# Patient Record
Sex: FEMALE | Race: WHITE | NOT HISPANIC OR LATINO | Employment: OTHER | URBAN - METROPOLITAN AREA
[De-identification: names, ages, dates, MRNs, and addresses within clinical notes are randomized per-mention and may not be internally consistent; named-entity substitution may affect disease eponyms.]

---

## 2023-08-14 ENCOUNTER — HOSPITAL ENCOUNTER (OUTPATIENT)
Dept: DATA CONVERSION | Facility: HOSPITAL | Age: 73
Discharge: HOME | End: 2023-08-14

## 2023-08-14 DIAGNOSIS — I10 ESSENTIAL (PRIMARY) HYPERTENSION: ICD-10-CM

## 2023-08-14 DIAGNOSIS — E55.9 VITAMIN D DEFICIENCY, UNSPECIFIED: ICD-10-CM

## 2023-08-14 DIAGNOSIS — Z00.00 ENCOUNTER FOR GENERAL ADULT MEDICAL EXAMINATION WITHOUT ABNORMAL FINDINGS: ICD-10-CM

## 2023-08-14 DIAGNOSIS — E78.5 HYPERLIPIDEMIA, UNSPECIFIED: ICD-10-CM

## 2023-08-14 LAB
25(OH)D3 SERPL-MCNC: 73 NG/ML (ref 31–100)
ALBUMIN SERPL-MCNC: 4.4 GM/DL (ref 3.5–5)
ALBUMIN/GLOB SERPL: 1.8 RATIO (ref 1.5–3)
ALP BLD-CCNC: 71 U/L (ref 35–125)
ALT SERPL-CCNC: 20 U/L (ref 5–40)
ANION GAP SERPL CALCULATED.3IONS-SCNC: 12 MMOL/L (ref 0–19)
APPEARANCE PLAS: CLEAR
AST SERPL-CCNC: 23 U/L (ref 5–40)
BASOPHILS # BLD AUTO: 0.05 K/UL (ref 0–0.22)
BASOPHILS NFR BLD AUTO: 0.9 % (ref 0–1)
BILIRUB SERPL-MCNC: 0.9 MG/DL (ref 0.1–1.2)
BUN SERPL-MCNC: 16 MG/DL (ref 8–25)
BUN/CREAT SERPL: 20 RATIO (ref 8–21)
CALCIUM SERPL-MCNC: 9.4 MG/DL (ref 8.5–10.4)
CHLORIDE SERPL-SCNC: 105 MMOL/L (ref 97–107)
CHOLEST SERPL-MCNC: 159 MG/DL (ref 133–200)
CHOLEST/HDLC SERPL: 2.4 RATIO
CO2 SERPL-SCNC: 22 MMOL/L (ref 24–31)
COLOR SPUN FLD: YELLOW
CREAT SERPL-MCNC: 0.8 MG/DL (ref 0.4–1.6)
DEPRECATED RDW RBC AUTO: 45.9 FL (ref 37–54)
DIFFERENTIAL METHOD BLD: ABNORMAL
EOSINOPHIL # BLD AUTO: 0.2 K/UL (ref 0–0.45)
EOSINOPHIL NFR BLD: 3.5 % (ref 0–3)
ERYTHROCYTE [DISTWIDTH] IN BLOOD BY AUTOMATED COUNT: 12 % (ref 11.7–15)
FASTING STATUS PATIENT QL REPORTED: NORMAL
GFR SERPL CREATININE-BSD FRML MDRD: 78 ML/MIN/1.73 M2
GLOBULIN SER-MCNC: 2.5 G/DL (ref 1.9–3.7)
GLUCOSE SERPL-MCNC: 103 MG/DL (ref 65–99)
HCT VFR BLD AUTO: 45.7 % (ref 36–44)
HDLC SERPL-MCNC: 66 MG/DL
HGB BLD-MCNC: 15.4 GM/DL (ref 12–15)
IMM GRANULOCYTES # BLD AUTO: 0.01 K/UL (ref 0–0.1)
LDLC SERPL CALC-MCNC: 65 MG/DL (ref 65–130)
LYMPHOCYTES # BLD AUTO: 1.35 K/UL (ref 1.2–3.2)
LYMPHOCYTES NFR BLD MANUAL: 23.6 % (ref 20–40)
MCH RBC QN AUTO: 34.5 PG (ref 26–34)
MCHC RBC AUTO-ENTMCNC: 33.7 % (ref 31–37)
MCV RBC AUTO: 102.5 FL (ref 80–100)
MONOCYTES # BLD AUTO: 0.45 K/UL (ref 0–0.8)
MONOCYTES NFR BLD MANUAL: 7.9 % (ref 0–8)
NEUTROPHILS # BLD AUTO: 3.65 K/UL
NEUTROPHILS # BLD AUTO: 3.65 K/UL (ref 1.8–7.7)
NEUTROPHILS.IMMATURE NFR BLD: 0.2 % (ref 0–1)
NEUTS SEG NFR BLD: 63.9 % (ref 50–70)
NRBC BLD-RTO: 0 /100 WBC
PLATELET # BLD AUTO: 216 K/UL (ref 150–450)
PMV BLD AUTO: 9.5 CU (ref 7–12.6)
POTASSIUM SERPL-SCNC: 4.4 MMOL/L (ref 3.4–5.1)
PROT SERPL-MCNC: 6.9 G/DL (ref 5.9–7.9)
RBC # BLD AUTO: 4.46 M/UL (ref 4–4.9)
SODIUM SERPL-SCNC: 139 MMOL/L (ref 133–145)
TRIGL SERPL-MCNC: 138 MG/DL (ref 40–150)
TSH SERPL DL<=0.05 MIU/L-ACNC: 3.3 MIU/L (ref 0.27–4.2)
WBC # BLD AUTO: 5.7 K/UL (ref 4.5–11)

## 2023-08-23 ENCOUNTER — HOSPITAL ENCOUNTER (OUTPATIENT)
Dept: DATA CONVERSION | Facility: HOSPITAL | Age: 73
End: 2023-08-23

## 2023-08-23 DIAGNOSIS — C50.919 MALIGNANT NEOPLASM OF UNSPECIFIED SITE OF UNSPECIFIED FEMALE BREAST (MULTI): ICD-10-CM

## 2023-09-14 ENCOUNTER — HOSPITAL ENCOUNTER (OUTPATIENT)
Dept: DATA CONVERSION | Facility: HOSPITAL | Age: 73
Discharge: HOME | End: 2023-09-14
Payer: MEDICARE

## 2023-09-14 DIAGNOSIS — C50.212 MALIGNANT NEOPLASM OF UPPER-INNER QUADRANT OF LEFT FEMALE BREAST (MULTI): ICD-10-CM

## 2023-09-14 DIAGNOSIS — C50.919 MALIGNANT NEOPLASM OF UNSPECIFIED SITE OF UNSPECIFIED FEMALE BREAST (MULTI): ICD-10-CM

## 2023-09-16 VITALS
HEART RATE: 66 BPM | BODY MASS INDEX: 22.13 KG/M2 | OXYGEN SATURATION: 99 % | WEIGHT: 146 LBS | HEIGHT: 68 IN | RESPIRATION RATE: 16 BRPM | TEMPERATURE: 97.1 F

## 2023-10-04 ENCOUNTER — TELEPHONE (OUTPATIENT)
Dept: PRIMARY CARE | Facility: CLINIC | Age: 73
End: 2023-10-04
Payer: MEDICARE

## 2023-10-06 PROBLEM — E55.9 VITAMIN D DEFICIENCY: Status: ACTIVE | Noted: 2023-10-06

## 2023-10-06 PROBLEM — E21.3 HYPERPARATHYROIDISM (MULTI): Status: ACTIVE | Noted: 2023-10-06

## 2023-10-06 PROBLEM — M51.9 DISORDER OF INTERVERTEBRAL DISC OF LUMBAR SPINE: Status: ACTIVE | Noted: 2023-10-06

## 2023-10-06 PROBLEM — G62.9 NEUROPATHY: Status: ACTIVE | Noted: 2023-10-06

## 2023-10-06 PROBLEM — R20.0 NUMBNESS: Status: ACTIVE | Noted: 2023-10-06

## 2023-10-06 PROBLEM — G56.01 CARPAL TUNNEL SYNDROME OF RIGHT WRIST: Status: ACTIVE | Noted: 2023-10-06

## 2023-10-06 PROBLEM — K80.20 CHOLELITHIASIS: Status: ACTIVE | Noted: 2023-10-06

## 2023-10-06 PROBLEM — C50.919 MALIGNANT NEOPLASM OF FEMALE BREAST (MULTI): Status: ACTIVE | Noted: 2023-10-06

## 2023-10-06 PROBLEM — I10 HYPERTENSION: Status: ACTIVE | Noted: 2023-10-06

## 2023-10-06 PROBLEM — A60.1 HERPES SIMPLEX INFECTION OF PERIANAL SKIN: Status: ACTIVE | Noted: 2023-10-06

## 2023-10-06 PROBLEM — K64.8 OTHER HEMORRHOIDS: Status: ACTIVE | Noted: 2023-10-06

## 2023-10-06 PROBLEM — E78.5 HYPERLIPIDEMIA: Status: ACTIVE | Noted: 2023-10-06

## 2023-10-06 PROBLEM — G47.00 INSOMNIA: Status: ACTIVE | Noted: 2023-10-06

## 2023-10-06 PROBLEM — L25.9 CONTACT DERMATITIS: Status: ACTIVE | Noted: 2023-10-06

## 2023-10-06 PROBLEM — S82.832A CLOSED FRACTURE OF LEFT DISTAL FIBULA: Status: ACTIVE | Noted: 2023-10-06

## 2023-10-06 PROBLEM — K64.4 RESIDUAL HEMORRHOIDAL SKIN TAGS: Status: ACTIVE | Noted: 2023-10-06

## 2023-10-06 RX ORDER — ASPIRIN 81 MG/1
81 TABLET ORAL DAILY
COMMUNITY
Start: 2021-09-17 | End: 2024-04-24 | Stop reason: WASHOUT

## 2023-10-06 RX ORDER — ANASTROZOLE 1 MG/1
1 TABLET ORAL DAILY
COMMUNITY
Start: 2021-09-17 | End: 2023-10-24 | Stop reason: ALTCHOICE

## 2023-10-06 RX ORDER — VALACYCLOVIR HYDROCHLORIDE 500 MG/1
500 TABLET, FILM COATED ORAL DAILY
COMMUNITY
Start: 2022-09-22 | End: 2023-10-23 | Stop reason: ALTCHOICE

## 2023-10-06 RX ORDER — NAPROXEN SODIUM 220 MG/1
TABLET ORAL
COMMUNITY

## 2023-10-06 RX ORDER — TRIAMCINOLONE ACETONIDE 1 MG/G
OINTMENT TOPICAL 2 TIMES DAILY
COMMUNITY
Start: 2022-04-27 | End: 2023-10-23 | Stop reason: ALTCHOICE

## 2023-10-06 RX ORDER — ZOLEDRONIC ACID 0.04 MG/ML
4 INJECTION, SOLUTION INTRAVENOUS
COMMUNITY
End: 2023-10-23 | Stop reason: ALTCHOICE

## 2023-10-06 RX ORDER — GABAPENTIN 100 MG/1
100 CAPSULE ORAL
COMMUNITY
End: 2023-10-23 | Stop reason: ALTCHOICE

## 2023-10-06 RX ORDER — PRAVASTATIN SODIUM 20 MG/1
20 TABLET ORAL DAILY
COMMUNITY
End: 2023-10-23 | Stop reason: ALTCHOICE

## 2023-10-06 RX ORDER — SILVER SULFADIAZINE 10 G/1000G
1 CREAM TOPICAL DAILY
COMMUNITY
Start: 2022-09-27 | End: 2023-10-23 | Stop reason: ALTCHOICE

## 2023-10-06 RX ORDER — ATENOLOL 25 MG/1
25 TABLET ORAL DAILY
COMMUNITY
End: 2023-10-23 | Stop reason: ALTCHOICE

## 2023-10-06 RX ORDER — CHLORHEXIDINE GLUCONATE ORAL RINSE 1.2 MG/ML
SOLUTION DENTAL
COMMUNITY
Start: 2023-05-17 | End: 2023-10-23 | Stop reason: ALTCHOICE

## 2023-10-06 RX ORDER — ATORVASTATIN CALCIUM 10 MG/1
10 TABLET, FILM COATED ORAL DAILY
COMMUNITY
End: 2023-12-15

## 2023-10-06 RX ORDER — TURMERIC ROOT EXTRACT 500 MG
TABLET ORAL
COMMUNITY
End: 2023-10-23 | Stop reason: ALTCHOICE

## 2023-10-06 RX ORDER — ERGOCALCIFEROL (VITAMIN D2) 10 MCG
1 TABLET ORAL DAILY
COMMUNITY

## 2023-10-06 RX ORDER — LORATADINE 10 MG/1
10 TABLET ORAL DAILY
COMMUNITY
End: 2024-03-19 | Stop reason: WASHOUT

## 2023-10-06 RX ORDER — GLUCOSAM/CHONDRO/HERB 149/HYAL 750-100 MG
1 TABLET ORAL DAILY
COMMUNITY
End: 2023-10-23 | Stop reason: ALTCHOICE

## 2023-10-06 RX ORDER — ATENOLOL 50 MG/1
50 TABLET ORAL DAILY
COMMUNITY
End: 2023-10-24 | Stop reason: ALTCHOICE

## 2023-10-06 RX ORDER — LISINOPRIL 10 MG/1
10 TABLET ORAL DAILY
COMMUNITY
Start: 2023-08-14 | End: 2023-12-15

## 2023-10-06 RX ORDER — ACYCLOVIR 800 MG/1
800 TABLET ORAL DAILY
COMMUNITY
End: 2023-10-27 | Stop reason: ALTCHOICE

## 2023-10-06 RX ORDER — METRONIDAZOLE 7.5 MG/G
1 CREAM TOPICAL 2 TIMES DAILY
COMMUNITY
Start: 2023-05-31 | End: 2023-10-23 | Stop reason: ALTCHOICE

## 2023-10-22 PROBLEM — I10 ESSENTIAL HYPERTENSION: Status: RESOLVED | Noted: 2023-10-06 | Resolved: 2023-10-22

## 2023-10-22 PROBLEM — A60.1 HERPESVIRAL INFECTION OF PERIANAL SKIN AND RECTUM: Status: ACTIVE | Noted: 2023-10-22

## 2023-10-22 PROBLEM — K64.9 HEMORRHOIDS: Status: ACTIVE | Noted: 2023-10-06

## 2023-10-22 PROBLEM — R93.1 ABNORMAL FINDINGS ON DIAGNOSTIC IMAGING OF HEART AND CORONARY CIRCULATION: Status: ACTIVE | Noted: 2023-10-22

## 2023-10-23 ENCOUNTER — HOSPITAL ENCOUNTER (EMERGENCY)
Facility: HOSPITAL | Age: 73
Discharge: HOME | End: 2023-10-23
Attending: EMERGENCY MEDICINE
Payer: MEDICARE

## 2023-10-23 ENCOUNTER — OFFICE VISIT (OUTPATIENT)
Dept: CARDIOLOGY | Facility: CLINIC | Age: 73
End: 2023-10-23
Payer: MEDICARE

## 2023-10-23 VITALS
OXYGEN SATURATION: 97 % | SYSTOLIC BLOOD PRESSURE: 137 MMHG | TEMPERATURE: 98.7 F | RESPIRATION RATE: 18 BRPM | DIASTOLIC BLOOD PRESSURE: 85 MMHG | HEIGHT: 67 IN | HEART RATE: 91 BPM | WEIGHT: 148.2 LBS | BODY MASS INDEX: 23.26 KG/M2

## 2023-10-23 VITALS
BODY MASS INDEX: 23.88 KG/M2 | WEIGHT: 152.12 LBS | HEIGHT: 67 IN | RESPIRATION RATE: 16 BRPM | HEART RATE: 94 BPM | SYSTOLIC BLOOD PRESSURE: 112 MMHG | OXYGEN SATURATION: 100 % | TEMPERATURE: 97.7 F | DIASTOLIC BLOOD PRESSURE: 73 MMHG

## 2023-10-23 DIAGNOSIS — I20.89 ANGINA OF EFFORT (CMS-HCC): ICD-10-CM

## 2023-10-23 DIAGNOSIS — R06.02 SHORTNESS OF BREATH: ICD-10-CM

## 2023-10-23 DIAGNOSIS — E78.2 MIXED HYPERLIPIDEMIA: Primary | ICD-10-CM

## 2023-10-23 DIAGNOSIS — I48.91 ATRIAL FIBRILLATION WITH RVR (MULTI): ICD-10-CM

## 2023-10-23 DIAGNOSIS — I48.91 ATRIAL FIBRILLATION, UNSPECIFIED TYPE (MULTI): Primary | ICD-10-CM

## 2023-10-23 DIAGNOSIS — I10 ESSENTIAL HYPERTENSION: ICD-10-CM

## 2023-10-23 DIAGNOSIS — I10 PRIMARY HYPERTENSION: ICD-10-CM

## 2023-10-23 LAB
ALBUMIN SERPL-MCNC: 4.6 G/DL (ref 3.5–5)
ALP BLD-CCNC: 73 U/L (ref 35–125)
ALT SERPL-CCNC: 32 U/L (ref 5–40)
ANION GAP SERPL CALC-SCNC: 11 MMOL/L
AST SERPL-CCNC: 29 U/L (ref 5–40)
BASOPHILS # BLD AUTO: 0.06 X10*3/UL (ref 0–0.1)
BASOPHILS NFR BLD AUTO: 0.8 %
BILIRUB SERPL-MCNC: 0.7 MG/DL (ref 0.1–1.2)
BUN SERPL-MCNC: 20 MG/DL (ref 8–25)
CALCIUM SERPL-MCNC: 9.5 MG/DL (ref 8.5–10.4)
CHLORIDE SERPL-SCNC: 103 MMOL/L (ref 97–107)
CO2 SERPL-SCNC: 24 MMOL/L (ref 24–31)
CREAT SERPL-MCNC: 0.9 MG/DL (ref 0.4–1.6)
EOSINOPHIL # BLD AUTO: 0.15 X10*3/UL (ref 0–0.4)
EOSINOPHIL NFR BLD AUTO: 2 %
ERYTHROCYTE [DISTWIDTH] IN BLOOD BY AUTOMATED COUNT: 12.7 % (ref 11.5–14.5)
GFR SERPL CREATININE-BSD FRML MDRD: 68 ML/MIN/1.73M*2
GLUCOSE SERPL-MCNC: 114 MG/DL (ref 65–99)
HCT VFR BLD AUTO: 46.5 % (ref 36–46)
HGB BLD-MCNC: 15.7 G/DL (ref 12–16)
IMM GRANULOCYTES # BLD AUTO: 0.02 X10*3/UL (ref 0–0.5)
IMM GRANULOCYTES NFR BLD AUTO: 0.3 % (ref 0–0.9)
LYMPHOCYTES # BLD AUTO: 1.78 X10*3/UL (ref 0.8–3)
LYMPHOCYTES NFR BLD AUTO: 23.5 %
MAGNESIUM SERPL-MCNC: 2.3 MG/DL (ref 1.6–3.1)
MCH RBC QN AUTO: 34.7 PG (ref 26–34)
MCHC RBC AUTO-ENTMCNC: 33.8 G/DL (ref 32–36)
MCV RBC AUTO: 103 FL (ref 80–100)
MONOCYTES # BLD AUTO: 0.55 X10*3/UL (ref 0.05–0.8)
MONOCYTES NFR BLD AUTO: 7.3 %
NEUTROPHILS # BLD AUTO: 5.02 X10*3/UL (ref 1.6–5.5)
NEUTROPHILS NFR BLD AUTO: 66.1 %
NRBC BLD-RTO: 0 /100 WBCS (ref 0–0)
PLATELET # BLD AUTO: 230 X10*3/UL (ref 150–450)
PMV BLD AUTO: 9.3 FL (ref 7.5–11.5)
POTASSIUM SERPL-SCNC: 4.8 MMOL/L (ref 3.4–5.1)
PROT SERPL-MCNC: 6.8 G/DL (ref 5.9–7.9)
RBC # BLD AUTO: 4.52 X10*6/UL (ref 4–5.2)
SODIUM SERPL-SCNC: 138 MMOL/L (ref 133–145)
TSH SERPL DL<=0.05 MIU/L-ACNC: 2.46 MIU/L (ref 0.27–4.2)
WBC # BLD AUTO: 7.6 X10*3/UL (ref 4.4–11.3)

## 2023-10-23 PROCEDURE — 99284 EMERGENCY DEPT VISIT MOD MDM: CPT | Mod: 25

## 2023-10-23 PROCEDURE — 93000 ELECTROCARDIOGRAM COMPLETE: CPT | Performed by: INTERNAL MEDICINE

## 2023-10-23 PROCEDURE — 99204 OFFICE O/P NEW MOD 45 MIN: CPT | Performed by: INTERNAL MEDICINE

## 2023-10-23 PROCEDURE — 1126F AMNT PAIN NOTED NONE PRSNT: CPT | Performed by: INTERNAL MEDICINE

## 2023-10-23 PROCEDURE — 84443 ASSAY THYROID STIM HORMONE: CPT | Performed by: EMERGENCY MEDICINE

## 2023-10-23 PROCEDURE — 80053 COMPREHEN METABOLIC PANEL: CPT | Performed by: EMERGENCY MEDICINE

## 2023-10-23 PROCEDURE — 1036F TOBACCO NON-USER: CPT | Performed by: INTERNAL MEDICINE

## 2023-10-23 PROCEDURE — 2500000001 HC RX 250 WO HCPCS SELF ADMINISTERED DRUGS (ALT 637 FOR MEDICARE OP): Performed by: EMERGENCY MEDICINE

## 2023-10-23 PROCEDURE — 83735 ASSAY OF MAGNESIUM: CPT | Performed by: EMERGENCY MEDICINE

## 2023-10-23 PROCEDURE — 36415 COLL VENOUS BLD VENIPUNCTURE: CPT | Performed by: EMERGENCY MEDICINE

## 2023-10-23 PROCEDURE — 96374 THER/PROPH/DIAG INJ IV PUSH: CPT

## 2023-10-23 PROCEDURE — 3079F DIAST BP 80-89 MM HG: CPT | Performed by: INTERNAL MEDICINE

## 2023-10-23 PROCEDURE — 2500000005 HC RX 250 GENERAL PHARMACY W/O HCPCS: Performed by: EMERGENCY MEDICINE

## 2023-10-23 PROCEDURE — 3075F SYST BP GE 130 - 139MM HG: CPT | Performed by: INTERNAL MEDICINE

## 2023-10-23 PROCEDURE — 1159F MED LIST DOCD IN RCRD: CPT | Performed by: INTERNAL MEDICINE

## 2023-10-23 PROCEDURE — 85025 COMPLETE CBC W/AUTO DIFF WBC: CPT | Performed by: EMERGENCY MEDICINE

## 2023-10-23 RX ORDER — DILTIAZEM HYDROCHLORIDE 120 MG/1
120 CAPSULE, COATED, EXTENDED RELEASE ORAL 2 TIMES DAILY
Qty: 60 CAPSULE | Refills: 0 | Status: SHIPPED | OUTPATIENT
Start: 2023-10-23 | End: 2023-10-27 | Stop reason: SDUPTHER

## 2023-10-23 RX ORDER — REGADENOSON 0.08 MG/ML
0.4 INJECTION, SOLUTION INTRAVENOUS
Status: CANCELLED | OUTPATIENT
Start: 2023-10-23

## 2023-10-23 RX ORDER — DILTIAZEM HYDROCHLORIDE 5 MG/ML
15 INJECTION INTRAVENOUS ONCE
Status: COMPLETED | OUTPATIENT
Start: 2023-10-23 | End: 2023-10-23

## 2023-10-23 RX ORDER — DILTIAZEM HYDROCHLORIDE 120 MG/1
120 CAPSULE, COATED, EXTENDED RELEASE ORAL DAILY
Status: DISCONTINUED | OUTPATIENT
Start: 2023-10-23 | End: 2023-10-23 | Stop reason: HOSPADM

## 2023-10-23 RX ADMIN — DILTIAZEM HYDROCHLORIDE 120 MG: 120 CAPSULE, COATED, EXTENDED RELEASE ORAL at 14:07

## 2023-10-23 RX ADMIN — DILTIAZEM HYDROCHLORIDE 15 MG: 5 INJECTION INTRAVENOUS at 12:22

## 2023-10-23 RX ADMIN — APIXABAN 5 MG: 5 TABLET, FILM COATED ORAL at 14:07

## 2023-10-23 ASSESSMENT — COLUMBIA-SUICIDE SEVERITY RATING SCALE - C-SSRS
2. HAVE YOU ACTUALLY HAD ANY THOUGHTS OF KILLING YOURSELF?: NO
1. IN THE PAST MONTH, HAVE YOU WISHED YOU WERE DEAD OR WISHED YOU COULD GO TO SLEEP AND NOT WAKE UP?: NO
6. HAVE YOU EVER DONE ANYTHING, STARTED TO DO ANYTHING, OR PREPARED TO DO ANYTHING TO END YOUR LIFE?: NO

## 2023-10-23 ASSESSMENT — PATIENT HEALTH QUESTIONNAIRE - PHQ9
2. FEELING DOWN, DEPRESSED OR HOPELESS: NOT AT ALL
SUM OF ALL RESPONSES TO PHQ9 QUESTIONS 1 AND 2: 0
1. LITTLE INTEREST OR PLEASURE IN DOING THINGS: NOT AT ALL

## 2023-10-23 ASSESSMENT — PAIN SCALES - GENERAL
PAINLEVEL_OUTOF10: 0 - NO PAIN
PAINLEVEL: 0-NO PAIN
PAINLEVEL_OUTOF10: 0 - NO PAIN

## 2023-10-23 ASSESSMENT — PAIN - FUNCTIONAL ASSESSMENT
PAIN_FUNCTIONAL_ASSESSMENT: 0-10
PAIN_FUNCTIONAL_ASSESSMENT: 0-10

## 2023-10-23 ASSESSMENT — ENCOUNTER SYMPTOMS
LOSS OF SENSATION IN FEET: 1
DEPRESSION: 0
OCCASIONAL FEELINGS OF UNSTEADINESS: 1

## 2023-10-23 ASSESSMENT — PAIN DESCRIPTION - PROGRESSION: CLINICAL_PROGRESSION: NOT CHANGED

## 2023-10-23 NOTE — LETTER
October 23, 2023     Vishnu Ordoñez DO  [address]  [address]  [facility] [address]    Patient: Radha Montalvo   YOB: 1950   Date of Visit: 10/23/2023       Dear Dr. Vishnu Ordoñez, :    Thank you for referring Radha Montalvo to me for evaluation. Below are my notes for this consultation.  If you have questions, please do not hesitate to call me. I look forward to following your patient along with you.       Sincerely,     Paulo Dougherty MD      CC: Shemar Wang MD  ______________________________________________________________________________________    Subjective  Chief Complaint   Patient presents with   • Establish Care     Mrs\Ms. Montalvo is present to establish relationship with Dr. Dougherty for Followup  after referral from Dr. Ordoñez        Patient with a history of hyperlipidemia currently on Lipitor 10 mg once a day, also on atenolol 50 mg once a day  On lisinopril 10 mg once a day patient has seen  Physician had a coronary calcium score done which is about 2800.  No shortness of breath with exertion here for underlying evaluation of CAD.  No active angina CHF and symptoms unlikely chronic stable angina.  Better with rest.  Patient had EKG done about 5 years ago essentially showed sinus rhythm with a prolonged PA interval.    Patient had a repeat EKG done in the office found having atrial fibrillation rate about 126 bpm  Advised patient go to [facility] emergency room for rate control medication as well as anticoagulation.    Past Medical History:   Diagnosis Date   • Abnormal findings on diagnostic imaging of heart and coronary circulation 10/22/2023   • Cancer (CMS/HCC) 2013   • Essential hypertension 10/06/2023   • Hyperlipidemia 10/06/2023   • Hypertension 10/06/2023   • Personal history of other diseases of the musculoskeletal system and connective tissue 10/06/2023    History of arthritis     Past Surgical History:   Procedure Laterality Date   • BI MAMMO GUIDED  LOCALIZATION BREAST LEFT Left 2/25/2013    BI MAMMO GUIDED LOCALIZATION BREAST LEFT [facility]   • OTHER SURGICAL HISTORY  04/27/2022    Lumpectomy     Family medical history includes stroke in father.  Current Outpatient Medications   Medication Sig Dispense Refill   • acyclovir (Zovirax) 800 mg tablet Take 1 tablet (800 mg) by mouth once daily.     • aspirin 81 mg EC tablet Take 1 tablet (81 mg) by mouth once daily.     • atenolol (Tenormin) 50 mg tablet Take 1 tablet (50 mg) by mouth once daily.     • atorvastatin (Lipitor) 10 mg tablet Take 1 tablet (10 mg) by mouth once daily.     • cholecalciferol (VITAMIN D-3) 10 mcg (400 unit) tablet Take 1 capsule by mouth once daily.     • lisinopril 10 mg tablet Take 1 tablet (10 mg) by mouth once daily.     • loratadine (Claritin) 10 mg tablet Take 1 tablet (10 mg) by mouth once daily.     • omega 3-dha-epa-fish oil (Fish OiL) 1,200 (144-216) mg capsule 1.5 capsules Orally     • vit B complex no.12/niacin,B3, (VITAMIN B COMPLEX NO.12-NIACIN ORAL) Take by mouth.  Vitamin B 12 TABS     • anastrozole (Arimidex) 1 mg tablet Take 1 tablet (1 mg total) by mouth once daily.     • atenolol (Tenormin) 25 mg tablet Take 1 tablet (25 mg) by mouth once daily.     • CALCIUM ORAL 1 tab Oral     • chlorhexidine (Peridex) 0.12 % solution RINSE WITH 1/2 OUNCE TWICE A DAY FOR 30 SECONDS. SPIT DO NOT SWALLOW     • gabapentin (Neurontin) 100 mg capsule Take 1 capsule (100 mg) by mouth.     • metroNIDAZOLE (Metrocream) 0.75 % cream Apply 1 Application topically 2 times a day. to affected area     • omega 3-dha-epa-fish oil (Fish OiL) 1,000 mg (120 mg-180 mg) capsule Take 1 capsule (1,000 mg) by mouth once daily.     • pravastatin (Pravachol) 20 mg tablet Take 1 tablet (20 mg) by mouth once daily.     • silver sulfADIAZINE (Silvadene) 1 % cream Apply 1 Application topically once daily.     • triamcinolone (Kenalog) 0.1 % ointment twice a day.  APPLY AND GENTLY MASSAGE INTO AFFECTED  "AREA(S) TWICE DAILY.     • tumeric-ging-olive-oreg-capryl 100 mg-150 mg- 50 mg-150 mg capsule 1 cap     • turmeric root extract 500 mg tablet Take by mouth.  Turmeric TABS     • Valtrex 500 mg tablet Take 1 tablet (500 mg) by mouth once daily.     • zoledronic acid (Zometa) 4 mg/100 mL piggyback Infuse 100 mL (4 mg) into a venous catheter.  100 ml Intravenous       No current facility-administered medications for this visit.      reports that she has never smoked. She has never been exposed to tobacco smoke. She has never used smokeless tobacco. She reports current alcohol use of about 10.0 standard drinks of alcohol per week. She reports that she does not use drugs.  Patient has no known allergies.  * No diagnoses found *    Vitals:    10/23/23 1019   BP: 137/85   Pulse: 91   Resp: 18   Temp: 37.1 °C (98.7 °F)   TempSrc: Core   SpO2: 97%   Weight: 67.2 kg (148 lb 3.2 oz)   Height: 1.702 m (5' 7\")   PainSc: 0-No pain      BMI:Body mass index is 23.21 kg/m².   General Cardiology:  General Appearance: Alert, oriented and in no acute distress.  HEENT: extra ocular movements intact (EOMI), pupils equal,  round, reactive to light and accommodation (PERRLA).  Carotid Upstroke: no bruit, normal.  Jugular Venous Distention (JVD): flat.  Chest: normal.  Lungs: Clear to auscultation,   Heart Sounds: no S3 or S4, fast S1, S2, irregular rate.  Murmur, Click, Gallop: Soft systolic murmur.  Abdomen: no hepatomegaly, no masses felt, soft.  Extremities: Trace leg edema.  Peripheral pulses: 2 plus bilateral.  NEUROLOGY Cranial nerves II-XII grossly intact.     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  Calcium score was about 2800.  No active chest pain but patient shortness of breath dyspnea exertion.  More likely clinical angina.  Currently on a Lipitor, hypertensive blood pressure medication we will schedule patient for pharmacological stress test with modify risk factors.  Continue current beta-blocker, lisinopril as well as Lipitor.  Advised patient take a baby aspirin 81 mg once a day.    Diet and exercise reviewed with patient..advice to walk about 10,000 steps or about 2 hours during day time. Cut back on salt, sugar and flour. Advised patient to check blood pressure twice a day for next 10 days and give us a call if her blood pressure remains above 170 systolic and diastolic above 95.  Meanwhile cut back on salt, caffeine.  If blood pressure persistently stays above 200 systolic then go to nearest emergency room or call 911.  Aneurysmal dilatation ascending thoracic aorta up to 4.5 cm.  Will refer patient for CT surgery for ascending aortic enlargement 4.5 cm.  No history of smoking.    Paulo Dougherty MD

## 2023-10-23 NOTE — DISCHARGE INSTRUCTIONS
You have been diagnosed with atrial fibrillation.  Begin taking Eliquis and Cardizem as prescribed to control your rate and prevent blood clots.  Make an appointment to follow-up with your cardiologist for further management.    I would recommend stopping your atenolol until you can discuss this in detail with your family doctor at tomorrow's appointment as to whether or not he would like you to continue the atenolol along with the Cardizem.

## 2023-10-23 NOTE — ED PROVIDER NOTES
HPI   Chief Complaint   Patient presents with    Rapid Heart Rate     Sent here by dr flores for a rapid heart rate, new onset a-fib       73-year-old female presents for evaluation of new onset atrial fibrillation with RVR found by her cardiologist, Dr. Dougherty in his office today sent here for further evaluation.  It is asymptomatic.  She is not having any chest pain, shortness breath, palpitations, leg swelling or other symptoms.  States she has been in her usual state of health but had an outpatient calcium screening which was more elevated than her primary care doctor felt was appropriate so referred her to a cardiologist just to establish care and follow-up.  While in the office today she was noted to have new onset A-fib on her EKG she was sent here for further evaluation.                          No data recorded                Patient History   Past Medical History:   Diagnosis Date    Abnormal findings on diagnostic imaging of heart and coronary circulation 10/22/2023    Cancer (CMS/[facility]) 2013    Essential hypertension 10/06/2023    Hyperlipidemia 10/06/2023    Hypertension 10/06/2023    Personal history of other diseases of the musculoskeletal system and connective tissue 10/06/2023    History of arthritis     Past Surgical History:   Procedure Laterality Date    BI MAMMO GUIDED LOCALIZATION BREAST LEFT Left 2/25/2013    BI MAMMO GUIDED LOCALIZATION BREAST LEFT [facility]    OTHER SURGICAL HISTORY  04/27/2022    Lumpectomy     Family History   Problem Relation Name Age of Onset    Stroke Father Liborio Nair     Aneurysm Father Liborio Nair      Social History     Tobacco Use    Smoking status: Never     Passive exposure: Never    Smokeless tobacco: Never   Substance Use Topics    Alcohol use: Yes     Alcohol/week: 10.0 standard drinks of alcohol     Types: 10 Glasses of wine per week    Drug use: Never       Physical Exam   ED Triage Vitals [10/23/23 1129]   Temp Heart Rate Resp BP   36.5 °C (97.7 °F)  110 20 (!) 145/96      SpO2 Temp Source Heart Rate Source Patient Position   95 % Oral Monitor Sitting      BP Location FiO2 (%)     Right arm --       Physical Exam  Vitals and nursing note reviewed.     General: Vitals reviewed. Awake, alert, well-developed, well-nourished, NAD  HEENT: NC/AT, PERRL, MMM  Neck: Supple, trachea midline  Respiratory: No respiratory distress, lungs clear to auscultation bilaterally, no wheezes, rhonchi, or rales  CV: Tachycardic rate, irregularly irregular rhythm, no murmur/gallop/rubs  Abdomen/GI: Soft, non-tender, non-distended, no rebound, guarding, or rigidity, normal bowel sounds  Extremities: Moving all extremities, no deformities no peripheral edema, calf tenderness or palpable cords  Neuro: A/O, normal speech  Skin: Warm, dry. No rashes identified    ED Course & MDM   ED Course as of 10/23/23 1346   Mon Oct 23, 2023   1325 Remains controlled after bolus of Cardizem, consulted patient's cardiologist, Dr. Dougherty who recommends Eliquis 5 mg twice daily and Cardizem CD1 120 mg p.o. twice daily and will see her in the office for follow-up. [UNA]      ED Course User Index  [UNA] Lashonda Jensen MD         Diagnoses as of 10/23/23 1346   Atrial fibrillation, unspecified type (CMS/HCC)   Atrial fibrillation with RVR (CMS/HCC)       Medical Decision Making  73-year-old female presents for new onset atrial fibrillation with RVR noted at her cardiologist office.  No associated symptoms.  Consider primary arrhythmia, electrolyte imbalance, thyroid abnormality among others.  She does not have any chest pain or ACS equivalent symptoms.  No history of physical exam findings to suggest pulmonary embolism.  He does have atrial fibrillation with RVR on my independent review of EKG but no ischemic changes.  Labs obtained without significant abnormality.  Thyroid normal.  Chest x-ray on my independent review with no acute cardiopulmonary process.  Patient was given bolus of IV Cardizem with  improvement of her heart rate into the 80s which maintained so on repeat evaluation for several hours of monitoring.  Patient remains asymptomatic.  Did consult her cardiologist, Dr. Dougherty who agrees that patient can be seen as an outpatient recommended starting Eliquis, Cardizem which were prescribed for the patient.  I did have detailed discussion with the patient regarding this new plan of care.  Also discussed that she likely will need to discontinue her atenolol given she is taking Cardizem.  She does have an appoint with her primary care doctor tomorrow and I recommended discussing this with him but not taking her atenolol until then.Return precautions discussed.    CRITICAL CARE NOTE:    Upon my evaluation, this patient had a high probability of imminent or life-threatening deterioration due to tachyarrhythmia requiring IV rate control, which required my direct attention, intervention, and personal management    31 total minutes of critical care were personally provided which excludes and was non concurrent with other providers and all other billable procedures.  This was for time at the bedside, re-evaluations, interpretation of lab and imaging results, discussions with consultants, and monitoring for potential decompensation.  Intervention were performed as documented above.    Amount and/or Complexity of Data Reviewed  Labs: ordered. Decision-making details documented in ED Course.  Radiology: ordered. Decision-making details documented in ED Course.  ECG/medicine tests: ordered and independent interpretation performed. Decision-making details documented in ED Course.     Details: EKG on my independent review interpreted at 1129: Atrial fibrillation with RVR at 119 bpm, normal axis, other than GA normal intervals, no acute ST or T wave abnormalities    EKG on my independent review interpreted at 1258: Atrial fibrillation at 88 bpm, normal axis, normal intervals aside from GA, no acute ST or T wave  abnormalities        Procedure  Procedures     Lashonda Jensen MD  10/23/23 2904

## 2023-10-23 NOTE — PROGRESS NOTES
Subjective   Chief Complaint   Patient presents with    Establish Care     Mrs\Ms. Montalvo is present to establish relationship with Dr. Dougherty for Followup  after referral from Dr. Ordoñez        Patient with a history of hyperlipidemia currently on Lipitor 10 mg once a day, also on atenolol 50 mg once a day  On lisinopril 10 mg once a day patient has seen  Physician had a coronary calcium score done which is about 2800.  No shortness of breath with exertion here for underlying evaluation of CAD.  No active angina CHF and symptoms unlikely chronic stable angina.  Better with rest.  Patient had EKG done about 5 years ago essentially showed sinus rhythm with a prolonged MA interval.    Patient had a repeat EKG done in the office found having atrial fibrillation rate about 126 bpm  Advised patient go to [facility] emergency room for rate control medication as well as anticoagulation.    Past Medical History:   Diagnosis Date    Abnormal findings on diagnostic imaging of heart and coronary circulation 10/22/2023    Cancer (CMS/[facility]) 2013    Essential hypertension 10/06/2023    Hyperlipidemia 10/06/2023    Hypertension 10/06/2023    Personal history of other diseases of the musculoskeletal system and connective tissue 10/06/2023    History of arthritis     Past Surgical History:   Procedure Laterality Date    BI MAMMO GUIDED LOCALIZATION BREAST LEFT Left 2/25/2013    BI MAMMO GUIDED LOCALIZATION BREAST LEFT [facility]    OTHER SURGICAL HISTORY  04/27/2022    Lumpectomy     Family medical history includes stroke in father.  Current Outpatient Medications   Medication Sig Dispense Refill    acyclovir (Zovirax) 800 mg tablet Take 1 tablet (800 mg) by mouth once daily.      aspirin 81 mg EC tablet Take 1 tablet (81 mg) by mouth once daily.      atenolol (Tenormin) 50 mg tablet Take 1 tablet (50 mg) by mouth once daily.      atorvastatin (Lipitor) 10 mg tablet Take 1 tablet (10 mg) by mouth once daily.       cholecalciferol (VITAMIN D-3) 10 mcg (400 unit) tablet Take 1 capsule by mouth once daily.      lisinopril 10 mg tablet Take 1 tablet (10 mg) by mouth once daily.      loratadine (Claritin) 10 mg tablet Take 1 tablet (10 mg) by mouth once daily.      omega 3-dha-epa-fish oil (Fish OiL) 1,200 (144-216) mg capsule 1.5 capsules Orally      vit B complex no.12/niacin,B3, (VITAMIN B COMPLEX NO.12-NIACIN ORAL) Take by mouth.  Vitamin B 12 TABS      anastrozole (Arimidex) 1 mg tablet Take 1 tablet (1 mg total) by mouth once daily.      atenolol (Tenormin) 25 mg tablet Take 1 tablet (25 mg) by mouth once daily.      CALCIUM ORAL 1 tab Oral      chlorhexidine (Peridex) 0.12 % solution RINSE WITH 1/2 OUNCE TWICE A DAY FOR 30 SECONDS. SPIT DO NOT SWALLOW      gabapentin (Neurontin) 100 mg capsule Take 1 capsule (100 mg) by mouth.      metroNIDAZOLE (Metrocream) 0.75 % cream Apply 1 Application topically 2 times a day. to affected area      omega 3-dha-epa-fish oil (Fish OiL) 1,000 mg (120 mg-180 mg) capsule Take 1 capsule (1,000 mg) by mouth once daily.      pravastatin (Pravachol) 20 mg tablet Take 1 tablet (20 mg) by mouth once daily.      silver sulfADIAZINE (Silvadene) 1 % cream Apply 1 Application topically once daily.      triamcinolone (Kenalog) 0.1 % ointment twice a day.  APPLY AND GENTLY MASSAGE INTO AFFECTED AREA(S) TWICE DAILY.      tumeric-ging-olive-oreg-capryl 100 mg-150 mg- 50 mg-150 mg capsule 1 cap      turmeric root extract 500 mg tablet Take by mouth.  Turmeric TABS      Valtrex 500 mg tablet Take 1 tablet (500 mg) by mouth once daily.      zoledronic acid (Zometa) 4 mg/100 mL piggyback Infuse 100 mL (4 mg) into a venous catheter.  100 ml Intravenous       No current facility-administered medications for this visit.      reports that she has never smoked. She has never been exposed to tobacco smoke. She has never used smokeless tobacco. She reports current alcohol use of about 10.0 standard drinks of  "alcohol per week. She reports that she does not use drugs.  Patient has no known allergies.  * No diagnoses found *    Vitals:    10/23/23 1019   BP: 137/85   Pulse: 91   Resp: 18   Temp: 37.1 °C (98.7 °F)   TempSrc: Core   SpO2: 97%   Weight: 67.2 kg (148 lb 3.2 oz)   Height: 1.702 m (5' 7\")   PainSc: 0-No pain      BMI:Body mass index is 23.21 kg/m².   General Cardiology:  General Appearance: Alert, oriented and in no acute distress.  HEENT: extra ocular movements intact (EOMI), pupils equal,  round, reactive to light and accommodation (PERRLA).  Carotid Upstroke: no bruit, normal.  Jugular Venous Distention (JVD): flat.  Chest: normal.  Lungs: Clear to auscultation,   Heart Sounds: no S3 or S4, fast S1, S2, irregular rate.  Murmur, Click, Gallop: Soft systolic murmur.  Abdomen: no hepatomegaly, no masses felt, soft.  Extremities: Trace leg edema.  Peripheral pulses: 2 plus bilateral.  NEUROLOGY Cranial nerves II-XII grossly intact.     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  Calcium score was about 2800.  No active chest pain but patient shortness of breath dyspnea exertion.  More likely clinical angina.  Currently on a Lipitor, hypertensive blood pressure medication we will schedule patient for pharmacological stress test with modify risk " factors.  Continue current beta-blocker, lisinopril as well as Lipitor.  Advised patient take a baby aspirin 81 mg once a day.    Diet and exercise reviewed with patient..advice to walk about 10,000 steps or about 2 hours during day time. Cut back on salt, sugar and flour. Advised patient to check blood pressure twice a day for next 10 days and give us a call if her blood pressure remains above 170 systolic and diastolic above 95.  Meanwhile cut back on salt, caffeine.  If blood pressure persistently stays above 200 systolic then go to nearest emergency room or call 911.  Aneurysmal dilatation ascending thoracic aorta up to 4.5 cm.  Will refer patient for CT surgery for ascending aortic enlargement 4.5 cm.  No history of smoking.    Paulo Dougherty MD

## 2023-10-24 ENCOUNTER — OFFICE VISIT (OUTPATIENT)
Dept: PRIMARY CARE | Facility: CLINIC | Age: 73
End: 2023-10-24
Payer: MEDICARE

## 2023-10-24 VITALS
SYSTOLIC BLOOD PRESSURE: 110 MMHG | HEART RATE: 68 BPM | OXYGEN SATURATION: 94 % | WEIGHT: 147 LBS | HEIGHT: 67 IN | DIASTOLIC BLOOD PRESSURE: 70 MMHG | TEMPERATURE: 97.5 F | RESPIRATION RATE: 20 BRPM | BODY MASS INDEX: 23.07 KG/M2

## 2023-10-24 DIAGNOSIS — I71.21 ANEURYSM OF ASCENDING AORTA WITHOUT RUPTURE (CMS-HCC): ICD-10-CM

## 2023-10-24 DIAGNOSIS — I48.19 PERSISTENT ATRIAL FIBRILLATION (MULTI): Primary | ICD-10-CM

## 2023-10-24 DIAGNOSIS — R93.1 ELEVATED CORONARY ARTERY CALCIUM SCORE: ICD-10-CM

## 2023-10-24 DIAGNOSIS — I10 ESSENTIAL HYPERTENSION: ICD-10-CM

## 2023-10-24 PROCEDURE — 3078F DIAST BP <80 MM HG: CPT | Performed by: FAMILY MEDICINE

## 2023-10-24 PROCEDURE — 1159F MED LIST DOCD IN RCRD: CPT | Performed by: FAMILY MEDICINE

## 2023-10-24 PROCEDURE — 1036F TOBACCO NON-USER: CPT | Performed by: FAMILY MEDICINE

## 2023-10-24 PROCEDURE — 99214 OFFICE O/P EST MOD 30 MIN: CPT | Performed by: FAMILY MEDICINE

## 2023-10-24 PROCEDURE — 1126F AMNT PAIN NOTED NONE PRSNT: CPT | Performed by: FAMILY MEDICINE

## 2023-10-24 PROCEDURE — 3074F SYST BP LT 130 MM HG: CPT | Performed by: FAMILY MEDICINE

## 2023-10-24 ASSESSMENT — ENCOUNTER SYMPTOMS
CONSTITUTIONAL NEGATIVE: 1
RESPIRATORY NEGATIVE: 1
MUSCULOSKELETAL NEGATIVE: 1
CARDIOVASCULAR NEGATIVE: 1
NEUROLOGICAL NEGATIVE: 1
GASTROINTESTINAL NEGATIVE: 1

## 2023-10-24 ASSESSMENT — PATIENT HEALTH QUESTIONNAIRE - PHQ9
SUM OF ALL RESPONSES TO PHQ9 QUESTIONS 1 AND 2: 0
2. FEELING DOWN, DEPRESSED OR HOPELESS: NOT AT ALL
1. LITTLE INTEREST OR PLEASURE IN DOING THINGS: NOT AT ALL

## 2023-10-24 ASSESSMENT — PAIN SCALES - GENERAL: PAINLEVEL: 0-NO PAIN

## 2023-10-24 NOTE — PROGRESS NOTES
"Subjective   Patient ID: Radha Montalvo is a 73 y.o. female who presents for Follow-up (PATIENT SAW DR PIZANO YESTERDAY HE SENT HER TO THE ER  FOR FAST HEART RATE , SHE WENT TO [facility] . SHE WOULDLIKE TO GO OVER THE RECORDS AND DISCUSS HERMEDICATION).    HPI She was sent from Dr Malin office from [facility] a [facility] with rvr.  .  She was placed eliquis and diltiazem  twice daily.   He had wanted to possibly dc her atenolol.    Er records reviewed  Review of Systems   Constitutional: Negative.    HENT: Negative.     Respiratory: Negative.     Cardiovascular: Negative.    Gastrointestinal: Negative.    Genitourinary: Negative.    Musculoskeletal: Negative.    Neurological: Negative.        Objective   /70 (BP Location: Left arm, Patient Position: Sitting, BP Cuff Size: Adult)   Pulse 68   Temp 36.4 °C (97.5 °F) (Skin)   Resp 20   Ht 1.702 m (5' 7\")   Wt 66.7 kg (147 lb)   LMP  (LMP Unknown)   SpO2 94%   BMI 23.02 kg/m²     Physical Exam  Cardiovascular:      Rate and Rhythm: Normal rate. Rhythm irregular.   Pulmonary:      Breath sounds: Normal breath sounds.         Assessment/Plan   Problem List Items Addressed This Visit             ICD-10-CM    RESOLVED: Essential hypertension I10     Other Visit Diagnoses         Codes    Persistent atrial fibrillation (CMS/HCC)    -  Primary I48.19    Elevated coronary artery calcium score     R93.1    Aneurysm of ascending aorta without rupture (CMS/HCC)     I71.21        Reviewed er report and continue current meds.  Follow up with cardiology and cardiothoracic surgery for aortic aneurysm.  Dc atenolol and monitor bp heart rate.   Recheck 6 wks     "

## 2023-10-27 ENCOUNTER — OFFICE VISIT (OUTPATIENT)
Dept: CARDIOLOGY | Facility: CLINIC | Age: 73
End: 2023-10-27
Payer: MEDICARE

## 2023-10-27 VITALS
BODY MASS INDEX: 23.07 KG/M2 | WEIGHT: 147 LBS | SYSTOLIC BLOOD PRESSURE: 152 MMHG | RESPIRATION RATE: 18 BRPM | HEART RATE: 95 BPM | DIASTOLIC BLOOD PRESSURE: 84 MMHG | HEIGHT: 67 IN

## 2023-10-27 DIAGNOSIS — I10 ESSENTIAL HYPERTENSION: ICD-10-CM

## 2023-10-27 DIAGNOSIS — E78.2 MIXED HYPERLIPIDEMIA: Primary | ICD-10-CM

## 2023-10-27 DIAGNOSIS — I20.89 ANGINA OF EFFORT (CMS-HCC): ICD-10-CM

## 2023-10-27 DIAGNOSIS — I48.91 ATRIAL FIBRILLATION, UNSPECIFIED TYPE (MULTI): ICD-10-CM

## 2023-10-27 PROBLEM — M50.30 DEGENERATIVE DISC DISEASE, CERVICAL: Chronic | Status: ACTIVE | Noted: 2017-01-04

## 2023-10-27 PROBLEM — Z85.3 HISTORY OF BREAST CANCER: Status: ACTIVE | Noted: 2017-04-25

## 2023-10-27 PROBLEM — M99.51 INTERVERTEBRAL DISC STENOSIS OF NEURAL CANAL OF CERVICAL REGION: Chronic | Status: ACTIVE | Noted: 2017-01-04

## 2023-10-27 PROBLEM — M54.2 CERVICALGIA: Status: ACTIVE | Noted: 2017-01-17

## 2023-10-27 PROBLEM — M54.12 CERVICAL RADICULITIS: Chronic | Status: ACTIVE | Noted: 2017-01-04

## 2023-10-27 PROCEDURE — 3079F DIAST BP 80-89 MM HG: CPT | Performed by: INTERNAL MEDICINE

## 2023-10-27 PROCEDURE — 1126F AMNT PAIN NOTED NONE PRSNT: CPT | Performed by: INTERNAL MEDICINE

## 2023-10-27 PROCEDURE — 99214 OFFICE O/P EST MOD 30 MIN: CPT | Performed by: INTERNAL MEDICINE

## 2023-10-27 PROCEDURE — 3077F SYST BP >= 140 MM HG: CPT | Performed by: INTERNAL MEDICINE

## 2023-10-27 PROCEDURE — 1159F MED LIST DOCD IN RCRD: CPT | Performed by: INTERNAL MEDICINE

## 2023-10-27 PROCEDURE — 1036F TOBACCO NON-USER: CPT | Performed by: INTERNAL MEDICINE

## 2023-10-27 RX ORDER — VIT C/E/ZN/COPPR/LUTEIN/ZEAXAN 250MG-90MG
1000 CAPSULE ORAL
COMMUNITY
End: 2023-10-27 | Stop reason: SDUPTHER

## 2023-10-27 RX ORDER — ZOLEDRONIC ACID 4 MG/5ML
4 INJECTION INTRAVENOUS ONCE
COMMUNITY
End: 2023-10-27 | Stop reason: ALTCHOICE

## 2023-10-27 RX ORDER — ACYCLOVIR 800 MG/1
800 TABLET ORAL DAILY
COMMUNITY
End: 2024-02-22

## 2023-10-27 RX ORDER — DILTIAZEM HYDROCHLORIDE 120 MG/1
240 CAPSULE, COATED, EXTENDED RELEASE ORAL 2 TIMES DAILY
Qty: 60 CAPSULE | Refills: 0 | Status: SHIPPED | OUTPATIENT
Start: 2023-10-27 | End: 2023-11-20 | Stop reason: SDUPTHER

## 2023-10-27 NOTE — PROGRESS NOTES
Subjective   No chief complaint on file.     Patient is seen and examined.  Last was seen in office about 4 days ago.  Patient came in routine follow-up.  EKG found to having in office A-fib RVR rate about 126 bpm patient was seen in the hospital.  Controlled with beta-blocker including Cardizem and anticoagulation no active chest pain tightness.  Patient had a history of coronary calcium score which is about 2800.  Patient also has upcoming pharmacological stress test as well as follow-up appointment with the CT surgery for ascending aortic aneurysm 4.5 cm.  So far stable cardiac wise feeling much better at the moment.  Patient back on Eliquis and diltiazem for better rate control as well as anticoagulation.  Continue current treatment.  Modify risk factor.    Past Medical History:   Diagnosis Date    Abnormal findings on diagnostic imaging of heart and coronary circulation 10/22/2023    Cancer (CMS/HCC) 2013    Essential hypertension 10/06/2023    Hyperlipidemia 10/06/2023    Hypertension 10/06/2023    Personal history of other diseases of the musculoskeletal system and connective tissue 10/06/2023    History of arthritis     Past Surgical History:   Procedure Laterality Date    BI MAMMO GUIDED LOCALIZATION BREAST LEFT Left 2/25/2013    BI MAMMO GUIDED LOCALIZATION BREAST LEFT [facility]    OTHER SURGICAL HISTORY  04/27/2022    Lumpectomy     Family medical history includes stroke in father.  Current Outpatient Medications   Medication Sig Dispense Refill    apixaban (Eliquis) 5 mg tablet Take 1 tablet (5 mg) by mouth 2 times a day. 60 tablet 0    aspirin 81 mg EC tablet Take 1 tablet (81 mg) by mouth once daily.      atorvastatin (Lipitor) 10 mg tablet Take 1 tablet (10 mg) by mouth once daily.      cholecalciferol (VITAMIN D-3) 10 mcg (400 unit) tablet Take 1 capsule by mouth once daily.      lisinopril 10 mg tablet Take 1 tablet (10 mg) by mouth once daily.      loratadine (Claritin) 10 mg tablet Take  "1 tablet (10 mg) by mouth once daily.      omega 3-dha-epa-fish oil (Fish OiL) 1,200 (144-216) mg capsule 1.5 capsules Orally      vit B complex no.12/niacin,B3, (VITAMIN B COMPLEX NO.12-NIACIN ORAL) Take by mouth.  Vitamin B 12 TABS      acyclovir (Zovirax) 800 mg tablet Take 1 tablet (800 mg) by mouth once daily.      dilTIAZem CD (Cardizem CD) 120 mg 24 hr capsule Take 2 capsules (240 mg) by mouth 2 times a day. 60 capsule 0     No current facility-administered medications for this visit.      reports that she has never smoked. She has never been exposed to tobacco smoke. She has never used smokeless tobacco. She reports that she does not drink alcohol and does not use drugs.  Patient has no known allergies.  Mixed hyperlipidemia    Vitals:    10/27/23 1153   BP: 152/84   Pulse: 95   Resp: 18   Weight: 66.7 kg (147 lb)   Height: 1.702 m (5' 7\")      BMI:Body mass index is 23.02 kg/m².   General Cardiology:  General Appearance: Alert, oriented and in no acute distress.  HEENT: extra ocular movements intact (EOMI), pupils equal,  round, reactive to light and accommodation (PERRLA).  Carotid Upstroke: no bruit, normal.  Jugular Venous Distention (JVD): flat.  Chest: normal.  Lungs: Clear to auscultation,   Heart Sounds: no S3 or S4, normal S1, S2, regular rate.  Murmur, Click, Gallop: Soft systolic murmur.  Abdomen: no hepatomegaly, no masses felt, soft.  Extremities: Trace leg edema.  Peripheral pulses: 2 plus bilateral.  NEUROLOGY Cranial nerves II-XII grossly intact.     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    Shortness of breath    Angina of effort    Cervical radiculitis    Cervicalgia    Degenerative disc disease, cervical    History of breast cancer    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       Problem List Items Addressed This Visit          Cardiac and Vasculature    Hyperlipidemia - Primary    Relevant Medications    dilTIAZem CD (Cardizem CD) 120 mg 24 hr capsule    Other Relevant Orders    Follow Up In Cardiology    Essential hypertension    Relevant Medications    dilTIAZem CD (Cardizem CD) 120 mg 24 hr capsule    Other Relevant Orders    Follow Up In Cardiology    Angina of effort    Relevant Medications    dilTIAZem CD (Cardizem CD) 120 mg 24 hr capsule    Other Relevant Orders    Follow Up In Cardiology     Other Visit Diagnoses       Atrial fibrillation, unspecified type (CMS/HCC)        Relevant Medications    dilTIAZem CD (Cardizem CD) 120 mg 24 hr capsule    Other Relevant Orders    Follow Up In Cardiology           Patient has a history of hypertension hyperlipidemia multiple cardiac risk factor.  Ongoing work-up for CAD pending pharmacological stress test.  Patient went to [facility] emergency room for A-fib RVR from the office.  Discharged home on a rate control medication as well as Eliquis.  Patient currently taking the pills.  Modify risk factor.  Pending CT surgery appointment for dilated ascending arctic aneurysm.  Diet and exercise reviewed with patient..advice to walk about 10,000 steps or about 2 hours during day time. Cut back on salt, sugar and flour.  Advised patient to check blood pressure twice a day for next 10 days and give us a call if her blood pressure remains above 170 systolic and diastolic above 95.  Meanwhile cut back on salt, caffeine.  If blood pressure persistently stays above  200 systolic then go to nearest emergency room or call 911.  Advised patient to avoid lunch meats, canned soups, pizzas, bread rolls, and sandwiches. Advised patient to limit salt intake 1,500 mg daily. Advised patient to exercise 30 mins/3 times a week including treadmill or aerobic type, Goal to achieve 65% target HR.    Paulo Dougherty MD

## 2023-10-31 ENCOUNTER — TELEPHONE (OUTPATIENT)
Dept: CARDIOLOGY | Facility: CLINIC | Age: 73
End: 2023-10-31
Payer: MEDICARE

## 2023-11-02 DIAGNOSIS — I48.91 ATRIAL FIBRILLATION, UNSPECIFIED TYPE (MULTI): ICD-10-CM

## 2023-11-02 NOTE — TELEPHONE ENCOUNTER
Requested Prescriptions     Pending Prescriptions Disp Refills    apixaban (Eliquis) 5 mg tablet 60 tablet 8     Sig: Take 1 tablet (5 mg) by mouth 2 times a day.     Please send the attached prescription for the patient. They have a follow up scheduled. Thank you.    Leo Rich MA

## 2023-11-08 ENCOUNTER — HOSPITAL ENCOUNTER (OUTPATIENT)
Dept: RADIOLOGY | Facility: HOSPITAL | Age: 73
Discharge: HOME | End: 2023-11-08
Payer: MEDICARE

## 2023-11-08 ENCOUNTER — HOSPITAL ENCOUNTER (OUTPATIENT)
Dept: CARDIOLOGY | Facility: HOSPITAL | Age: 73
Discharge: HOME | End: 2023-11-08
Payer: MEDICARE

## 2023-11-08 DIAGNOSIS — I10 ESSENTIAL HYPERTENSION: ICD-10-CM

## 2023-11-08 DIAGNOSIS — E78.2 MIXED HYPERLIPIDEMIA: ICD-10-CM

## 2023-11-08 DIAGNOSIS — I10 PRIMARY HYPERTENSION: ICD-10-CM

## 2023-11-08 DIAGNOSIS — R06.02 SHORTNESS OF BREATH: ICD-10-CM

## 2023-11-08 DIAGNOSIS — I20.9 ANGINA PECTORIS, UNSPECIFIED (CMS-HCC): ICD-10-CM

## 2023-11-08 PROCEDURE — 3430000001 HC RX 343 DIAGNOSTIC RADIOPHARMACEUTICALS: Performed by: INTERNAL MEDICINE

## 2023-11-08 PROCEDURE — 93017 CV STRESS TEST TRACING ONLY: CPT

## 2023-11-08 PROCEDURE — 2500000004 HC RX 250 GENERAL PHARMACY W/ HCPCS (ALT 636 FOR OP/ED)

## 2023-11-08 PROCEDURE — A9502 TC99M TETROFOSMIN: HCPCS | Performed by: INTERNAL MEDICINE

## 2023-11-08 PROCEDURE — 78452 HT MUSCLE IMAGE SPECT MULT: CPT

## 2023-11-08 PROCEDURE — 93018 CV STRESS TEST I&R ONLY: CPT | Performed by: INTERNAL MEDICINE

## 2023-11-08 PROCEDURE — 93016 CV STRESS TEST SUPVJ ONLY: CPT | Performed by: INTERNAL MEDICINE

## 2023-11-08 RX ORDER — REGADENOSON 0.08 MG/ML
0.4 INJECTION, SOLUTION INTRAVENOUS
Status: COMPLETED | OUTPATIENT
Start: 2023-11-08 | End: 2023-11-08

## 2023-11-08 RX ORDER — REGADENOSON 0.08 MG/ML
INJECTION, SOLUTION INTRAVENOUS
Status: COMPLETED
Start: 2023-11-08 | End: 2023-11-08

## 2023-11-08 RX ADMIN — REGADENOSON 0.4 MG: 0.08 INJECTION, SOLUTION INTRAVENOUS at 08:30

## 2023-11-08 RX ADMIN — TETROFOSMIN 10.8 MILLICURIE: 0.23 INJECTION, POWDER, LYOPHILIZED, FOR SOLUTION INTRAVENOUS at 08:00

## 2023-11-08 RX ADMIN — TETROFOSMIN 33 MILLICURIE: 0.23 INJECTION, POWDER, LYOPHILIZED, FOR SOLUTION INTRAVENOUS at 09:20

## 2023-11-15 LAB
ATRIAL RATE: 300 BPM
Q ONSET: 214 MS
QRS COUNT: 14 BEATS
QRS DURATION: 72 MS
QT INTERVAL: 394 MS
QTC CALCULATION(BAZETT): 476 MS
QTC FREDERICIA: 447 MS
R AXIS: -1 DEGREES
T AXIS: 40 DEGREES
T OFFSET: 411 MS
VENTRICULAR RATE: 88 BPM

## 2023-11-20 DIAGNOSIS — E78.2 MIXED HYPERLIPIDEMIA: ICD-10-CM

## 2023-11-20 DIAGNOSIS — I20.89 ANGINA OF EFFORT (CMS-HCC): ICD-10-CM

## 2023-11-20 DIAGNOSIS — I10 ESSENTIAL HYPERTENSION: ICD-10-CM

## 2023-11-20 DIAGNOSIS — I48.91 ATRIAL FIBRILLATION, UNSPECIFIED TYPE (MULTI): ICD-10-CM

## 2023-11-20 RX ORDER — DILTIAZEM HYDROCHLORIDE 120 MG/1
240 CAPSULE, COATED, EXTENDED RELEASE ORAL 2 TIMES DAILY
Qty: 360 CAPSULE | Refills: 3 | Status: SHIPPED | OUTPATIENT
Start: 2023-11-20 | End: 2024-02-26

## 2023-11-21 PROBLEM — S82.832A CLOSED FRACTURE OF LEFT DISTAL FIBULA: Status: RESOLVED | Noted: 2023-10-06 | Resolved: 2023-11-21

## 2023-11-21 PROBLEM — E21.3 HYPERPARATHYROIDISM (MULTI): Status: RESOLVED | Noted: 2023-10-06 | Resolved: 2023-11-21

## 2023-11-21 PROBLEM — K80.20 CHOLELITHIASIS: Status: RESOLVED | Noted: 2023-10-06 | Resolved: 2023-11-21

## 2023-11-21 NOTE — PROGRESS NOTES
Referral from Dr. Dougherty. Radha comes to discuss treatment recommendations for dilated ascending aorta seen on cardiac scoring ct 9/26/23. History of hld, htn, arthritis, DJD, breast cancer, a fib on eliquis. Mary Aguilera RN    This is 73 years old female patient who was sent to me by Dr. Dougherty from [facility].  She underwent standard calcium score CT to understand her cardiovascular risk factor and she was found incidentally with a 4.5 cm ascending aorta.  I have gone through the standard questionnaire with the patient and she denies any significant family history of aortic complications.  At this point and considering that she is 72 years old I think that surveillance is the way to go.  We will continue with a new CT scan in 1 more year but also we can request an echocardiogram to make sure she has got a trileaflet nonworking aortic valve.  In the meantime I advised her to maintain blood pressure control on a standard cardiovascular risk factor management.  I also suggested avoid heavy lifting and then we continue to follow in 1 more year.

## 2023-11-22 ENCOUNTER — OFFICE VISIT (OUTPATIENT)
Dept: CARDIAC SURGERY | Facility: HOSPITAL | Age: 73
End: 2023-11-22
Payer: MEDICARE

## 2023-11-22 VITALS
WEIGHT: 148 LBS | DIASTOLIC BLOOD PRESSURE: 88 MMHG | HEIGHT: 67 IN | HEART RATE: 110 BPM | OXYGEN SATURATION: 97 % | SYSTOLIC BLOOD PRESSURE: 138 MMHG | BODY MASS INDEX: 23.23 KG/M2

## 2023-11-22 DIAGNOSIS — I77.810 ASCENDING AORTA DILATION (CMS-HCC): ICD-10-CM

## 2023-11-22 DIAGNOSIS — Q25.44: ICD-10-CM

## 2023-11-22 PROCEDURE — 3079F DIAST BP 80-89 MM HG: CPT | Performed by: THORACIC SURGERY (CARDIOTHORACIC VASCULAR SURGERY)

## 2023-11-22 PROCEDURE — 99205 OFFICE O/P NEW HI 60 MIN: CPT | Performed by: THORACIC SURGERY (CARDIOTHORACIC VASCULAR SURGERY)

## 2023-11-22 PROCEDURE — 1126F AMNT PAIN NOTED NONE PRSNT: CPT | Performed by: THORACIC SURGERY (CARDIOTHORACIC VASCULAR SURGERY)

## 2023-11-22 PROCEDURE — 1159F MED LIST DOCD IN RCRD: CPT | Performed by: THORACIC SURGERY (CARDIOTHORACIC VASCULAR SURGERY)

## 2023-11-22 PROCEDURE — 1036F TOBACCO NON-USER: CPT | Performed by: THORACIC SURGERY (CARDIOTHORACIC VASCULAR SURGERY)

## 2023-11-22 PROCEDURE — 99215 OFFICE O/P EST HI 40 MIN: CPT | Mod: 25 | Performed by: THORACIC SURGERY (CARDIOTHORACIC VASCULAR SURGERY)

## 2023-11-22 PROCEDURE — 3075F SYST BP GE 130 - 139MM HG: CPT | Performed by: THORACIC SURGERY (CARDIOTHORACIC VASCULAR SURGERY)

## 2023-11-22 ASSESSMENT — PAIN SCALES - GENERAL: PAINLEVEL: 0-NO PAIN

## 2023-11-24 ENCOUNTER — OFFICE VISIT (OUTPATIENT)
Dept: PRIMARY CARE | Facility: CLINIC | Age: 73
End: 2023-11-24
Payer: MEDICARE

## 2023-11-24 VITALS
WEIGHT: 150 LBS | RESPIRATION RATE: 18 BRPM | DIASTOLIC BLOOD PRESSURE: 82 MMHG | TEMPERATURE: 94.8 F | BODY MASS INDEX: 23.49 KG/M2 | SYSTOLIC BLOOD PRESSURE: 122 MMHG | HEART RATE: 70 BPM | OXYGEN SATURATION: 99 %

## 2023-11-24 DIAGNOSIS — J01.10 ACUTE NON-RECURRENT FRONTAL SINUSITIS: ICD-10-CM

## 2023-11-24 DIAGNOSIS — R05.1 ACUTE COUGH: Primary | ICD-10-CM

## 2023-11-24 PROCEDURE — 3079F DIAST BP 80-89 MM HG: CPT | Performed by: REGISTERED NURSE

## 2023-11-24 PROCEDURE — 99213 OFFICE O/P EST LOW 20 MIN: CPT | Performed by: REGISTERED NURSE

## 2023-11-24 PROCEDURE — 1126F AMNT PAIN NOTED NONE PRSNT: CPT | Performed by: REGISTERED NURSE

## 2023-11-24 PROCEDURE — 1036F TOBACCO NON-USER: CPT | Performed by: REGISTERED NURSE

## 2023-11-24 PROCEDURE — 3074F SYST BP LT 130 MM HG: CPT | Performed by: REGISTERED NURSE

## 2023-11-24 PROCEDURE — 1159F MED LIST DOCD IN RCRD: CPT | Performed by: REGISTERED NURSE

## 2023-11-24 RX ORDER — FLUTICASONE PROPIONATE 50 MCG
1 SPRAY, SUSPENSION (ML) NASAL DAILY
Qty: 16 G | Refills: 5 | Status: SHIPPED | OUTPATIENT
Start: 2023-11-24 | End: 2023-12-18

## 2023-11-24 RX ORDER — HYDROCODONE BITARTRATE AND HOMATROPINE METHYLBROMIDE ORAL SOLUTION 5; 1.5 MG/5ML; MG/5ML
5 LIQUID ORAL EVERY 6 HOURS PRN
Qty: 100 ML | Refills: 0 | Status: SHIPPED | OUTPATIENT
Start: 2023-11-24 | End: 2023-11-29

## 2023-11-24 ASSESSMENT — PAIN SCALES - GENERAL: PAINLEVEL: 0-NO PAIN

## 2023-11-24 ASSESSMENT — PATIENT HEALTH QUESTIONNAIRE - PHQ9
1. LITTLE INTEREST OR PLEASURE IN DOING THINGS: NOT AT ALL
2. FEELING DOWN, DEPRESSED OR HOPELESS: NOT AT ALL
SUM OF ALL RESPONSES TO PHQ9 QUESTIONS 1 AND 2: 0

## 2023-11-24 ASSESSMENT — ENCOUNTER SYMPTOMS
DEPRESSION: 0
COUGH: 1
OCCASIONAL FEELINGS OF UNSTEADINESS: 0
LOSS OF SENSATION IN FEET: 0

## 2023-11-24 ASSESSMENT — COLUMBIA-SUICIDE SEVERITY RATING SCALE - C-SSRS
1. IN THE PAST MONTH, HAVE YOU WISHED YOU WERE DEAD OR WISHED YOU COULD GO TO SLEEP AND NOT WAKE UP?: NO
6. HAVE YOU EVER DONE ANYTHING, STARTED TO DO ANYTHING, OR PREPARED TO DO ANYTHING TO END YOUR LIFE?: NO
2. HAVE YOU ACTUALLY HAD ANY THOUGHTS OF KILLING YOURSELF?: NO

## 2023-11-24 NOTE — PROGRESS NOTES
Subjective   Patient ID: Radha Montalvo is a 73 y.o. female who presents for Cough (Pt has htn and afib and is not sure what to take for her cough sinus pain pressure headache  covid test negative this morning).    Cough         Review of Systems   Respiratory:  Positive for cough.        Objective   /82   Pulse 70   Temp 34.9 °C (94.8 °F)   Resp 18   Wt 68 kg (150 lb)   LMP  (LMP Unknown)   SpO2 99%   BMI 23.49 kg/m²     Physical Exam  Vitals reviewed.   HENT:      Nose: Nose normal.      Mouth/Throat:      Mouth: Mucous membranes are moist.      Pharynx: Posterior oropharyngeal erythema present.   Pulmonary:      Effort: Pulmonary effort is normal.      Breath sounds: Normal breath sounds.   Psychiatric:         Mood and Affect: Mood normal.         Behavior: Behavior normal.         Assessment/Plan   Problem List Items Addressed This Visit    None  Visit Diagnoses         Codes    Acute cough    -  Primary R05.1    Relevant Medications    hydrocodone-homatropine (Hycodan) 5-1.5 mg/5 mL syrup    Acute non-recurrent frontal sinusitis     J01.10    Relevant Medications    fluticasone (Flonase) 50 mcg/actuation nasal spray

## 2023-12-01 ENCOUNTER — OFFICE VISIT (OUTPATIENT)
Dept: PRIMARY CARE | Facility: CLINIC | Age: 73
End: 2023-12-01
Payer: MEDICARE

## 2023-12-01 VITALS
WEIGHT: 146 LBS | SYSTOLIC BLOOD PRESSURE: 100 MMHG | OXYGEN SATURATION: 97 % | HEART RATE: 68 BPM | HEIGHT: 67 IN | TEMPERATURE: 98.6 F | BODY MASS INDEX: 22.91 KG/M2 | DIASTOLIC BLOOD PRESSURE: 68 MMHG | RESPIRATION RATE: 20 BRPM

## 2023-12-01 DIAGNOSIS — R60.0 BILATERAL LEG EDEMA: Primary | ICD-10-CM

## 2023-12-01 PROCEDURE — 3074F SYST BP LT 130 MM HG: CPT | Performed by: REGISTERED NURSE

## 2023-12-01 PROCEDURE — 99213 OFFICE O/P EST LOW 20 MIN: CPT | Performed by: REGISTERED NURSE

## 2023-12-01 PROCEDURE — 1036F TOBACCO NON-USER: CPT | Performed by: REGISTERED NURSE

## 2023-12-01 PROCEDURE — 1159F MED LIST DOCD IN RCRD: CPT | Performed by: REGISTERED NURSE

## 2023-12-01 PROCEDURE — 1126F AMNT PAIN NOTED NONE PRSNT: CPT | Performed by: REGISTERED NURSE

## 2023-12-01 PROCEDURE — 3078F DIAST BP <80 MM HG: CPT | Performed by: REGISTERED NURSE

## 2023-12-01 ASSESSMENT — PATIENT HEALTH QUESTIONNAIRE - PHQ9
SUM OF ALL RESPONSES TO PHQ9 QUESTIONS 1 AND 2: 0
1. LITTLE INTEREST OR PLEASURE IN DOING THINGS: NOT AT ALL
2. FEELING DOWN, DEPRESSED OR HOPELESS: NOT AT ALL

## 2023-12-01 ASSESSMENT — PAIN SCALES - GENERAL: PAINLEVEL: 0-NO PAIN

## 2023-12-01 NOTE — PROGRESS NOTES
"Subjective   Patient ID: Radha Montalvo is a 73 y.o. female who presents for Joint Swelling (PATIENT COMPLAINS OF SWOLLEN RIGHT ANKLE/LEG X 2 WEEKS).    SWELLING IN FEET AND ANKLES. WORSE IN RIGHT, PT SHOWED PIC AND FEET AND ANKLES DOUBLE IN SIZE BY NIGHT         Review of Systems   All other systems reviewed and are negative.      Objective   /68 (BP Location: Right arm, Patient Position: Sitting, BP Cuff Size: Adult)   Pulse 68   Temp 37 °C (98.6 °F)   Resp 20   Ht 1.702 m (5' 7\")   Wt 66.2 kg (146 lb)   LMP  (LMP Unknown)   SpO2 97%   BMI 22.87 kg/m²     Physical Exam  Vitals reviewed.   Constitutional:       Appearance: Normal appearance.   Musculoskeletal:      Right lower leg: Edema present.      Left lower leg: Edema present.   Neurological:      Mental Status: She is alert.   Psychiatric:         Mood and Affect: Mood normal.         Behavior: Behavior normal.         Assessment/Plan   Problem List Items Addressed This Visit    None  Visit Diagnoses         Codes    Bilateral leg edema    -  Primary R60.0               "

## 2023-12-05 ENCOUNTER — HOSPITAL ENCOUNTER (OUTPATIENT)
Dept: CARDIOLOGY | Facility: HOSPITAL | Age: 73
Discharge: HOME | End: 2023-12-05
Payer: MEDICARE

## 2023-12-05 DIAGNOSIS — Q25.44: ICD-10-CM

## 2023-12-05 DIAGNOSIS — I77.810 ASCENDING AORTA DILATION (CMS-HCC): ICD-10-CM

## 2023-12-05 LAB
AORTIC VALVE MEAN GRADIENT: 4
AORTIC VALVE PEAK VELOCITY: 1.37
AV PEAK GRADIENT: 7.5
AVA (PEAK VEL): 1.82
AVA (VTI): 2.04
EJECTION FRACTION APICAL 4 CHAMBER: 62.3
EJECTION FRACTION: 63
LEFT ATRIUM VOLUME AREA LENGTH INDEX BSA: 31.6
LEFT VENTRICLE INTERNAL DIMENSION DIASTOLE: 3.63 (ref 3.5–6)
LEFT VENTRICULAR OUTFLOW TRACT DIAMETER: 2
MITRAL VALVE E/E' RATIO: 19.07
RIGHT VENTRICLE FREE WALL PEAK S': 10.6
TRICUSPID ANNULAR PLANE SYSTOLIC EXCURSION: 1.9

## 2023-12-05 PROCEDURE — 93306 TTE W/DOPPLER COMPLETE: CPT

## 2023-12-05 PROCEDURE — 93306 TTE W/DOPPLER COMPLETE: CPT | Performed by: THORACIC SURGERY (CARDIOTHORACIC VASCULAR SURGERY)

## 2023-12-10 DIAGNOSIS — I48.91 ATRIAL FIBRILLATION, UNSPECIFIED TYPE (MULTI): ICD-10-CM

## 2023-12-13 DIAGNOSIS — I10 ESSENTIAL HYPERTENSION: Primary | ICD-10-CM

## 2023-12-13 DIAGNOSIS — Z00.00 ENCOUNTER FOR GENERAL ADULT MEDICAL EXAMINATION WITHOUT ABNORMAL FINDINGS: ICD-10-CM

## 2023-12-15 RX ORDER — LISINOPRIL 10 MG/1
10 TABLET ORAL DAILY
Qty: 90 TABLET | Refills: 3 | Status: SHIPPED | OUTPATIENT
Start: 2023-12-15 | End: 2024-01-31 | Stop reason: SDUPTHER

## 2023-12-15 RX ORDER — ATORVASTATIN CALCIUM 10 MG/1
10 TABLET, FILM COATED ORAL DAILY
Qty: 90 TABLET | Refills: 3 | Status: SHIPPED | OUTPATIENT
Start: 2023-12-15 | End: 2024-12-09

## 2023-12-16 DIAGNOSIS — J01.10 ACUTE NON-RECURRENT FRONTAL SINUSITIS: ICD-10-CM

## 2023-12-18 RX ORDER — FLUTICASONE PROPIONATE 50 MCG
1 SPRAY, SUSPENSION (ML) NASAL DAILY
Qty: 48 ML | Refills: 2 | Status: SHIPPED | OUTPATIENT
Start: 2023-12-18 | End: 2024-04-15 | Stop reason: WASHOUT

## 2024-01-08 ENCOUNTER — TELEPHONE (OUTPATIENT)
Dept: CARDIOLOGY | Facility: CLINIC | Age: 74
End: 2024-01-08
Payer: MEDICARE

## 2024-01-11 ENCOUNTER — TELEPHONE (OUTPATIENT)
Dept: CARDIAC SURGERY | Facility: CLINIC | Age: 74
End: 2024-01-11
Payer: MEDICARE

## 2024-01-11 NOTE — TELEPHONE ENCOUNTER
Patient called stating she has had swelling in her lower leg and ankles for the last couple of weeks that starts out minimal in AM, but increases throughout the day. She states that the area also gets red and splotchy. She is concerned it may be caused by either the Eliquis or the Diltiazem that you increased. Please advise.

## 2024-01-13 PROBLEM — M79.604 PAIN OF RIGHT LOWER EXTREMITY: Status: ACTIVE | Noted: 2024-01-13

## 2024-01-13 PROBLEM — I77.810 ASCENDING AORTA DILATATION (CMS-HCC): Status: ACTIVE | Noted: 2023-11-22

## 2024-01-13 PROBLEM — H57.10 PAIN IN EYE: Status: ACTIVE | Noted: 2017-01-17

## 2024-01-13 PROBLEM — R05.1 ACUTE COUGH: Status: ACTIVE | Noted: 2024-01-13

## 2024-01-13 PROBLEM — B00.9 HERPES SIMPLEX INFECTION OF SKIN: Status: ACTIVE | Noted: 2023-10-06

## 2024-01-13 PROBLEM — M25.579 ANKLE PAIN: Status: ACTIVE | Noted: 2024-01-13

## 2024-01-13 PROBLEM — J01.10 ACUTE FRONTAL SINUSITIS: Status: ACTIVE | Noted: 2024-01-13

## 2024-01-13 PROBLEM — S05.90XA INJURY OF EYE REGION: Status: ACTIVE | Noted: 2024-01-13

## 2024-01-13 PROBLEM — B02.9 HERPES ZOSTER: Status: ACTIVE | Noted: 2024-01-13

## 2024-01-13 PROBLEM — S05.00XA CORNEAL ABRASION: Status: ACTIVE | Noted: 2024-01-13

## 2024-01-13 PROBLEM — I48.91 ATRIAL FIBRILLATION (MULTI): Status: ACTIVE | Noted: 2024-01-13

## 2024-01-13 PROBLEM — R93.89 ABNORMAL X-RAY EXAMINATION: Status: ACTIVE | Noted: 2023-10-22

## 2024-01-13 PROBLEM — R60.0 EDEMA OF BOTH LOWER EXTREMITIES: Status: ACTIVE | Noted: 2024-01-13

## 2024-01-13 PROBLEM — Q25.44: Status: ACTIVE | Noted: 2024-01-13

## 2024-01-13 PROBLEM — H10.219 CHEMICAL CONJUNCTIVITIS: Status: ACTIVE | Noted: 2023-04-08

## 2024-01-13 PROBLEM — M79.605 PAIN OF LEFT LOWER EXTREMITY: Status: ACTIVE | Noted: 2024-01-13

## 2024-01-13 RX ORDER — ANASTROZOLE 1 MG/1
1 TABLET ORAL
COMMUNITY
Start: 2021-09-17 | End: 2024-06-07 | Stop reason: WASHOUT

## 2024-01-13 RX ORDER — ATENOLOL 50 MG/1
50 TABLET ORAL DAILY
COMMUNITY
Start: 2021-09-17 | End: 2024-04-24 | Stop reason: WASHOUT

## 2024-01-13 NOTE — TELEPHONE ENCOUNTER
Patient notified by voicemail of Dr. Dougherty's instructions. Also advised patient to call the office on Monday with questions and clarification if needed and to be sure she received the message.

## 2024-01-16 ENCOUNTER — APPOINTMENT (OUTPATIENT)
Dept: CARDIOLOGY | Facility: CLINIC | Age: 74
End: 2024-01-16
Payer: MEDICARE

## 2024-01-19 ENCOUNTER — TELEPHONE (OUTPATIENT)
Dept: CARDIOLOGY | Facility: CLINIC | Age: 74
End: 2024-01-19
Payer: MEDICARE

## 2024-01-19 NOTE — TELEPHONE ENCOUNTER
Patient called in to office she is going to be out of Eliquis by Monday but the price right now is to expensive for her 550 dollars it will cost her, gave the patient the [employer] patient assistance number to she if they can help her or not, but she still would like to hear from the doctor is he can switch her to something cheaper

## 2024-01-19 NOTE — TELEPHONE ENCOUNTER
Patient is concerned because the eliquis is quite expensive. Can she be placed on something different. We tried to give her the information for the patient assist program but she still wants to see if there is something cheaper.

## 2024-01-25 NOTE — TELEPHONE ENCOUNTER
Spoke with the patient and informed her that we can not give samples and she stated that she had already paid for the medication and wants to stay on eliquis.

## 2024-01-31 DIAGNOSIS — Z00.00 ENCOUNTER FOR GENERAL ADULT MEDICAL EXAMINATION WITHOUT ABNORMAL FINDINGS: ICD-10-CM

## 2024-01-31 RX ORDER — LISINOPRIL 10 MG/1
10 TABLET ORAL DAILY
Qty: 90 TABLET | Refills: 3 | Status: SHIPPED | OUTPATIENT
Start: 2024-01-31 | End: 2024-06-07 | Stop reason: WASHOUT

## 2024-02-22 DIAGNOSIS — A60.1 HERPESVIRAL INFECTION OF PERIANAL SKIN AND RECTUM: Primary | ICD-10-CM

## 2024-02-22 RX ORDER — ACYCLOVIR 800 MG/1
800 TABLET ORAL DAILY
Qty: 90 TABLET | Refills: 1 | Status: SHIPPED | OUTPATIENT
Start: 2024-02-22 | End: 2024-08-20

## 2024-02-25 DIAGNOSIS — I48.91 ATRIAL FIBRILLATION, UNSPECIFIED TYPE (MULTI): ICD-10-CM

## 2024-02-25 DIAGNOSIS — I10 ESSENTIAL HYPERTENSION: ICD-10-CM

## 2024-02-25 DIAGNOSIS — E78.2 MIXED HYPERLIPIDEMIA: ICD-10-CM

## 2024-02-25 DIAGNOSIS — I20.89 ANGINA OF EFFORT (CMS-HCC): ICD-10-CM

## 2024-02-26 RX ORDER — DILTIAZEM HYDROCHLORIDE 120 MG/1
CAPSULE, COATED, EXTENDED RELEASE ORAL
Qty: 360 CAPSULE | Refills: 3 | Status: SHIPPED | OUTPATIENT
Start: 2024-02-26 | End: 2024-06-07 | Stop reason: WASHOUT

## 2024-02-26 NOTE — TELEPHONE ENCOUNTER
Pharmacy requesting refill of the patients medication for a 90 day supply with refills.   Please send the attached prescription as soon as possible.   Thank you  Leo Rich MA     Requested Prescriptions     Pending Prescriptions Disp Refills    dilTIAZem CD (Cardizem CD) 120 mg 24 hr capsule [Pharmacy Med Name: DILTIAZEM 24H ER(CD) 120 MG CP] 360 capsule 3     Sig: TAKE 2 CAPSULES (240 MG) BY MOUTH 2 TIMES A DAY.

## 2024-03-19 ENCOUNTER — OFFICE VISIT (OUTPATIENT)
Dept: PRIMARY CARE | Facility: CLINIC | Age: 74
End: 2024-03-19
Payer: MEDICARE

## 2024-03-19 VITALS
BODY MASS INDEX: 23.18 KG/M2 | OXYGEN SATURATION: 99 % | WEIGHT: 148 LBS | TEMPERATURE: 96 F | SYSTOLIC BLOOD PRESSURE: 106 MMHG | HEART RATE: 102 BPM | DIASTOLIC BLOOD PRESSURE: 60 MMHG

## 2024-03-19 DIAGNOSIS — J98.8 RESPIRATORY INFECTION: ICD-10-CM

## 2024-03-19 DIAGNOSIS — R05.1 ACUTE COUGH: ICD-10-CM

## 2024-03-19 DIAGNOSIS — R06.02 SOB (SHORTNESS OF BREATH) ON EXERTION: Primary | ICD-10-CM

## 2024-03-19 PROCEDURE — 1036F TOBACCO NON-USER: CPT | Performed by: REGISTERED NURSE

## 2024-03-19 PROCEDURE — 99213 OFFICE O/P EST LOW 20 MIN: CPT | Performed by: REGISTERED NURSE

## 2024-03-19 PROCEDURE — 1126F AMNT PAIN NOTED NONE PRSNT: CPT | Performed by: REGISTERED NURSE

## 2024-03-19 PROCEDURE — 1159F MED LIST DOCD IN RCRD: CPT | Performed by: REGISTERED NURSE

## 2024-03-19 PROCEDURE — 3074F SYST BP LT 130 MM HG: CPT | Performed by: REGISTERED NURSE

## 2024-03-19 PROCEDURE — 1160F RVW MEDS BY RX/DR IN RCRD: CPT | Performed by: REGISTERED NURSE

## 2024-03-19 PROCEDURE — 3078F DIAST BP <80 MM HG: CPT | Performed by: REGISTERED NURSE

## 2024-03-19 RX ORDER — ALBUTEROL SULFATE 90 UG/1
2 AEROSOL, METERED RESPIRATORY (INHALATION) EVERY 4 HOURS PRN
Qty: 8 G | Refills: 11 | Status: SHIPPED | OUTPATIENT
Start: 2024-03-19 | End: 2024-04-15 | Stop reason: WASHOUT

## 2024-03-19 RX ORDER — PREDNISONE 20 MG/1
20 TABLET ORAL DAILY
Qty: 5 TABLET | Refills: 0 | Status: SHIPPED | OUTPATIENT
Start: 2024-03-19 | End: 2024-03-24

## 2024-03-19 RX ORDER — BENZONATATE 200 MG/1
200 CAPSULE ORAL 3 TIMES DAILY PRN
Qty: 42 CAPSULE | Refills: 0 | Status: SHIPPED | OUTPATIENT
Start: 2024-03-19 | End: 2024-04-15 | Stop reason: WASHOUT

## 2024-03-19 RX ORDER — AMOXICILLIN AND CLAVULANATE POTASSIUM 500; 125 MG/1; MG/1
500 TABLET, FILM COATED ORAL 3 TIMES DAILY
Qty: 30 TABLET | Refills: 0 | Status: SHIPPED | OUTPATIENT
Start: 2024-03-19 | End: 2024-03-29

## 2024-03-19 ASSESSMENT — COLUMBIA-SUICIDE SEVERITY RATING SCALE - C-SSRS
2. HAVE YOU ACTUALLY HAD ANY THOUGHTS OF KILLING YOURSELF?: NO
6. HAVE YOU EVER DONE ANYTHING, STARTED TO DO ANYTHING, OR PREPARED TO DO ANYTHING TO END YOUR LIFE?: NO
1. IN THE PAST MONTH, HAVE YOU WISHED YOU WERE DEAD OR WISHED YOU COULD GO TO SLEEP AND NOT WAKE UP?: NO

## 2024-03-19 ASSESSMENT — PAIN SCALES - GENERAL: PAINLEVEL: 0-NO PAIN

## 2024-03-19 ASSESSMENT — ENCOUNTER SYMPTOMS
SHORTNESS OF BREATH: 1
COUGH: 1
OCCASIONAL FEELINGS OF UNSTEADINESS: 0
LOSS OF SENSATION IN FEET: 0
DEPRESSION: 0

## 2024-03-19 NOTE — PROGRESS NOTES
Subjective   Patient ID: Radha Montalvo is a 74 y.o. female who presents for Cough (Cold, Runny nose, Cough non productive, x 3 weeks).    Cough which gives a tight feeling in the chest and SOB of exertion         Review of Systems   Respiratory:  Positive for cough and shortness of breath.    All other systems reviewed and are negative.      Objective   /60   Pulse 102   Temp 35.6 °C (96 °F)   Wt 67.1 kg (148 lb)   LMP  (LMP Unknown)   SpO2 99%   BMI 23.18 kg/m²     Physical Exam  Vitals reviewed.   Constitutional:       Appearance: Normal appearance.   Pulmonary:      Effort: Pulmonary effort is normal.      Breath sounds: Normal breath sounds.   Neurological:      Mental Status: She is alert.   Psychiatric:         Mood and Affect: Mood normal.         Assessment/Plan   Problem List Items Addressed This Visit             ICD-10-CM    Acute cough R05.1    Relevant Medications    benzonatate (Tessalon) 200 mg capsule     Other Visit Diagnoses         Codes    SOB (shortness of breath) on exertion    -  Primary R06.02    Relevant Medications    albuterol (Ventolin HFA) 90 mcg/actuation inhaler    predniSONE (Deltasone) 20 mg tablet    Respiratory infection     J98.8    Relevant Medications    amoxicillin-pot clavulanate (Augmentin) 500-125 mg tablet

## 2024-04-03 ENCOUNTER — HOSPITAL ENCOUNTER (OUTPATIENT)
Dept: RADIOLOGY | Facility: CLINIC | Age: 74
Discharge: HOME | End: 2024-04-03
Payer: MEDICARE

## 2024-04-03 ENCOUNTER — OFFICE VISIT (OUTPATIENT)
Dept: PRIMARY CARE | Facility: CLINIC | Age: 74
End: 2024-04-03
Payer: MEDICARE

## 2024-04-03 VITALS
OXYGEN SATURATION: 99 % | DIASTOLIC BLOOD PRESSURE: 77 MMHG | TEMPERATURE: 98.6 F | HEIGHT: 67 IN | WEIGHT: 145 LBS | SYSTOLIC BLOOD PRESSURE: 120 MMHG | RESPIRATION RATE: 18 BRPM | BODY MASS INDEX: 22.76 KG/M2 | HEART RATE: 67 BPM

## 2024-04-03 DIAGNOSIS — R06.02 SHORTNESS OF BREATH: Primary | ICD-10-CM

## 2024-04-03 DIAGNOSIS — I48.91 ATRIAL FIBRILLATION, UNSPECIFIED TYPE (MULTI): ICD-10-CM

## 2024-04-03 DIAGNOSIS — R06.02 SHORTNESS OF BREATH: ICD-10-CM

## 2024-04-03 PROCEDURE — 1126F AMNT PAIN NOTED NONE PRSNT: CPT | Performed by: FAMILY MEDICINE

## 2024-04-03 PROCEDURE — 71046 X-RAY EXAM CHEST 2 VIEWS: CPT

## 2024-04-03 PROCEDURE — 1159F MED LIST DOCD IN RCRD: CPT | Performed by: FAMILY MEDICINE

## 2024-04-03 PROCEDURE — 99214 OFFICE O/P EST MOD 30 MIN: CPT | Performed by: FAMILY MEDICINE

## 2024-04-03 PROCEDURE — 71046 X-RAY EXAM CHEST 2 VIEWS: CPT | Performed by: RADIOLOGY

## 2024-04-03 PROCEDURE — 3078F DIAST BP <80 MM HG: CPT | Performed by: FAMILY MEDICINE

## 2024-04-03 PROCEDURE — 3074F SYST BP LT 130 MM HG: CPT | Performed by: FAMILY MEDICINE

## 2024-04-03 PROCEDURE — 1160F RVW MEDS BY RX/DR IN RCRD: CPT | Performed by: FAMILY MEDICINE

## 2024-04-03 ASSESSMENT — PAIN SCALES - GENERAL: PAINLEVEL: 0-NO PAIN

## 2024-04-03 ASSESSMENT — ENCOUNTER SYMPTOMS
OCCASIONAL FEELINGS OF UNSTEADINESS: 0
DEPRESSION: 0
LOSS OF SENSATION IN FEET: 0

## 2024-04-04 ASSESSMENT — ENCOUNTER SYMPTOMS
NEUROLOGICAL NEGATIVE: 1
MUSCULOSKELETAL NEGATIVE: 1
SHORTNESS OF BREATH: 1
CARDIOVASCULAR NEGATIVE: 1
CONSTITUTIONAL NEGATIVE: 1
GASTROINTESTINAL NEGATIVE: 1

## 2024-04-04 NOTE — PROGRESS NOTES
"Subjective   Patient ID: Radha Montalvo is a 74 y.o. female who presents for Shortness of Breath (PATIENT COMPLAINS OF SHORTNESS OF BREATH THAT'S BEEN GOING FOR 6 MONTHS, SHE HAD A COLD FOR MONTHS BUT ALL COVID TESTS ARE NEGATIVE).    Shortness of Breath       SOB with light exertion for months.  No chest pain or pressure.  She had a stress test 10/2023 and TE after having a bout of a fib and had normal EF at that time. She has follow up with Dr Dougherty in the 24th of this month.  No significant smoking history. She has not converted from a fib but remains rate controlled.    Review of Systems   Constitutional: Negative.    HENT: Negative.     Respiratory:  Positive for shortness of breath.    Cardiovascular: Negative.    Gastrointestinal: Negative.    Genitourinary: Negative.    Musculoskeletal: Negative.    Neurological: Negative.        Objective   /77 (BP Location: Right arm, Patient Position: Sitting, BP Cuff Size: Adult)   Pulse 67   Temp 37 °C (98.6 °F) (Skin)   Resp 18   Ht 1.702 m (5' 7\")   Wt 65.8 kg (145 lb)   LMP  (LMP Unknown)   SpO2 99%   BMI 22.71 kg/m²     Physical Exam  Constitutional:       General: She is not in acute distress.     Appearance: Normal appearance.   Cardiovascular:      Rate and Rhythm: Normal rate. Rhythm irregular.      Heart sounds: No murmur heard.  Pulmonary:      Breath sounds: Normal breath sounds. No wheezing.   Neurological:      Mental Status: She is alert.         Assessment/Plan   Problem List Items Addressed This Visit             ICD-10-CM    Shortness of breath - Primary R06.02    Relevant Orders    XR chest 2 views    Complete Pulmonary Function Test Pre/Post Bronchodialator (Spirometry Pre/Post/DLCO/Lung Volumes)    Atrial fibrillation (CMS/HCC) I48.91     Will follow up with cardiology as scheduled and work up for any pulmonary sources.  Will await PFT results.  Her  is having surgery near future and she will try to complete this prior.      "

## 2024-04-15 ENCOUNTER — OFFICE VISIT (OUTPATIENT)
Dept: PRIMARY CARE | Facility: CLINIC | Age: 74
End: 2024-04-15
Payer: MEDICARE

## 2024-04-15 VITALS
DIASTOLIC BLOOD PRESSURE: 62 MMHG | RESPIRATION RATE: 16 BRPM | OXYGEN SATURATION: 99 % | SYSTOLIC BLOOD PRESSURE: 110 MMHG | BODY MASS INDEX: 21.97 KG/M2 | WEIGHT: 140 LBS | HEIGHT: 67 IN | TEMPERATURE: 97.3 F | HEART RATE: 81 BPM

## 2024-04-15 DIAGNOSIS — L23.9 ALLERGIC DERMATITIS: Primary | ICD-10-CM

## 2024-04-15 PROCEDURE — 3078F DIAST BP <80 MM HG: CPT | Performed by: FAMILY MEDICINE

## 2024-04-15 PROCEDURE — 99213 OFFICE O/P EST LOW 20 MIN: CPT | Performed by: FAMILY MEDICINE

## 2024-04-15 PROCEDURE — 1160F RVW MEDS BY RX/DR IN RCRD: CPT | Performed by: FAMILY MEDICINE

## 2024-04-15 PROCEDURE — 1126F AMNT PAIN NOTED NONE PRSNT: CPT | Performed by: FAMILY MEDICINE

## 2024-04-15 PROCEDURE — 1036F TOBACCO NON-USER: CPT | Performed by: FAMILY MEDICINE

## 2024-04-15 PROCEDURE — 1159F MED LIST DOCD IN RCRD: CPT | Performed by: FAMILY MEDICINE

## 2024-04-15 PROCEDURE — 3074F SYST BP LT 130 MM HG: CPT | Performed by: FAMILY MEDICINE

## 2024-04-15 RX ORDER — TRIAMCINOLONE ACETONIDE 1 MG/G
CREAM TOPICAL 2 TIMES DAILY
Qty: 30 G | Refills: 0 | Status: SHIPPED | OUTPATIENT
Start: 2024-04-15 | End: 2024-06-07 | Stop reason: WASHOUT

## 2024-04-15 ASSESSMENT — ENCOUNTER SYMPTOMS
DEPRESSION: 0
OCCASIONAL FEELINGS OF UNSTEADINESS: 0
LOSS OF SENSATION IN FEET: 0

## 2024-04-15 ASSESSMENT — PATIENT HEALTH QUESTIONNAIRE - PHQ9
2. FEELING DOWN, DEPRESSED OR HOPELESS: NOT AT ALL
1. LITTLE INTEREST OR PLEASURE IN DOING THINGS: NOT AT ALL
SUM OF ALL RESPONSES TO PHQ9 QUESTIONS 1 AND 2: 0

## 2024-04-15 ASSESSMENT — COLUMBIA-SUICIDE SEVERITY RATING SCALE - C-SSRS
6. HAVE YOU EVER DONE ANYTHING, STARTED TO DO ANYTHING, OR PREPARED TO DO ANYTHING TO END YOUR LIFE?: NO
2. HAVE YOU ACTUALLY HAD ANY THOUGHTS OF KILLING YOURSELF?: NO
1. IN THE PAST MONTH, HAVE YOU WISHED YOU WERE DEAD OR WISHED YOU COULD GO TO SLEEP AND NOT WAKE UP?: NO

## 2024-04-15 ASSESSMENT — PAIN SCALES - GENERAL: PAINLEVEL: 0-NO PAIN

## 2024-04-15 NOTE — PROGRESS NOTES
"Subjective   Patient ID: Radha Montalvo is a 74 y.o. female who presents for Rash (Pt is here with complaints of a rash that moves around, pt states that the rash in currently in her groin area, but reports it was in her breast area and on her neck.).    Rash     she had rash in axilla and behind ears and this resolved.  Then under breast and groin region.  And arms and legs    Review of Systems   Skin:  Positive for rash.       Objective   /62 (BP Location: Right arm, Patient Position: Sitting, BP Cuff Size: Adult)   Pulse 81   Temp 36.3 °C (97.3 °F) (Temporal)   Resp 16   Ht 1.702 m (5' 7\")   Wt 63.5 kg (140 lb)   LMP  (LMP Unknown)   SpO2 99%   BMI 21.93 kg/m²     Physical Exam  Constitutional:       General: She is not in acute distress.     Appearance: Normal appearance.   Cardiovascular:      Rate and Rhythm: Normal rate and regular rhythm.      Heart sounds: No murmur heard.  Pulmonary:      Breath sounds: Normal breath sounds. No wheezing.   Skin:     Comments: Some mild erythema and scale under breasts /abdomen.  Antecubital region.     Neurological:      Mental Status: She is alert.         Assessment/Plan   Problem List Items Addressed This Visit    None  Visit Diagnoses         Codes    Allergic dermatitis    -  Primary L23.9        Loratadine otc prn.  Moisturizer daily.         "

## 2024-04-18 ENCOUNTER — HOSPITAL ENCOUNTER (OUTPATIENT)
Dept: RESPIRATORY THERAPY | Facility: HOSPITAL | Age: 74
Setting detail: THERAPIES SERIES
Discharge: HOME | End: 2024-04-18
Payer: MEDICARE

## 2024-04-18 DIAGNOSIS — R06.02 SHORTNESS OF BREATH: ICD-10-CM

## 2024-04-18 PROCEDURE — 94726 PLETHYSMOGRAPHY LUNG VOLUMES: CPT

## 2024-04-23 ENCOUNTER — TELEPHONE (OUTPATIENT)
Dept: PRIMARY CARE | Facility: CLINIC | Age: 74
End: 2024-04-23
Payer: MEDICARE

## 2024-04-23 DIAGNOSIS — I10 PRIMARY HYPERTENSION: Primary | ICD-10-CM

## 2024-04-24 ENCOUNTER — OFFICE VISIT (OUTPATIENT)
Dept: CARDIOLOGY | Facility: CLINIC | Age: 74
End: 2024-04-24
Payer: MEDICARE

## 2024-04-24 VITALS
RESPIRATION RATE: 18 BRPM | WEIGHT: 143.4 LBS | HEIGHT: 67 IN | TEMPERATURE: 98.9 F | HEART RATE: 86 BPM | DIASTOLIC BLOOD PRESSURE: 71 MMHG | BODY MASS INDEX: 22.51 KG/M2 | SYSTOLIC BLOOD PRESSURE: 123 MMHG | OXYGEN SATURATION: 97 %

## 2024-04-24 DIAGNOSIS — I20.89 ANGINA OF EFFORT (CMS-HCC): ICD-10-CM

## 2024-04-24 DIAGNOSIS — R06.02 SHORTNESS OF BREATH: ICD-10-CM

## 2024-04-24 DIAGNOSIS — E78.2 MIXED HYPERLIPIDEMIA: ICD-10-CM

## 2024-04-24 DIAGNOSIS — I77.810 ASCENDING AORTA DILATATION (CMS-HCC): Primary | ICD-10-CM

## 2024-04-24 DIAGNOSIS — I48.91 ATRIAL FIBRILLATION, UNSPECIFIED TYPE (MULTI): ICD-10-CM

## 2024-04-24 DIAGNOSIS — I10 ESSENTIAL HYPERTENSION: ICD-10-CM

## 2024-04-24 PROCEDURE — 1159F MED LIST DOCD IN RCRD: CPT | Performed by: INTERNAL MEDICINE

## 2024-04-24 PROCEDURE — 1126F AMNT PAIN NOTED NONE PRSNT: CPT | Performed by: INTERNAL MEDICINE

## 2024-04-24 PROCEDURE — 3078F DIAST BP <80 MM HG: CPT | Performed by: INTERNAL MEDICINE

## 2024-04-24 PROCEDURE — 1036F TOBACCO NON-USER: CPT | Performed by: INTERNAL MEDICINE

## 2024-04-24 PROCEDURE — 3074F SYST BP LT 130 MM HG: CPT | Performed by: INTERNAL MEDICINE

## 2024-04-24 PROCEDURE — 1160F RVW MEDS BY RX/DR IN RCRD: CPT | Performed by: INTERNAL MEDICINE

## 2024-04-24 PROCEDURE — 99214 OFFICE O/P EST MOD 30 MIN: CPT | Performed by: INTERNAL MEDICINE

## 2024-04-24 RX ORDER — LOSARTAN POTASSIUM 50 MG/1
50 TABLET ORAL DAILY
Qty: 90 TABLET | Refills: 2 | Status: SHIPPED | OUTPATIENT
Start: 2024-04-24 | End: 2025-04-24

## 2024-04-24 ASSESSMENT — ENCOUNTER SYMPTOMS
OCCASIONAL FEELINGS OF UNSTEADINESS: 0
DEPRESSION: 0
LOSS OF SENSATION IN FEET: 0

## 2024-04-24 ASSESSMENT — LIFESTYLE VARIABLES
HOW OFTEN DURING THE LAST YEAR HAVE YOU HAD A FEELING OF GUILT OR REMORSE AFTER DRINKING: NEVER
HOW OFTEN DURING THE LAST YEAR HAVE YOU FOUND THAT YOU WERE NOT ABLE TO STOP DRINKING ONCE YOU HAD STARTED: NEVER
HOW OFTEN DURING THE LAST YEAR HAVE YOU FAILED TO DO WHAT WAS NORMALLY EXPECTED FROM YOU BECAUSE OF DRINKING: NEVER
HOW OFTEN DURING THE LAST YEAR HAVE YOU BEEN UNABLE TO REMEMBER WHAT HAPPENED THE NIGHT BEFORE BECAUSE YOU HAD BEEN DRINKING: NEVER
HOW OFTEN DURING THE LAST YEAR HAVE YOU NEEDED AN ALCOHOLIC DRINK FIRST THING IN THE MORNING TO GET YOURSELF GOING AFTER A NIGHT OF HEAVY DRINKING: NEVER
HAVE YOU OR SOMEONE ELSE BEEN INJURED AS A RESULT OF YOUR DRINKING: NO
HAS A RELATIVE, FRIEND, DOCTOR, OR ANOTHER HEALTH PROFESSIONAL EXPRESSED CONCERN ABOUT YOUR DRINKING OR SUGGESTED YOU CUT DOWN: NO
AUDIT TOTAL SCORE: 4
AUDIT-C TOTAL SCORE: 4
HOW OFTEN DO YOU HAVE A DRINK CONTAINING ALCOHOL: 2-3 TIMES A WEEK
HOW MANY STANDARD DRINKS CONTAINING ALCOHOL DO YOU HAVE ON A TYPICAL DAY: 3 OR 4
HOW OFTEN DO YOU HAVE SIX OR MORE DRINKS ON ONE OCCASION: NEVER
SKIP TO QUESTIONS 9-10: 0

## 2024-04-24 ASSESSMENT — PAIN SCALES - GENERAL: PAINLEVEL: 0-NO PAIN

## 2024-04-24 NOTE — PROGRESS NOTES
Subjective   Chief Complaint   Patient presents with    Follow-up     Mrs\ Ms. Montalvo  is present for her 6 month Follow up with Dr. Dougherty, pt will like to discuss her SOB on exertion         74-year-old female patient with history of hypertension hyperlipidemia history of ascending aortic aneurysm about 4.5 cm.  Patient history of arthritis, worsening short of breath with dyspnea exertion.  History of paroxysmal atrial fibrillation.  Patient was last seen in October.  Last November 2023 patient had echocardiogram and nuclear stress test.  Echocardiogram showed normal ejection fraction 50 to 60% with trivial mitral and tricuspid regurgitation seen the time.  Patient has not had a nuclear stress test done last year essentially no evidence of ischemia.  Normal ejection fraction at that time.  Patient had a TSH done last year 6 months ago about 2.46 within normal range.  Lipid profile in August last year essentially was unremarkable LDL was 65 mg deciliter.  Patient had a comprehensive metabolic panel done last August 2023 essentially unremarkable.    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    Edema of both lower extremities 01/13/2024    Essential hypertension 10/06/2023    Hyperlipidemia 10/06/2023    Hypertension 10/06/2023    Personal history of other diseases of the musculoskeletal system and connective tissue 10/06/2023    History of arthritis    Shortness of breath 10/23/2023     Past Surgical History:   Procedure Laterality Date    BI MAMMO GUIDED LOCALIZATION BREAST LEFT Left 2/25/2013    BI MAMMO GUIDED LOCALIZATION BREAST LEFT [facility]    OTHER SURGICAL HISTORY  04/27/2022    Lumpectomy     [facility]  history includes stroke in 2 relatives (father, mother).  Current Outpatient Medications   Medication Sig Dispense Refill    acyclovir (Zovirax) 800 mg tablet TAKE 1 TABLET BY MOUTH EVERY DAY FOR 90 DAYS 90 tablet 1    apixaban (Eliquis) 5 mg tablet Take 1 tablet (5 mg) by mouth 2 times a day. 60 tablet 11    aspirin 81 mg EC tablet Take 1 tablet (81 mg) by mouth once daily.      atorvastatin (Lipitor) 10 mg tablet TAKE 1 TABLET BY MOUTH EVERY DAY FOR 90 DAYS 90 tablet 3    cholecalciferol (VITAMIN D-3) 10 mcg (400 unit) tablet Take 1 capsule by mouth once daily.      dilTIAZem CD (Cardizem CD) 120 mg 24 hr capsule TAKE 2 CAPSULES (240 MG) BY MOUTH 2 TIMES A DAY. 360 capsule 3    lisinopril 10 mg tablet Take 1 tablet (10 mg) by mouth once daily. 90 tablet 3    omega 3-dha-epa-fish oil (Fish OiL) 1,200 (144-216) mg capsule 1.5 capsules Orally      vit B complex no.12/niacin,B3, (VITAMIN B COMPLEX NO.12-NIACIN ORAL) Take by mouth.  Vitamin B 12 TABS      anastrozole (Arimidex) 1 mg tablet Take 1 tablet (1 mg total) by mouth once daily.      atenolol (Tenormin) 50 mg tablet Take 1 tablet (50 mg) by mouth once daily.      triamcinolone (Kenalog) 0.1 % cream Apply topically 2 times a day. Apply to affected area 1-2 times daily as needed. Avoid face and groin. (Patient not taking: Reported on 4/24/2024) 30 g 0     No current facility-administered medications for this visit.      reports that she has never smoked. She has never been exposed to tobacco smoke. She has never used smokeless tobacco. She reports current alcohol use of about 7.0 standard drinks of alcohol per week. She reports that she does not use drugs.  Patient has no known allergies.  Mixed hyperlipidemia  Essential hypertension  Mixed hyperlipidemia  Atrial fibrillation, unspecified type (Multi)  Angina of effort (CMS-HCC)  Essential hypertension    Vitals:    04/24/24 0930   BP: 123/71   Pulse: 86   Resp: 18   Temp: 37.2 °C (98.9 °F)   TempSrc: Core  "  SpO2: 97%   Weight: 65 kg (143 lb 6.4 oz)   Height: 1.702 m (5' 7\")   PainSc: 0-No pain      BMI:Body mass index is 22.46 kg/m².   General Cardiology:  General Appearance: Alert, oriented and in no acute distress.  HEENT: extra ocular movements intact (EOMI), pupils equal,  round, reactive to light and accommodation (PERRLA).  Carotid Upstroke: no bruit, normal.  Jugular Venous Distention (JVD): flat.  Chest: normal.  Lungs: Clear to auscultation,   Heart Sounds: no S3 or S4, normal S1, S2, regular rate.  Murmur, Click, Gallop: no systolic murmur.  Abdomen: no hepatomegaly, no masses felt, soft.  Extremities: no leg edema.  Peripheral pulses: 2 plus bilateral.  NEUROLOGY Cranial nerves II-XII grossly intact.     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    Shortness of breath    Exercise-induced angina (CMS-HCC)    Cervical radiculitis    Pain in eye    Degenerative disc disease, cervical    History of malignant neoplasm of breast    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    Acute cough    Acute frontal sinusitis    Ascending aorta dilatation (CMS-HCC)    Atrial fibrillation (Multi)    Chemical conjunctivitis    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  Workup from cardiac perspective is unremarkable Short of breath could be from pulmonary origin.  Underlying allergy season.  Advised patient to wear the mask or take send antihistamine over-the-counter.  1.  Atrial fibrillation: Continue current Eliquis as well as a rate control education.  Modified expected.  Discontinue aspirin at the moment.  High risk for bleeding.  2.  Short of breath: Patient had negative stress test for ischemia as well as echocardiogram is unremarkable 6 months ago.  Continue current treatment per modified expected.  Pending PFT results.  Diet and exercise reviewed with patient..advice to walk about 10,000 steps or about 2 hours during day time. Cut back on salt, sugar and flour.  Pt. care time is spent includes review of diagnostic tests, labs, radiographs, EKGs, old echoes, cardiac work-up and coordination of care. Assessment, impression and plans are reflected in the note above as well as the orders.    Paulo Dougherty MD

## 2024-04-26 DIAGNOSIS — R06.02 SHORTNESS OF BREATH: ICD-10-CM

## 2024-05-16 ENCOUNTER — TELEPHONE (OUTPATIENT)
Dept: CARDIOLOGY | Facility: CLINIC | Age: 74
End: 2024-05-16
Payer: MEDICARE

## 2024-05-16 NOTE — TELEPHONE ENCOUNTER
Pt states that her PCP has seen her to have Xrays done. Pt has jani dealing with shortness of breath. She has  been having test run. PCP has asked what medication she has been taking for the aneurysm, pt has questions pertaining to her can and how to proceed

## 2024-06-07 ENCOUNTER — OFFICE VISIT (OUTPATIENT)
Dept: PRIMARY CARE | Facility: CLINIC | Age: 74
End: 2024-06-07
Payer: MEDICARE

## 2024-06-07 VITALS
TEMPERATURE: 95.9 F | HEART RATE: 71 BPM | HEIGHT: 67 IN | WEIGHT: 143 LBS | SYSTOLIC BLOOD PRESSURE: 110 MMHG | DIASTOLIC BLOOD PRESSURE: 70 MMHG | OXYGEN SATURATION: 99 % | BODY MASS INDEX: 22.44 KG/M2

## 2024-06-07 DIAGNOSIS — L20.9 ATOPIC DERMATITIS, UNSPECIFIED TYPE: Primary | ICD-10-CM

## 2024-06-07 PROCEDURE — 99213 OFFICE O/P EST LOW 20 MIN: CPT | Performed by: REGISTERED NURSE

## 2024-06-07 PROCEDURE — 3074F SYST BP LT 130 MM HG: CPT | Performed by: REGISTERED NURSE

## 2024-06-07 PROCEDURE — 1126F AMNT PAIN NOTED NONE PRSNT: CPT | Performed by: REGISTERED NURSE

## 2024-06-07 PROCEDURE — 1160F RVW MEDS BY RX/DR IN RCRD: CPT | Performed by: REGISTERED NURSE

## 2024-06-07 PROCEDURE — 1036F TOBACCO NON-USER: CPT | Performed by: REGISTERED NURSE

## 2024-06-07 PROCEDURE — 3078F DIAST BP <80 MM HG: CPT | Performed by: REGISTERED NURSE

## 2024-06-07 PROCEDURE — 1159F MED LIST DOCD IN RCRD: CPT | Performed by: REGISTERED NURSE

## 2024-06-07 RX ORDER — METHYLPREDNISOLONE 4 MG/1
TABLET ORAL
Qty: 21 TABLET | Refills: 0 | Status: SHIPPED | OUTPATIENT
Start: 2024-06-07

## 2024-06-07 RX ORDER — KETOCONAZOLE 20 MG/G
CREAM TOPICAL 2 TIMES DAILY
Qty: 30 G | Refills: 0 | Status: SHIPPED | OUTPATIENT
Start: 2024-06-07 | End: 2024-06-21

## 2024-06-07 ASSESSMENT — PAIN SCALES - GENERAL: PAINLEVEL: 0-NO PAIN

## 2024-06-07 ASSESSMENT — ENCOUNTER SYMPTOMS
DEPRESSION: 0
OCCASIONAL FEELINGS OF UNSTEADINESS: 0
LOSS OF SENSATION IN FEET: 0

## 2024-06-07 NOTE — PROGRESS NOTES
"Subjective   Patient ID: Radha Montalvo is a 74 y.o. female who presents for Rash (RASH- FACE 2 WEEKS AGO, USING HYDROCORTISONE CREAM, NOT HELPING, ITCH A LITTLE.).    Rash  This is a new problem. The current episode started 1 to 4 weeks ago. The problem is unchanged. The affected locations include the face. The rash is characterized by redness, scaling and itchiness. She was exposed to nothing. Past treatments include topical steroids. The treatment provided no relief.        Review of Systems   Skin:  Positive for rash.   All other systems reviewed and are negative.      Objective   /70 (BP Location: Right arm, Patient Position: Sitting, BP Cuff Size: Adult)   Pulse 71   Temp 35.5 °C (95.9 °F) (Temporal)   Ht 1.702 m (5' 7\")   Wt 64.9 kg (143 lb)   LMP  (LMP Unknown)   SpO2 99%   BMI 22.40 kg/m²     Physical Exam  Vitals reviewed.   Constitutional:       Appearance: Normal appearance.   Skin:     General: Skin is warm and dry.      Findings: Erythema and rash present.   Neurological:      Mental Status: She is alert.   Psychiatric:         Mood and Affect: Mood normal.         Behavior: Behavior normal.         Assessment/Plan   Problem List Items Addressed This Visit    None  Visit Diagnoses         Codes    Atopic dermatitis, unspecified type    -  Primary L20.9    Relevant Medications    methylPREDNISolone (Medrol Dospak) 4 mg tablets    ketoconazole (NIZOral) 2 % cream               "

## 2024-06-18 ENCOUNTER — OFFICE VISIT (OUTPATIENT)
Dept: CARDIOLOGY | Facility: CLINIC | Age: 74
End: 2024-06-18
Payer: MEDICARE

## 2024-06-18 VITALS
WEIGHT: 143 LBS | OXYGEN SATURATION: 96 % | DIASTOLIC BLOOD PRESSURE: 76 MMHG | SYSTOLIC BLOOD PRESSURE: 135 MMHG | RESPIRATION RATE: 18 BRPM | HEIGHT: 67 IN | TEMPERATURE: 98.9 F | BODY MASS INDEX: 22.44 KG/M2 | HEART RATE: 55 BPM

## 2024-06-18 DIAGNOSIS — E78.2 MIXED HYPERLIPIDEMIA: Primary | ICD-10-CM

## 2024-06-18 DIAGNOSIS — R60.0 EDEMA OF BOTH LOWER EXTREMITIES: ICD-10-CM

## 2024-06-18 DIAGNOSIS — I10 ESSENTIAL HYPERTENSION: ICD-10-CM

## 2024-06-18 DIAGNOSIS — I48.0 PAROXYSMAL ATRIAL FIBRILLATION (MULTI): ICD-10-CM

## 2024-06-18 PROCEDURE — 3075F SYST BP GE 130 - 139MM HG: CPT | Performed by: INTERNAL MEDICINE

## 2024-06-18 PROCEDURE — 1159F MED LIST DOCD IN RCRD: CPT | Performed by: INTERNAL MEDICINE

## 2024-06-18 PROCEDURE — 1126F AMNT PAIN NOTED NONE PRSNT: CPT | Performed by: INTERNAL MEDICINE

## 2024-06-18 PROCEDURE — 3078F DIAST BP <80 MM HG: CPT | Performed by: INTERNAL MEDICINE

## 2024-06-18 PROCEDURE — 99213 OFFICE O/P EST LOW 20 MIN: CPT | Performed by: INTERNAL MEDICINE

## 2024-06-18 PROCEDURE — 1036F TOBACCO NON-USER: CPT | Performed by: INTERNAL MEDICINE

## 2024-06-18 RX ORDER — BISOPROLOL FUMARATE 5 MG/1
5 TABLET, FILM COATED ORAL 2 TIMES DAILY
Qty: 180 TABLET | Refills: 3 | Status: SHIPPED | OUTPATIENT
Start: 2024-06-18 | End: 2025-06-18

## 2024-06-18 RX ORDER — DILTIAZEM HYDROCHLORIDE 120 MG/1
120 CAPSULE, COATED, EXTENDED RELEASE ORAL DAILY
Qty: 90 CAPSULE | Refills: 3 | Status: SHIPPED | OUTPATIENT
Start: 2024-06-18 | End: 2025-06-18

## 2024-06-18 ASSESSMENT — LIFESTYLE VARIABLES
HOW OFTEN DO YOU HAVE SIX OR MORE DRINKS ON ONE OCCASION: NEVER
HOW MANY STANDARD DRINKS CONTAINING ALCOHOL DO YOU HAVE ON A TYPICAL DAY: 1 OR 2
HAVE YOU OR SOMEONE ELSE BEEN INJURED AS A RESULT OF YOUR DRINKING: NO
AUDIT-C TOTAL SCORE: 4
AUDIT TOTAL SCORE: 4
SKIP TO QUESTIONS 9-10: 1
HAS A RELATIVE, FRIEND, DOCTOR, OR ANOTHER HEALTH PROFESSIONAL EXPRESSED CONCERN ABOUT YOUR DRINKING OR SUGGESTED YOU CUT DOWN: NO
HOW OFTEN DO YOU HAVE A DRINK CONTAINING ALCOHOL: 4 OR MORE TIMES A WEEK

## 2024-06-18 ASSESSMENT — ENCOUNTER SYMPTOMS
LOSS OF SENSATION IN FEET: 0
DEPRESSION: 0
OCCASIONAL FEELINGS OF UNSTEADINESS: 0

## 2024-06-18 ASSESSMENT — PAIN SCALES - GENERAL: PAINLEVEL: 0-NO PAIN

## 2024-06-18 NOTE — PROGRESS NOTES
Subjective   Chief Complaint   Patient presents with    Hypertension     Mrs\Ms. Montalvo  is present for her HTN\HR Follow up with Dr. Dougherty          74-year-old female patient has seen her in about 2 months ago.  Patient had a short of breath dyspnea exertion had a pulmonary evaluation with a sleep studies.  History of ascending aortic aneurysm 4.5 cm.  Also paroxysmal atrial fibrillation.  Patient had a PFT done walking at that time patient had a high heart rate.  Currently wearing leg stockings.  Echocardiogram done also about 6 months ago was normal ejection fraction negative nuclear stress test for ischemia.  Patient currently on Eliquis 5 mg tablet twice a day with the Lipitor as well as Cozaar 50 mg tablet once a day.    Past Medical History:   Diagnosis Date    Abnormal findings on diagnostic imaging of heart and coronary circulation 10/22/2023    Aneurysm (CMS-HCC) 10/23/2024    Ascending aorta dilatation (CMS-[facility]) 11/22/2023    Atrial fibrillation (Multi) 10/23/2023    Cancer (Multi) 2013    Congenital dilatation of aorta ([facility]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    Edema of both lower extremities 01/13/2024    Essential hypertension 10/06/2023    Hyperlipidemia 10/06/2023    Hypertension 10/06/2023    Personal history of other diseases of the musculoskeletal system and connective tissue 10/06/2023    History of arthritis    Shortness of breath 10/23/2023     Past Surgical History:   Procedure Laterality Date    BI MAMMO GUIDED BREAST LEFT LOCALIZATION Left 2/25/2013    BI MAMMO GUIDED LOCALIZATION BREAST LEFT [facility]    OTHER SURGICAL HISTORY  04/27/2022    Lumpectomy     Family medical history includes stroke in 2 relatives (father, mother).  Current Outpatient Medications   Medication Sig Dispense Refill    acyclovir (Zovirax) 800 mg tablet TAKE 1 TABLET BY MOUTH EVERY DAY FOR 90 DAYS 90 tablet 1    apixaban (Eliquis) 5 mg tablet Take 1 tablet (5 mg) by mouth 2 times a day. 60 tablet 11    atorvastatin  "(Lipitor) 10 mg tablet TAKE 1 TABLET BY MOUTH EVERY DAY FOR 90 DAYS 90 tablet 3    cholecalciferol (VITAMIN D-3) 10 mcg (400 unit) tablet Take 1 capsule by mouth once daily.      losartan (Cozaar) 50 mg tablet Take 1 tablet (50 mg) by mouth once daily. 90 tablet 2    omega 3-dha-epa-fish oil (Fish OiL) 1,200 (144-216) mg capsule 1.5 capsules Orally      vit B complex no.12/niacin,B3, (VITAMIN B COMPLEX NO.12-NIACIN ORAL) Take by mouth.  Vitamin B 12 TABS       No current facility-administered medications for this visit.      reports that she has never smoked. She has never been exposed to tobacco smoke. She has never used smokeless tobacco. She reports current alcohol use of about 7.0 standard drinks of alcohol per week. She reports that she does not use drugs.  Patient has no known allergies.  Mixed hyperlipidemia    Vitals:    06/18/24 1005   BP: 135/76   Pulse: 55   Resp: 18   Temp: 37.2 °C (98.9 °F)   TempSrc: Core   SpO2: 96%   Weight: 64.9 kg (143 lb)   Height: 1.702 m (5' 7\")   PainSc: 0-No pain      BMI:Body mass index is 22.4 kg/m².   General Cardiology:  General Appearance: Alert, oriented and in no acute distress.  HEENT: extra ocular movements intact (EOMI), pupils equal,  round, reactive to light and accommodation (PERRLA).  Carotid Upstroke: no bruit, normal.  Jugular Venous Distention (JVD): flat.  Chest: normal.  Lungs: Clear to auscultation,   Heart Sounds: no S3 or S4, normal S1, S2, irregular irregular fast rate.  Murmur, Click, Gallop: no systolic murmur.  Abdomen: no hepatomegaly, no masses felt, soft.  Extremities: no leg edema.  Peripheral pulses: 2 plus bilateral.  NEUROLOGY Cranial nerves II-XII grossly intact.     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    Shortness of breath    Exercise-induced angina (CMS-HCC)    Cervical radiculitis    Pain in eye    Degenerative disc disease, cervical    History of malignant neoplasm of breast    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    Acute cough    Acute frontal sinusitis    Ascending aorta dilatation (CMS-HCC)    Atrial fibrillation (Multi)    Chemical conjunctivitis    Congenital dilatation of aorta (HHS-HCC)    Corneal abrasion    Edema of both lower extremities    Herpes zoster    Injury of eye region    Ankle pain    Pain of left lower extremity    Pain of right lower extremity       Problem List Items Addressed This Visit       Hyperlipidemia - Primary    Essential hypertension    Atrial fibrillation (Multi)    Edema of both lower extremities      74-year female patient with paroxysmal atrial fibrillation, history of hypertension hyperlipidemia.  Patient has ongoing pulmonary evaluation found to having A-fib RVR contributing to her shortness of breath  1.  A-fib RVR: Patient does need a rate control medication.  Will start patient on bisoprolol 5 mg tablet p.o. twice a day as well as a Cardizem CD1 120 mg tablet p.o. daily.  2.  Hypertension: Will discontinue losartan due to starting new rate control education.  Avoiding hypotension episode.  Avoid caffeine and stimulant. Advised patient to avoid lunch meats, canned soups, pizzas, bread rolls, and sandwiches. Advised patient to limit salt intake 1,500 mg daily. Advised patient to exercise 30 mins/3 times a week including treadmill or aerobic type, Goal to achieve 65% target HR.  Diet and exercise reviewed with patient..advice to walk about 10,000 steps or about 2 hours during day time. Cut back on salt, sugar and  flour.  Advised patient to check blood pressure twice a day for next 10 days and give us a call if her blood pressure remains above 170 systolic and diastolic above 95.  Meanwhile cut back on salt, caffeine.  If blood pressure persistently stays above 200 systolic then go to nearest emergency room or call 911.    Paulo Dougherty MD

## 2024-08-15 ENCOUNTER — OFFICE VISIT (OUTPATIENT)
Dept: PRIMARY CARE | Facility: CLINIC | Age: 74
End: 2024-08-15
Payer: MEDICARE

## 2024-08-15 VITALS
HEART RATE: 67 BPM | TEMPERATURE: 98.8 F | OXYGEN SATURATION: 98 % | HEIGHT: 67 IN | RESPIRATION RATE: 20 BRPM | BODY MASS INDEX: 23.86 KG/M2 | DIASTOLIC BLOOD PRESSURE: 72 MMHG | SYSTOLIC BLOOD PRESSURE: 110 MMHG | WEIGHT: 152 LBS

## 2024-08-15 DIAGNOSIS — N64.59 INVERSION OF LEFT NIPPLE: ICD-10-CM

## 2024-08-15 DIAGNOSIS — Z00.00 ROUTINE GENERAL MEDICAL EXAMINATION AT A HEALTH CARE FACILITY: Primary | ICD-10-CM

## 2024-08-15 DIAGNOSIS — I10 PRIMARY HYPERTENSION: ICD-10-CM

## 2024-08-15 DIAGNOSIS — E78.2 MIXED HYPERLIPIDEMIA: ICD-10-CM

## 2024-08-15 DIAGNOSIS — I48.0 PAROXYSMAL ATRIAL FIBRILLATION (MULTI): ICD-10-CM

## 2024-08-15 LAB
ALBUMIN SERPL-MCNC: 4.5 G/DL (ref 3.5–5)
ALP BLD-CCNC: 87 U/L (ref 35–125)
ALT SERPL-CCNC: 23 U/L (ref 5–40)
ANION GAP SERPL CALC-SCNC: 15 MMOL/L
AST SERPL-CCNC: 24 U/L (ref 5–40)
BASOPHILS # BLD AUTO: 0.07 X10*3/UL (ref 0–0.1)
BASOPHILS NFR BLD AUTO: 1 %
BILIRUB SERPL-MCNC: 0.5 MG/DL (ref 0.1–1.2)
BUN SERPL-MCNC: 20 MG/DL (ref 8–25)
CALCIUM SERPL-MCNC: 9.7 MG/DL (ref 8.5–10.4)
CHLORIDE SERPL-SCNC: 103 MMOL/L (ref 97–107)
CHOLEST SERPL-MCNC: 138 MG/DL (ref 133–200)
CHOLEST/HDLC SERPL: 2.3 {RATIO}
CO2 SERPL-SCNC: 22 MMOL/L (ref 24–31)
CREAT SERPL-MCNC: 0.9 MG/DL (ref 0.4–1.6)
EGFRCR SERPLBLD CKD-EPI 2021: 67 ML/MIN/1.73M*2
EOSINOPHIL # BLD AUTO: 0.41 X10*3/UL (ref 0–0.4)
EOSINOPHIL NFR BLD AUTO: 5.8 %
ERYTHROCYTE [DISTWIDTH] IN BLOOD BY AUTOMATED COUNT: 12 % (ref 11.5–14.5)
GLUCOSE SERPL-MCNC: 95 MG/DL (ref 65–99)
HCT VFR BLD AUTO: 49 % (ref 36–46)
HDLC SERPL-MCNC: 61 MG/DL
HGB BLD-MCNC: 16.3 G/DL (ref 12–16)
IMM GRANULOCYTES # BLD AUTO: 0.02 X10*3/UL (ref 0–0.5)
IMM GRANULOCYTES NFR BLD AUTO: 0.3 % (ref 0–0.9)
LDLC SERPL CALC-MCNC: 58 MG/DL (ref 65–130)
LYMPHOCYTES # BLD AUTO: 1.81 X10*3/UL (ref 0.8–3)
LYMPHOCYTES NFR BLD AUTO: 25.7 %
MCH RBC QN AUTO: 34.2 PG (ref 26–34)
MCHC RBC AUTO-ENTMCNC: 33.3 G/DL (ref 32–36)
MCV RBC AUTO: 103 FL (ref 80–100)
MONOCYTES # BLD AUTO: 0.71 X10*3/UL (ref 0.05–0.8)
MONOCYTES NFR BLD AUTO: 10.1 %
NEUTROPHILS # BLD AUTO: 4.01 X10*3/UL (ref 1.6–5.5)
NEUTROPHILS NFR BLD AUTO: 57.1 %
NRBC BLD-RTO: 0 /100 WBCS (ref 0–0)
PLATELET # BLD AUTO: 281 X10*3/UL (ref 150–450)
POTASSIUM SERPL-SCNC: 4.4 MMOL/L (ref 3.4–5.1)
PROT SERPL-MCNC: 6.6 G/DL (ref 5.9–7.9)
RBC # BLD AUTO: 4.77 X10*6/UL (ref 4–5.2)
SODIUM SERPL-SCNC: 140 MMOL/L (ref 133–145)
TRIGL SERPL-MCNC: 96 MG/DL (ref 40–150)
TSH SERPL DL<=0.05 MIU/L-ACNC: 2.76 MIU/L (ref 0.27–4.2)
WBC # BLD AUTO: 7 X10*3/UL (ref 4.4–11.3)

## 2024-08-15 PROCEDURE — 99213 OFFICE O/P EST LOW 20 MIN: CPT | Performed by: FAMILY MEDICINE

## 2024-08-15 PROCEDURE — 36415 COLL VENOUS BLD VENIPUNCTURE: CPT | Performed by: FAMILY MEDICINE

## 2024-08-15 PROCEDURE — 1159F MED LIST DOCD IN RCRD: CPT | Performed by: FAMILY MEDICINE

## 2024-08-15 PROCEDURE — G0439 PPPS, SUBSEQ VISIT: HCPCS | Performed by: FAMILY MEDICINE

## 2024-08-15 PROCEDURE — 3078F DIAST BP <80 MM HG: CPT | Performed by: FAMILY MEDICINE

## 2024-08-15 PROCEDURE — 99215 OFFICE O/P EST HI 40 MIN: CPT | Performed by: FAMILY MEDICINE

## 2024-08-15 PROCEDURE — 3008F BODY MASS INDEX DOCD: CPT | Performed by: FAMILY MEDICINE

## 2024-08-15 PROCEDURE — 84443 ASSAY THYROID STIM HORMONE: CPT | Performed by: FAMILY MEDICINE

## 2024-08-15 PROCEDURE — 1036F TOBACCO NON-USER: CPT | Performed by: FAMILY MEDICINE

## 2024-08-15 PROCEDURE — 85025 COMPLETE CBC W/AUTO DIFF WBC: CPT | Performed by: FAMILY MEDICINE

## 2024-08-15 PROCEDURE — 86803 HEPATITIS C AB TEST: CPT | Mod: WESLAB | Performed by: FAMILY MEDICINE

## 2024-08-15 PROCEDURE — 80053 COMPREHEN METABOLIC PANEL: CPT | Performed by: FAMILY MEDICINE

## 2024-08-15 PROCEDURE — 1126F AMNT PAIN NOTED NONE PRSNT: CPT | Performed by: FAMILY MEDICINE

## 2024-08-15 PROCEDURE — 1170F FXNL STATUS ASSESSED: CPT | Performed by: FAMILY MEDICINE

## 2024-08-15 PROCEDURE — 3074F SYST BP LT 130 MM HG: CPT | Performed by: FAMILY MEDICINE

## 2024-08-15 PROCEDURE — 80061 LIPID PANEL: CPT | Performed by: FAMILY MEDICINE

## 2024-08-15 ASSESSMENT — ENCOUNTER SYMPTOMS
GASTROINTESTINAL NEGATIVE: 1
MUSCULOSKELETAL NEGATIVE: 1
RESPIRATORY NEGATIVE: 1
DEPRESSION: 0
LOSS OF SENSATION IN FEET: 0
CARDIOVASCULAR NEGATIVE: 1
OCCASIONAL FEELINGS OF UNSTEADINESS: 0
NEUROLOGICAL NEGATIVE: 1
CONSTITUTIONAL NEGATIVE: 1

## 2024-08-15 ASSESSMENT — ACTIVITIES OF DAILY LIVING (ADL)
BATHING: INDEPENDENT
TAKING_MEDICATION: INDEPENDENT
DOING_HOUSEWORK: INDEPENDENT
MANAGING_FINANCES: INDEPENDENT
DRESSING: INDEPENDENT
GROCERY_SHOPPING: INDEPENDENT

## 2024-08-15 ASSESSMENT — PAIN SCALES - GENERAL: PAINLEVEL: 0-NO PAIN

## 2024-08-15 NOTE — PROGRESS NOTES
"Subjective   Patient ID: Radha Montalvo is a 74 y.o. female who presents for Annual Exam (HERE FOR A PHYSICAL AND FASTING  BLOODWORK).    HPI seeing pulmonology for hypoxia/emphysema (Dr Ricks) had sleep study that showed apnea and started CPAP about a month ago and trying to adjust to this slowly.   Sees cardiology regularly.   She had left breast lumpectomy with chemo and radiation and was followed with oncology and then released.  She had mammo 1 year ago.  She has left nipple inversion for a couple of weeks.  No nipple dc  Review of Systems   Constitutional: Negative.    HENT: Negative.     Respiratory: Negative.     Cardiovascular: Negative.    Gastrointestinal: Negative.    Genitourinary: Negative.    Musculoskeletal: Negative.    Neurological: Negative.        Objective   /72 (BP Location: Right arm, Patient Position: Sitting, BP Cuff Size: Adult)   Pulse 67   Temp 37.1 °C (98.8 °F) (Skin)   Resp 20   Ht 1.702 m (5' 7\")   Wt 68.9 kg (152 lb)   LMP  (LMP Unknown)   SpO2 98%   BMI 23.81 kg/m²     Physical Exam  Vitals and nursing note reviewed.   Constitutional:       General: She is not in acute distress.  HENT:      Right Ear: Tympanic membrane and ear canal normal.      Left Ear: Tympanic membrane and ear canal normal.      Nose: Nose normal. No rhinorrhea.      Mouth/Throat:      Pharynx: Oropharynx is clear. No oropharyngeal exudate or posterior oropharyngeal erythema.      Comments: Dentition wnl  Eyes:      Extraocular Movements: Extraocular movements intact.      Conjunctiva/sclera: Conjunctivae normal.      Pupils: Pupils are equal, round, and reactive to light.   Neck:      Vascular: No carotid bruit.   Cardiovascular:      Rate and Rhythm: Normal rate and regular rhythm.      Heart sounds: Normal heart sounds. No murmur heard.  Pulmonary:      Breath sounds: Normal breath sounds. No wheezing or rhonchi.   Chest:          Comments: Scar of left breast medial upper quad with some " underlying scar tissues pt states is baseline for her and no changes that she notices from this.  Left nipple partially inverted with no dc or erythema.  No other masses palpated.   Abdominal:      General: Bowel sounds are normal. There is no distension.      Palpations: Abdomen is soft. There is no mass.      Tenderness: There is no abdominal tenderness. There is no guarding or rebound.      Hernia: No hernia is present.   Musculoskeletal:         General: No swelling or tenderness. Normal range of motion.      Cervical back: Normal range of motion and neck supple.   Lymphadenopathy:      Cervical: No cervical adenopathy.   Skin:     General: Skin is warm.      Findings: No rash.   Neurological:      General: No focal deficit present.      Mental Status: She is alert.         Assessment/Plan   Problem List Items Addressed This Visit             ICD-10-CM    Hyperlipidemia E78.5    Atrial fibrillation (Multi) I48.91     Other Visit Diagnoses         Codes    Routine general medical examination at a health care facility    -  Primary Z00.00    Relevant Orders    CBC and Auto Differential    Comprehensive Metabolic Panel    TSH with reflex to Free T4 if abnormal    Lipid Panel    Inversion of left nipple     N64.59    Relevant Orders    BI mammo bilateral diagnostic    Primary hypertension     I10    Relevant Orders    CBC and Auto Differential          Will set up surgical eval near future pending mammo report also.

## 2024-08-16 LAB — HCV AB SER QL: NONREACTIVE

## 2024-09-06 ENCOUNTER — OFFICE VISIT (OUTPATIENT)
Dept: PRIMARY CARE | Facility: CLINIC | Age: 74
End: 2024-09-06
Payer: MEDICARE

## 2024-09-06 VITALS
DIASTOLIC BLOOD PRESSURE: 82 MMHG | BODY MASS INDEX: 23.86 KG/M2 | SYSTOLIC BLOOD PRESSURE: 124 MMHG | OXYGEN SATURATION: 99 % | WEIGHT: 152 LBS | HEART RATE: 50 BPM | HEIGHT: 67 IN | TEMPERATURE: 95.2 F

## 2024-09-06 DIAGNOSIS — K14.6 TONGUE SORE: Primary | ICD-10-CM

## 2024-09-06 PROCEDURE — 99213 OFFICE O/P EST LOW 20 MIN: CPT | Performed by: REGISTERED NURSE

## 2024-09-06 PROCEDURE — 3074F SYST BP LT 130 MM HG: CPT | Performed by: REGISTERED NURSE

## 2024-09-06 PROCEDURE — 1159F MED LIST DOCD IN RCRD: CPT | Performed by: REGISTERED NURSE

## 2024-09-06 PROCEDURE — 3079F DIAST BP 80-89 MM HG: CPT | Performed by: REGISTERED NURSE

## 2024-09-06 PROCEDURE — 3008F BODY MASS INDEX DOCD: CPT | Performed by: REGISTERED NURSE

## 2024-09-06 PROCEDURE — 1036F TOBACCO NON-USER: CPT | Performed by: REGISTERED NURSE

## 2024-09-06 PROCEDURE — 1160F RVW MEDS BY RX/DR IN RCRD: CPT | Performed by: REGISTERED NURSE

## 2024-09-06 PROCEDURE — 1158F ADVNC CARE PLAN TLK DOCD: CPT | Performed by: REGISTERED NURSE

## 2024-09-06 PROCEDURE — 1125F AMNT PAIN NOTED PAIN PRSNT: CPT | Performed by: REGISTERED NURSE

## 2024-09-06 PROCEDURE — 1123F ACP DISCUSS/DSCN MKR DOCD: CPT | Performed by: REGISTERED NURSE

## 2024-09-06 RX ORDER — CHLORHEXIDINE GLUCONATE ORAL RINSE 1.2 MG/ML
15 SOLUTION DENTAL AS NEEDED
Qty: 120 ML | Refills: 0 | Status: SHIPPED | OUTPATIENT
Start: 2024-09-06 | End: 2024-09-20

## 2024-09-06 ASSESSMENT — ENCOUNTER SYMPTOMS
LOSS OF SENSATION IN FEET: 0
OCCASIONAL FEELINGS OF UNSTEADINESS: 0
DEPRESSION: 0

## 2024-09-06 ASSESSMENT — PAIN SCALES - GENERAL: PAINLEVEL: 4

## 2024-09-06 NOTE — PROGRESS NOTES
"Subjective   Patient ID: Radha Montalvo is a 74 y.o. female who presents for Thrush (Oral Thrush- x 3 days, can't eat /Had stomach issues, took Mylanta, has never taken before,).    Pt here with tongue pain no current discoloration         Review of Systems   HENT:  Negative for mouth sores.    All other systems reviewed and are negative.      Objective   /82 (BP Location: Left arm, Patient Position: Sitting, BP Cuff Size: Adult)   Pulse 50   Temp 35.1 °C (95.2 °F) (Temporal)   Ht 1.702 m (5' 7\")   Wt 68.9 kg (152 lb)   LMP  (LMP Unknown)   SpO2 99%   BMI 23.81 kg/m²     Physical Exam  Vitals reviewed.   Constitutional:       Appearance: Normal appearance.   HENT:      Mouth/Throat:      Mouth: Mucous membranes are moist.      Pharynx: No oropharyngeal exudate or posterior oropharyngeal erythema.   Neurological:      Mental Status: She is alert.         Assessment/Plan   Problem List Items Addressed This Visit    None  Visit Diagnoses         Codes    Tongue sore    -  Primary K14.6    Relevant Medications    chlorhexidine (Peridex) 0.12 % solution    ferrous fumarate-vit C-vit B12 66 mg iron- 250 mg-10 mcg capsule               "

## 2024-09-15 DIAGNOSIS — A60.1 HERPESVIRAL INFECTION OF PERIANAL SKIN AND RECTUM: ICD-10-CM

## 2024-09-16 ENCOUNTER — HOSPITAL ENCOUNTER (OUTPATIENT)
Dept: RADIOLOGY | Facility: HOSPITAL | Age: 74
Discharge: HOME | End: 2024-09-16
Payer: MEDICARE

## 2024-09-16 ENCOUNTER — TELEPHONE (OUTPATIENT)
Dept: SURGERY | Facility: CLINIC | Age: 74
End: 2024-09-16
Payer: MEDICARE

## 2024-09-16 VITALS — WEIGHT: 152 LBS | BODY MASS INDEX: 23.86 KG/M2 | HEIGHT: 67 IN

## 2024-09-16 DIAGNOSIS — N64.59 INVERSION OF LEFT NIPPLE: ICD-10-CM

## 2024-09-16 PROCEDURE — 76642 ULTRASOUND BREAST LIMITED: CPT | Performed by: RADIOLOGY

## 2024-09-16 PROCEDURE — 77062 BREAST TOMOSYNTHESIS BI: CPT

## 2024-09-16 PROCEDURE — G0279 TOMOSYNTHESIS, MAMMO: HCPCS | Performed by: RADIOLOGY

## 2024-09-16 PROCEDURE — 77066 DX MAMMO INCL CAD BI: CPT | Performed by: RADIOLOGY

## 2024-09-16 PROCEDURE — 76642 ULTRASOUND BREAST LIMITED: CPT | Mod: LT

## 2024-09-16 NOTE — TELEPHONE ENCOUNTER
Previous surgeon - Ani. Would like appt. Due to scaring on breast - Rajni recommended follow up with us due to excessive scar tissue. Recent films. Looking to schedule beginning of November.

## 2024-10-01 RX ORDER — ACYCLOVIR 800 MG/1
800 TABLET ORAL DAILY
Qty: 90 TABLET | Refills: 1 | Status: SHIPPED | OUTPATIENT
Start: 2024-10-01

## 2024-10-31 ENCOUNTER — LAB (OUTPATIENT)
Dept: LAB | Facility: LAB | Age: 74
End: 2024-10-31
Payer: MEDICARE

## 2024-10-31 DIAGNOSIS — I77.810 ASCENDING AORTA DILATION (CMS-HCC): ICD-10-CM

## 2024-10-31 LAB
ANION GAP SERPL CALC-SCNC: 13 MMOL/L (ref 10–20)
BUN SERPL-MCNC: 17 MG/DL (ref 6–23)
CALCIUM SERPL-MCNC: 9.5 MG/DL (ref 8.6–10.3)
CHLORIDE SERPL-SCNC: 105 MMOL/L (ref 98–107)
CO2 SERPL-SCNC: 27 MMOL/L (ref 21–32)
CREAT SERPL-MCNC: 0.79 MG/DL (ref 0.5–1.05)
EGFRCR SERPLBLD CKD-EPI 2021: 79 ML/MIN/1.73M*2
GLUCOSE SERPL-MCNC: 94 MG/DL (ref 74–99)
POTASSIUM SERPL-SCNC: 4.5 MMOL/L (ref 3.5–5.3)
SODIUM SERPL-SCNC: 140 MMOL/L (ref 136–145)

## 2024-10-31 PROCEDURE — 36415 COLL VENOUS BLD VENIPUNCTURE: CPT

## 2024-10-31 PROCEDURE — 80048 BASIC METABOLIC PNL TOTAL CA: CPT

## 2024-11-04 ENCOUNTER — OFFICE VISIT (OUTPATIENT)
Dept: SURGERY | Facility: CLINIC | Age: 74
End: 2024-11-04
Payer: MEDICARE

## 2024-11-04 VITALS
HEIGHT: 67 IN | HEART RATE: 71 BPM | SYSTOLIC BLOOD PRESSURE: 125 MMHG | BODY MASS INDEX: 23.86 KG/M2 | RESPIRATION RATE: 17 BRPM | WEIGHT: 152 LBS | DIASTOLIC BLOOD PRESSURE: 88 MMHG | TEMPERATURE: 97.7 F

## 2024-11-04 DIAGNOSIS — Z85.3 HISTORY OF MALIGNANT NEOPLASM OF LEFT BREAST: Primary | ICD-10-CM

## 2024-11-04 PROCEDURE — 3008F BODY MASS INDEX DOCD: CPT | Performed by: SURGERY

## 2024-11-04 PROCEDURE — 1036F TOBACCO NON-USER: CPT | Performed by: SURGERY

## 2024-11-04 PROCEDURE — 1159F MED LIST DOCD IN RCRD: CPT | Performed by: SURGERY

## 2024-11-04 PROCEDURE — 3074F SYST BP LT 130 MM HG: CPT | Performed by: SURGERY

## 2024-11-04 PROCEDURE — 1125F AMNT PAIN NOTED PAIN PRSNT: CPT | Performed by: SURGERY

## 2024-11-04 PROCEDURE — 99204 OFFICE O/P NEW MOD 45 MIN: CPT | Performed by: SURGERY

## 2024-11-04 PROCEDURE — 3079F DIAST BP 80-89 MM HG: CPT | Performed by: SURGERY

## 2024-11-04 PROCEDURE — 99214 OFFICE O/P EST MOD 30 MIN: CPT | Performed by: SURGERY

## 2024-11-04 PROCEDURE — 1123F ACP DISCUSS/DSCN MKR DOCD: CPT | Performed by: SURGERY

## 2024-11-04 ASSESSMENT — LIFESTYLE VARIABLES
AUDIT-C TOTAL SCORE: 4
SKIP TO QUESTIONS 9-10: 1
HOW OFTEN DO YOU HAVE SIX OR MORE DRINKS ON ONE OCCASION: NEVER
HOW MANY STANDARD DRINKS CONTAINING ALCOHOL DO YOU HAVE ON A TYPICAL DAY: 1 OR 2
HOW OFTEN DO YOU HAVE A DRINK CONTAINING ALCOHOL: 4 OR MORE TIMES A WEEK

## 2024-11-04 ASSESSMENT — PATIENT HEALTH QUESTIONNAIRE - PHQ9
2. FEELING DOWN, DEPRESSED OR HOPELESS: NOT AT ALL
SUM OF ALL RESPONSES TO PHQ9 QUESTIONS 1 & 2: 0
1. LITTLE INTEREST OR PLEASURE IN DOING THINGS: NOT AT ALL

## 2024-11-04 ASSESSMENT — PAIN SCALES - GENERAL: PAINLEVEL_OUTOF10: 4

## 2024-11-04 ASSESSMENT — ENCOUNTER SYMPTOMS
OCCASIONAL FEELINGS OF UNSTEADINESS: 0
DEPRESSION: 0
LOSS OF SENSATION IN FEET: 0

## 2024-11-04 NOTE — ASSESSMENT & PLAN NOTE
Patient underwent 2013 lumpectomy and sentinel node biopsy for T1 N1 MX tumor.  It was ER positive NH HER2/katey negative.  Patient noticed changes in the left breast.  On physical exam she does have some inversion of the inferior aspect of the nipple and some thickening of the skin medially.  Normal imaging.  At this time recommend MRI of the breast to evaluate the changes in the breast.  If abnormal we will bring her back for discussion.  If normal we will see her again in 6 months for repeat exam.  Patient is agreeable to the plan

## 2024-11-04 NOTE — PROGRESS NOTES
Subjective   Patient ID: Radha Montalvo is a 74 y.o. female who presents for scar on breast thickening.    HPI  Patient returns to clinic and presents for scar on breast thickening.   Patient last seen in office on 6/2/2015 for gallbladder consult.  She previously saw Dr Treviño for a routine physical on 8/15/2024. He noted the scar on the breast at that time and ordered a mammogram and ultrasound of the breast.   Dr Treviño told patient scans were okay but wanted her to follow up with surgeon due to recent changes.     BI MAMMO BILATERAL DIAGNOSTIC TOMOSYNTHESIS; BI US BREAST LIMITED  LEFT;  9/16/2024 9:58 am; 9/16/2024 11:22 am      ACCESSION NUMBER(S):  [id number]; [id number]      ORDERING CLINICIAN:  KRISTINE TREVIÑO      INDICATION:  Left breast pain and inverted nipple for about 2 months without  nipple discharge. History of left breast lumpectomy and radiation as  well as chemotherapy in 2013.      COMPARISON:  09/14/2023, 09/12/2022, and priors dating back to and priors dating  back to 2010      FINDINGS:  MAMMOGRAPHY: 2D and tomosynthesis images were reviewed at 1 mm slice  thickness.      Density:  There are scattered areas of fibroglandular density.      Postlumpectomy changes including parenchymal scarring and dystrophic  calcifications are again seen in the inferior medial left breast at  posterior depth overlying skin and retraction. Mild diffuse left  breast skin and trabecular thickening is again seen, in keeping with  history of radiation treatment. No suspicious masses or  calcifications are identified.      ULTRASOUND:  Extensive ultrasound examination of the left breast demonstrates  postsurgical scarring at 9:00 position without other abnormalities.  No sonographic abnormality is identified in the subareolar region.      IMPRESSION:  Posttreatment changes with no mammographic or targeted sonographic  evidence of malignancy in the left breast. No mammographic or  sonographic abnormality to  correlate with left breast symptoms, for  which clinical follow-up is recommended.    Previous Biopsy: yes Menarche Age: 14 Age of first delivery: 25 Breastfeed: no Menopause age: 50 HRT: no Family history of breast cancer: no Family history of ovarian cancer: no Family history of pancreatic cancer: no G 3 P 2    Social History:  Tobacco Use - no  Do you use any recreational drugs - yes  Alcohol Use - yes  Caffeine Intake - no  Working - retired  Marital status -   Living with - spouse    Past Medical History:   Diagnosis Date    Abnormal findings on diagnostic imaging of heart and coronary circulation 10/22/2023    Aneurysm (CMS-HCC) 10/23/2024    Ascending aorta dilatation (CMS-HCC) 11/22/2023    Atrial fibrillation (Multi) 10/23/2023    Breast cancer (Multi) 2013    Cancer (Multi) 2013    Congenital dilatation of aorta ([facility]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    Edema of both lower extremities 01/13/2024    Essential hypertension 10/06/2023    Hx antineoplastic chemo     Hyperlipidemia 10/06/2023    Hypertension 10/06/2023    Personal history of irradiation     Personal history of other diseases of the musculoskeletal system and connective tissue 10/06/2023    History of arthritis    Shortness of breath 10/23/2023     Past Surgical History:   Procedure Laterality Date    BI MAMMO GUIDED BREAST LEFT LOCALIZATION Left 02/25/2013    BI MAMMO GUIDED LOCALIZATION BREAST LEFT [facility]    BREAST BIOPSY      BREAST LUMPECTOMY Left 2013    HYSTERECTOMY  2000    MAMMOGRAM DIAGNOSTIC BI Bilateral 09/16/2024    OTHER SURGICAL HISTORY  04/27/2022    Lumpectomy    US COMPLETE BREAST Left 09/16/2024     Family History   Problem Relation Name Age of Onset    Stroke Mother Christi Nair     Arthritis Mother Christidavion Nair     COPD Mother Christi Suttony     Stroke Father Liborio Nair     Aneurysm Father Liborio Nair     Hypertension Father Liborio Nair      No Known Allergies    Review of Systems  /88   Pulse 71   Temp 36.5 °C (97.7 °F)    "Resp 17   Ht 1.702 m (5' 7\")   Wt 68.9 kg (152 lb)   LMP  (LMP Unknown)   BMI 23.81 kg/m²     Objective   Physical Exam  Physical Exam:  /88   Pulse 71   Temp 36.5 °C (97.7 °F)   Resp 17   Ht 1.702 m (5' 7\")   Wt 68.9 kg (152 lb)   LMP  (LMP Unknown)   BMI 23.81 kg/m²    GENERAL APPEARANCE: Patient appears in no acute distress.   EYES: Sclera non-icteric, PERRLA.   ENT Normal appearance of ears and nose.   NECK/THYROID: Neck: no masses. Thyroid: no masses.   LYMPH NODES: No cervical or supraclavicular lymphadenopathy.   CARDIOVASCULAR Heart: RRR, no murmurs; Carotid bruits: none; Peripheral edema: none.   RESPIRATORY: Lungs: Bilateral inspiratory and expiratory wheezing; no respiratory distress.   BREASTS: Exam done both in upright and supine position. On inspection patient's left breast significantly smaller than the right.  Left nipple with the inferior aspect slightly inverted.  No discharge or signs of infection.  Patient feels a thickening of the skin on the left chest wall just medial to the left breast.  Patient with mild peau d'orange medial skin.  Lumpectomy incision medial aspect of the breast.  Masses: none palpable in either breast.  Axillary lymphadenopathy: none.   GI (ABDOMEN) No intraabdominal mass appreciated, no hepatosplenomegaly; Hernia: none; Tenderness: none.   PSYCH: Patient oriented to time, place and person, normal affect.    Assessment/Plan   Problem List Items Addressed This Visit             ICD-10-CM    History of malignant neoplasm of left breast - Primary Z85.3     Patient underwent 2013 lumpectomy and sentinel node biopsy for T1 N1 MX tumor.  It was ER positive MT HER2/katey negative.  Patient noticed changes in the left breast.  On physical exam she does have some inversion of the inferior aspect of the nipple and some thickening of the skin medially.  Normal imaging.  At this time recommend MRI of the breast to evaluate the changes in the breast.  If abnormal we will " bring her back for discussion.  If normal we will see her again in 6 months for repeat exam.  Patient is agreeable to the plan             BREAST HISTORY :   In 2013 patient underwent a core biopsy that revealed invasive lobular carcinoma.  Patient subsequently underwent a left breast lumpectomy with sentinel node biopsy.  2 lymph nodes were excised 1 were positive for tumor.  This ultimately was a T1 N1 MX ER positive [place] negative HER2/katey negative tumor.  Patient underwent postop chemotherapy postop radiation therapy and postop endocrine therapy for 10 years.    Ellen Stout MD 11/04/24 12:51 PM

## 2024-11-12 ENCOUNTER — HOSPITAL ENCOUNTER (OUTPATIENT)
Dept: RADIOLOGY | Facility: HOSPITAL | Age: 74
Discharge: HOME | End: 2024-11-12
Payer: MEDICARE

## 2024-11-12 DIAGNOSIS — I77.810 ASCENDING AORTA DILATION (CMS-HCC): ICD-10-CM

## 2024-11-12 PROCEDURE — 71275 CT ANGIOGRAPHY CHEST: CPT

## 2024-11-12 PROCEDURE — 2550000001 HC RX 255 CONTRASTS: Performed by: THORACIC SURGERY (CARDIOTHORACIC VASCULAR SURGERY)

## 2024-11-12 PROCEDURE — 71275 CT ANGIOGRAPHY CHEST: CPT | Performed by: RADIOLOGY

## 2024-11-19 PROBLEM — R05.1 ACUTE COUGH: Status: RESOLVED | Noted: 2024-01-13 | Resolved: 2024-11-19

## 2024-11-19 PROBLEM — M19.012 ARTHRITIS OF LEFT ACROMIOCLAVICULAR JOINT: Status: RESOLVED | Noted: 2024-02-15 | Resolved: 2024-11-19

## 2024-11-19 PROBLEM — G47.00 INSOMNIA: Status: RESOLVED | Noted: 2023-10-06 | Resolved: 2024-11-19

## 2024-11-19 PROBLEM — H10.219 CHEMICAL CONJUNCTIVITIS: Status: RESOLVED | Noted: 2023-04-08 | Resolved: 2024-11-19

## 2024-11-19 PROBLEM — S82.839A CLOSED FRACTURE OF DISTAL END OF FIBULA: Status: ACTIVE | Noted: 2023-10-06

## 2024-11-19 PROBLEM — H57.10 PAIN IN EYE: Status: RESOLVED | Noted: 2017-01-17 | Resolved: 2024-11-19

## 2024-11-19 PROBLEM — M75.22 BICEPS TENDINITIS OF LEFT SHOULDER: Status: RESOLVED | Noted: 2024-02-15 | Resolved: 2024-11-19

## 2024-11-19 PROBLEM — S46.111A LABRAL TEAR OF LONG HEAD OF BICEPS TENDON, RIGHT, INITIAL ENCOUNTER: Status: RESOLVED | Noted: 2024-02-15 | Resolved: 2024-11-19

## 2024-11-19 PROBLEM — M19.012 PRIMARY OSTEOARTHRITIS OF BOTH SHOULDERS: Status: RESOLVED | Noted: 2024-02-15 | Resolved: 2024-11-19

## 2024-11-19 PROBLEM — M19.011 PRIMARY OSTEOARTHRITIS OF BOTH SHOULDERS: Status: RESOLVED | Noted: 2024-02-15 | Resolved: 2024-11-19

## 2024-11-19 NOTE — PROGRESS NOTES
Radha returns for aorta follow up status post recent ct 11/12/24. Mary Aguilera RN    This is a 74 years old female patient that we have seen once before because of dilated ascending aorta.  She is here a year later with a new CT scan that shows no significant changes.  She is otherwise with no significant symptoms however the patient's  here today with her is saying that she is very short of breath after walking 200 feet.  She is going to see her cardiologist soon.  I have explained to her that this has nothing to do with the aortic dilatation.  As a matter fact I do not think this aortic aneurysm is significant will need any more than surveillance for now.  We talked about blood pressure control and may be a new noncontrast CT in 2 years from now.

## 2024-11-27 ENCOUNTER — OFFICE VISIT (OUTPATIENT)
Dept: CARDIAC SURGERY | Facility: HOSPITAL | Age: 74
End: 2024-11-27
Payer: MEDICARE

## 2024-11-27 VITALS
BODY MASS INDEX: 24.74 KG/M2 | WEIGHT: 157.6 LBS | OXYGEN SATURATION: 95 % | SYSTOLIC BLOOD PRESSURE: 147 MMHG | DIASTOLIC BLOOD PRESSURE: 81 MMHG | HEIGHT: 67 IN | HEART RATE: 72 BPM

## 2024-11-27 DIAGNOSIS — I77.810 ASCENDING AORTA DILATATION (CMS-HCC): ICD-10-CM

## 2024-11-27 PROCEDURE — 99215 OFFICE O/P EST HI 40 MIN: CPT | Performed by: THORACIC SURGERY (CARDIOTHORACIC VASCULAR SURGERY)

## 2024-11-27 PROCEDURE — 1126F AMNT PAIN NOTED NONE PRSNT: CPT | Performed by: THORACIC SURGERY (CARDIOTHORACIC VASCULAR SURGERY)

## 2024-11-27 PROCEDURE — 3079F DIAST BP 80-89 MM HG: CPT | Performed by: THORACIC SURGERY (CARDIOTHORACIC VASCULAR SURGERY)

## 2024-11-27 PROCEDURE — 1159F MED LIST DOCD IN RCRD: CPT | Performed by: THORACIC SURGERY (CARDIOTHORACIC VASCULAR SURGERY)

## 2024-11-27 PROCEDURE — 3008F BODY MASS INDEX DOCD: CPT | Performed by: THORACIC SURGERY (CARDIOTHORACIC VASCULAR SURGERY)

## 2024-11-27 PROCEDURE — 1123F ACP DISCUSS/DSCN MKR DOCD: CPT | Performed by: THORACIC SURGERY (CARDIOTHORACIC VASCULAR SURGERY)

## 2024-11-27 PROCEDURE — 3077F SYST BP >= 140 MM HG: CPT | Performed by: THORACIC SURGERY (CARDIOTHORACIC VASCULAR SURGERY)

## 2024-11-27 PROCEDURE — 1036F TOBACCO NON-USER: CPT | Performed by: THORACIC SURGERY (CARDIOTHORACIC VASCULAR SURGERY)

## 2024-11-27 ASSESSMENT — ENCOUNTER SYMPTOMS
OCCASIONAL FEELINGS OF UNSTEADINESS: 1
DEPRESSION: 0
LOSS OF SENSATION IN FEET: 1

## 2024-11-27 ASSESSMENT — PAIN SCALES - GENERAL: PAINLEVEL_OUTOF10: 0-NO PAIN

## 2024-11-29 DIAGNOSIS — I77.819 DILATATION OF AORTA (CMS-HCC): ICD-10-CM

## 2024-12-04 ENCOUNTER — APPOINTMENT (OUTPATIENT)
Dept: CARDIOLOGY | Facility: CLINIC | Age: 74
End: 2024-12-04
Payer: MEDICARE

## 2024-12-10 ENCOUNTER — APPOINTMENT (OUTPATIENT)
Dept: CARDIOLOGY | Facility: CLINIC | Age: 74
End: 2024-12-10
Payer: MEDICARE

## 2024-12-23 ENCOUNTER — APPOINTMENT (OUTPATIENT)
Dept: CARDIOLOGY | Facility: CLINIC | Age: 74
End: 2024-12-23
Payer: MEDICARE

## 2025-01-02 DIAGNOSIS — I48.91 ATRIAL FIBRILLATION, UNSPECIFIED TYPE (MULTI): ICD-10-CM

## 2025-01-02 NOTE — TELEPHONE ENCOUNTER
Please send the attached prescription to the patient's pharmacy as soon as possible. Thank you!    Requested Prescriptions     Pending Prescriptions Disp Refills    apixaban (Eliquis) 5 mg tablet 60 tablet 11     Sig: Take 1 tablet (5 mg) by mouth 2 times a day.

## 2025-01-07 ENCOUNTER — APPOINTMENT (OUTPATIENT)
Dept: CARDIOLOGY | Facility: CLINIC | Age: 75
End: 2025-01-07
Payer: MEDICARE

## 2025-01-07 VITALS
RESPIRATION RATE: 14 BRPM | DIASTOLIC BLOOD PRESSURE: 73 MMHG | OXYGEN SATURATION: 95 % | WEIGHT: 161 LBS | TEMPERATURE: 98.6 F | BODY MASS INDEX: 25.27 KG/M2 | HEIGHT: 67 IN | SYSTOLIC BLOOD PRESSURE: 130 MMHG | HEART RATE: 68 BPM

## 2025-01-07 DIAGNOSIS — I48.0 PAROXYSMAL ATRIAL FIBRILLATION (MULTI): ICD-10-CM

## 2025-01-07 DIAGNOSIS — I77.810 ASCENDING AORTA DILATATION (CMS-HCC): ICD-10-CM

## 2025-01-07 DIAGNOSIS — I20.89 CHRONIC STABLE ANGINA (CMS-HCC): ICD-10-CM

## 2025-01-07 DIAGNOSIS — R60.0 EDEMA OF BOTH LOWER EXTREMITIES: ICD-10-CM

## 2025-01-07 DIAGNOSIS — I10 ESSENTIAL HYPERTENSION: ICD-10-CM

## 2025-01-07 DIAGNOSIS — I48.91 ATRIAL FIBRILLATION, UNSPECIFIED TYPE (MULTI): ICD-10-CM

## 2025-01-07 DIAGNOSIS — I10 PRIMARY HYPERTENSION: ICD-10-CM

## 2025-01-07 DIAGNOSIS — E78.2 MIXED HYPERLIPIDEMIA: Primary | ICD-10-CM

## 2025-01-07 PROCEDURE — 1159F MED LIST DOCD IN RCRD: CPT | Performed by: INTERNAL MEDICINE

## 2025-01-07 PROCEDURE — 3078F DIAST BP <80 MM HG: CPT | Performed by: INTERNAL MEDICINE

## 2025-01-07 PROCEDURE — 1126F AMNT PAIN NOTED NONE PRSNT: CPT | Performed by: INTERNAL MEDICINE

## 2025-01-07 PROCEDURE — 1160F RVW MEDS BY RX/DR IN RCRD: CPT | Performed by: INTERNAL MEDICINE

## 2025-01-07 PROCEDURE — 3075F SYST BP GE 130 - 139MM HG: CPT | Performed by: INTERNAL MEDICINE

## 2025-01-07 PROCEDURE — 3008F BODY MASS INDEX DOCD: CPT | Performed by: INTERNAL MEDICINE

## 2025-01-07 PROCEDURE — 99214 OFFICE O/P EST MOD 30 MIN: CPT | Performed by: INTERNAL MEDICINE

## 2025-01-07 PROCEDURE — G2211 COMPLEX E/M VISIT ADD ON: HCPCS | Performed by: INTERNAL MEDICINE

## 2025-01-07 PROCEDURE — 1123F ACP DISCUSS/DSCN MKR DOCD: CPT | Performed by: INTERNAL MEDICINE

## 2025-01-07 PROCEDURE — 1036F TOBACCO NON-USER: CPT | Performed by: INTERNAL MEDICINE

## 2025-01-07 RX ORDER — BISOPROLOL FUMARATE 5 MG/1
20 TABLET, FILM COATED ORAL DAILY
Qty: 360 TABLET | Refills: 3 | Status: SHIPPED | OUTPATIENT
Start: 2025-01-07 | End: 2025-01-07 | Stop reason: SINTOL

## 2025-01-07 RX ORDER — DILTIAZEM HYDROCHLORIDE 240 MG/1
240 CAPSULE, EXTENDED RELEASE ORAL DAILY
Qty: 90 CAPSULE | Refills: 3 | Status: SHIPPED | OUTPATIENT
Start: 2025-01-07 | End: 2026-01-07

## 2025-01-07 RX ORDER — BISOPROLOL FUMARATE 10 MG/1
10 TABLET, FILM COATED ORAL DAILY
Qty: 90 TABLET | Refills: 3 | Status: SHIPPED | OUTPATIENT
Start: 2025-01-07 | End: 2026-01-07

## 2025-01-07 RX ORDER — NITROGLYCERIN 40 MG/1
1 PATCH TRANSDERMAL DAILY
Qty: 90 PATCH | Refills: 3 | Status: SHIPPED | OUTPATIENT
Start: 2025-01-07 | End: 2025-01-07 | Stop reason: WASHOUT

## 2025-01-07 ASSESSMENT — LIFESTYLE VARIABLES
AUDIT-C TOTAL SCORE: 4
HOW MANY STANDARD DRINKS CONTAINING ALCOHOL DO YOU HAVE ON A TYPICAL DAY: 1 OR 2
AUDIT TOTAL SCORE: 4
HOW OFTEN DO YOU HAVE SIX OR MORE DRINKS ON ONE OCCASION: NEVER
HAVE YOU OR SOMEONE ELSE BEEN INJURED AS A RESULT OF YOUR DRINKING: NO
HOW OFTEN DO YOU HAVE A DRINK CONTAINING ALCOHOL: 4 OR MORE TIMES A WEEK
SKIP TO QUESTIONS 9-10: 1
HAS A RELATIVE, FRIEND, DOCTOR, OR ANOTHER HEALTH PROFESSIONAL EXPRESSED CONCERN ABOUT YOUR DRINKING OR SUGGESTED YOU CUT DOWN: NO

## 2025-01-07 ASSESSMENT — PATIENT HEALTH QUESTIONNAIRE - PHQ9
10. IF YOU CHECKED OFF ANY PROBLEMS, HOW DIFFICULT HAVE THESE PROBLEMS MADE IT FOR YOU TO DO YOUR WORK, TAKE CARE OF THINGS AT HOME, OR GET ALONG WITH OTHER PEOPLE: NOT DIFFICULT AT ALL
1. LITTLE INTEREST OR PLEASURE IN DOING THINGS: NOT AT ALL
SUM OF ALL RESPONSES TO PHQ9 QUESTIONS 1 AND 2: 1
2. FEELING DOWN, DEPRESSED OR HOPELESS: SEVERAL DAYS

## 2025-01-07 ASSESSMENT — ENCOUNTER SYMPTOMS
DEPRESSION: 0
LOSS OF SENSATION IN FEET: 0
OCCASIONAL FEELINGS OF UNSTEADINESS: 0

## 2025-01-07 ASSESSMENT — PAIN SCALES - GENERAL: PAINLEVEL_OUTOF10: 0-NO PAIN

## 2025-01-07 NOTE — H&P (VIEW-ONLY)
Subjective   Chief Complaint   Patient presents with    Follow-up     Mrs\Ms. Montalvo is present for her 8 month Follow up with Dr. Dougherty. Pt states her breathing has been getting worse         74-year-old female patient with a history of short of breath on exertion.  History of ascending aortic aneurysm 4.5 cm.  History approximator fibrillation.  Currently on Eliquis.  Complain of short of breath with mild activity.  On Eliquis, Lipitor losartan.  Patient was heart rate control medication in past.  Echocardiogram was done year ago showed normal ejection fraction with trace MR TR seen.  Ascending aortic root that time was 3.8 cm.  Nuclear stress test was done year ago essentially negative for ischemia with a normal ejection fraction year ago.  Patient had CAT scan of the chest done that time mild ascending aortic aneurysm about 3.9 cm.  Also underlying coronary calcification seen.  Patient complained of worsening short of breath mild-moderate activity angina type of symptoms.  Now here for further evaluation.  Symptoms improved with rest.  Increases with activity.  Worsening since last few months.  Now getting worse.  Past Medical History:   Diagnosis Date    Abnormal findings on diagnostic imaging of heart and coronary circulation 10/22/2023    Aneurysm (CMS-HCC) 10/23/2024    Arthritis of left acromioclavicular joint 02/15/2024    Ascending aorta dilatation (CMS-[facility]) 11/22/2023    Atrial fibrillation (Multi) 10/23/2023    Biceps tendinitis of left shoulder 02/15/2024    Breast cancer (Multi) 2013    Cancer (Multi) 2013    Congenital dilatation of aorta ([facility]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    Edema of both lower extremities 01/13/2024    Essential hypertension 10/06/2023    Hx antineoplastic chemo     Hyperlipidemia 10/06/2023    Hypertension 10/06/2023    Labral tear of long head of biceps tendon, right, initial encounter 02/15/2024    Personal history of irradiation     Personal history of other diseases of the  musculoskeletal system and connective tissue 10/06/2023    History of arthritis    Primary osteoarthritis of both shoulders 02/15/2024    Shortness of breath 10/23/2023     Past Surgical History:   Procedure Laterality Date    BI MAMMO GUIDED BREAST LEFT LOCALIZATION Left 02/25/2013    BI MAMMO GUIDED LOCALIZATION BREAST LEFT [facility]    BREAST BIOPSY      BREAST LUMPECTOMY Left 2013    HYSTERECTOMY  2000    MAMMOGRAM DIAGNOSTIC BI Bilateral 09/16/2024    OTHER SURGICAL HISTORY  04/27/2022    Lumpectomy    US COMPLETE BREAST Left 09/16/2024     Family medical history includes stroke in 2 relatives (father, mother).  Current Outpatient Medications   Medication Sig Dispense Refill    acyclovir (Zovirax) 800 mg tablet TAKE 1 TABLET BY MOUTH EVERY DAY 90 tablet 1    apixaban (Eliquis) 5 mg tablet Take 1 tablet (5 mg) by mouth 2 times a day. 60 tablet 11    atorvastatin (Lipitor) 10 mg tablet TAKE 1 TABLET BY MOUTH EVERY DAY FOR 90 DAYS 90 tablet 3    bisoprolol (Zebeta) 5 mg tablet Take 1 tablet (5 mg) by mouth 2 times a day. 180 tablet 3    cholecalciferol (VITAMIN D-3) 10 mcg (400 unit) tablet Take 1 capsule by mouth once daily.      dilTIAZem CD (Cardizem CD) 120 mg 24 hr capsule Take 1 capsule (120 mg) by mouth once daily. 90 capsule 3    omega 3-dha-epa-fish oil (Fish OiL) 1,200 (144-216) mg capsule 1.5 capsules Orally      vit B complex no.12/niacin,B3, (VITAMIN B COMPLEX NO.12-NIACIN ORAL) Take by mouth.  Vitamin B 12 TABS       No current facility-administered medications for this visit.      reports that she has never smoked. She has never been exposed to tobacco smoke. She has never used smokeless tobacco. She reports current alcohol use of about 7.0 standard drinks of alcohol per week. She reports that she does not use drugs.  Allergies:  Patient has no known allergies.    ROS: See HPI  CONSTITUTIONAL: Chills- none. Fever- none. Weight change appropriate for age.  HEENT: Headache- Negative.  Change  "in vision- none.  Ear pain- none. Nasal congestion- none. Post-nasal drip-none.  Sore throat-none.  CARDIOLOGY: Chest pain- none.  Leg edema-trace.  Murmurs-soft systolic.  Palpitation- none.  RESPIRATORY: Denies any shortness of breath.  GI: Abdominal pain- none.  Change in bowel habits- none.  Constipation- none.  Diarrhea- none.  Nausea- none.  Vomiting- none.  MUSCULOSKELETAL: Joint pain- none.  Muscle aches- none.  DERMATOLOGY: Rash- none.  NEUROLOGY: Dizziness- none.   Headache- none.  PSYCHIATRY: Denies any depression or anxiety     Vitals:    01/07/25 0848   BP: 130/73   Pulse: 68   Resp: 14   Temp: 37 °C (98.6 °F)   TempSrc: Core   SpO2: 95%   Weight: 73 kg (161 lb)   Height: 1.702 m (5' 7\")   PainSc: 0-No pain      BMI:Body mass index is 25.22 kg/m².   General Cardiology:  General Appearance: Alert, oriented and in no acute distress.  HEENT: extra ocular movements intact (EOMI), pupils equal,  round, reactive to light and accommodation (PERRLA).  Carotid Upstroke: no bruit, normal.  Jugular Venous Distention (JVD): flat.  Chest: normal.  Lungs: Clear to auscultation,   Heart Sounds: no S3 or S4, normal S1, S2, irregular rate.  Murmur, Click, Gallop: Soft 74-year-old female patient with a history of systolic murmur.  Abdomen: no hepatomegaly, no masses felt, soft.  Extremities: no leg edema.  Peripheral pulses: 2 plus bilateral.  NEUROLOGY Cranial nerves II-XII grossly intact.     Patient Active Problem List   Diagnosis    Carpal tunnel syndrome of right wrist    Closed fracture of distal end of fibula    Contact dermatitis    Disorder of intervertebral disc of lumbar spine    Herpes simplex infection of skin    Hyperlipidemia    Hyperparathyroidism (Multi)    Hypertension    Malignant neoplasm of female breast    Neuropathy    Numbness    Hemorrhoids without complication    Residual hemorrhoidal skin tags    Vitamin D deficiency    Abnormal x-ray examination    Herpesviral infection of perianal skin and " rectum    Shortness of breath    Chronic stable angina (CMS-HCC)    Cervical radiculitis    Degenerative disc disease, cervical    History of malignant neoplasm of breast    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    Acute frontal sinusitis    Ascending aorta dilatation (CMS-HCC)    Atrial fibrillation (Multi)    Congenital dilatation of aorta (HHS-HCC)    Corneal abrasion    Edema of both lower extremities    Herpes zoster    Injury of eye region    Ankle pain    Pain of left lower extremity    Pain of right lower extremity    History of malignant neoplasm of left breast       Problem List Items Addressed This Visit       Hyperlipidemia - Primary    Hypertension    Chronic stable angina (CMS-HCC)    Ascending aorta dilatation (CMS-HCC)    Atrial fibrillation (Multi)      74-year-old female patient with a history of hypertension, hyperlipidemia.  Patient also history of paroxysmal defibrillation multiple rate control medication agreed bisoprolol and Cardizem.  For last several weeks complain of worsening short of breath dyspnea exertion.  1.  Proximal atrial fibrillation: Continue current rate control medication agree bisoprolol as well as a Cardizem.  Patient also on Eliquis.  Patient with episode of tachycardia palpitation may be A-fib RVR contributing for shortness of breath I will increase Cardizem to 240 mg once a day and increase bisoprolol to 10 mg once a day.  2.  Ascending aortic aneurysm: Patient had a CAT scan done recently essentially appears to be 3.9 cm essentially unchanged from the year ago.  Also coronary calcification seen.  3.  Chronic stable angina: Patient with exertional short of breath chest pressure tightness negative stress test a year ago we will schedule patient for diagnostic catheterization.  Also add patient's on a low-dose of nitroglycerin patch.    Advised patient to avoid lunch meats, canned soups, pizzas, bread rolls, and sandwiches. Advised patient to limit  salt intake 1,500 mg daily. Advised patient to exercise 30 mins/3 times a week including treadmill or aerobic type, Goal to achieve 65% target HR.    Diet and exercise reviewed with patient..advice to walk about 10,000 steps or about 2 hours during day time. Cut back on salt, sugar and flour.    Pt. care time is spent includes review of diagnostic tests, labs, radiographs, EKGs, old echoes, cardiac work-up and coordination of care. Assessment, impression and plans are reflected in the note above as well as the orders.    Paulo Dougherty MD

## 2025-01-07 NOTE — PROGRESS NOTES
Subjective   Chief Complaint   Patient presents with    Follow-up     Mrs\Ms. Montalvo is present for her 8 month Follow up with Dr. Dougherty. Pt states her breathing has been getting worse         74-year-old female patient with a history of short of breath on exertion.  History of ascending aortic aneurysm 4.5 cm.  History approximator fibrillation.  Currently on Eliquis.  Complain of short of breath with mild activity.  On Eliquis, Lipitor losartan.  Patient was heart rate control medication in past.  Echocardiogram was done year ago showed normal ejection fraction with trace MR TR seen.  Ascending aortic root that time was 3.8 cm.  Nuclear stress test was done year ago essentially negative for ischemia with a normal ejection fraction year ago.  Patient had CAT scan of the chest done that time mild ascending aortic aneurysm about 3.9 cm.  Also underlying coronary calcification seen.  Patient complained of worsening short of breath mild-moderate activity angina type of symptoms.  Now here for further evaluation.  Symptoms improved with rest.  Increases with activity.  Worsening since last few months.  Now getting worse.  Past Medical History:   Diagnosis Date    Abnormal findings on diagnostic imaging of heart and coronary circulation 10/22/2023    Aneurysm (CMS-HCC) 10/23/2024    Arthritis of left acromioclavicular joint 02/15/2024    Ascending aorta dilatation (CMS-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    Edema of both lower extremities 01/13/2024    Essential hypertension 10/06/2023    Hx antineoplastic chemo     Hyperlipidemia 10/06/2023    Hypertension 10/06/2023    Labral tear of long head of biceps tendon, right, initial encounter 02/15/2024    Personal history of irradiation     Personal history of other diseases of the  musculoskeletal system and connective tissue 10/06/2023    History of arthritis    Primary osteoarthritis of both shoulders 02/15/2024    Shortness of breath 10/23/2023     Past Surgical History:   Procedure Laterality Date    BI MAMMO GUIDED BREAST LEFT LOCALIZATION Left 02/25/2013    BI MAMMO GUIDED LOCALIZATION BREAST LEFT [facility]    BREAST BIOPSY      BREAST LUMPECTOMY Left 2013    HYSTERECTOMY  2000    MAMMOGRAM DIAGNOSTIC BI Bilateral 09/16/2024    OTHER SURGICAL HISTORY  04/27/2022    Lumpectomy    US COMPLETE BREAST Left 09/16/2024     Family medical history includes stroke in 2 relatives (father, mother).  Current Outpatient Medications   Medication Sig Dispense Refill    acyclovir (Zovirax) 800 mg tablet TAKE 1 TABLET BY MOUTH EVERY DAY 90 tablet 1    apixaban (Eliquis) 5 mg tablet Take 1 tablet (5 mg) by mouth 2 times a day. 60 tablet 11    atorvastatin (Lipitor) 10 mg tablet TAKE 1 TABLET BY MOUTH EVERY DAY FOR 90 DAYS 90 tablet 3    bisoprolol (Zebeta) 5 mg tablet Take 1 tablet (5 mg) by mouth 2 times a day. 180 tablet 3    cholecalciferol (VITAMIN D-3) 10 mcg (400 unit) tablet Take 1 capsule by mouth once daily.      dilTIAZem CD (Cardizem CD) 120 mg 24 hr capsule Take 1 capsule (120 mg) by mouth once daily. 90 capsule 3    omega 3-dha-epa-fish oil (Fish OiL) 1,200 (144-216) mg capsule 1.5 capsules Orally      vit B complex no.12/niacin,B3, (VITAMIN B COMPLEX NO.12-NIACIN ORAL) Take by mouth.  Vitamin B 12 TABS       No current facility-administered medications for this visit.      reports that she has never smoked. She has never been exposed to tobacco smoke. She has never used smokeless tobacco. She reports current alcohol use of about 7.0 standard drinks of alcohol per week. She reports that she does not use drugs.  Allergies:  Patient has no known allergies.    ROS: See HPI  CONSTITUTIONAL: Chills- none. Fever- none. Weight change appropriate for age.  HEENT: Headache- Negative.  Change  "in vision- none.  Ear pain- none. Nasal congestion- none. Post-nasal drip-none.  Sore throat-none.  CARDIOLOGY: Chest pain- none.  Leg edema-trace.  Murmurs-soft systolic.  Palpitation- none.  RESPIRATORY: Denies any shortness of breath.  GI: Abdominal pain- none.  Change in bowel habits- none.  Constipation- none.  Diarrhea- none.  Nausea- none.  Vomiting- none.  MUSCULOSKELETAL: Joint pain- none.  Muscle aches- none.  DERMATOLOGY: Rash- none.  NEUROLOGY: Dizziness- none.   Headache- none.  PSYCHIATRY: Denies any depression or anxiety     Vitals:    01/07/25 0848   BP: 130/73   Pulse: 68   Resp: 14   Temp: 37 °C (98.6 °F)   TempSrc: Core   SpO2: 95%   Weight: 73 kg (161 lb)   Height: 1.702 m (5' 7\")   PainSc: 0-No pain      BMI:Body mass index is 25.22 kg/m².   General Cardiology:  General Appearance: Alert, oriented and in no acute distress.  HEENT: extra ocular movements intact (EOMI), pupils equal,  round, reactive to light and accommodation (PERRLA).  Carotid Upstroke: no bruit, normal.  Jugular Venous Distention (JVD): flat.  Chest: normal.  Lungs: Clear to auscultation,   Heart Sounds: no S3 or S4, normal S1, S2, irregular rate.  Murmur, Click, Gallop: Soft 74-year-old female patient with a history of systolic murmur.  Abdomen: no hepatomegaly, no masses felt, soft.  Extremities: no leg edema.  Peripheral pulses: 2 plus bilateral.  NEUROLOGY Cranial nerves II-XII grossly intact.     Patient Active Problem List   Diagnosis    Carpal tunnel syndrome of right wrist    Closed fracture of distal end of fibula    Contact dermatitis    Disorder of intervertebral disc of lumbar spine    Herpes simplex infection of skin    Hyperlipidemia    Hyperparathyroidism (Multi)    Hypertension    Malignant neoplasm of female breast    Neuropathy    Numbness    Hemorrhoids without complication    Residual hemorrhoidal skin tags    Vitamin D deficiency    Abnormal x-ray examination    Herpesviral infection of perianal skin and " rectum    Shortness of breath    Chronic stable angina (CMS-HCC)    Cervical radiculitis    Degenerative disc disease, cervical    History of malignant neoplasm of breast    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    Acute frontal sinusitis    Ascending aorta dilatation (CMS-HCC)    Atrial fibrillation (Multi)    Congenital dilatation of aorta (HHS-HCC)    Corneal abrasion    Edema of both lower extremities    Herpes zoster    Injury of eye region    Ankle pain    Pain of left lower extremity    Pain of right lower extremity    History of malignant neoplasm of left breast       Problem List Items Addressed This Visit       Hyperlipidemia - Primary    Hypertension    Chronic stable angina (CMS-HCC)    Ascending aorta dilatation (CMS-HCC)    Atrial fibrillation (Multi)      74-year-old female patient with a history of hypertension, hyperlipidemia.  Patient also history of paroxysmal defibrillation multiple rate control medication agreed bisoprolol and Cardizem.  For last several weeks complain of worsening short of breath dyspnea exertion.  1.  Proximal atrial fibrillation: Continue current rate control medication agree bisoprolol as well as a Cardizem.  Patient also on Eliquis.  Patient with episode of tachycardia palpitation may be A-fib RVR contributing for shortness of breath I will increase Cardizem to 240 mg once a day and increase bisoprolol to 10 mg once a day.  2.  Ascending aortic aneurysm: Patient had a CAT scan done recently essentially appears to be 3.9 cm essentially unchanged from the year ago.  Also coronary calcification seen.  3.  Chronic stable angina: Patient with exertional short of breath chest pressure tightness negative stress test a year ago we will schedule patient for diagnostic catheterization.  Also add patient's on a low-dose of nitroglycerin patch.    Advised patient to avoid lunch meats, canned soups, pizzas, bread rolls, and sandwiches. Advised patient to limit  salt intake 1,500 mg daily. Advised patient to exercise 30 mins/3 times a week including treadmill or aerobic type, Goal to achieve 65% target HR.    Diet and exercise reviewed with patient..advice to walk about 10,000 steps or about 2 hours during day time. Cut back on salt, sugar and flour.    Pt. care time is spent includes review of diagnostic tests, labs, radiographs, EKGs, old echoes, cardiac work-up and coordination of care. Assessment, impression and plans are reflected in the note above as well as the orders.    Paulo Dougherty MD

## 2025-01-09 ENCOUNTER — TELEPHONE (OUTPATIENT)
Dept: PRIMARY CARE | Facility: CLINIC | Age: 75
End: 2025-01-09
Payer: MEDICARE

## 2025-01-09 DIAGNOSIS — M50.30 DEGENERATIVE DISC DISEASE, CERVICAL: Primary | Chronic | ICD-10-CM

## 2025-01-11 DIAGNOSIS — I10 ESSENTIAL HYPERTENSION: ICD-10-CM

## 2025-01-13 RX ORDER — ATORVASTATIN CALCIUM 10 MG/1
10 TABLET, FILM COATED ORAL DAILY
Qty: 90 TABLET | Refills: 3 | Status: SHIPPED | OUTPATIENT
Start: 2025-01-13

## 2025-01-23 DIAGNOSIS — I10 ESSENTIAL HYPERTENSION: ICD-10-CM

## 2025-01-23 DIAGNOSIS — E78.2 MIXED HYPERLIPIDEMIA: ICD-10-CM

## 2025-01-23 DIAGNOSIS — R60.0 EDEMA OF BOTH LOWER EXTREMITIES: ICD-10-CM

## 2025-01-23 DIAGNOSIS — R06.02 SHORTNESS OF BREATH: ICD-10-CM

## 2025-01-23 DIAGNOSIS — I48.0 PAROXYSMAL ATRIAL FIBRILLATION (MULTI): ICD-10-CM

## 2025-01-23 DIAGNOSIS — I10 PRIMARY HYPERTENSION: ICD-10-CM

## 2025-01-23 DIAGNOSIS — I20.9 ANGINA PECTORIS, UNSPECIFIED: ICD-10-CM

## 2025-01-23 DIAGNOSIS — I20.89 CHRONIC STABLE ANGINA (CMS-HCC): Primary | ICD-10-CM

## 2025-01-23 NOTE — H&P (VIEW-ONLY)
With worsening short of breath this may exertion underlying atrial fibrillation.  Schedule patient for diagnostic catheterization.

## 2025-01-25 ENCOUNTER — OFFICE VISIT (OUTPATIENT)
Dept: URGENT CARE | Age: 75
End: 2025-01-25
Payer: MEDICARE

## 2025-01-25 VITALS
SYSTOLIC BLOOD PRESSURE: 128 MMHG | TEMPERATURE: 97.6 F | RESPIRATION RATE: 14 BRPM | DIASTOLIC BLOOD PRESSURE: 86 MMHG | OXYGEN SATURATION: 97 % | HEIGHT: 67 IN | WEIGHT: 162.26 LBS | HEART RATE: 61 BPM | BODY MASS INDEX: 25.47 KG/M2

## 2025-01-25 DIAGNOSIS — B37.0 THRUSH, ORAL: Primary | ICD-10-CM

## 2025-01-25 RX ORDER — NYSTATIN 100000 [USP'U]/ML
5 SUSPENSION ORAL 4 TIMES DAILY
Qty: 200 ML | Refills: 0 | Status: SHIPPED | OUTPATIENT
Start: 2025-01-25 | End: 2025-02-04

## 2025-01-25 ASSESSMENT — PAIN SCALES - GENERAL: PAINLEVEL_OUTOF10: 0-NO PAIN

## 2025-01-25 NOTE — PROGRESS NOTES
Subjective   Patient ID: Radha Montalvo is a 74 y.o. female. They present today with a chief complaint of Thrush (In mouth for 2 days.).    History of Present Illness  74-year-old female presents urgent care for complaint of oral thrush for past 2 days.  States she has had this in the past.  States he does wear CPAP.  Denies steroid inhaler use, or recent antibiotic use.  Denies any current fevers or chills, nausea, vomiting, sweats, chest pain, shortness of breath, abdominal pain, ear pain, neck pain.  No.  Patient does have oral thrush.  Prescribed nystatin.  Patient states she is supposed to get cardiac catheterization on Monday.  Instructed to call cardiologist to let them know she has thrush to see if there needs to be reschedule and to discuss whether she needs to restart Eliquis.  Follow-up PCP 1 to 2 weeks.  Return/ER precautions.  Patient agrees with plan.          Past Medical History  Allergies as of 01/25/2025    (No Known Allergies)       (Not in a hospital admission)       Past Medical History:   Diagnosis Date    Abnormal findings on diagnostic imaging of heart and coronary circulation 10/22/2023    Aneurysm (CMS-HCC) 10/23/2024    Arthritis of left acromioclavicular joint 02/15/2024    Ascending aorta dilatation (CMS-HCC) 11/22/2023    Atrial fibrillation (Multi) 10/23/2023    Biceps tendinitis of left shoulder 02/15/2024    Breast cancer (Multi) 2013    Cancer (Multi) 2013    Congenital dilatation of aorta ([facility]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    Edema of both lower extremities 01/13/2024    Essential hypertension 10/06/2023    Hx antineoplastic chemo     Hyperlipidemia 10/06/2023    Hypertension 10/06/2023    Labral tear of long head of biceps tendon, right, initial encounter 02/15/2024    Personal history of irradiation     Personal history of other diseases of the musculoskeletal system and connective tissue 10/06/2023    History of arthritis    Primary osteoarthritis of both shoulders 02/15/2024    Shortness  "of breath 10/23/2023       Past Surgical History:   Procedure Laterality Date    BI MAMMO GUIDED BREAST LEFT LOCALIZATION Left 02/25/2013    BI MAMMO GUIDED LOCALIZATION BREAST LEFT [facility]    BREAST BIOPSY      BREAST LUMPECTOMY Left 2013    HYSTERECTOMY  2000    MAMMOGRAM DIAGNOSTIC BI Bilateral 09/16/2024    OTHER SURGICAL HISTORY  04/27/2022    Lumpectomy    US COMPLETE BREAST Left 09/16/2024        reports that she has never smoked. She has never been exposed to tobacco smoke. She has never used smokeless tobacco. She reports current alcohol use of about 7.0 standard drinks of alcohol per week. She reports that she does not use drugs.    Review of Systems  Review of Systems   All other systems reviewed and are negative.                                 Objective    Vitals:    01/25/25 0904   BP: 128/86   Pulse: 61   Resp: 14   Temp: 36.4 °C (97.6 °F)   SpO2: 97%   Weight: 73.6 kg (162 lb 4.1 oz)   Height: 1.689 m (5' 6.5\")     No LMP recorded (lmp unknown). Patient has had a hysterectomy.    Physical Exam  Vitals reviewed.   Constitutional:       General: She is not in acute distress.     Appearance: Normal appearance. She is not ill-appearing, toxic-appearing or diaphoretic.   HENT:      Head: Normocephalic and atraumatic.      Nose: Nose normal.      Mouth/Throat:      Mouth: Mucous membranes are moist.      Pharynx: Oropharynx is clear.      Comments: Pharynx mildly erythematous without exudate.  Uvula midline normal.  There is white film on tongue that scrapes off with tongue depressor.  Nontender.  Cardiovascular:      Rate and Rhythm: Normal rate. Rhythm irregular.   Pulmonary:      Effort: Pulmonary effort is normal. No respiratory distress.      Breath sounds: Normal breath sounds. No stridor. No wheezing, rhonchi or rales.   Abdominal:      General: Abdomen is flat.      Palpations: Abdomen is soft.      Tenderness: There is no abdominal tenderness. There is no right CVA tenderness or left " CVA tenderness.   Musculoskeletal:      Cervical back: Normal range of motion and neck supple. No rigidity or tenderness.   Lymphadenopathy:      Cervical: No cervical adenopathy.   Skin:     General: Skin is warm and dry.   Neurological:      General: No focal deficit present.      Mental Status: She is alert and oriented to person, place, and time.   Psychiatric:         Mood and Affect: Mood normal.         Behavior: Behavior normal.         Procedures    Point of Care Test & Imaging Results from this visit  No results found for this visit on 01/25/25.   No results found.    Diagnostic study results (if any) were reviewed by Annette Marie PA-C.    Assessment/Plan   Allergies, medications, history, and pertinent labs/EKGs/Imaging reviewed by Annette Marie PA-C.     Medical Decision Making  74-year-old female presents urgent care for complaint of oral thrush for past 2 days.  States she has had this in the past.  States he does wear CPAP.  Denies steroid inhaler use, or recent antibiotic use.  Denies any current fevers or chills, nausea, vomiting, sweats, chest pain, shortness of breath, abdominal pain, ear pain, neck pain.  No.  Patient does have oral thrush.  Prescribed nystatin.  Patient states she is supposed to get cardiac catheterization on Monday.  Instructed to call cardiologist to let them know she has thrush to see if there needs to be reschedule and to discuss whether she needs to restart Eliquis.  Follow-up PCP 1 to 2 weeks.  Return/ER precautions.  Patient agrees with plan.    Orders and Diagnoses  There are no diagnoses linked to this encounter.    Medical Admin Record      Patient disposition: Home    Electronically signed by Annette Marie PA-C  9:09 AM

## 2025-01-25 NOTE — PATIENT INSTRUCTIONS
Take medication as prescribed.  Maintain adequate hydration and nutrition.  If symptoms worsen, do not improve, or any other concerning or worrisome symptoms develop please return to the clinic or go to the emergency department.  Please call your cardiologist first thing Monday morning to let them know you have thrush as you may need to reschedule your cardiac catheterization appointment.

## 2025-01-27 ENCOUNTER — TELEPHONE (OUTPATIENT)
Dept: CARDIOLOGY | Facility: CLINIC | Age: 75
End: 2025-01-27
Payer: MEDICARE

## 2025-01-27 ENCOUNTER — HOSPITAL ENCOUNTER (OUTPATIENT)
Facility: HOSPITAL | Age: 75
Setting detail: OUTPATIENT SURGERY
Discharge: HOME | End: 2025-01-27
Attending: INTERNAL MEDICINE | Admitting: INTERNAL MEDICINE
Payer: MEDICARE

## 2025-01-27 ENCOUNTER — APPOINTMENT (OUTPATIENT)
Dept: CARDIOLOGY | Facility: HOSPITAL | Age: 75
End: 2025-01-27
Payer: MEDICARE

## 2025-01-27 VITALS
DIASTOLIC BLOOD PRESSURE: 92 MMHG | TEMPERATURE: 97.7 F | BODY MASS INDEX: 24.8 KG/M2 | SYSTOLIC BLOOD PRESSURE: 151 MMHG | HEIGHT: 67 IN | WEIGHT: 158 LBS | RESPIRATION RATE: 13 BRPM | OXYGEN SATURATION: 100 % | HEART RATE: 57 BPM

## 2025-01-27 DIAGNOSIS — I10 PRIMARY HYPERTENSION: ICD-10-CM

## 2025-01-27 DIAGNOSIS — I20.89 CHRONIC STABLE ANGINA (CMS-HCC): Primary | ICD-10-CM

## 2025-01-27 DIAGNOSIS — I25.110 ATHEROSCLEROTIC HEART DISEASE OF NATIVE CORONARY ARTERY WITH UNSTABLE ANGINA PECTORIS: ICD-10-CM

## 2025-01-27 DIAGNOSIS — R06.02 SHORTNESS OF BREATH: ICD-10-CM

## 2025-01-27 LAB
ANION GAP SERPL CALCULATED.3IONS-SCNC: 12 MMOL/L (ref 10–20)
BUN SERPL-MCNC: 23 MG/DL (ref 6–23)
CALCIUM SERPL-MCNC: 8.6 MG/DL (ref 8.6–10.3)
CHLORIDE SERPL-SCNC: 107 MMOL/L (ref 98–107)
CO2 SERPL-SCNC: 25 MMOL/L (ref 21–32)
CREAT SERPL-MCNC: 0.88 MG/DL (ref 0.5–1.05)
EGFRCR SERPLBLD CKD-EPI 2021: 69 ML/MIN/1.73M*2
ERYTHROCYTE [DISTWIDTH] IN BLOOD BY AUTOMATED COUNT: 12.3 % (ref 11.5–14.5)
GLUCOSE SERPL-MCNC: 113 MG/DL (ref 74–99)
HCT VFR BLD AUTO: 50.8 % (ref 36–46)
HGB BLD-MCNC: 17.6 G/DL (ref 12–16)
MCH RBC QN AUTO: 35.2 PG (ref 26–34)
MCHC RBC AUTO-ENTMCNC: 34.6 G/DL (ref 32–36)
MCV RBC AUTO: 102 FL (ref 80–100)
NRBC BLD-RTO: 0 /100 WBCS (ref 0–0)
PLATELET # BLD AUTO: 221 X10*3/UL (ref 150–450)
POTASSIUM SERPL-SCNC: 4.6 MMOL/L (ref 3.5–5.3)
RBC # BLD AUTO: 5 X10*6/UL (ref 4–5.2)
SODIUM SERPL-SCNC: 139 MMOL/L (ref 136–145)
WBC # BLD AUTO: 13.5 X10*3/UL (ref 4.4–11.3)

## 2025-01-27 PROCEDURE — 85027 COMPLETE CBC AUTOMATED: CPT | Performed by: NURSE PRACTITIONER

## 2025-01-27 PROCEDURE — 99152 MOD SED SAME PHYS/QHP 5/>YRS: CPT | Performed by: INTERNAL MEDICINE

## 2025-01-27 PROCEDURE — 2780000003 HC OR 278 NO HCPCS: Performed by: INTERNAL MEDICINE

## 2025-01-27 PROCEDURE — G0269 OCCLUSIVE DEVICE IN VEIN ART: HCPCS | Mod: 59 | Performed by: INTERNAL MEDICINE

## 2025-01-27 PROCEDURE — C1760 CLOSURE DEV, VASC: HCPCS | Performed by: INTERNAL MEDICINE

## 2025-01-27 PROCEDURE — 93005 ELECTROCARDIOGRAM TRACING: CPT | Mod: 59

## 2025-01-27 PROCEDURE — 93458 L HRT ARTERY/VENTRICLE ANGIO: CPT | Performed by: INTERNAL MEDICINE

## 2025-01-27 PROCEDURE — 2500000004 HC RX 250 GENERAL PHARMACY W/ HCPCS (ALT 636 FOR OP/ED): Performed by: INTERNAL MEDICINE

## 2025-01-27 PROCEDURE — 2550000001 HC RX 255 CONTRASTS: Performed by: INTERNAL MEDICINE

## 2025-01-27 PROCEDURE — 80048 BASIC METABOLIC PNL TOTAL CA: CPT | Performed by: NURSE PRACTITIONER

## 2025-01-27 PROCEDURE — 93010 ELECTROCARDIOGRAM REPORT: CPT | Performed by: INTERNAL MEDICINE

## 2025-01-27 PROCEDURE — 36415 COLL VENOUS BLD VENIPUNCTURE: CPT | Performed by: NURSE PRACTITIONER

## 2025-01-27 PROCEDURE — 7100000009 HC PHASE TWO TIME - INITIAL BASE CHARGE: Performed by: INTERNAL MEDICINE

## 2025-01-27 PROCEDURE — 7100000010 HC PHASE TWO TIME - EACH INCREMENTAL 1 MINUTE: Performed by: INTERNAL MEDICINE

## 2025-01-27 PROCEDURE — 2720000007 HC OR 272 NO HCPCS: Performed by: INTERNAL MEDICINE

## 2025-01-27 RX ORDER — FENTANYL CITRATE 50 UG/ML
INJECTION, SOLUTION INTRAMUSCULAR; INTRAVENOUS AS NEEDED
Status: DISCONTINUED | OUTPATIENT
Start: 2025-01-27 | End: 2025-01-27 | Stop reason: HOSPADM

## 2025-01-27 RX ORDER — MIDAZOLAM HYDROCHLORIDE 1 MG/ML
INJECTION, SOLUTION INTRAMUSCULAR; INTRAVENOUS AS NEEDED
Status: DISCONTINUED | OUTPATIENT
Start: 2025-01-27 | End: 2025-01-27 | Stop reason: HOSPADM

## 2025-01-27 RX ORDER — LIDOCAINE HYDROCHLORIDE 10 MG/ML
INJECTION, SOLUTION EPIDURAL; INFILTRATION; INTRACAUDAL; PERINEURAL AS NEEDED
Status: DISCONTINUED | OUTPATIENT
Start: 2025-01-27 | End: 2025-01-27 | Stop reason: HOSPADM

## 2025-01-27 RX ORDER — ACETAMINOPHEN 325 MG/1
650 TABLET ORAL EVERY 6 HOURS PRN
Status: DISCONTINUED | OUTPATIENT
Start: 2025-01-27 | End: 2025-01-27 | Stop reason: HOSPADM

## 2025-01-27 ASSESSMENT — COLUMBIA-SUICIDE SEVERITY RATING SCALE - C-SSRS
1. IN THE PAST MONTH, HAVE YOU WISHED YOU WERE DEAD OR WISHED YOU COULD GO TO SLEEP AND NOT WAKE UP?: NO
2. HAVE YOU ACTUALLY HAD ANY THOUGHTS OF KILLING YOURSELF?: NO
6. HAVE YOU EVER DONE ANYTHING, STARTED TO DO ANYTHING, OR PREPARED TO DO ANYTHING TO END YOUR LIFE?: NO

## 2025-01-27 ASSESSMENT — PAIN - FUNCTIONAL ASSESSMENT: PAIN_FUNCTIONAL_ASSESSMENT: 0-10

## 2025-01-27 ASSESSMENT — PAIN SCALES - GENERAL: PAINLEVEL_OUTOF10: 0 - NO PAIN

## 2025-01-27 NOTE — POST-PROCEDURE NOTE
Physician Transition of Care Summary  Invasive Cardiovascular Lab    Procedure Date: 1/27/2025  Attending:    * Paulo Dougherty - Primary  Resident/Fellow/Other Assistant: Surgeons and Role:  * No surgeons found with a matching role *    Indications:   Pre-op Diagnosis      * Chronic stable angina (CMS-HCC) [I20.89]     * Primary hypertension [I10]    Post-procedure diagnosis:   Post-op Diagnosis     * Chronic stable angina (CMS-HCC) [I20.89]     * Primary hypertension [I10]    Procedure(s):   Left Heart Cath with Coronary Angiography and LV  [id number] - MD CATH PLMT L HRT & ARTS W/NJX & ANGIO IMG S&I        Procedure Findings:   After informed consent obtained with the conscious sedation.  Patient had diagnostic catheterization done via right femoral artery using 6 Armenian sheath.  Finding as follow.  Left main appears to be fair size has no critical lesion.  Left anterior descending artery has ostial about 50% plaque seen.  Mid LAD also has another long diffuse 90% lesion seen.  Involved small diagonal branch.  Left circumflex artery appears to be codominant vessel has proximal area about 90% focal eccentric lesion seen.  CAROL ANN flow 2 seen both distal LAD as well as a left circumflex artery.  Right coronary appears to be large caliber vessel and codominant vessel has no critical lesion.  Left ventriculogram done show ejection fraction by 50%.  Aortic pressure was about 138/80 and LVEDP is about 10 mm per mercury.  Patient with essentially critical two-vessel coronary disease including an LAD as well as a left circumflex artery.  Probably candidate for CABG.  Sheath was removed and Angio-Seal applied to right groin.  Patient left the Cath Lab in stable condition.  CT surgery was consulted.  Description of the Procedure:   After infiltration with 2% Lidocaine, the right femoral artery was cannulated with a modified Seldinger technique. Subsequently a 6 Armenian sheath was placed retrograde in the right femoral artery.  Selective coronary catheterization was performed using a 6 Fr catheter(s)to cannulate the coronary arteries. A JL4 catheter was used for left coronary injections. Additional catheter(s) used to visualize the coronary arteries were: Toan, AR2 and 6 Malawian pigtail catheters for LV. Multiple injections of contrast were made into the left and right coronary arteries with angiograms recorded in multiple projections.    Complications:   None    Stents/Implants:   Implants       No implant documentation for this case.            Anticoagulation/Antiplatelet Plan:   Resume Eliquis after 24 hours    Estimated Blood Loss:   15 mL    Anesthesia: Moderate Sedation Anesthesia Staff: No anesthesia staff entered.    Any Specimen(s) Removed:   Order Name Source Comment Collection Info Order Time   CBC Blood, Venous  Collected By: Verena Caldwell RN 1/27/2025  8:19 AM     Release result to [facility]   Immediate        BASIC METABOLIC PANEL Blood, Venous  Collected By: Verena Caldwell RN 1/27/2025  8:19 AM     Release result to [employer]   Immediate            Disposition:   Discharged home after CT surgery eval      Electronically signed by: Paulo Dougherty MD, 1/27/2025 10:13 AM

## 2025-01-27 NOTE — NURSING NOTE
END OF PROCEDURE MONITORING CRITERIA  01. BP is within +/- 20% of preprocedure  02. Oxygen sat is at 92% or above on RA, or existing order for O2 treatment, or at pre-sedation levels, otherwise new O2 order needed.  03. Unless the patient has a pre-procedure history of diminished level of consciousness, s/he is easily arousable and when aroused is able to responds appropriately for his/her age.  04. Significant complications related to the specific procedure are absent, have been controlled, or have been evaluated, including:      A. Pain.      B. Wound drainage.      C. All drains and tubes are patent.      D. Nausea and Vomiting. Vomiting is not persisten and has not occurred within 15 minutes prior to discharge.      E. Bladder distention. Voided bladder and/or no symptoms or urinary retention (e.g., bladder distention, frequent voiding in small amounts).      F. Neurovascular status.      G. Level of Consciousness consistent with pre procedural status.        ______spouse_ (relationship) affirmed that they were driving the patient home.

## 2025-01-27 NOTE — TELEPHONE ENCOUNTER
Divya Arguello, APRN-CNP  Britta Gonzalez  Pt is discharged. Please call pt to  make appointment for follow up in 1 month with Dr. Dougherty. Thank you

## 2025-01-27 NOTE — DISCHARGE INSTRUCTIONS

## 2025-01-28 ENCOUNTER — OFFICE VISIT (OUTPATIENT)
Dept: CARDIAC SURGERY | Facility: CLINIC | Age: 75
End: 2025-01-28
Payer: MEDICARE

## 2025-01-28 ENCOUNTER — PREP FOR PROCEDURE (OUTPATIENT)
Dept: CARDIAC SURGERY | Facility: CLINIC | Age: 75
End: 2025-01-28

## 2025-01-28 VITALS
RESPIRATION RATE: 18 BRPM | SYSTOLIC BLOOD PRESSURE: 112 MMHG | HEART RATE: 69 BPM | HEIGHT: 68 IN | BODY MASS INDEX: 23.95 KG/M2 | OXYGEN SATURATION: 98 % | WEIGHT: 158 LBS | DIASTOLIC BLOOD PRESSURE: 60 MMHG

## 2025-01-28 DIAGNOSIS — I25.10 CAD IN NATIVE ARTERY: Primary | ICD-10-CM

## 2025-01-28 DIAGNOSIS — I25.118 CORONARY ARTERY DISEASE INVOLVING NATIVE CORONARY ARTERY OF NATIVE HEART WITH OTHER FORM OF ANGINA PECTORIS: Primary | ICD-10-CM

## 2025-01-28 DIAGNOSIS — I48.21 PERMANENT ATRIAL FIBRILLATION (MULTI): ICD-10-CM

## 2025-01-28 DIAGNOSIS — I25.118 CORONARY ARTERY DISEASE OF NATIVE ARTERY OF NATIVE HEART WITH STABLE ANGINA PECTORIS: Primary | ICD-10-CM

## 2025-01-28 DIAGNOSIS — I79.8 OTHER DISORDERS OF ARTERIES, ARTERIOLES AND CAPILLARIES IN DISEASES CLASSIFIED ELSEWHERE: ICD-10-CM

## 2025-01-28 LAB
ATRIAL RATE: 41 BPM
Q ONSET: 229 MS
QRS COUNT: 14 BEATS
QRS DURATION: 72 MS
QT INTERVAL: 372 MS
QTC CALCULATION(BAZETT): 437 MS
QTC FREDERICIA: 414 MS
R AXIS: 6 DEGREES
T AXIS: 24 DEGREES
T OFFSET: 415 MS
VENTRICULAR RATE: 83 BPM

## 2025-01-28 PROCEDURE — 3074F SYST BP LT 130 MM HG: CPT | Performed by: THORACIC SURGERY (CARDIOTHORACIC VASCULAR SURGERY)

## 2025-01-28 PROCEDURE — 99213 OFFICE O/P EST LOW 20 MIN: CPT | Performed by: THORACIC SURGERY (CARDIOTHORACIC VASCULAR SURGERY)

## 2025-01-28 PROCEDURE — 1126F AMNT PAIN NOTED NONE PRSNT: CPT | Performed by: THORACIC SURGERY (CARDIOTHORACIC VASCULAR SURGERY)

## 2025-01-28 PROCEDURE — 1123F ACP DISCUSS/DSCN MKR DOCD: CPT | Performed by: THORACIC SURGERY (CARDIOTHORACIC VASCULAR SURGERY)

## 2025-01-28 PROCEDURE — 3008F BODY MASS INDEX DOCD: CPT | Performed by: THORACIC SURGERY (CARDIOTHORACIC VASCULAR SURGERY)

## 2025-01-28 PROCEDURE — 1159F MED LIST DOCD IN RCRD: CPT | Performed by: THORACIC SURGERY (CARDIOTHORACIC VASCULAR SURGERY)

## 2025-01-28 PROCEDURE — 3078F DIAST BP <80 MM HG: CPT | Performed by: THORACIC SURGERY (CARDIOTHORACIC VASCULAR SURGERY)

## 2025-01-28 PROCEDURE — 1036F TOBACCO NON-USER: CPT | Performed by: THORACIC SURGERY (CARDIOTHORACIC VASCULAR SURGERY)

## 2025-01-28 ASSESSMENT — PAIN SCALES - GENERAL
PAINLEVEL_OUTOF10: 0 - NO PAIN
PAINLEVEL_OUTOF10: 0-NO PAIN

## 2025-01-28 ASSESSMENT — LIFESTYLE VARIABLES
HOW OFTEN DO YOU HAVE A DRINK CONTAINING ALCOHOL: 4 OR MORE TIMES A WEEK
HOW MANY STANDARD DRINKS CONTAINING ALCOHOL DO YOU HAVE ON A TYPICAL DAY: 1 OR 2
SKIP TO QUESTIONS 9-10: 1
AUDIT-C TOTAL SCORE: 4
HOW OFTEN DO YOU HAVE SIX OR MORE DRINKS ON ONE OCCASION: NEVER

## 2025-01-28 ASSESSMENT — ENCOUNTER SYMPTOMS
LOSS OF SENSATION IN FEET: 0
DEPRESSION: 0
OCCASIONAL FEELINGS OF UNSTEADINESS: 0

## 2025-01-28 NOTE — PROGRESS NOTES
Subjective   Radha Montalvo is a 74 y.o. female referred by Farhat for coronary bypass grafting. She reports dyspnea and exertional chest pressure/discomfort. She denies chest pain.      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    Edema of both lower extremities 01/13/2024    Essential hypertension 10/06/2023    Hx antineoplastic chemo     Hyperlipidemia 10/06/2023    Hypertension 10/06/2023    Labral tear of long head of biceps tendon, right, initial encounter 02/15/2024    Personal history of irradiation     Personal history of other diseases of the musculoskeletal system and connective tissue 10/06/2023    History of arthritis    Primary osteoarthritis of both shoulders 02/15/2024    Shortness of breath 10/23/2023     Past Surgical History:   Procedure Laterality Date    BI MAMMO GUIDED BREAST LEFT LOCALIZATION Left 02/25/2013    BI MAMMO GUIDED LOCALIZATION BREAST LEFT [facility] CLINICAL LEGACY    BREAST BIOPSY      BREAST LUMPECTOMY Left 2013    CARDIAC CATHETERIZATION N/A 1/27/2025    Procedure: Left Heart Cath with Coronary Angiography and LV;  Surgeon: Paulo Dougherty MD;  Location: [facility] Cardiac Cath Lab;  Service: Cardiovascular;  Laterality: N/A;  no prior auth needed    HYSTERECTOMY  2000    MAMMOGRAM DIAGNOSTIC BI Bilateral 09/16/2024    OTHER SURGICAL HISTORY  04/27/2022    Lumpectomy    US COMPLETE BREAST Left 09/16/2024     Family History   Problem Relation Name Age of Onset    Stroke Mother Christi Nair     Arthritis Mother Christi Nair     COPD Mother Christi Nair     Stroke Father Liborio Nair      Aneurysm Father Liborio Nair     Hypertension Father Liborio Nair        No Known Allergies      Current Outpatient Medications:     acyclovir (Zovirax) 800 mg tablet, TAKE 1 TABLET BY MOUTH EVERY DAY, Disp: 90 tablet, Rfl: 1    apixaban (Eliquis) 5 mg tablet, Take 1 tablet (5 mg) by mouth 2 times a day., Disp: 60 tablet, Rfl: 11    atorvastatin (Lipitor) 10 mg tablet, TAKE 1 TABLET BY MOUTH EVERY DAY, Disp: 90 tablet, Rfl: 3    bisoprolol (Zebeta) 10 mg tablet, Take 1 tablet (10 mg) by mouth once daily., Disp: 90 tablet, Rfl: 3    cholecalciferol (VITAMIN D-3) 10 mcg (400 unit) tablet, Take 1 capsule by mouth once daily., Disp: , Rfl:     dilTIAZem ER (Tiazac) 240 mg 24 hr capsule, Take 1 capsule (240 mg) by mouth once daily., Disp: 90 capsule, Rfl: 3    nystatin (Mycostatin) 100,000 unit/mL suspension, Take 5 mL (500,000 Units) by mouth 4 times a day for 10 days. Swish for several minutes then swallow., Disp: 200 mL, Rfl: 0    omega 3-dha-epa-fish oil (Fish OiL) 1,200 (144-216) mg capsule, 1.5 capsules Orally, Disp: , Rfl:     vit B complex no.12/niacin,B3, (VITAMIN B COMPLEX NO.12-NIACIN ORAL), Take by mouth.  Vitamin B 12 TABS, Disp: , Rfl:   No current facility-administered medications for this visit.    Review of systems negative except for hx of breast cancer and a fib.       Objective   Cardiac Studies  Cardiac catheterization revealed anterior ischemia.    EF: >50%  Vessels: Double vessel disease: LAD and Circumflex    Physical Exam  General: She is a pleasant female currently in no distress.  LMP  (LMP Unknown)    There is no height or weight on file to calculate BMI.   HEENT: Normocephalic and atraumatic. PERRLA. EOMs are full. Dentition is unremarkable.  NECK: Supple without thyromegaly, masses, or carotid bruits.  CHEST: Clear.  HEART: Regular rate and rhythm.  ABDOMEN: Soft, flat, nontender without organomegaly or masses.  NEUROLOGIC: Unremarkable.  EXTREMITIES: Unremarkable. Pedal pulses are  palpable.    Data Review: CT Chest:  ascending 3.9      Assessment/Plan   The patient is a 74-year-old woman that was sent to our outpatient clinic after being diagnosed with symptomatic for short of breath double vessel disease.  The patient have a family history of coronary disease, with his father her father having bypass surgery many years ago.  She also have a history of atrial fibrillation that was diagnosed almost a year ago on Eliquis.  Incidental finding on a CT scan was an aortic dilatation that has been followed by Dr. Wang in the aortic center for least 2 years.    I think the patient have class I indication for CABG with concomitant atrial fibrillation surgery, including the left atrial appendage exclusion with a clip.  The plan will be to perform a CABG x 2 with a LIMA to LAD and a vein to the circumflex, a GP maze and the left heart appendage exclusion.  We will evaluate the ascending aorta at the time of the surgery for the need of prophylactic replacement.    We extensively discussed with the patient the risk of the procedure and the benefit in terms of long-term prognosis including the need of concomitant ascending aorta replacement.  She and her  agree with the surgery.  We will book the surgery tentatively on 314.  Problem List Items Addressed This Visit    None      No orders of the defined types were placed in this encounter.

## 2025-01-28 NOTE — H&P (VIEW-ONLY)
Subjective   Radha Montalvo is a 74 y.o. female referred by Farhat for coronary bypass grafting. She reports dyspnea and exertional chest pressure/discomfort. She denies chest pain.      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    Edema of both lower extremities 01/13/2024    Essential hypertension 10/06/2023    Hx antineoplastic chemo     Hyperlipidemia 10/06/2023    Hypertension 10/06/2023    Labral tear of long head of biceps tendon, right, initial encounter 02/15/2024    Personal history of irradiation     Personal history of other diseases of the musculoskeletal system and connective tissue 10/06/2023    History of arthritis    Primary osteoarthritis of both shoulders 02/15/2024    Shortness of breath 10/23/2023     Past Surgical History:   Procedure Laterality Date    BI MAMMO GUIDED BREAST LEFT LOCALIZATION Left 02/25/2013    BI MAMMO GUIDED LOCALIZATION BREAST LEFT [facility] CLINICAL LEGACY    BREAST BIOPSY      BREAST LUMPECTOMY Left 2013    CARDIAC CATHETERIZATION N/A 1/27/2025    Procedure: Left Heart Cath with Coronary Angiography and LV;  Surgeon: Paulo Dougherty MD;  Location: [facility] Cardiac Cath Lab;  Service: Cardiovascular;  Laterality: N/A;  no prior auth needed    HYSTERECTOMY  2000    MAMMOGRAM DIAGNOSTIC BI Bilateral 09/16/2024    OTHER SURGICAL HISTORY  04/27/2022    Lumpectomy    US COMPLETE BREAST Left 09/16/2024     Family History   Problem Relation Name Age of Onset    Stroke Mother Christi Nair     Arthritis Mother Christi Nair     COPD Mother Christi Nair     Stroke Father Liborio Nair      Aneurysm Father Liborio Nair     Hypertension Father Liborio Nair        No Known Allergies      Current Outpatient Medications:     acyclovir (Zovirax) 800 mg tablet, TAKE 1 TABLET BY MOUTH EVERY DAY, Disp: 90 tablet, Rfl: 1    apixaban (Eliquis) 5 mg tablet, Take 1 tablet (5 mg) by mouth 2 times a day., Disp: 60 tablet, Rfl: 11    atorvastatin (Lipitor) 10 mg tablet, TAKE 1 TABLET BY MOUTH EVERY DAY, Disp: 90 tablet, Rfl: 3    bisoprolol (Zebeta) 10 mg tablet, Take 1 tablet (10 mg) by mouth once daily., Disp: 90 tablet, Rfl: 3    cholecalciferol (VITAMIN D-3) 10 mcg (400 unit) tablet, Take 1 capsule by mouth once daily., Disp: , Rfl:     dilTIAZem ER (Tiazac) 240 mg 24 hr capsule, Take 1 capsule (240 mg) by mouth once daily., Disp: 90 capsule, Rfl: 3    nystatin (Mycostatin) 100,000 unit/mL suspension, Take 5 mL (500,000 Units) by mouth 4 times a day for 10 days. Swish for several minutes then swallow., Disp: 200 mL, Rfl: 0    omega 3-dha-epa-fish oil (Fish OiL) 1,200 (144-216) mg capsule, 1.5 capsules Orally, Disp: , Rfl:     vit B complex no.12/niacin,B3, (VITAMIN B COMPLEX NO.12-NIACIN ORAL), Take by mouth.  Vitamin B 12 TABS, Disp: , Rfl:   No current facility-administered medications for this visit.    Review of systems negative except for hx of breast cancer and a fib.       Objective   Cardiac Studies  Cardiac catheterization revealed anterior ischemia.    EF: >50%  Vessels: Double vessel disease: LAD and Circumflex    Physical Exam  General: She is a pleasant female currently in no distress.  LMP  (LMP Unknown)    There is no height or weight on file to calculate BMI.   HEENT: Normocephalic and atraumatic. PERRLA. EOMs are full. Dentition is unremarkable.  NECK: Supple without thyromegaly, masses, or carotid bruits.  CHEST: Clear.  HEART: Regular rate and rhythm.  ABDOMEN: Soft, flat, nontender without organomegaly or masses.  NEUROLOGIC: Unremarkable.  EXTREMITIES: Unremarkable. Pedal pulses are  palpable.    Data Review: CT Chest:  ascending 3.9      Assessment/Plan   The patient is a 74-year-old woman that was sent to our outpatient clinic after being diagnosed with symptomatic for short of breath double vessel disease.  The patient have a family history of coronary disease, with his father her father having bypass surgery many years ago.  She also have a history of atrial fibrillation that was diagnosed almost a year ago on Eliquis.  Incidental finding on a CT scan was an aortic dilatation that has been followed by Dr. Wang in the aortic center for least 2 years.    I think the patient have class I indication for CABG with concomitant atrial fibrillation surgery, including the left atrial appendage exclusion with a clip.  The plan will be to perform a CABG x 2 with a LIMA to LAD and a vein to the circumflex, a GP maze and the left heart appendage exclusion.  We will evaluate the ascending aorta at the time of the surgery for the need of prophylactic replacement.    We extensively discussed with the patient the risk of the procedure and the benefit in terms of long-term prognosis including the need of concomitant ascending aorta replacement.  She and her  agree with the surgery.  We will book the surgery tentatively on 314.  Problem List Items Addressed This Visit    None      No orders of the defined types were placed in this encounter.

## 2025-01-30 ENCOUNTER — OFFICE VISIT (OUTPATIENT)
Dept: URGENT CARE | Age: 75
End: 2025-01-30
Payer: MEDICARE

## 2025-01-30 ENCOUNTER — APPOINTMENT (OUTPATIENT)
Dept: PRIMARY CARE | Facility: CLINIC | Age: 75
End: 2025-01-30
Payer: MEDICARE

## 2025-01-30 VITALS
HEIGHT: 67 IN | HEART RATE: 56 BPM | OXYGEN SATURATION: 98 % | WEIGHT: 157.19 LBS | DIASTOLIC BLOOD PRESSURE: 84 MMHG | SYSTOLIC BLOOD PRESSURE: 140 MMHG | BODY MASS INDEX: 24.67 KG/M2 | TEMPERATURE: 97.7 F | RESPIRATION RATE: 14 BRPM

## 2025-01-30 DIAGNOSIS — J06.9 VIRAL URI: Primary | ICD-10-CM

## 2025-01-30 DIAGNOSIS — R05.9 COUGH, UNSPECIFIED TYPE: ICD-10-CM

## 2025-01-30 LAB
POC RAPID INFLUENZA A: NEGATIVE
POC RAPID INFLUENZA B: NEGATIVE
POC SARS-COV-2 AG BINAX: NORMAL

## 2025-01-30 NOTE — PROGRESS NOTES
Subjective   Patient ID: Radha Montalvo is a 74 y.o. female. They present today with a chief complaint of Cough (Was seen on Saturday has thrush, coughing up clear mucus, sinus drainage since yesterday.).    History of Present Illness  74-year-old female presents urgent care for plaint of clear postnasal drip and cough that she believes is actually improving that started yesterday.  States she was seen on Saturday for throat she was given nystatin and states that is improving significantly.  States she does have upcoming coronary bypass surgery on February 14, 2025.  Denies any current fevers or chills, nausea, vomiting, sweats, chest pain, shortness of breath, abdominal pain.  COVID and flu are negative.  Discussed chest x-ray and patient declines chest x-ray at this time.  Educated since patient does have improving symptoms and she recently had thrush that antibiotics would not be warranted at this time as she likely has viral URI causing cough with postnasal drip.  Educated on if symptoms worsen/return/develops any facial pain/thick sinus drainage, or symptoms are improving since and then are worsening or symptoms last for 10 more days to return for reassessment.  States if she has any other symptoms develop she can also return at any time or go to the emergency department.  Educated on supportive care for viral URI/cough.  Instructed to follow-up with PCP in 1 to 2 weeks.  Patient agrees with plan.          Past Medical History  Allergies as of 01/30/2025    (No Known Allergies)       (Not in a hospital admission)       Past Medical History:   Diagnosis Date    Abnormal findings on diagnostic imaging of heart and coronary circulation 10/22/2023    Aneurysm (CMS-[facility]) 10/23/2024    Arthritis of left acromioclavicular joint 02/15/2024    Ascending aorta dilatation (CMS-[facility]) 11/22/2023    Atrial fibrillation (Multi) 10/23/2023    Biceps tendinitis of left shoulder 02/15/2024    Breast cancer (Multi) 2013     "Cancer (Multi) 2013    Congenital dilatation of aorta ([facility]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    Edema of both lower extremities 01/13/2024    Essential hypertension 10/06/2023    Hx antineoplastic chemo     Hyperlipidemia 10/06/2023    Hypertension 10/06/2023    Labral tear of long head of biceps tendon, right, initial encounter 02/15/2024    Personal history of irradiation     Personal history of other diseases of the musculoskeletal system and connective tissue 10/06/2023    History of arthritis    Primary osteoarthritis of both shoulders 02/15/2024    Shortness of breath 10/23/2023       Past Surgical History:   Procedure Laterality Date    BI MAMMO GUIDED BREAST LEFT LOCALIZATION Left 02/25/2013    BI MAMMO GUIDED LOCALIZATION BREAST LEFT [facility] CLINICAL LEGACY    BREAST BIOPSY      BREAST LUMPECTOMY Left 2013    CARDIAC CATHETERIZATION N/A 1/27/2025    Procedure: Left Heart Cath with Coronary Angiography and LV;  Surgeon: Paulo Dougherty MD;  Location: [facility] Cardiac Cath Lab;  Service: Cardiovascular;  Laterality: N/A;  no prior auth needed    HYSTERECTOMY  2000    MAMMOGRAM DIAGNOSTIC BI Bilateral 09/16/2024    OTHER SURGICAL HISTORY  04/27/2022    Lumpectomy    US COMPLETE BREAST Left 09/16/2024        reports that she has never smoked. She has never been exposed to tobacco smoke. She has never used smokeless tobacco. She reports current alcohol use of about 7.0 standard drinks of alcohol per week. She reports that she does not use drugs.    Review of Systems  Review of Systems   All other systems reviewed and are negative.                                 Objective    Vitals:    01/30/25 0856   BP: 140/84   BP Location: Right arm   Patient Position: Sitting   BP Cuff Size: Adult   Pulse: 56   Resp: 14   Temp: 36.5 °C (97.7 °F)   TempSrc: Oral   SpO2: 98%   Weight: 71.3 kg (157 lb 3 oz)   Height: 1.702 m (5' 7\")     No LMP recorded (lmp unknown). Patient has had a hysterectomy.    Physical Exam  Vitals reviewed.   Constitutional:  "      General: She is not in acute distress.     Appearance: Normal appearance. She is not ill-appearing, toxic-appearing or diaphoretic.   HENT:      Head: Normocephalic and atraumatic.      Comments: No sinus tenderness.     Nose: Congestion and rhinorrhea present.      Mouth/Throat:      Mouth: Mucous membranes are moist.      Pharynx: Oropharynx is clear.      Comments: Thrush has resolved.  Uvula midline and normal.  Clear postnasal drip.  Pharynx is only mildly erythematous without exudate.  Cardiovascular:      Rate and Rhythm: Bradycardia present. Rhythm irregular.   Pulmonary:      Effort: Pulmonary effort is normal. No respiratory distress.      Breath sounds: Normal breath sounds. No stridor. No wheezing, rhonchi or rales.   Abdominal:      General: Abdomen is flat.      Palpations: Abdomen is soft.      Tenderness: There is no abdominal tenderness. There is no right CVA tenderness or left CVA tenderness.   Musculoskeletal:      Cervical back: Normal range of motion and neck supple. No rigidity or tenderness.   Lymphadenopathy:      Cervical: No cervical adenopathy.   Skin:     General: Skin is warm and dry.   Neurological:      General: No focal deficit present.      Mental Status: She is alert and oriented to person, place, and time.   Psychiatric:         Mood and Affect: Mood normal.         Behavior: Behavior normal.         Procedures    Point of Care Test & Imaging Results from this visit  Results for orders placed or performed in visit on 01/30/25   POCT Influenza A/B manually resulted   Result Value Ref Range    POC Rapid Influenza A Negative Negative    POC Rapid Influenza B Negative Negative   POCT Covid-19 Rapid Antigen   Result Value Ref Range    POC FRANSICO-COV-2 AG  Presumptive negative test for SARS-CoV-2 (no antigen detected)     Presumptive negative test for SARS-CoV-2 (no antigen detected)      No results found.    Diagnostic study results (if any) were reviewed by Annette Marie  AKILA.    Assessment/Plan   Allergies, medications, history, and pertinent labs/EKGs/Imaging reviewed by Annette Marie PA-C.     Medical Decision Making  74-year-old female presents urgent care for plaint of clear postnasal drip and cough that she believes is actually improving that started yesterday.  States she was seen on Saturday for throat she was given nystatin and states that is improving significantly.  States she does have upcoming coronary bypass surgery on February 14, 2025.  Denies any current fevers or chills, nausea, vomiting, sweats, chest pain, shortness of breath, abdominal pain.  COVID and flu are negative.  Discussed chest x-ray and patient declines chest x-ray at this time.  Educated since patient does have improving symptoms and she recently had thrush that antibiotics would not be warranted at this time as she likely has viral URI causing cough with postnasal drip.  Educated on if symptoms worsen/return/develops any facial pain/thick sinus drainage, or symptoms are improving since and then are worsening or symptoms last for 10 more days to return for reassessment.  States if she has any other symptoms develop she can also return at any time or go to the emergency department.  Educated on supportive care for viral URI/cough.  Instructed to follow-up with PCP in 1 to 2 weeks.  Patient agrees with plan.    Orders and Diagnoses  Diagnoses and all orders for this visit:  Cough, unspecified type  -     POCT Influenza A/B manually resulted  -     POCT Covid-19 Rapid Antigen      Medical Admin Record      Patient disposition: Home    Electronically signed by Annette Marie PA-C  9:36 AM

## 2025-01-30 NOTE — PATIENT INSTRUCTIONS
Use your Flonase and Claritin following dosing instructions daily.  Can also try cough drops, honey/honey and hot tea, cool-mist humidifier.  Maintain adequate hydration and nutrition.  If symptoms worsen, do not improve, or any other concerning or worrisome symptoms develop please return to the clinic or go to the emergency department.  Follow-up with PCP in 1 to 2 weeks.

## 2025-02-02 ENCOUNTER — ANCILLARY PROCEDURE (OUTPATIENT)
Dept: URGENT CARE | Age: 75
End: 2025-02-02
Payer: MEDICARE

## 2025-02-02 ENCOUNTER — OFFICE VISIT (OUTPATIENT)
Dept: URGENT CARE | Age: 75
End: 2025-02-02
Payer: MEDICARE

## 2025-02-02 VITALS
WEIGHT: 158 LBS | DIASTOLIC BLOOD PRESSURE: 87 MMHG | RESPIRATION RATE: 16 BRPM | OXYGEN SATURATION: 97 % | TEMPERATURE: 98.2 F | HEART RATE: 54 BPM | SYSTOLIC BLOOD PRESSURE: 141 MMHG | BODY MASS INDEX: 24.8 KG/M2 | HEIGHT: 67 IN

## 2025-02-02 DIAGNOSIS — R05.9 COUGH, UNSPECIFIED TYPE: ICD-10-CM

## 2025-02-02 DIAGNOSIS — J01.00 ACUTE NON-RECURRENT MAXILLARY SINUSITIS: Primary | ICD-10-CM

## 2025-02-02 DIAGNOSIS — J40 BRONCHITIS: ICD-10-CM

## 2025-02-02 PROCEDURE — 1159F MED LIST DOCD IN RCRD: CPT

## 2025-02-02 PROCEDURE — 71046 X-RAY EXAM CHEST 2 VIEWS: CPT

## 2025-02-02 PROCEDURE — 1036F TOBACCO NON-USER: CPT

## 2025-02-02 PROCEDURE — 1123F ACP DISCUSS/DSCN MKR DOCD: CPT

## 2025-02-02 PROCEDURE — 3008F BODY MASS INDEX DOCD: CPT

## 2025-02-02 PROCEDURE — 3077F SYST BP >= 140 MM HG: CPT

## 2025-02-02 PROCEDURE — 3079F DIAST BP 80-89 MM HG: CPT

## 2025-02-02 PROCEDURE — 1160F RVW MEDS BY RX/DR IN RCRD: CPT

## 2025-02-02 PROCEDURE — 99213 OFFICE O/P EST LOW 20 MIN: CPT

## 2025-02-02 RX ORDER — AMOXICILLIN AND CLAVULANATE POTASSIUM 875; 125 MG/1; MG/1
875 TABLET, FILM COATED ORAL 2 TIMES DAILY
Qty: 14 TABLET | Refills: 0 | Status: SHIPPED | OUTPATIENT
Start: 2025-02-02 | End: 2025-02-09

## 2025-02-02 ASSESSMENT — ENCOUNTER SYMPTOMS
CHEST TIGHTNESS: 0
COUGH: 1
ABDOMINAL PAIN: 0
WHEEZING: 0
FATIGUE: 0
CHILLS: 0
EYE DISCHARGE: 0
FEVER: 0
EYE PAIN: 0
DIARRHEA: 0
EYE ITCHING: 0
SORE THROAT: 0
STRIDOR: 0
DIZZINESS: 0
VOMITING: 0
SINUS PRESSURE: 1
SHORTNESS OF BREATH: 0

## 2025-02-02 NOTE — PROGRESS NOTES
Subjective   Patient ID: Radha Montalvo is a 74 y.o. female. They present today with a chief complaint of Cough (Coughing up phlegm x 2 weeks).    History of Present Illness  Patient is a 74-year-old female presenting to the clinic with complaints of cough, nasal congestion, sinus pressure.  Symptoms have been present for several weeks now.  Patient states she developed nasal congestion and sinus pressure 2 weeks ago.  Patient states has been consistent and not improving.  Patient states sinus pressure is over maxillary sinus.  Patient has green drainage.  With associated postnasal drip.  No sore throat.  Patient does have a cough productive of phlegm over the last week.  Patient does have history of coronary artery disease.  Patient has a planned coronary artery bypass graft on the 14th.  Patient denies any chest pain or shortness of breath at this time.  Patient states her cough can sometimes be productive of phlegm.  Patient has been seen twice at the urgent care clinic both times diagnoses viral.      History provided by:  Patient  Cough  Associated symptoms include postnasal drip. Pertinent negatives include no chest pain, chills, ear pain, fever, sore throat, shortness of breath or wheezing.       Past Medical History  Allergies as of 02/02/2025    (No Known Allergies)       (Not in a hospital admission)       Past Medical History:   Diagnosis Date    Abnormal findings on diagnostic imaging of heart and coronary circulation 10/22/2023    Aneurysm (CMS-HCC) 10/23/2024    Arthritis of left acromioclavicular joint 02/15/2024    Ascending aorta dilatation (CMS-HCC) 11/22/2023    Atrial fibrillation (Multi) 10/23/2023    Biceps tendinitis of left shoulder 02/15/2024    Breast cancer (Multi) 2013    Cancer (Multi) 2013    Congenital dilatation of aorta ([facility]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    Edema of both lower extremities 01/13/2024    Essential hypertension 10/06/2023    Hx antineoplastic chemo     Hyperlipidemia 10/06/2023     Hypertension 10/06/2023    Labral tear of long head of biceps tendon, right, initial encounter 02/15/2024    Personal history of irradiation     Personal history of other diseases of the musculoskeletal system and connective tissue 10/06/2023    History of arthritis    Primary osteoarthritis of both shoulders 02/15/2024    Shortness of breath 10/23/2023       Past Surgical History:   Procedure Laterality Date    BI MAMMO GUIDED BREAST LEFT LOCALIZATION Left 02/25/2013    BI MAMMO GUIDED LOCALIZATION BREAST LEFT [facility]    BREAST BIOPSY      BREAST LUMPECTOMY Left 2013    CARDIAC CATHETERIZATION N/A 1/27/2025    Procedure: Left Heart Cath with Coronary Angiography and LV;  Surgeon: Paulo Dougherty MD;  Location: [facility] Cardiac Cath Lab;  Service: Cardiovascular;  Laterality: N/A;  no prior auth needed    HYSTERECTOMY  2000    MAMMOGRAM DIAGNOSTIC BI Bilateral 09/16/2024    OTHER SURGICAL HISTORY  04/27/2022    Lumpectomy    US COMPLETE BREAST Left 09/16/2024        reports that she has never smoked. She has never been exposed to tobacco smoke. She has never used smokeless tobacco. She reports current alcohol use of about 7.0 standard drinks of alcohol per week. She reports that she does not use drugs.    Review of Systems  Review of Systems   Constitutional:  Negative for chills, fatigue and fever.   HENT:  Positive for congestion, postnasal drip and sinus pressure. Negative for ear discharge, ear pain and sore throat.    Eyes:  Negative for pain, discharge and itching.   Respiratory:  Positive for cough. Negative for chest tightness, shortness of breath, wheezing and stridor.    Cardiovascular:  Negative for chest pain.   Gastrointestinal:  Negative for abdominal pain, diarrhea and vomiting.   Neurological:  Negative for dizziness.                                  Objective    Vitals:    02/02/25 1128   BP: 141/87   BP Location: Right arm   Patient Position: Sitting   BP Cuff Size: Adult   Pulse: 54   Resp:  "16   Temp: 36.8 °C (98.2 °F)   TempSrc: Oral   SpO2: 97%   Weight: 71.7 kg (158 lb)   Height: 1.702 m (5' 7\")     No LMP recorded (lmp unknown). Patient has had a hysterectomy.    Physical Exam  Vitals reviewed.   Constitutional:       General: She is awake. She is not in acute distress.     Appearance: Normal appearance. She is well-developed. She is not ill-appearing or toxic-appearing.   HENT:      Head: Normocephalic and atraumatic.      Right Ear: Tympanic membrane, ear canal and external ear normal.      Left Ear: Tympanic membrane, ear canal and external ear normal.      Nose: Congestion present.      Right Sinus: Maxillary sinus tenderness present.      Left Sinus: Maxillary sinus tenderness present.      Mouth/Throat:      Mouth: Mucous membranes are moist.      Pharynx: Oropharynx is clear. Uvula midline. No oropharyngeal exudate or posterior oropharyngeal erythema.      Tonsils: No tonsillar exudate or tonsillar abscesses.   Cardiovascular:      Rate and Rhythm: Normal rate and regular rhythm.      Heart sounds: Normal heart sounds, S1 normal and S2 normal. No murmur heard.     No friction rub. No gallop.   Pulmonary:      Effort: Pulmonary effort is normal. No respiratory distress.      Breath sounds: No stridor. Rhonchi present. No wheezing or rales.   Skin:     General: Skin is warm.   Neurological:      General: No focal deficit present.      Mental Status: She is alert and oriented to person, place, and time. Mental status is at baseline.      Gait: Gait is intact.   Psychiatric:         Mood and Affect: Mood normal.         Behavior: Behavior normal. Behavior is cooperative.         Procedures    Point of Care Test & Imaging Results from this visit  No results found for this visit on 02/02/25.   XR chest 2 views    Result Date: 2/2/2025  Interpreted By:  Lyric Azevedo, STUDY: XR CHEST 2 VIEWS 2/2/2025 11:36 am   INDICATION: Signs/Symptoms:cough for 2 weeks   COMPARISON: 04/03/2024   ACCESSION " NUMBER(S): [id number]   ORDERING CLINICIAN: CHRISTOPHE ALFARO   TECHNIQUE: PA and lateral views   FINDINGS: There is linear scarring in the right lung base. There is severe scoliosis. Pulmonary vascularity and cardiac silhouette appear normal. No effusions. No infiltrates.       No acute abnormalities and no significant change since the prior exam   Signed by: Lyric Azevedo 2/2/2025 11:42 AM Dictation workstation:   [id number]     Diagnostic study results (if any) were reviewed by [facility].    Assessment/Plan   Allergies, medications, history, and pertinent labs/EKGs/Imaging reviewed by Christophe Alfaro PA-C.     Medical Decision Making  Patient is a 74-year-old female presenting to the clinic with complaints of cough, nasal congestion, sinus pressure.  Symptoms have been present for several weeks now.  Patient states she developed nasal congestion and sinus pressure 2 weeks ago.  Patient states has been consistent and not improving.  Patient states sinus pressure is over maxillary sinus.  Patient has green drainage.  With associated postnasal drip.  No sore throat.  Patient does have a cough productive of phlegm over the last week.  Patient does have history of coronary artery disease.  Patient has a planned coronary artery bypass graft on the 14th.  Patient denies any chest pain or shortness of breath at this time.  Patient states her cough can sometimes be productive of phlegm.  Patient has been seen twice at the urgent care clinic both times diagnoses viral.  Patient with sinus tenderness bilaterally over the maxillary sinuses.  Patient with bilateral rhonchi.  Chest x-ray read by radiology as no acute cardiopulmonary process.  Likely patient has sinusitis leading to postnasal drip.  Attending physician consulted on patient case.  Attending physician agrees sinusitis treatment this time.  Discharge instructions to follow. Discharge instructions. Please follow up with your Primary Care Physician within  the next 5-7 days. It is important to take prescriptions as prescribed and complete all antibiotics. If your symptoms worsen you are instructed to immediately go to the emergency room for reevaluation and further assessment. If you develop any chest pain, SOB, or difficulty breathing you are instructed to go to the emergency room for reevaluation. All discharge instructions will be provided and explained to the patient at discharge. If you have any questions regarding your treatment plan please call the [facility] urgent care clinic.     Orders and Diagnoses  Diagnoses and all orders for this visit:  Cough, unspecified type  -     XR chest 2 views; Future      Medical Admin Record      Patient disposition: Home    Electronically signed by [facility]  12:02 PM

## 2025-02-02 NOTE — PATIENT INSTRUCTIONS
Discharge instructions    Please follow up with your Primary Care Physician within the next 5-7 days.    It is important to take prescriptions as prescribed and complete all antibiotics.     If your symptoms worsen you are instructed to immediately go to the emergency room for reevaluation and further assessment.    If you develop any chest pain, SOB, or difficulty breathing you are instructed to go to the emergency room for reevaluation.    All discharge instructions will be provided and explained to the patient at discharge.    If you have any questions regarding your treatment plan please call the [facility] urgent care clinic.

## 2025-02-04 ENCOUNTER — HOSPITAL ENCOUNTER (OUTPATIENT)
Dept: RADIOLOGY | Facility: HOSPITAL | Age: 75
Discharge: HOME | End: 2025-02-04
Payer: MEDICARE

## 2025-02-04 ENCOUNTER — APPOINTMENT (OUTPATIENT)
Dept: PREADMISSION TESTING | Facility: HOSPITAL | Age: 75
End: 2025-02-04
Payer: MEDICARE

## 2025-02-04 DIAGNOSIS — I25.118 CORONARY ARTERY DISEASE INVOLVING NATIVE CORONARY ARTERY OF NATIVE HEART WITH OTHER FORM OF ANGINA PECTORIS: ICD-10-CM

## 2025-02-04 DIAGNOSIS — I79.8 OTHER DISORDERS OF ARTERIES, ARTERIOLES AND CAPILLARIES IN DISEASES CLASSIFIED ELSEWHERE: ICD-10-CM

## 2025-02-04 PROCEDURE — 93880 EXTRACRANIAL BILAT STUDY: CPT

## 2025-02-07 ENCOUNTER — PRE-ADMISSION TESTING (OUTPATIENT)
Dept: PREADMISSION TESTING | Facility: HOSPITAL | Age: 75
End: 2025-02-07
Payer: MEDICARE

## 2025-02-07 VITALS
DIASTOLIC BLOOD PRESSURE: 72 MMHG | HEART RATE: 59 BPM | OXYGEN SATURATION: 96 % | WEIGHT: 157 LBS | BODY MASS INDEX: 24.64 KG/M2 | SYSTOLIC BLOOD PRESSURE: 119 MMHG | HEIGHT: 67 IN | TEMPERATURE: 98.3 F

## 2025-02-07 DIAGNOSIS — R79.9 ABNORMAL FINDING OF BLOOD CHEMISTRY, UNSPECIFIED: ICD-10-CM

## 2025-02-07 DIAGNOSIS — I25.118 CORONARY ARTERY DISEASE OF NATIVE ARTERY OF NATIVE HEART WITH STABLE ANGINA PECTORIS: ICD-10-CM

## 2025-02-07 DIAGNOSIS — Z01.818 PREOPERATIVE EXAMINATION: Primary | ICD-10-CM

## 2025-02-07 DIAGNOSIS — I48.91 ATRIAL FIBRILLATION, UNSPECIFIED TYPE (MULTI): ICD-10-CM

## 2025-02-07 DIAGNOSIS — I25.118 CORONARY ARTERY DISEASE INVOLVING NATIVE CORONARY ARTERY OF NATIVE HEART WITH OTHER FORM OF ANGINA PECTORIS: ICD-10-CM

## 2025-02-07 LAB
ALBUMIN SERPL BCP-MCNC: 4.3 G/DL (ref 3.4–5)
ALP SERPL-CCNC: 88 U/L (ref 33–136)
ALT SERPL W P-5'-P-CCNC: 49 U/L (ref 7–45)
ANION GAP SERPL CALC-SCNC: 15 MMOL/L (ref 10–20)
APPEARANCE UR: CLEAR
APTT PPP: 31 SECONDS (ref 27–38)
AST SERPL W P-5'-P-CCNC: 22 U/L (ref 9–39)
BASOPHILS # BLD AUTO: 0.03 X10*3/UL (ref 0–0.1)
BASOPHILS NFR BLD AUTO: 0.3 %
BILIRUB SERPL-MCNC: 0.8 MG/DL (ref 0–1.2)
BILIRUB UR STRIP.AUTO-MCNC: NEGATIVE MG/DL
BUN SERPL-MCNC: 25 MG/DL (ref 6–23)
CALCIUM SERPL-MCNC: 9.6 MG/DL (ref 8.6–10.6)
CHLORIDE SERPL-SCNC: 100 MMOL/L (ref 98–107)
CO2 SERPL-SCNC: 26 MMOL/L (ref 21–32)
COLOR UR: NORMAL
CREAT SERPL-MCNC: 0.69 MG/DL (ref 0.5–1.05)
EGFRCR SERPLBLD CKD-EPI 2021: >90 ML/MIN/1.73M*2
EOSINOPHIL # BLD AUTO: 0.13 X10*3/UL (ref 0–0.4)
EOSINOPHIL NFR BLD AUTO: 1.1 %
ERYTHROCYTE [DISTWIDTH] IN BLOOD BY AUTOMATED COUNT: 12.5 % (ref 11.5–14.5)
EST. AVERAGE GLUCOSE BLD GHB EST-MCNC: 128 MG/DL
GLUCOSE SERPL-MCNC: 107 MG/DL (ref 74–99)
GLUCOSE UR STRIP.AUTO-MCNC: NORMAL MG/DL
HBA1C MFR BLD: 6.1 %
HCT VFR BLD AUTO: 48.9 % (ref 36–46)
HGB BLD-MCNC: 16.4 G/DL (ref 12–16)
IMM GRANULOCYTES # BLD AUTO: 0.05 X10*3/UL (ref 0–0.5)
IMM GRANULOCYTES NFR BLD AUTO: 0.4 % (ref 0–0.9)
INR PPP: 1.1 (ref 0.9–1.1)
KETONES UR STRIP.AUTO-MCNC: NEGATIVE MG/DL
LEUKOCYTE ESTERASE UR QL STRIP.AUTO: NEGATIVE
LYMPHOCYTES # BLD AUTO: 1.19 X10*3/UL (ref 0.8–3)
LYMPHOCYTES NFR BLD AUTO: 10.5 %
MCH RBC QN AUTO: 34.5 PG (ref 26–34)
MCHC RBC AUTO-ENTMCNC: 33.5 G/DL (ref 32–36)
MCV RBC AUTO: 103 FL (ref 80–100)
MONOCYTES # BLD AUTO: 0.66 X10*3/UL (ref 0.05–0.8)
MONOCYTES NFR BLD AUTO: 5.8 %
NEUTROPHILS # BLD AUTO: 9.32 X10*3/UL (ref 1.6–5.5)
NEUTROPHILS NFR BLD AUTO: 81.9 %
NITRITE UR QL STRIP.AUTO: NEGATIVE
NRBC BLD-RTO: 0 /100 WBCS (ref 0–0)
PH UR STRIP.AUTO: 6.5 [PH]
PLATELET # BLD AUTO: 214 X10*3/UL (ref 150–450)
POTASSIUM SERPL-SCNC: 4.2 MMOL/L (ref 3.5–5.3)
PROT SERPL-MCNC: 6.4 G/DL (ref 6.4–8.2)
PROT UR STRIP.AUTO-MCNC: NEGATIVE MG/DL
PROTHROMBIN TIME: 12 SECONDS (ref 9.8–12.8)
RBC # BLD AUTO: 4.75 X10*6/UL (ref 4–5.2)
RBC # UR STRIP.AUTO: NEGATIVE MG/DL
SODIUM SERPL-SCNC: 137 MMOL/L (ref 136–145)
SP GR UR STRIP.AUTO: 1.02
UROBILINOGEN UR STRIP.AUTO-MCNC: NORMAL MG/DL
WBC # BLD AUTO: 11.4 X10*3/UL (ref 4.4–11.3)

## 2025-02-07 PROCEDURE — 83036 HEMOGLOBIN GLYCOSYLATED A1C: CPT

## 2025-02-07 PROCEDURE — 85730 THROMBOPLASTIN TIME PARTIAL: CPT | Performed by: NURSE PRACTITIONER

## 2025-02-07 PROCEDURE — 36415 COLL VENOUS BLD VENIPUNCTURE: CPT | Performed by: NURSE PRACTITIONER

## 2025-02-07 PROCEDURE — 81003 URINALYSIS AUTO W/O SCOPE: CPT

## 2025-02-07 PROCEDURE — 93005 ELECTROCARDIOGRAM TRACING: CPT

## 2025-02-07 PROCEDURE — 87081 CULTURE SCREEN ONLY: CPT

## 2025-02-07 PROCEDURE — 85025 COMPLETE CBC W/AUTO DIFF WBC: CPT

## 2025-02-07 PROCEDURE — 84075 ASSAY ALKALINE PHOSPHATASE: CPT

## 2025-02-07 PROCEDURE — 99214 OFFICE O/P EST MOD 30 MIN: CPT

## 2025-02-07 RX ORDER — CHLORHEXIDINE GLUCONATE 40 MG/ML
SOLUTION TOPICAL DAILY PRN
Qty: 473 ML | Refills: 0 | Status: SHIPPED | OUTPATIENT
Start: 2025-02-07

## 2025-02-07 RX ORDER — CHLORHEXIDINE GLUCONATE ORAL RINSE 1.2 MG/ML
15 SOLUTION DENTAL AS NEEDED
Qty: 120 ML | Refills: 0 | Status: SHIPPED | OUTPATIENT
Start: 2025-02-07 | End: 2025-02-09

## 2025-02-07 ASSESSMENT — DUKE ACTIVITY SCORE INDEX (DASI)
CAN YOU PARTICIPATE IN MODERATE RECREATIONAL ACTIVITIES LIKE GOLF, BOWLING, DANCING, DOUBLES TENNIS OR THROWING A BASEBALL OR FOOTBALL: NO
CAN YOU DO MODERATE WORK AROUND THE HOUSE LIKE VACUUMING, SWEEPING FLOORS OR CARRYING GROCERIES: YES
CAN YOU DO YARD WORK LIKE RAKING LEAVES, WEEDING OR PUSHING A MOWER: NO
CAN YOU DO LIGHT WORK AROUND THE HOUSE LIKE DUSTING OR WASHING DISHES: YES
CAN YOU WALK INDOORS, SUCH AS AROUND YOUR HOUSE: YES
CAN YOU TAKE CARE OF YOURSELF (EAT, DRESS, BATHE, OR USE TOILET): YES
CAN YOU RUN A SHORT DISTANCE: NO
CAN YOU PARTICIPATE IN STRENOUS SPORTS LIKE SWIMMING, SINGLES TENNIS, FOOTBALL, BASKETBALL, OR SKIING: NO
CAN YOU DO HEAVY WORK AROUND THE HOUSE LIKE SCRUBBING FLOORS OR LIFTING AND MOVING HEAVY FURNITURE: NO
DASI METS SCORE: 4.7
CAN YOU WALK A BLOCK OR TWO ON LEVEL GROUND: NO
TOTAL_SCORE: 15.95
CAN YOU CLIMB A FLIGHT OF STAIRS OR WALK UP A HILL: NO
CAN YOU HAVE SEXUAL RELATIONS: YES

## 2025-02-07 ASSESSMENT — ENCOUNTER SYMPTOMS
SKIN CHANGES: 0
SHORTNESS OF BREATH: 0
PALPITATIONS: 0
VOICE CHANGE: 0
BLOOD IN STOOL: 0
NAUSEA: 0
EYE DISCHARGE: 0
VOMITING: 0
NECK SWELLING: 0
WEAKNESS: 0
WOUND: 0
WHEEZING: 0
DIARRHEA: 0
RHINORRHEA: 0
DYSPNEA AT REST: 0
TROUBLE SWALLOWING: 0
MYALGIAS: 0
ARTHRALGIAS: 0
JOINT SWELLING: 0
NUMBNESS: 0
NECK PAIN: 0
POLYDIPSIA: 0
VERTIGO: 0
DYSURIA: 0
FEVER: 0
NECK STIFFNESS: 0
COUGH: 1
LIMITED RANGE OF MOTION: 0
DOUBLE VISION: 0
DIFFICULTY URINATING: 0
LIGHT-HEADEDNESS: 0
NERVOUS/ANXIOUS: 0
CHILLS: 0
PND: 0
CONSTIPATION: 0
HEMOPTYSIS: 0
CONFUSION: 0
ABDOMINAL DISTENTION: 0
NECK MASS: 0
BRUISES/BLEEDS EASILY: 0
ABDOMINAL PAIN: 0
TREMORS: 0
DYSPNEA WITH EXERTION: 0
UNEXPECTED WEIGHT CHANGE: 0
SINUS CONGESTION: 1
VISUAL CHANGE: 0
EXCESSIVE BLEEDING: 0

## 2025-02-07 ASSESSMENT — LIFESTYLE VARIABLES: SMOKING_STATUS: NONSMOKER

## 2025-02-07 NOTE — CPM/PAT H&P
CPM/[facility] Evaluation       Name: Radha Montalvo (Radha Monatlvo)  /Age: 1950/74 y.o.     Visit Type:   In-Person       Chief Complaint: Perioperative Evaluation    HPI HPI: 73 y/o female scheduled for CABG (CORONARY ARTERY BYPASS GRAFT)REPAIR, AORTA, ASCENDING THORACIC Ablation A-Fib  on   DELAYED FOR URI with Zack Wilson secondary to Coronary artery disease of native artery of native heart with stable angina pectoris .   Presents to [facility] today for perioperative risk stratification and optimization. PMHX includes HLD, HTN, Coronary artery disease, Ascending Aorta Dilations, Atrial Fibrillation, DARIO (CPAP), Hyperparathyroidism (s/p parathyroidectomy),   Breast Cancer, Vitamin D Deficiency, Cervical Radiculitis, Neuropathy, Degenerative Disc Disease (cervical), Arthritis, .    #URI - Urgent Care - 25, Mallika reports 1.5 weeks of congestion, cough, sore throat. She recently was seen at Urgent Care and received antibiotics which she will finish later this week. She reports still having congestion and a cough, although notes it has improved and she isnow coughing clear phlegm.  During Urgent Care work up, CXR negative, Flu A/A?COVID negative. Today in [facility], bilateral lung fields clear to auscultation. Romy cavity without sign of exudate/erythema. Surgeon notified via secure chat, recommend 4-6 weeks for URI recovery prior to procedures. Surgeon okay to delay surgery. Patient has been called to notify of this and instructed to continue her blood thinner. She is aware surgeon office with be in contact. Patient was provided spirometer device and notified to continue to use while waiting for upcoming surgery.     PCP: ODALYS Estevez-CNP  Specialists:     CT Surgery - Dwayne Jay MD   Pulmonology - ODALYS Parmar.CNP   Cardiology - Paulo Dougherty MD            Past Medical History:   Diagnosis Date    Abnormal findings on diagnostic imaging of heart  and coronary circulation 10/22/2023    Aneurysm (CMS-[facility]) 10/23/2024    Arthritis of left acromioclavicular joint 02/15/2024    Ascending aorta dilatation (CMS-[facility]) 11/22/2023    Atrial fibrillation (Multi) 10/23/2023    Biceps tendinitis of left shoulder 02/15/2024    Breast cancer (Multi) 2013    Cancer (Multi) 2013    Cataract     s/p extraction    Congenital dilatation of aorta ([facility]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    Edema of both lower extremities 01/13/2024    Essential hypertension 10/06/2023    Hx antineoplastic chemo     Hyperlipidemia 10/06/2023    Hypertension 10/06/2023    Labral tear of long head of biceps tendon, right, initial encounter 02/15/2024    Parathyroid disease (Multi)     s/p paratcyoiectomy)    Peripheral neuropathy     feet, bilatera    Personal history of irradiation     Personal history of other diseases of the musculoskeletal system and connective tissue 10/06/2023    History of arthritis    Primary osteoarthritis of both shoulders 02/15/2024    Shortness of breath 10/23/2023    Sleep apnea     CPAP       Past Surgical History:   Procedure Laterality Date    BI MAMMO GUIDED BREAST LEFT LOCALIZATION Left 02/25/2013    BI MAMMO GUIDED LOCALIZATION BREAST LEFT [facility] CLINICAL LEGACY    BREAST BIOPSY      BREAST LUMPECTOMY Left 2013    CARDIAC CATHETERIZATION N/A 01/27/2025    Procedure: Left Heart Cath with Coronary Angiography and LV;  Surgeon: Paulo Dougherty MD;  Location: [facility] Cardiac Cath Lab;  Service: Cardiovascular;  Laterality: N/A;  no prior auth needed    CATARACT EXTRACTION Bilateral     HYSTERECTOMY  2000    MAMMOGRAM DIAGNOSTIC BI Bilateral 09/16/2024    OTHER SURGICAL HISTORY  04/27/2022    Lumpectomy    US COMPLETE BREAST Left 09/16/2024       Patient  reports being sexually active and has had partner(s) who are male. She reports using the following method of birth control/protection: Post-menopausal.    Family History   Problem Relation Name Age of Onset    Stroke Mother Christi Nair      Arthritis Mother Christi Nair     COPD Mother Christi Nair     Stroke Father Liborio Nair     Aneurysm Father Liborio Nair     Hypertension Father Liborio Nair        No Known Allergies    Prior to Admission medications    Medication Sig Start Date End Date Taking? Authorizing Provider   acyclovir (Zovirax) 800 mg tablet TAKE 1 TABLET BY MOUTH EVERY DAY 10/1/24   Vishnu Ordoñez,    amoxicillin-pot clavulanate (Augmentin) 875-125 mg tablet Take 1 tablet (875 mg) by mouth 2 times a day for 7 days. 2/2/25 2/9/25  Codey Florence PA-C   apixaban (Eliquis) 5 mg tablet Take 1 tablet (5 mg) by mouth 2 times a day. 1/7/25 1/2/26  Paulo Dougherty MD   atorvastatin (Lipitor) 10 mg tablet TAKE 1 TABLET BY MOUTH EVERY DAY 1/13/25   Vishnu Ordoñez DO   bisoprolol (Zebeta) 10 mg tablet Take 1 tablet (10 mg) by mouth once daily. 1/7/25 1/7/26  Paulo Dougherty MD   cholecalciferol (VITAMIN D-3) 10 mcg (400 unit) tablet Take 1 capsule by mouth once daily.    Historical Provider, MD   dilTIAZem ER (Tiazac) 240 mg 24 hr capsule Take 1 capsule (240 mg) by mouth once daily. 1/7/25 1/7/26  Paulo Dougherty MD   nystatin (Mycostatin) 100,000 unit/mL suspension Take 5 mL (500,000 Units) by mouth 4 times a day for 10 days. Swish for several minutes then swallow. 1/25/25 2/4/25  Annette Marie PA-C   omega 3-dha-epa-fish oil (Fish OiL) 1,200 (144-216) mg capsule 1.5 capsules Orally    Historical Provider, MD   vit B complex no.12/niacin,B3, (VITAMIN B COMPLEX NO.12-NIACIN ORAL) Take by mouth.  Vitamin B 12 TABS    Historical Provider, MD LEMUS ROS:   Constitutional:    no fever   no chills   no unexpected weight change  Neuro/Psych:    no numbness   no weakness   no light-headedness   no tremors   no confusion   not nervous/anxious   no self-injury   no suicidal ideation  Eyes:    no discharge   no vision loss   no diplopia   no visual disturbance   no corrective lenses  Ears:    no ear pain   no hearing loss   no vertigo   no  tinnitus   no hearing aides  Nose:    no nasal discharge   sinus congestion   no epistaxis  Mouth:    no dental issues   no mouth pain   no oral bleeding   no mouth lesions  Throat:    no throat pain   no dysphagia   no voice change  Neck:    no neck pain   neck swelling   no neck stiffness   no neck masses  Cardio:    no chest pain   no palpitations   no peripheral edema   no dyspnea   no ROLAND   no paroxysmal nocturnal dyspnea  Respiratory:    cough (phlegm occasional, clear, improving since 3 weeks ago.)   no wheezing   no hemoptysis   no shortness of breath  Endocrine:    no cold intolerance   no heat intolerance   no polydipsia  GI:    no abdominal distention   no abdominal pain   no constipation   no diarrhea   no nausea   no vomiting   no blood in stool  :    no difficulty urinating   no dysuria   no oliguria   no polyuria  Musculoskeletal:    no arthralgias   no myalgias   no decreased ROM   no swelling  Hematologic:    does not bruise/bleed easily   no excessive bleeding   no history of blood transfusion   no blood clots  Skin:   no skin changes   no sores/wound   no rash      Physical Exam  Vitals reviewed. Physical exam within normal limits.   Constitutional:       General: She is not in acute distress.     Appearance: Normal appearance. She is normal weight. She is not ill-appearing, toxic-appearing or diaphoretic.   HENT:      Head: Normocephalic.      Nose: Nose normal. No congestion or rhinorrhea.      Mouth/Throat:      Mouth: Mucous membranes are moist.      Pharynx: Oropharynx is clear. No oropharyngeal exudate or posterior oropharyngeal erythema.   Eyes:      General: No scleral icterus.        Right eye: No discharge.         Left eye: No discharge.      Conjunctiva/sclera: Conjunctivae normal.   Neck:      Vascular: No carotid bruit.   Cardiovascular:      Rate and Rhythm: Normal rate and regular rhythm.      Pulses: Normal pulses.      Heart sounds: Normal heart sounds. No murmur heard.     No  "friction rub. No gallop.   Pulmonary:      Effort: Pulmonary effort is normal. No respiratory distress.      Breath sounds: Normal breath sounds. No stridor. No wheezing, rhonchi or rales.   Chest:      Chest wall: No tenderness.   Musculoskeletal:         General: No swelling or tenderness. Normal range of motion.      Cervical back: Normal range of motion and neck supple. No rigidity or tenderness.   Skin:     General: Skin is warm and dry.   Neurological:      General: No focal deficit present.      Mental Status: She is alert.      Motor: No weakness.   Psychiatric:         Mood and Affect: Mood normal.         Behavior: Behavior normal.         Thought Content: Thought content normal.         Judgment: Judgment normal.          PAT AIRWAY:   Airway:     Mallampati::  II    TM distance::  >3 FB    Neck ROM::  Full        Visit Vitals  /72   Pulse 59   Temp 36.8 °C (98.3 °F) (Oral)   Ht 1.702 m (5' 7\")   Wt 71.2 kg (157 lb)   LMP  (LMP Unknown)   SpO2 96%   BMI 24.59 kg/m²   OB Status Hysterectomy   Smoking Status Never   BSA 1.83 m²       DASI Risk Score      Flowsheet Row Pre-Admission Testing from 2/7/2025 in [facility]   Can you take care of yourself (eat, dress, bathe, or use toilet)?  2.75 filed at 02/07/2025 1030   Can you walk indoors, such as around your house? 1.75 filed at 02/07/2025 1030   Can you walk a block or two on level ground?  0 filed at 02/07/2025 1030   Can you climb a flight of stairs or walk up a hill? 0 filed at 02/07/2025 1030   Can you run a short distance? 0 filed at 02/07/2025 1030   Can you do light work around the house like dusting or washing dishes? 2.7 filed at 02/07/2025 1030   Can you do moderate work around the house like vacuuming, sweeping floors or carrying groceries? 3.5 filed at 02/07/2025 1030   Can you do heavy work around the house like scrubbing floors or lifting and moving heavy furniture?  0 filed at 02/07/2025 1030   Can you do yard work like " raking leaves, weeding or pushing a mower? 0 filed at 02/07/2025 1030   Can you have sexual relations? 5.25 filed at 02/07/2025 1030   Can you participate in moderate recreational activities like golf, bowling, dancing, doubles tennis or throwing a baseball or football? 0 filed at 02/07/2025 1030   Can you participate in strenous sports like swimming, singles tennis, football, basketball, or skiing? 0 filed at 02/07/2025 1030   DASI SCORE 15.95 filed at 02/07/2025 1030   METS Score (Will be calculated only when all the questions are answered) 4.7 filed at 02/07/2025 1030          Caprini DVT Assessment      Flowsheet Row Pre-Admission Testing from 2/7/2025 in [facility] Admission (Discharged) from 1/27/2025 in [facility] with Paulo Dougherty MD   DVT Score (IF A SCORE IS NOT CALCULATING, MUST SELECT A BMI TO COMPLETE) 10 filed at 02/07/2025 1622 3 filed at 01/27/2025 1024   Medical Factors Present cancer, chemotherapy, or previous malignancy filed at 02/07/2025 1622 --   Surgical Factors Major surgery planned, lasting over 3 hours filed at 02/07/2025 1622 --   BMI (BMI MUST BE CHOSEN) 30 or less filed at 02/07/2025 1622 30 or less filed at 01/27/2025 1024          Modified Frailty Index      Flowsheet Row Pre-Admission Testing from 2/7/2025 in [facility]   Non-independent functional status (problems with dressing, bathing, personal grooming, or cooking) 0 filed at 02/07/2025 1625   History of diabetes mellitus  0 filed at 02/07/2025 1625   History of COPD 0 filed at 02/07/2025 1625   History of CHF No filed at 02/07/2025 1625   History of MI 0 filed at 02/07/2025 1625   History of Percutaneous Coronary Intervention, Cardiac Surgery, or Angina No filed at 02/07/2025 1625   Hypertension requiring the use of medication  0.0909 filed at 02/07/2025 1625   Peripheral vascular disease 0 filed at 02/07/2025 5747   Impaired sensorium (cognitive impairement or loss, clouding,  or delirium) 0 filed at 02/07/2025 1625   TIA or CVA withouy residual deficit 0 filed at 02/07/2025 1625   Cerebrovascular accident with deficit 0 filed at 02/07/2025 1625   Modified Frailty Index Calculator .0909 filed at 02/07/2025 1625          CHADS2 Stroke Risk  Current as of about an hour ago        2.8% 3 to 100%: High Risk   2 to < 3%: Medium Risk   0 to < 2%: Low Risk     Last Change: N/A          This score determines the patient's risk of having a stroke if the patient has atrial fibrillation.          Points Metrics   0 Has Congestive Heart Failure:  No     Patients with congestive heart failure get 1 point.    Current as of about an hour ago   1 Has Hypertension:  Yes     Patients with hypertension get 1 point.    Current as of about an hour ago   0 Age:  74     Patients who are 75 years of age or older get 1 point.    Current as of about an hour ago   0 Has Diabetes Excluding Gestational Diabetes:  No     Patients with diabetes get 1 point.    Current as of about an hour ago   0 Had Stroke:  No  Had TIA:  No  Had Thromboembolism:  No     Patients who have had a stroke, TIA, or thromboembolism get 2 points.    Current as of about an hour ago             Revised Cardiac Risk Index      Flowsheet Row Pre-Admission Testing from 2/7/2025 in [facility]   High-Risk Surgery (Intraperitoneal, Intrathoracic,Suprainguinal vascular) 1 filed at 02/07/2025 1614   History of ischemic heart disease (History of MI, History of positive exercuse test, Current chest paint considered due to myocardial ischemia, Use of nitrate therapy, ECG with pathological Q Waves) 1 filed at 02/07/2025 1614   History of congestive heart failure (pulmonary edemia, bilateral rales or S3 gallop, Paroxysmal nocturnal dyspnea, CXR showing pulmonary vascular redistribution) 0 filed at 02/07/2025 1614   History of cerebrovascular disease (Prior TIA or stroke) 0 filed at 02/07/2025 1614   Pre-operative insulin treatment 0 filed  at 02/07/2025 1614   Pre-operative creatinine>2 mg/dl 0 filed at 02/07/2025 1614   Revised Cardiac Risk Calculator 2 filed at 02/07/2025 1614          Apfel Simplified Score      Flowsheet Row Pre-Admission Testing from 2/7/2025 in [facility]   Smoking status 1 filed at 02/07/2025 1623   History of motion sickness or PONV  0 filed at 02/07/2025 1623   Use of postoperative opioids 1 filed at 02/07/2025 1623   Gender - Female 1=Yes filed at 02/07/2025 1623   Apfel Simplified Score Calculator 3 filed at 02/07/2025 1623          Risk Analysis Index Results This Encounter    No data found in the last 10 encounters.       Stop Bang Score      Flowsheet Row Pre-Admission Testing from 2/7/2025 in [facility]   Do you snore loudly? 1 filed at 02/07/2025 1029   Do you often feel tired or fatigued after your sleep? 1 filed at 02/07/2025 1029   Has anyone ever observed you stop breathing in your sleep? 1 filed at 02/07/2025 1029   Do you have or are you being treated for high blood pressure? 1 filed at 02/07/2025 1029   Recent BMI (Calculated) 24.7 filed at 02/07/2025 1029   Is BMI greater than 35 kg/m2? 0=No filed at 02/07/2025 1029   Age older than 50 years old? 1=Yes filed at 02/07/2025 1029   Is your neck circumference greater than 17 inches (Male) or 16 inches (Female)? 0 filed at 02/07/2025 1029   Gender - Male 0=No filed at 02/07/2025 1029   STOP-BANG Total Score 5 filed at 02/07/2025 1029          Prodigy: High Risk  Total Score: 12              Prodigy Age Score           ARISCAT Score for Postoperative Pulmonary Complications      Flowsheet Row Pre-Admission Testing from 2/7/2025 in [facility]   Age Calculated Score 3 filed at 02/07/2025 1615   Preoperative SpO2 0 filed at 02/07/2025 1615   Respiratory infection in the last month Either upper or lower (i.e., URI, bronchitis, pneumonia), with fever and antibiotic treatment 0 filed at 02/07/2025 1615   Preoperative  anemia (Hgb less than 10 g/dl) 0 filed at 02/07/2025 1615   Surgical incision  24 filed at 02/07/2025 1615   Duration of surgery  23 filed at 02/07/2025 1615   Emergency Procedure  0 filed at 02/07/2025 1615   ARISCAT Total Score  50 filed at 02/07/2025 1615          Amrita Perioperative Risk for Myocardial Infarction or Cardiac Arrest (TACOS)    No data to display           Assessment and Plan:   Anesthesia/Airway:  No anesthesia complications      Neuro:    No neurologic diagnoses, however, the patient is at an increased risk for post operative delirium secondary to age >/= 65 and type and duration of surgery.  Preoperative brain exercise educational handout provided to patient.    The patient is at an increased risk for perioperative stroke secondary to a-fib, preoperative interrupton of antithrombotic, increased age, HTN, HLD, female sex , cardiac surgery, general anesthesia, hypercoagulable state, and op time >2.5 hours.       HEENT/Airway:    No HEENT diagnosis or significant findings on chart review or clinical presentation and evaluation. No further preoperative testing/intervention indicated at this time.      Cardiovascular:    #HLD, HTN, Coronary artery disease, Ascending Aorta Dilations, Atrial Fibrillation-  medicated with Eliquis (hold 3 days prior), atorvastatin (continue) bisoprolol (continue) diltiazem (continue) fish oil (last dose 2/11)and follows with Cardiology.  Patient is seeking surgical intervention for coronary artery disease, expected CABG procedure to occur  with CT surgery. BP today is 119/72 and denies cardiovascular symptoms. Physical exam is benign.  Echo has been scheduled at request of surgeon office.  Patient is scheduled 2/12    METS are 4.7    RCRI  2 which is 10.1% 30 day risk of MACE (risk for cardiac death, nonfatal myocardial infarction, and nonfactal cardiac arrest    TACOS score which indicates a   0.9 % risk of intraoperative or 30-day postoperative MACE    EKG 2/7/25  Atrial  fibrillation rate 92bpm  Nonspecific ST abnormality   Abnormal ECG  (Pending Cardiology review, hx of paroxsymal a fib)    Carotid US Duplex 2/4/25  IMPRESSION:  Utilizing the peak systolic velocities, the estimated degree of  stenosis within the internal carotid arteries is less than 50%  Bilaterally    CXR 2/2/25  IMPRESSION:  No acute abnormalities and no significant change since the prior exam    [facility] 1/27/25  CONCLUSIONS:   1. After informed consent obtained with the conscious sedation. Patient had diagnostic catheterization done via right femoral artery using 6 Togolese sheath. Finding as follow.      Left main appears to be fair size has no critical lesion.      Left anterior descending artery has ostial about 50% plaque seen. Mid LAD also has another long diffuse 90% lesion seen. Involved small diagonal branch.      Left circumflex artery appears to be codominant vessel has proximal area about 90% focal eccentric lesion seen. CAROL ANN flow 2 seen both distal LAD as well as a left circumflex artery.      Right coronary appears to be large caliber vessel and codominant vessel has no critical lesion.      Left ventriculogram done show ejection fraction by 50%. Aortic pressure was about 138/80 and LVEDP is about 10 mm per mercury.      Patient with essentially critical two-vessel coronary disease including an LAD as well as a left circumflex artery. Probably candidate for CABG.      Sheath was removed and Angio-Seal applied to right groin. Patient left the Cath Lab in stable condition.      CT surgery was consulted.    Echo 12/5/23  CONCLUSIONS:   1. Left ventricular systolic function is normal with a 55-60% estimated ejection fraction.   2. Trace mitral valve regurgitation.   3. Trace tricuspid regurgitation is visualized.    CT Cardiac Scoring 9/26/23  Impression  1. Coronary artery calcium score of 2846.2*.  2. Patchy bibasilar infiltrates and/or atelectasis.  3. Aneurysmal dilatation ascending thoracic aorta up to  4.5 cm.  4. Focal irregular nodular density posteromedial left breast may  relate to postprocedural changes. Clinical and mammographic follow-up  advised.  5. Additional findings as above.      Pulmonary:    #URI - Urgent Care - 25, Patient reports 1.5 weeks of congestion, cough, sore throat. She recently was seen at Urgent Care and received antibiotics which she will finish later this week. She reports still having congestion and a cough, although notes it has improved and she is now coughing clear phlegm.  During Urgent Care work up, CXR negative, Flu A/A?COVID negative. Today in [facility], bilateral lung fields clear to auscultation. Romy cavity without sign of exudate/erythema. Surgeon notified via secure chat, recommend 4-6 weeks for URI recovery prior to procedures. Surgeon okay to delay surgery. Patient has been called to notify of this and instructed to continue her blood thinner. She is aware surgeon office with be in contact. Patient was provided spirometer device and notified to continue to use while waiting for upcoming surgery.   #DARIO (CPAP)-patient is compliant with CPAP and follows with pulmonology for management.  Patient has been instructed to bring CPAP on day of surgery.  Preoperative deep breathing educational handout provided to patient.    Patient is at increased risk of perioperative complications secondary to  age > 60, site of surgery, major surgery, duration of surgery > 2 hours, types of anesthetic    ARISCAT:    50  points which is a high (42.1%) risk of in-hospital post-op pulmonary complications     PRODIGY:  17  points which is a high risk of post op opioid induced respiratory depression episodes    STOP BAN   points which is a high risk for moderate to severe DARIO    Pumonary toilet education discussed, patient also provided deep breathing exercises and incentive spirometry educational handout      Renal:   No renal diagnosis, however patient is at increase risk for perioperative  renal complications secondary to  Age equal to or greater than 56, HTN     DPS if cardiac surgery  Pt at Moderate risk for perioperative DEMETRIS based on Dynamic Predictive Scoring Tool for Perioperative DEMETRIS      Endocrine:   #Hyperparathyroidism -  (s/p parathyroidectomy), Mallika reports no complications/concern since. PCP monitors. Last TSH was 8/15/24 and wnl. No further testing or intervention is indicated at this time.       Hematologic/Onc:      #Breast Cancer- Patient reports history of lumpectomy and follows with PCP for monitoring, occurred 13 years prior. No further perioperative interventions.     Preoperative DVT educational handout provided to patient.  Caprini Score:  10  points which is a highest risk of perioperative VTE      Gastrointestinal:   No GI diagnosis or significant findings on chart review or clinical presentation and evaluation. However please refer to below risk factor scores.     EAT-10 score of    0  : self-perceived oropharyngeal dysphagia scale (0-40)     Apfel: 3 points 61% risk for post operative N/V      Infectious disease:     No infectious diagnosis or significant findings on chart review or clinical presentation and evaluation.   Prescription provided for CHG body wash and dental rinse. CHG use instructions reviewed and provided to patient.  Staph screen collected      Musculoskeletal:    #Vitamin D Deficiency, Cervical Radiculitis, Neuropathy, Degenerative Disc Disease (cervical), Arthritis, -       Other:     Hold all vitamins and supplements 7 days prior to surgery  Tylenol okay to continue, please hold Aleve/naproxen/ibuprofen (NSAIDs) for 7 days prior to surgery        Labs ordered  cbc, comp, coag, t/s, and MSSA/MRSA culture      Medication instructions and NPO guidelines reviewed with the patient.  All questions or concerns discussed and addressed.      Concetta Lind PA-C       4.4 - 11.3 x10*3/uL    nRBC 0.0 0.0 - 0.0 /100 WBCs    RBC 5.00 4.00 - 5.20 x10*6/uL    Hemoglobin 17.6 (H) 12.0 - 16.0 g/dL    Hematocrit 50.8 (H) 36.0 - 46.0 %     (H) 80 - 100 fL    MCH 35.2 (H) 26.0 - 34.0 pg    MCHC 34.6 32.0 - 36.0 g/dL    RDW 12.3 11.5 - 14.5 %    Platelets 221 150 - 450 x10*3/uL   Basic Metabolic Panel    Collection Time: 01/27/25  8:43 AM   Result Value Ref Range    Glucose 113 (H) 74 - 99 mg/dL    Sodium 139 136 - 145 mmol/L    Potassium 4.6 3.5 - 5.3 mmol/L    Chloride 107 98 - 107 mmol/L    Bicarbonate 25 21 - 32 mmol/L    Anion Gap 12 10 - 20 mmol/L    Urea Nitrogen 23 6 - 23 mg/dL    Creatinine 0.88 0.50 - 1.05 mg/dL    eGFR 69 >60 mL/min/1.73m*2    Calcium 8.6 8.6 - 10.3 mg/dL   ECG 12 Lead    Collection Time: 01/27/25  9:30 AM   Result Value Ref Range    Ventricular Rate 83 BPM    Atrial Rate 41 BPM    QRS Duration 72 ms    QT Interval 372 ms    QTC Calculation(Bazett) 437 ms    R Axis 6 degrees    T Axis 24 degrees    QRS Count 14 beats    Q Onset 229 ms    T Offset 415 ms    QTC Fredericia 414 ms   POCT Influenza A/B manually resulted    Collection Time: 01/30/25  9:33 AM   Result Value Ref Range    POC Rapid Influenza A Negative Negative    POC Rapid Influenza B Negative Negative   POCT Covid-19 Rapid Antigen    Collection Time: 01/30/25  9:33 AM   Result Value Ref Range    POC FRANSICO-COV-2 AG  Presumptive negative test for SARS-CoV-2 (no antigen detected)     Presumptive negative test for SARS-CoV-2 (no antigen detected)   Coagulation Screen    Collection Time: 02/07/25 11:14 AM   Result Value Ref Range    Protime 12.0 9.8 - 12.8 seconds    INR 1.1 0.9 - 1.1    aPTT 31 27 - 38 seconds   Staphylococcus aureus/MRSA colonization, Culture    Collection Time: 02/07/25 11:14 AM    Specimen: Nares/Axilla/Groin; Swab   Result Value Ref Range    Staph/MRSA Screen Culture No Staphylococcus aureus isolated    CBC and Auto Differential    Collection Time: 02/07/25 11:14 AM   Result Value  Ref Range    WBC 11.4 (H) 4.4 - 11.3 x10*3/uL    nRBC 0.0 0.0 - 0.0 /100 WBCs    RBC 4.75 4.00 - 5.20 x10*6/uL    Hemoglobin 16.4 (H) 12.0 - 16.0 g/dL    Hematocrit 48.9 (H) 36.0 - 46.0 %     (H) 80 - 100 fL    MCH 34.5 (H) 26.0 - 34.0 pg    MCHC 33.5 32.0 - 36.0 g/dL    RDW 12.5 11.5 - 14.5 %    Platelets 214 150 - 450 x10*3/uL    Neutrophils % 81.9 40.0 - 80.0 %    Immature Granulocytes %, Automated 0.4 0.0 - 0.9 %    Lymphocytes % 10.5 13.0 - 44.0 %    Monocytes % 5.8 2.0 - 10.0 %    Eosinophils % 1.1 0.0 - 6.0 %    Basophils % 0.3 0.0 - 2.0 %    Neutrophils Absolute 9.32 (H) 1.60 - 5.50 x10*3/uL    Immature Granulocytes Absolute, Automated 0.05 0.00 - 0.50 x10*3/uL    Lymphocytes Absolute 1.19 0.80 - 3.00 x10*3/uL    Monocytes Absolute 0.66 0.05 - 0.80 x10*3/uL    Eosinophils Absolute 0.13 0.00 - 0.40 x10*3/uL    Basophils Absolute 0.03 0.00 - 0.10 x10*3/uL   Comprehensive Metabolic Panel    Collection Time: 02/07/25 11:14 AM   Result Value Ref Range    Glucose 107 (H) 74 - 99 mg/dL    Sodium 137 136 - 145 mmol/L    Potassium 4.2 3.5 - 5.3 mmol/L    Chloride 100 98 - 107 mmol/L    Bicarbonate 26 21 - 32 mmol/L    Anion Gap 15 10 - 20 mmol/L    Urea Nitrogen 25 (H) 6 - 23 mg/dL    Creatinine 0.69 0.50 - 1.05 mg/dL    eGFR >90 >60 mL/min/1.73m*2    Calcium 9.6 8.6 - 10.6 mg/dL    Albumin 4.3 3.4 - 5.0 g/dL    Alkaline Phosphatase 88 33 - 136 U/L    Total Protein 6.4 6.4 - 8.2 g/dL    AST 22 9 - 39 U/L    Bilirubin, Total 0.8 0.0 - 1.2 mg/dL    ALT 49 (H) 7 - 45 U/L   Hemoglobin A1c    Collection Time: 02/07/25 11:14 AM   Result Value Ref Range    Hemoglobin A1C 6.1 (H) See comment %    Estimated Average Glucose 128 Not Established mg/dL   Urinalysis with Reflex Microscopic    Collection Time: 02/07/25 11:14 AM   Result Value Ref Range    Color, Urine Light-Yellow Light-Yellow, Yellow, Dark-Yellow    Appearance, Urine Clear Clear    Specific Gravity, Urine 1.019 1.005 - 1.035    pH, Urine 6.5 5.0, 5.5, 6.0,  6.5, 7.0, 7.5, 8.0    Protein, Urine NEGATIVE NEGATIVE, 10 (TRACE), 20 (TRACE) mg/dL    Glucose, Urine Normal Normal mg/dL    Blood, Urine NEGATIVE NEGATIVE mg/dL    Ketones, Urine NEGATIVE NEGATIVE mg/dL    Bilirubin, Urine NEGATIVE NEGATIVE mg/dL    Urobilinogen, Urine Normal Normal mg/dL    Nitrite, Urine NEGATIVE NEGATIVE    Leukocyte Esterase, Urine NEGATIVE NEGATIVE   Transthoracic Echo (TTE) Complete    Collection Time: 02/12/25 10:37 AM   Result Value Ref Range    AV pk bailey 0.99 m/s    LVOT diam 2.00 cm    AV mn grad 2 mmHg    Tricuspid annular plane systolic excursion 2.2 cm    LV Biplane EF 58 %    LA vol index A/L 24.7 ml/m2    LV EF 58 %    RV free wall pk S' 9.79 cm/s    LVIDd 3.68 cm    AV pk grad 4 mmHg    Aortic Valve Area by Continuity of VTI 2.63 cm2    Aortic Valve Area by Continuity of Peak Velocity 2.69 cm2    LV A4C EF 59.0            Medication instructions and NPO guidelines reviewed with the patient.  All questions or concerns discussed and addressed.      Concetta Lind PA-C

## 2025-02-07 NOTE — PREPROCEDURE INSTRUCTIONS
"      Thank you for visiting [facility] ([facility]) today for your pre-procedure evaluation, you were seen by     Concetta Lind PA-C   Department of Anesthesiology and Perioperative Medicine  Main phone [phone]  Fax [phone]      This summary includes instructions and information to aid you during your perioperative period.  Please read carefully. If you have any questions about your visit today, please call the number listed above.  If you become ill or have any changes to your health before your surgery, please contact your primary care provider and alert your surgeon.    Preparing for Surgery       Preoperative Fasting Guidelines    Why must I stop eating and drinking near surgery time?  With sedation, food or liquid in your stomach can enter your lungs causing serious complications  Food can increase nausea and vomiting  When do I need to stop eating and drinking before my surgery?  Do not eat any food after midnight the night before your surgery/procedure.  You may have up to 13.5 ounces of clear liquid until TWO hours before your instructed arrival time to the hospital.  This includes water, black tea/coffee, (no milk or cream) apple juice, and electrolyte drinks (Gatorade)  You may chew gum until TWO hours before your surgery/procedure    Tobacco and Alcohol;  Do not drink alcohol or smoke within 24 hours of surgery.  It is best to quit smoking for as long as possible before any surgery or procedure.       Other Instructions  Why did I have my nose, under my arms, and groin swabbed? The purpose of the swab is to identify Staphylococcus aureus inside your nose or on your skin.  The swab was sent to the laboratory for culture.  A positive swab/culture for Staphylococcus aureus is called colonization or carriage.   What is Staphylococcus aureus? Staphylococcus aureus, also known as \"staph\", is a germ found on the skin or in the nose of healthy people.  Sometimes Staphylococcus " aureus can get into the body and cause an infection.  This can be minor (such as pimples, boils, or other skin problems).  It might also be serious (such as a blood infection, pneumonia, or a surgical site infection). What is Staphylococcus aureus colonization or carriage? Colonization or carriage means that a person has the germ but is not sick from it.  These bacteria can be spread on the hands or when breathing or sneezing. How is Staphylococcus aureus spread? It is most often spread by close contact with a person or item that carries it. What happens if my culture is positive for Staphylococcus aureus? Your doctor/medical team will use this information to guide any antibiotic treatment which may be necessary.  Regardless of the culture results, we will clean the inside of your nose with a betadine swab just before you have your surgery. Will I get an infection if I have Staphylococcus aureus in my nose or on my skin? Anyone can get an infection with Staphylococcus aureus.  However, the best way to reduce your risk of infection is to follow the instructions provided to you for the use of your CHG soap and dental rinse.  , Body Wash; What is a home preoperative antibacterial shower? This shower is a way of cleaning the skin with a germ-killing solution before surgery.  The solution contains chlorhexidine, commonly known as CHG.  CHG is a skin cleanser with germ-killing ability.  Let your doctor know if you are allergic to chlorhexidine. Why do I need to take a preoperative antibacterial shower? Skin is not sterile.  It is best to try to make your skin as free of germs as possible before surgery.  Proper cleansing with a germ-killing soap before surgery can lower the number of germs on your skin.  This helps to reduce the risk of infection at the surgical site.  Following the instructions listed below will help you prepare your skin for surgery.   How do I use the solution? Steps:  Begin using your CHG soap 5 days  before your scheduled surgery on ___________.   First, wash and rinse your hair using the CHG soap. Keep CHG soap away from ear canals and eyes.  Rinse completely, do not condition.  Hair extensions should be removed. , Oral/Dental Rinse: What is oral/dental rinse?  It is a mouthwash. It is a way of cleaning the mouth with a germ-killing solution before your surgery.  The solution contains chlorhexidine, commonly known as CHG. It is used inside the mouth to kill a bacteria known as Staphylococcus aureus.  Let your doctor know if you are allergic to Chlorhexidine. Why do I need to use CHG oral/dental rinse? The CHG oral/dental rinse helps to kill a bacteria in your mouth known as Staphylococcus aureus.  This reduces the risk of infection at the surgical site.  Using your CHG oral/dental rinse STEPS: Use your CHG oral/dental rinse after you brush your teeth the night before (at bedtime) and the morning of your surgery.  Follow all directions on your prescription label.  Use the cap on the container to measure 15 ml.  Swish (gargle if you can) the mouthwash in your mouth for at least 30 seconds, (do not swallow) and spit out.  After you use your CHG rinse, do not rinse your mouth with water, drink or eat.  Please refer to the prescription label for the appropriate time to resume oral intake What side effects might I have using the CHG oral/dental rinse? CHG rinse will stick to plaque on the teeth.  Brush and floss just before use.  Teeth brushing will help avoid staining of plaque during use.       The Week before Surgery        Seven days before Surgery  Check your CPM medication instructions  Do the exercises provided to you by CPM   Arrange for a responsible, adult licensed  to take you home after surgery and stay with you for 24 hours.  You will not be permitted to drive yourself home if you have received any anesthetic/sedation  Five days before surgery  Check your CPM medication instructions  Do the  exercises provided to you by CPM   Start using Chlorhexidene (CHG) body wash if prescribed (Continue till day of surgery)      The Day before Surgery       Check your CPM medication and all other CPM instructions including when to stop eating and drinking  You will be called with your arrival time for surgery in the late afternoon.  If you do not receive a call please reach out to your surgeon's office.  Do not smoke or drink 24 hours before surgery  Prepare items to bring with you to the hospital  Shower with your chlorhexidine wash if prescribed  Brush your teeth and use your chlorhexidine dental rinse if prescribed    The Day of Surgery       Check your CPM medication instructions  Ensure you follow the instructions for when to stop eating and drinking  Shower, if prescribed use CHG.  Do not apply any lotions, creams, moisturizers, perfume or deodorant  Brush your teeth and use your CHG dental rinse if prescribed  Wear loose comfortable clothing  Avoid make-up  Remove  jewelry and piercings, consider professional piercing removal with a plastic spacer if needed  Bring photo ID and Insurance card  Bring an accurate medication list that includes medication dose, frequency and allergies  Bring a copy of your advanced directives (will, health care power of )  Bring any devices and controllers as well as medical devices you have been provided with for surgery (CPAP, slings, braces, etc.)  Dentures, eyeglasses, and contacts will be removed before surgery, please bring cases for contacts or glasses    Preoperative Deep Breathing Exercises    Why it is important to do deep breathing exercises before my surgery?  Deep breathing exercises strengthen your breathing muscles.  This helps you to recover after your surgery and decreases the chance of breathing complications.    How are the deep breathing exercises done?  Sit straight with your back supported.  Breathe in deeply and slowly through your nose. Your lower  rib cage should expand and your abdomen may move forward.  Hold that breath for 3 to 5 seconds.  Breathe out through pursed lips, slowly and completely.  Rest and repeat 10 times every hour while awake.  Rest longer if you become dizzy or lightheaded.      Preoperative Brain Exercises    What are brain exercises?  A brain exercise is any activity that engages your thinking (cognitive) skills.    What types of activities are considered brain exercises?  Jigsaw puzzles, crossword puzzles, word jumble, memory games, word search, and many more.  Many can be found free online or on your phone via a mobile farnaz.    Why should I do brain exercises before my surgery?  More recent research has shown brain exercise before surgery can lower the risk of postoperative delirium (confusion) which can be especially important for older adults.  Patients who did brain exercises for 5 to 10 hours the days before surgery, cut their risk of postoperative delirium in half up to 1 week after surgery.    Sit-to-Stand Exercise    What is the sit-to-stand exercise?  The sit-to-stand exercise strengthens the muscles of your lower body and muscles in the center of your body (core muscles for stability) helping to maintain and improve your strength and mobility.  How do I do the sit-to-stand exercise?  The goal is to do this exercise without using your arms or hands.  If this is too difficult, use your arms and hands or a chair with armrests to help slowly push yourself to the standing position and lower yourself back to the sitting position. As the movement becomes easier use your arms and hands less.    Steps to the sit-to-stand exercise  Sit up tall in a sturdy chair, knees bent, feet flat on the floor shoulder-width apart.  Shift your hips/pelvis forward in the chair to correctly position yourself for the next movement.  Lean forward at your hips.  Stand up straight putting equal weight on both feet.  Check to be sure you are properly  aligned with the chair, in a slow controlled movement sit back down.  Repeat this exercise 10-15 times.  If needed you can do it fewer times until your strength improves.  Rest for 1 minute.  Do another 10-15 sit-to-stand exercises.  Try to do this in the morning and evening.       Simple things you can do to help prevent blood clots     Blood clots are blockages that can form in the body's veins. When a blood clot forms in your deep veins, it may be called a deep vein thrombosis, or DVT for short. Blood clots can happen in any part of the body where blood flows, but they are most common in the arms and legs. If a piece of a blood clot breaks free and travels to the lungs, it is called a pulmonary embolus (PE). A PE can be a very serious problem.      Being in the hospital or having surgery can raise your chances of getting a blood clot because you may not be well enough to move around as much as you normally do.         Ways you can help prevent blood clots in the hospital       Wearing SCDs  SCDs stands for Sequential Compression Devices.   SCDs are special sleeves that wrap around your legs. They attach to a pump that fills them with air to gently squeeze your legs every few minutes.  This helps return the blood in your legs to your heart.   SCDs should only be taken off when walking or bathing. SCDs may not be comfortable, but they can help save your life.              Pump SCD leg sleeves  Wearing compression stockings - if your doctor orders them. These special snug-fitting stockings gently squeeze your legs to help blood flow.       Walking. Walking helps move the blood in your legs.   If your doctor says it is ok, try walking the halls at least   5 times a day. Ask us to help you get up, so you don't fall.      Taking any blood-thinning medicines your doctor orders.              Ways you can help prevent blood clots at home         Wearing compression stockings - if your doctor orders them.   Walking - to  help move the blood in your legs.    Taking any blood-thinning medicines your doctor orders.      Signs of a blood clot or PE    Tell your doctor or nurse right away if you have any of the problems listed below.         If you are at home, seek medical care right away. Call 911 for chest pain or problems breathing.            Signs of a blood clot (DVT) - such as pain, swelling, redness, or warmth in your arm or legs.  Signs of a pulmonary embolism (PE) - such as chest pain or feeling short of breath

## 2025-02-09 LAB — STAPHYLOCOCCUS SPEC CULT: NORMAL

## 2025-02-11 ENCOUNTER — TELEPHONE (OUTPATIENT)
Dept: CARDIAC SURGERY | Facility: CLINIC | Age: 75
End: 2025-02-11
Payer: MEDICARE

## 2025-02-11 NOTE — TELEPHONE ENCOUNTER
Spoke to patient regarding cough and post nasal drip. Reviewed with Dr Lacy. Patient is ok for surgery on 2/14/25 at [facility] [place].  She will call if she feels ill.

## 2025-02-12 ENCOUNTER — HOSPITAL ENCOUNTER (OUTPATIENT)
Dept: CARDIOLOGY | Facility: HOSPITAL | Age: 75
Discharge: HOME | End: 2025-02-12
Payer: MEDICARE

## 2025-02-12 DIAGNOSIS — I25.118 CORONARY ARTERY DISEASE INVOLVING NATIVE CORONARY ARTERY OF NATIVE HEART WITH OTHER FORM OF ANGINA PECTORIS: ICD-10-CM

## 2025-02-12 DIAGNOSIS — I48.11 LONGSTANDING PERSISTENT ATRIAL FIBRILLATION (MULTI): ICD-10-CM

## 2025-02-12 DIAGNOSIS — A60.1 HERPESVIRAL INFECTION OF PERIANAL SKIN AND RECTUM: ICD-10-CM

## 2025-02-12 LAB
AORTIC VALVE MEAN GRADIENT: 2 MMHG
AORTIC VALVE PEAK VELOCITY: 0.99 M/S
AV PEAK GRADIENT: 4 MMHG
AVA (PEAK VEL): 2.69 CM2
AVA (VTI): 2.63 CM2
EJECTION FRACTION APICAL 4 CHAMBER: 59
EJECTION FRACTION: 58 %
LEFT ATRIUM VOLUME AREA LENGTH INDEX BSA: 24.7 ML/M2
LEFT VENTRICLE INTERNAL DIMENSION DIASTOLE: 3.68 CM (ref 3.5–6)
LEFT VENTRICULAR OUTFLOW TRACT DIAMETER: 2 CM
LV EJECTION FRACTION BIPLANE: 58 %
RIGHT VENTRICLE FREE WALL PEAK S': 9.79 CM/S
TRICUSPID ANNULAR PLANE SYSTOLIC EXCURSION: 2.2 CM

## 2025-02-12 PROCEDURE — 93306 TTE W/DOPPLER COMPLETE: CPT

## 2025-02-12 PROCEDURE — 93306 TTE W/DOPPLER COMPLETE: CPT | Performed by: INTERNAL MEDICINE

## 2025-02-12 RX ORDER — ACYCLOVIR 800 MG/1
800 TABLET ORAL DAILY
Qty: 90 TABLET | Refills: 1 | Status: ON HOLD | OUTPATIENT
Start: 2025-02-12

## 2025-02-13 ENCOUNTER — ANESTHESIA EVENT (OUTPATIENT)
Dept: OPERATING ROOM | Facility: HOSPITAL | Age: 75
End: 2025-02-13
Payer: MEDICARE

## 2025-02-13 NOTE — PROGRESS NOTES
Pharmacy Medication History Review    Radha Montalvo is a 74 y.o. female who is planned to be admitted for Coronary artery disease of native artery of native heart with stable angina pectoris. Pharmacy called the patient prior to their scheduled procedure and reviewed the patient's [id number] medications for accuracy.    Medications ADDED:  Ferrous sulfate/vitamin C  Medications CHANGED:  none  Medications REMOVED:   Augmentin [id number] - therapy completed    Please review updated prior to admission medication list and comments regarding how patient may be taking medications differently by going to Admission tab --> Admission Orders --> Admit Orders / Review prior to admission medications.     Preferred pharmacy, last doses of medications, and allergies to be confirmed with patient by nursing the day of procedure.     Sources used to complete the med history include:  [facility], [place]  Pharmacy dispense history  Patient interview  Chart Review  Care Everywhere     Below are additional concerns with the patient's PTA list.  Patient states they taking #1 capsule of diltiazem CD 240mg daily. L.F. 01/07/25 #90/90d. (120mg L.F. 02/08/25 #90/90d, but patient states they were increased from 120mg TO the 240mg and NO longer take the 120mg daily)    Rimma Cramer [facility]  Please reach out via Secure Chat for questions

## 2025-02-14 ENCOUNTER — HOSPITAL ENCOUNTER (INPATIENT)
Facility: HOSPITAL | Age: 75
DRG: 233 | End: 2025-02-14
Attending: THORACIC SURGERY (CARDIOTHORACIC VASCULAR SURGERY) | Admitting: THORACIC SURGERY (CARDIOTHORACIC VASCULAR SURGERY)
Payer: MEDICARE

## 2025-02-14 ENCOUNTER — ANESTHESIA (OUTPATIENT)
Dept: OPERATING ROOM | Facility: HOSPITAL | Age: 75
End: 2025-02-14
Payer: MEDICARE

## 2025-02-14 ENCOUNTER — APPOINTMENT (OUTPATIENT)
Dept: RADIOLOGY | Facility: HOSPITAL | Age: 75
End: 2025-02-14
Payer: MEDICARE

## 2025-02-14 ENCOUNTER — HOSPITAL ENCOUNTER (OUTPATIENT)
Dept: OPERATING ROOM | Facility: HOSPITAL | Age: 75
Discharge: HOME | End: 2025-02-14
Payer: MEDICARE

## 2025-02-14 ENCOUNTER — APPOINTMENT (OUTPATIENT)
Dept: CARDIOLOGY | Facility: HOSPITAL | Age: 75
End: 2025-02-14
Payer: MEDICARE

## 2025-02-14 VITALS — OXYGEN SATURATION: 92 % | RESPIRATION RATE: 23 BRPM

## 2025-02-14 DIAGNOSIS — R26.81 UNSTEADY GAIT: ICD-10-CM

## 2025-02-14 DIAGNOSIS — Q25.44: Primary | ICD-10-CM

## 2025-02-14 DIAGNOSIS — Z95.1 S/P CABG (CORONARY ARTERY BYPASS GRAFT): ICD-10-CM

## 2025-02-14 DIAGNOSIS — I25.118 CORONARY ARTERY DISEASE OF NATIVE ARTERY OF NATIVE HEART WITH STABLE ANGINA PECTORIS: ICD-10-CM

## 2025-02-14 DIAGNOSIS — Z95.1 S/P CABG X 2: ICD-10-CM

## 2025-02-14 DIAGNOSIS — D62 ABLA (ACUTE BLOOD LOSS ANEMIA): ICD-10-CM

## 2025-02-14 DIAGNOSIS — I77.810 ASCENDING AORTA DILATATION (CMS-HCC): ICD-10-CM

## 2025-02-14 LAB
ABO GROUP (TYPE) IN BLOOD: NORMAL
ABO GROUP (TYPE) IN BLOOD: NORMAL
ACT BLD: 126 SEC (ref 82–174)
ACT BLD: 126 SEC (ref 82–174)
ACT BLD: 448 SEC (ref 82–174)
ACT BLD: 508 SEC (ref 82–174)
ACT BLD: 548 SEC (ref 82–174)
ALBUMIN SERPL BCP-MCNC: 3.4 G/DL (ref 3.4–5)
ANION GAP BLDA CALCULATED.4IONS-SCNC: 10 MMO/L (ref 10–25)
ANION GAP BLDA CALCULATED.4IONS-SCNC: 11 MMO/L (ref 10–25)
ANION GAP BLDA CALCULATED.4IONS-SCNC: 12 MMO/L (ref 10–25)
ANION GAP BLDA CALCULATED.4IONS-SCNC: 12 MMO/L (ref 10–25)
ANION GAP BLDA CALCULATED.4IONS-SCNC: 13 MMO/L (ref 10–25)
ANION GAP BLDA CALCULATED.4IONS-SCNC: 13 MMO/L (ref 10–25)
ANION GAP BLDA CALCULATED.4IONS-SCNC: 15 MMO/L (ref 10–25)
ANION GAP BLDA CALCULATED.4IONS-SCNC: 15 MMO/L (ref 10–25)
ANION GAP BLDA CALCULATED.4IONS-SCNC: 16 MMO/L (ref 10–25)
ANION GAP BLDA CALCULATED.4IONS-SCNC: 8 MMO/L (ref 10–25)
ANION GAP BLDA CALCULATED.4IONS-SCNC: 8 MMO/L (ref 10–25)
ANION GAP BLDA CALCULATED.4IONS-SCNC: 9 MMO/L (ref 10–25)
ANION GAP BLDA CALCULATED.4IONS-SCNC: 9 MMO/L (ref 10–25)
ANION GAP BLDV CALCULATED.4IONS-SCNC: 10 MMOL/L (ref 10–25)
ANION GAP SERPL CALC-SCNC: 13 MMOL/L (ref 10–20)
ANTIBODY SCREEN: NORMAL
APTT PPP: 24 SECONDS (ref 27–38)
BASE EXCESS BLDA CALC-SCNC: -0.1 MMOL/L (ref -2–3)
BASE EXCESS BLDA CALC-SCNC: -1 MMOL/L (ref -2–3)
BASE EXCESS BLDA CALC-SCNC: -1.1 MMOL/L (ref -2–3)
BASE EXCESS BLDA CALC-SCNC: -1.8 MMOL/L (ref -2–3)
BASE EXCESS BLDA CALC-SCNC: -2.2 MMOL/L (ref -2–3)
BASE EXCESS BLDA CALC-SCNC: -3.2 MMOL/L (ref -2–3)
BASE EXCESS BLDA CALC-SCNC: -3.5 MMOL/L (ref -2–3)
BASE EXCESS BLDA CALC-SCNC: -4 MMOL/L (ref -2–3)
BASE EXCESS BLDA CALC-SCNC: -4.3 MMOL/L (ref -2–3)
BASE EXCESS BLDA CALC-SCNC: -5 MMOL/L (ref -2–3)
BASE EXCESS BLDA CALC-SCNC: 0.8 MMOL/L (ref -2–3)
BASE EXCESS BLDA CALC-SCNC: 0.8 MMOL/L (ref -2–3)
BASE EXCESS BLDA CALC-SCNC: 1 MMOL/L (ref -2–3)
BASE EXCESS BLDV CALC-SCNC: 1.6 MMOL/L (ref -2–3)
BLOOD EXPIRATION DATE: NORMAL
BODY TEMPERATURE: 37 DEGREES CELSIUS
BUN SERPL-MCNC: 15 MG/DL (ref 6–23)
CA-I BLD-SCNC: 1.13 MMOL/L (ref 1.1–1.33)
CA-I BLDA-SCNC: 0.95 MMOL/L (ref 1.1–1.33)
CA-I BLDA-SCNC: 0.99 MMOL/L (ref 1.1–1.33)
CA-I BLDA-SCNC: 0.99 MMOL/L (ref 1.1–1.33)
CA-I BLDA-SCNC: 1.01 MMOL/L (ref 1.1–1.33)
CA-I BLDA-SCNC: 1.04 MMOL/L (ref 1.1–1.33)
CA-I BLDA-SCNC: 1.06 MMOL/L (ref 1.1–1.33)
CA-I BLDA-SCNC: 1.06 MMOL/L (ref 1.1–1.33)
CA-I BLDA-SCNC: 1.1 MMOL/L (ref 1.1–1.33)
CA-I BLDA-SCNC: 1.11 MMOL/L (ref 1.1–1.33)
CA-I BLDA-SCNC: 1.12 MMOL/L (ref 1.1–1.33)
CA-I BLDA-SCNC: 1.14 MMOL/L (ref 1.1–1.33)
CA-I BLDA-SCNC: 1.17 MMOL/L (ref 1.1–1.33)
CA-I BLDA-SCNC: 1.2 MMOL/L (ref 1.1–1.33)
CA-I BLDV-SCNC: 1.02 MMOL/L (ref 1.1–1.33)
CALCIUM SERPL-MCNC: 8 MG/DL (ref 8.6–10.6)
CFT FORM KAOLIN IND BLD RES TEG: 1.1 MIN (ref 0.8–2.1)
CFT FORM KAOLIN IND BLD RES TEG: 1.3 MIN (ref 0.8–2.1)
CFT FORM KAOLIN IND BLD RES TEG: NORMAL MIN
CHLORIDE BLDA-SCNC: 101 MMOL/L (ref 98–107)
CHLORIDE BLDA-SCNC: 102 MMOL/L (ref 98–107)
CHLORIDE BLDA-SCNC: 103 MMOL/L (ref 98–107)
CHLORIDE BLDA-SCNC: 105 MMOL/L (ref 98–107)
CHLORIDE BLDA-SCNC: 106 MMOL/L (ref 98–107)
CHLORIDE BLDA-SCNC: 107 MMOL/L (ref 98–107)
CHLORIDE BLDA-SCNC: 107 MMOL/L (ref 98–107)
CHLORIDE BLDA-SCNC: 108 MMOL/L (ref 98–107)
CHLORIDE BLDA-SCNC: 108 MMOL/L (ref 98–107)
CHLORIDE BLDA-SCNC: 110 MMOL/L (ref 98–107)
CHLORIDE BLDA-SCNC: 110 MMOL/L (ref 98–107)
CHLORIDE BLDV-SCNC: 104 MMOL/L (ref 98–107)
CHLORIDE SERPL-SCNC: 108 MMOL/L (ref 98–107)
CLOT ANGLE.KAOLIN INDUCED BLD RES TEG: 73 DEG (ref 63–78)
CLOT ANGLE.KAOLIN INDUCED BLD RES TEG: 74 DEG (ref 63–78)
CLOT ANGLE.KAOLIN INDUCED BLD RES TEG: NORMAL DEG
CLOT INIT KAO IND P HEP NEUT BLD RES TEG: 4.2 MIN (ref 4.3–8.3)
CLOT INIT KAO IND P HEP NEUT BLD RES TEG: 4.6 MIN (ref 4.6–9.1)
CLOT INIT KAO IND P HEP NEUT BLD RES TEG: 6.7 MIN (ref 4.3–8.3)
CLOT INIT KAO IND P HEP NEUT BLD RES TEG: 6.7 MIN (ref 4.6–9.1)
CLOT INIT KAO IND P HEP NEUT BLD RES TEG: NORMAL MIN
CLOT INIT KAO IND P HEP NEUT BLD RES TEG: NORMAL MIN
CO2 SERPL-SCNC: 25 MMOL/L (ref 21–32)
COHGB MFR BLDA: 0.5 %
COHGB MFR BLDA: 0.6 %
COHGB MFR BLDA: 0.6 %
COHGB MFR BLDA: 0.8 %
COHGB MFR BLDA: 1 %
COHGB MFR BLDA: 1 %
COHGB MFR BLDA: 1.3 %
COHGB MFR BLDV: 1 %
CREAT SERPL-MCNC: 0.66 MG/DL (ref 0.5–1.05)
DISPENSE STATUS: NORMAL
DO-HGB MFR BLDA: 0.1 % (ref 0–5)
DO-HGB MFR BLDA: 0.6 % (ref 0–5)
DO-HGB MFR BLDA: 0.7 % (ref 0–5)
DO-HGB MFR BLDA: 1.1 % (ref 0–5)
DO-HGB MFR BLDA: 1.4 % (ref 0–5)
DO-HGB MFR BLDA: 1.5 % (ref 0–5)
DO-HGB MFR BLDA: 2.4 % (ref 0–5)
EGFRCR SERPLBLD CKD-EPI 2021: >90 ML/MIN/1.73M*2
ERYTHROCYTE [DISTWIDTH] IN BLOOD BY AUTOMATED COUNT: 13 % (ref 11.5–14.5)
FIBRINOGEN BLD CALC-MCNC: 330 MG/DL (ref 278–581)
FIBRINOGEN BLD CALC-MCNC: 402 MG/DL (ref 278–581)
FIBRINOGEN BLD CALC-MCNC: NORMAL MG/DL
FIBRINOGEN PPP-MCNC: 246 MG/DL (ref 200–400)
GLUCOSE BLD MANUAL STRIP-MCNC: 139 MG/DL (ref 74–99)
GLUCOSE BLDA-MCNC: 122 MG/DL (ref 74–99)
GLUCOSE BLDA-MCNC: 129 MG/DL (ref 74–99)
GLUCOSE BLDA-MCNC: 140 MG/DL (ref 74–99)
GLUCOSE BLDA-MCNC: 142 MG/DL (ref 74–99)
GLUCOSE BLDA-MCNC: 149 MG/DL (ref 74–99)
GLUCOSE BLDA-MCNC: 150 MG/DL (ref 74–99)
GLUCOSE BLDA-MCNC: 154 MG/DL (ref 74–99)
GLUCOSE BLDA-MCNC: 166 MG/DL (ref 74–99)
GLUCOSE BLDA-MCNC: 166 MG/DL (ref 74–99)
GLUCOSE BLDA-MCNC: 169 MG/DL (ref 74–99)
GLUCOSE BLDA-MCNC: 170 MG/DL (ref 74–99)
GLUCOSE BLDA-MCNC: 180 MG/DL (ref 74–99)
GLUCOSE BLDA-MCNC: 180 MG/DL (ref 74–99)
GLUCOSE BLDV-MCNC: 168 MG/DL (ref 74–99)
GLUCOSE SERPL-MCNC: 182 MG/DL (ref 74–99)
HCO3 BLDA-SCNC: 21.1 MMOL/L (ref 22–26)
HCO3 BLDA-SCNC: 21.6 MMOL/L (ref 22–26)
HCO3 BLDA-SCNC: 22.1 MMOL/L (ref 22–26)
HCO3 BLDA-SCNC: 22.6 MMOL/L (ref 22–26)
HCO3 BLDA-SCNC: 22.7 MMOL/L (ref 22–26)
HCO3 BLDA-SCNC: 23.5 MMOL/L (ref 22–26)
HCO3 BLDA-SCNC: 24.2 MMOL/L (ref 22–26)
HCO3 BLDA-SCNC: 24.3 MMOL/L (ref 22–26)
HCO3 BLDA-SCNC: 24.8 MMOL/L (ref 22–26)
HCO3 BLDA-SCNC: 25.1 MMOL/L (ref 22–26)
HCO3 BLDA-SCNC: 25.3 MMOL/L (ref 22–26)
HCO3 BLDA-SCNC: 25.3 MMOL/L (ref 22–26)
HCO3 BLDA-SCNC: 25.4 MMOL/L (ref 22–26)
HCO3 BLDV-SCNC: 27.7 MMOL/L (ref 22–26)
HCT VFR BLD AUTO: 38.6 % (ref 36–46)
HCT VFR BLD EST: 32 % (ref 36–46)
HCT VFR BLD EST: 33 % (ref 36–46)
HCT VFR BLD EST: 33 % (ref 36–46)
HCT VFR BLD EST: 34 % (ref 36–46)
HCT VFR BLD EST: 35 % (ref 36–46)
HCT VFR BLD EST: 36 % (ref 36–46)
HCT VFR BLD EST: 37 % (ref 36–46)
HCT VFR BLD EST: 38 % (ref 36–46)
HCT VFR BLD EST: 39 % (ref 36–46)
HCT VFR BLD EST: 40 % (ref 36–46)
HCT VFR BLD EST: 44 % (ref 36–46)
HCT VFR BLD EST: 46 % (ref 36–46)
HGB BLD-MCNC: 13.5 G/DL (ref 12–16)
HGB BLDA-MCNC: 10.7 G/DL (ref 12–16)
HGB BLDA-MCNC: 10.7 G/DL (ref 12–16)
HGB BLDA-MCNC: 11.1 G/DL (ref 12–16)
HGB BLDA-MCNC: 11.5 G/DL (ref 12–16)
HGB BLDA-MCNC: 11.5 G/DL (ref 12–16)
HGB BLDA-MCNC: 12 G/DL (ref 12–16)
HGB BLDA-MCNC: 12 G/DL (ref 12–16)
HGB BLDA-MCNC: 12.3 G/DL (ref 12–16)
HGB BLDA-MCNC: 12.5 G/DL (ref 12–16)
HGB BLDA-MCNC: 12.9 G/DL (ref 12–16)
HGB BLDA-MCNC: 12.9 G/DL (ref 12–16)
HGB BLDA-MCNC: 13.2 G/DL (ref 12–16)
HGB BLDA-MCNC: 13.2 G/DL (ref 12–16)
HGB BLDA-MCNC: 13.3 G/DL (ref 12–16)
HGB BLDA-MCNC: 14.8 G/DL (ref 12–16)
HGB BLDA-MCNC: 14.8 G/DL (ref 12–16)
HGB BLDA-MCNC: 15.4 G/DL (ref 12–16)
HGB BLDA-MCNC: 15.4 G/DL (ref 12–16)
HGB BLDV-MCNC: 11.2 G/DL (ref 12–16)
INHALED O2 CONCENTRATION: 100 %
INHALED O2 CONCENTRATION: 21 %
INHALED O2 CONCENTRATION: 31 %
INHALED O2 CONCENTRATION: 40 %
INHALED O2 CONCENTRATION: 50 %
INHALED O2 CONCENTRATION: 60 %
INHALED O2 CONCENTRATION: 60 %
INHALED O2 CONCENTRATION: 75 %
INHALED O2 CONCENTRATION: 80 %
INR PPP: 1.1 (ref 0.9–1.1)
LACTATE BLDA-SCNC: 0.6 MMOL/L (ref 0.4–2)
LACTATE BLDA-SCNC: 0.7 MMOL/L (ref 0.4–2)
LACTATE BLDA-SCNC: 0.8 MMOL/L (ref 0.4–2)
LACTATE BLDA-SCNC: 0.9 MMOL/L (ref 0.4–2)
LACTATE BLDA-SCNC: 0.9 MMOL/L (ref 0.4–2)
LACTATE BLDA-SCNC: 1 MMOL/L (ref 0.4–2)
LACTATE BLDA-SCNC: 1.3 MMOL/L (ref 0.4–2)
LACTATE BLDA-SCNC: 1.4 MMOL/L (ref 0.4–2)
LACTATE BLDA-SCNC: 1.5 MMOL/L (ref 0.4–2)
LACTATE BLDA-SCNC: 2 MMOL/L (ref 0.4–2)
LACTATE BLDA-SCNC: 2.2 MMOL/L (ref 0.4–2)
LACTATE BLDV-SCNC: 0.8 MMOL/L (ref 0.4–2)
MA KAOLIN BLD RES TEG: 53 MM (ref 52–69)
MA KAOLIN BLD RES TEG: 61 MM (ref 52–69)
MA KAOLIN BLD RES TEG: NORMAL MM
MA KAOLIN+TF BLD RES TEG: 52 MM (ref 52–70)
MA KAOLIN+TF BLD RES TEG: 62 MM (ref 52–70)
MA KAOLIN+TF BLD RES TEG: NORMAL MM
MA TF IND+IIB-IIIA INH BLD RES TEG: 18 MM (ref 15–32)
MA TF IND+IIB-IIIA INH BLD RES TEG: 22 MM (ref 15–32)
MA TF IND+IIB-IIIA INH BLD RES TEG: NORMAL MM
MAGNESIUM SERPL-MCNC: 2.84 MG/DL (ref 1.6–2.4)
MCH RBC QN AUTO: 35.1 PG (ref 26–34)
MCHC RBC AUTO-ENTMCNC: 35 G/DL (ref 32–36)
MCV RBC AUTO: 100 FL (ref 80–100)
METHGB MFR BLDA: 0.7 % (ref 0–1.5)
METHGB MFR BLDA: 0.8 % (ref 0–1.5)
METHGB MFR BLDA: 0.9 % (ref 0–1.5)
METHGB MFR BLDA: 1 % (ref 0–1.5)
METHGB MFR BLDV: 0.7 % (ref 0–1.5)
NRBC BLD-RTO: 0 /100 WBCS (ref 0–0)
OXYHGB MFR BLDA: 90.1 % (ref 94–98)
OXYHGB MFR BLDA: 90.5 % (ref 94–98)
OXYHGB MFR BLDA: 92.4 % (ref 94–98)
OXYHGB MFR BLDA: 92.7 % (ref 94–98)
OXYHGB MFR BLDA: 94.3 % (ref 94–98)
OXYHGB MFR BLDA: 95.9 % (ref 94–98)
OXYHGB MFR BLDA: 95.9 % (ref 94–98)
OXYHGB MFR BLDA: 96.5 % (ref 94–98)
OXYHGB MFR BLDA: 96.5 % (ref 94–98)
OXYHGB MFR BLDA: 97.1 % (ref 94–98)
OXYHGB MFR BLDA: 97.1 % (ref 94–98)
OXYHGB MFR BLDA: 97.2 % (ref 94–98)
OXYHGB MFR BLDA: 97.2 % (ref 94–98)
OXYHGB MFR BLDA: 97.8 % (ref 94–98)
OXYHGB MFR BLDA: 97.9 % (ref 94–98)
OXYHGB MFR BLDV: 83.7 % (ref 45–75)
PCO2 BLDA: 37 MM HG (ref 38–42)
PCO2 BLDA: 38 MM HG (ref 38–42)
PCO2 BLDA: 39 MM HG (ref 38–42)
PCO2 BLDA: 39 MM HG (ref 38–42)
PCO2 BLDA: 41 MM HG (ref 38–42)
PCO2 BLDA: 42 MM HG (ref 38–42)
PCO2 BLDA: 42 MM HG (ref 38–42)
PCO2 BLDA: 43 MM HG (ref 38–42)
PCO2 BLDA: 44 MM HG (ref 38–42)
PCO2 BLDA: 45 MM HG (ref 38–42)
PCO2 BLDA: 45 MM HG (ref 38–42)
PCO2 BLDA: 46 MM HG (ref 38–42)
PCO2 BLDA: 47 MM HG (ref 38–42)
PCO2 BLDV: 49 MM HG (ref 41–51)
PH BLDA: 7.3 PH (ref 7.38–7.42)
PH BLDA: 7.31 PH (ref 7.38–7.42)
PH BLDA: 7.32 PH (ref 7.38–7.42)
PH BLDA: 7.32 PH (ref 7.38–7.42)
PH BLDA: 7.33 PH (ref 7.38–7.42)
PH BLDA: 7.34 PH (ref 7.38–7.42)
PH BLDA: 7.34 PH (ref 7.38–7.42)
PH BLDA: 7.35 PH (ref 7.38–7.42)
PH BLDA: 7.37 PH (ref 7.38–7.42)
PH BLDA: 7.4 PH (ref 7.38–7.42)
PH BLDA: 7.42 PH (ref 7.38–7.42)
PH BLDA: 7.42 PH (ref 7.38–7.42)
PH BLDA: 7.44 PH (ref 7.38–7.42)
PH BLDV: 7.36 PH (ref 7.33–7.43)
PHOSPHATE SERPL-MCNC: 3.6 MG/DL (ref 2.5–4.9)
PLATELET # BLD AUTO: 140 X10*3/UL (ref 150–450)
PO2 BLDA: 135 MM HG (ref 85–95)
PO2 BLDA: 147 MM HG (ref 85–95)
PO2 BLDA: 361 MM HG (ref 85–95)
PO2 BLDA: 377 MM HG (ref 85–95)
PO2 BLDA: 378 MM HG (ref 85–95)
PO2 BLDA: 429 MM HG (ref 85–95)
PO2 BLDA: 63 MM HG (ref 85–95)
PO2 BLDA: 64 MM HG (ref 85–95)
PO2 BLDA: 70 MM HG (ref 85–95)
PO2 BLDA: 74 MM HG (ref 85–95)
PO2 BLDA: 78 MM HG (ref 85–95)
PO2 BLDA: 85 MM HG (ref 85–95)
PO2 BLDA: 98 MM HG (ref 85–95)
PO2 BLDV: 53 MM HG (ref 35–45)
POTASSIUM BLDA-SCNC: 3.3 MMOL/L (ref 3.5–5.3)
POTASSIUM BLDA-SCNC: 3.4 MMOL/L (ref 3.5–5.3)
POTASSIUM BLDA-SCNC: 3.5 MMOL/L (ref 3.5–5.3)
POTASSIUM BLDA-SCNC: 3.6 MMOL/L (ref 3.5–5.3)
POTASSIUM BLDA-SCNC: 3.7 MMOL/L (ref 3.5–5.3)
POTASSIUM BLDA-SCNC: 3.7 MMOL/L (ref 3.5–5.3)
POTASSIUM BLDA-SCNC: 3.8 MMOL/L (ref 3.5–5.3)
POTASSIUM BLDA-SCNC: 3.8 MMOL/L (ref 3.5–5.3)
POTASSIUM BLDA-SCNC: 3.9 MMOL/L (ref 3.5–5.3)
POTASSIUM BLDA-SCNC: 4.1 MMOL/L (ref 3.5–5.3)
POTASSIUM BLDA-SCNC: 4.5 MMOL/L (ref 3.5–5.3)
POTASSIUM BLDV-SCNC: 4.2 MMOL/L (ref 3.5–5.3)
POTASSIUM SERPL-SCNC: 3.8 MMOL/L (ref 3.5–5.3)
PRODUCT BLOOD TYPE: 600
PRODUCT CODE: NORMAL
PROTHROMBIN TIME: 12.7 SECONDS (ref 9.8–12.8)
RBC # BLD AUTO: 3.85 X10*6/UL (ref 4–5.2)
RH FACTOR (ANTIGEN D): NORMAL
RH FACTOR (ANTIGEN D): NORMAL
SAO2 % BLDA: 100 % (ref 94–100)
SAO2 % BLDA: 92 % (ref 94–100)
SAO2 % BLDA: 93 % (ref 94–100)
SAO2 % BLDA: 94 % (ref 94–100)
SAO2 % BLDA: 95 % (ref 94–100)
SAO2 % BLDA: 97 % (ref 94–100)
SAO2 % BLDA: 98 % (ref 94–100)
SAO2 % BLDA: 99 % (ref 94–100)
SAO2 % BLDV: 85 % (ref 45–75)
SODIUM BLDA-SCNC: 135 MMOL/L (ref 136–145)
SODIUM BLDA-SCNC: 136 MMOL/L (ref 136–145)
SODIUM BLDA-SCNC: 137 MMOL/L (ref 136–145)
SODIUM BLDA-SCNC: 137 MMOL/L (ref 136–145)
SODIUM BLDA-SCNC: 138 MMOL/L (ref 136–145)
SODIUM BLDA-SCNC: 139 MMOL/L (ref 136–145)
SODIUM BLDA-SCNC: 139 MMOL/L (ref 136–145)
SODIUM BLDA-SCNC: 140 MMOL/L (ref 136–145)
SODIUM BLDV-SCNC: 137 MMOL/L (ref 136–145)
SODIUM SERPL-SCNC: 142 MMOL/L (ref 136–145)
TEST COMMENT: ABNORMAL
TEST COMMENT: NORMAL
TEST COMMENT: NORMAL
UNIT ABO: NORMAL
UNIT NUMBER: NORMAL
UNIT RH: NORMAL
UNIT VOLUME: 281
UNIT VOLUME: 282
UNIT VOLUME: 289
UNIT VOLUME: 350
WBC # BLD AUTO: 11.4 X10*3/UL (ref 4.4–11.3)
XM INTEP: NORMAL

## 2025-02-14 PROCEDURE — 2500000004 HC RX 250 GENERAL PHARMACY W/ HCPCS (ALT 636 FOR OP/ED)

## 2025-02-14 PROCEDURE — 33533 CABG ARTERIAL SINGLE: CPT | Performed by: THORACIC SURGERY (CARDIOTHORACIC VASCULAR SURGERY)

## 2025-02-14 PROCEDURE — 82805 BLOOD GASES W/O2 SATURATION: CPT

## 2025-02-14 PROCEDURE — 3700000002 HC GENERAL ANESTHESIA TIME - EACH INCREMENTAL 1 MINUTE: Performed by: THORACIC SURGERY (CARDIOTHORACIC VASCULAR SURGERY)

## 2025-02-14 PROCEDURE — A4649 SURGICAL SUPPLIES: HCPCS | Performed by: THORACIC SURGERY (CARDIOTHORACIC VASCULAR SURGERY)

## 2025-02-14 PROCEDURE — 94660 CPAP INITIATION&MGMT: CPT

## 2025-02-14 PROCEDURE — 02580ZZ DESTRUCTION OF CONDUCTION MECHANISM, OPEN APPROACH: ICD-10-PCS | Performed by: THORACIC SURGERY (CARDIOTHORACIC VASCULAR SURGERY)

## 2025-02-14 PROCEDURE — 85730 THROMBOPLASTIN TIME PARTIAL: CPT

## 2025-02-14 PROCEDURE — 82375 ASSAY CARBOXYHB QUANT: CPT

## 2025-02-14 PROCEDURE — 86923 COMPATIBILITY TEST ELECTRIC: CPT

## 2025-02-14 PROCEDURE — 06BP4ZZ EXCISION OF RIGHT SAPHENOUS VEIN, PERCUTANEOUS ENDOSCOPIC APPROACH: ICD-10-PCS | Performed by: THORACIC SURGERY (CARDIOTHORACIC VASCULAR SURGERY)

## 2025-02-14 PROCEDURE — 02100ZC BYPASS CORONARY ARTERY, ONE ARTERY FROM THORACIC ARTERY, OPEN APPROACH: ICD-10-PCS | Performed by: THORACIC SURGERY (CARDIOTHORACIC VASCULAR SURGERY)

## 2025-02-14 PROCEDURE — 82330 ASSAY OF CALCIUM: CPT

## 2025-02-14 PROCEDURE — 82810 BLOOD GASES O2 SAT ONLY: CPT

## 2025-02-14 PROCEDURE — 3600000012 HC PERFUSION TIME - EACH INCREMENTAL 1 MINUTE: Performed by: THORACIC SURGERY (CARDIOTHORACIC VASCULAR SURGERY)

## 2025-02-14 PROCEDURE — 37799 UNLISTED PX VASCULAR SURGERY: CPT

## 2025-02-14 PROCEDURE — B24BZZ4 ULTRASONOGRAPHY OF HEART WITH AORTA, TRANSESOPHAGEAL: ICD-10-PCS | Performed by: THORACIC SURGERY (CARDIOTHORACIC VASCULAR SURGERY)

## 2025-02-14 PROCEDURE — 83050 HGB METHEMOGLOBIN QUAN: CPT

## 2025-02-14 PROCEDURE — 2500000005 HC RX 250 GENERAL PHARMACY W/O HCPCS: Performed by: THORACIC SURGERY (CARDIOTHORACIC VASCULAR SURGERY)

## 2025-02-14 PROCEDURE — 99291 CRITICAL CARE FIRST HOUR: CPT

## 2025-02-14 PROCEDURE — 06BQ4ZZ EXCISION OF LEFT SAPHENOUS VEIN, PERCUTANEOUS ENDOSCOPIC APPROACH: ICD-10-PCS | Performed by: THORACIC SURGERY (CARDIOTHORACIC VASCULAR SURGERY)

## 2025-02-14 PROCEDURE — 2500000005 HC RX 250 GENERAL PHARMACY W/O HCPCS

## 2025-02-14 PROCEDURE — 93005 ELECTROCARDIOGRAM TRACING: CPT

## 2025-02-14 PROCEDURE — 3600000017 HC OR TIME - EACH INCREMENTAL 1 MINUTE - PROCEDURE LEVEL SIX: Performed by: THORACIC SURGERY (CARDIOTHORACIC VASCULAR SURGERY)

## 2025-02-14 PROCEDURE — 3600000018 HC OR TIME - INITIAL BASE CHARGE - PROCEDURE LEVEL SIX: Performed by: THORACIC SURGERY (CARDIOTHORACIC VASCULAR SURGERY)

## 2025-02-14 PROCEDURE — C1889 IMPLANT/INSERT DEVICE, NOC: HCPCS | Performed by: THORACIC SURGERY (CARDIOTHORACIC VASCULAR SURGERY)

## 2025-02-14 PROCEDURE — 2020000001 HC ICU ROOM DAILY

## 2025-02-14 PROCEDURE — 2720000007 HC OR 272 NO HCPCS: Performed by: THORACIC SURGERY (CARDIOTHORACIC VASCULAR SURGERY)

## 2025-02-14 PROCEDURE — 2500000004 HC RX 250 GENERAL PHARMACY W/ HCPCS (ALT 636 FOR OP/ED): Performed by: THORACIC SURGERY (CARDIOTHORACIC VASCULAR SURGERY)

## 2025-02-14 PROCEDURE — 5A09357 ASSISTANCE WITH RESPIRATORY VENTILATION, LESS THAN 24 CONSECUTIVE HOURS, CONTINUOUS POSITIVE AIRWAY PRESSURE: ICD-10-PCS | Performed by: THORACIC SURGERY (CARDIOTHORACIC VASCULAR SURGERY)

## 2025-02-14 PROCEDURE — 2500000002 HC RX 250 W HCPCS SELF ADMINISTERED DRUGS (ALT 637 FOR MEDICARE OP, ALT 636 FOR OP/ED)

## 2025-02-14 PROCEDURE — 36415 COLL VENOUS BLD VENIPUNCTURE: CPT | Performed by: STUDENT IN AN ORGANIZED HEALTH CARE EDUCATION/TRAINING PROGRAM

## 2025-02-14 PROCEDURE — 33259 ABLATE ATRIA W/BYPASS ADD-ON: CPT | Performed by: THORACIC SURGERY (CARDIOTHORACIC VASCULAR SURGERY)

## 2025-02-14 PROCEDURE — 82947 ASSAY GLUCOSE BLOOD QUANT: CPT

## 2025-02-14 PROCEDURE — 84295 ASSAY OF SERUM SODIUM: CPT

## 2025-02-14 PROCEDURE — 86900 BLOOD TYPING SEROLOGIC ABO: CPT | Performed by: STUDENT IN AN ORGANIZED HEALTH CARE EDUCATION/TRAINING PROGRAM

## 2025-02-14 PROCEDURE — 33517 CABG ARTERY-VEIN SINGLE: CPT | Performed by: THORACIC SURGERY (CARDIOTHORACIC VASCULAR SURGERY)

## 2025-02-14 PROCEDURE — 2780000003 HC OR 278 NO HCPCS: Performed by: THORACIC SURGERY (CARDIOTHORACIC VASCULAR SURGERY)

## 2025-02-14 PROCEDURE — 83735 ASSAY OF MAGNESIUM: CPT

## 2025-02-14 PROCEDURE — 85576 BLOOD PLATELET AGGREGATION: CPT

## 2025-02-14 PROCEDURE — 5A1221Z PERFORMANCE OF CARDIAC OUTPUT, CONTINUOUS: ICD-10-PCS | Performed by: THORACIC SURGERY (CARDIOTHORACIC VASCULAR SURGERY)

## 2025-02-14 PROCEDURE — 82435 ASSAY OF BLOOD CHLORIDE: CPT

## 2025-02-14 PROCEDURE — 71045 X-RAY EXAM CHEST 1 VIEW: CPT | Performed by: RADIOLOGY

## 2025-02-14 PROCEDURE — 85384 FIBRINOGEN ACTIVITY: CPT

## 2025-02-14 PROCEDURE — 3700000001 HC GENERAL ANESTHESIA TIME - INITIAL BASE CHARGE: Performed by: THORACIC SURGERY (CARDIOTHORACIC VASCULAR SURGERY)

## 2025-02-14 PROCEDURE — P9045 ALBUMIN (HUMAN), 5%, 250 ML: HCPCS | Mod: JZ,TB

## 2025-02-14 PROCEDURE — 021009W BYPASS CORONARY ARTERY, ONE ARTERY FROM AORTA WITH AUTOLOGOUS VENOUS TISSUE, OPEN APPROACH: ICD-10-PCS | Performed by: THORACIC SURGERY (CARDIOTHORACIC VASCULAR SURGERY)

## 2025-02-14 PROCEDURE — 85027 COMPLETE CBC AUTOMATED: CPT

## 2025-02-14 PROCEDURE — 85347 COAGULATION TIME ACTIVATED: CPT

## 2025-02-14 PROCEDURE — 33508 ENDOSCOPIC VEIN HARVEST: CPT | Performed by: THORACIC SURGERY (CARDIOTHORACIC VASCULAR SURGERY)

## 2025-02-14 PROCEDURE — 93010 ELECTROCARDIOGRAM REPORT: CPT | Performed by: INTERNAL MEDICINE

## 2025-02-14 PROCEDURE — 2500000001 HC RX 250 WO HCPCS SELF ADMINISTERED DRUGS (ALT 637 FOR MEDICARE OP)

## 2025-02-14 PROCEDURE — 02L70CK OCCLUSION OF LEFT ATRIAL APPENDAGE WITH EXTRALUMINAL DEVICE, OPEN APPROACH: ICD-10-PCS | Performed by: THORACIC SURGERY (CARDIOTHORACIC VASCULAR SURGERY)

## 2025-02-14 PROCEDURE — A4312 CATH W/O BAG 2-WAY SILICONE: HCPCS | Performed by: THORACIC SURGERY (CARDIOTHORACIC VASCULAR SURGERY)

## 2025-02-14 PROCEDURE — 71045 X-RAY EXAM CHEST 1 VIEW: CPT

## 2025-02-14 PROCEDURE — 3600000011 HC PERFUSION TIME - INITIAL BASE CHARGE: Performed by: THORACIC SURGERY (CARDIOTHORACIC VASCULAR SURGERY)

## 2025-02-14 DEVICE — ATRICLIP FLEX•V DEVICE 40
Type: IMPLANTABLE DEVICE | Site: HEART | Status: FUNCTIONAL
Brand: ATRICLIP FLEX•V

## 2025-02-14 DEVICE — IMPLANTABLE DEVICE: Type: IMPLANTABLE DEVICE | Site: CHEST | Status: NON-FUNCTIONAL

## 2025-02-14 RX ORDER — MAGNESIUM SULFATE HEPTAHYDRATE 40 MG/ML
2 INJECTION, SOLUTION INTRAVENOUS EVERY 6 HOURS PRN
Status: DISCONTINUED | OUTPATIENT
Start: 2025-02-14 | End: 2025-02-18

## 2025-02-14 RX ORDER — DEXMEDETOMIDINE HYDROCHLORIDE 4 UG/ML
INJECTION, SOLUTION INTRAVENOUS CONTINUOUS PRN
Status: DISCONTINUED | OUTPATIENT
Start: 2025-02-14 | End: 2025-02-14

## 2025-02-14 RX ORDER — INSULIN LISPRO 100 [IU]/ML
0-15 INJECTION, SOLUTION INTRAVENOUS; SUBCUTANEOUS EVERY 4 HOURS
Status: DISCONTINUED | OUTPATIENT
Start: 2025-02-14 | End: 2025-02-15

## 2025-02-14 RX ORDER — PANTOPRAZOLE SODIUM 40 MG/1
40 TABLET, DELAYED RELEASE ORAL
Status: DISCONTINUED | OUTPATIENT
Start: 2025-02-15 | End: 2025-02-14

## 2025-02-14 RX ORDER — HYDROMORPHONE HYDROCHLORIDE 0.2 MG/ML
0.2 INJECTION INTRAMUSCULAR; INTRAVENOUS; SUBCUTANEOUS
Status: DISCONTINUED | OUTPATIENT
Start: 2025-02-14 | End: 2025-02-14

## 2025-02-14 RX ORDER — LIDOCAINE HCL/PF 100 MG/5ML
SYRINGE (ML) INTRAVENOUS AS NEEDED
Status: DISCONTINUED | OUTPATIENT
Start: 2025-02-14 | End: 2025-02-14

## 2025-02-14 RX ORDER — SODIUM CHLORIDE, SODIUM LACTATE, POTASSIUM CHLORIDE, CALCIUM CHLORIDE 600; 310; 30; 20 MG/100ML; MG/100ML; MG/100ML; MG/100ML
5 INJECTION, SOLUTION INTRAVENOUS CONTINUOUS
Status: DISCONTINUED | OUTPATIENT
Start: 2025-02-14 | End: 2025-02-15

## 2025-02-14 RX ORDER — BISOPROLOL FUMARATE 10 MG/1
10 TABLET, FILM COATED ORAL DAILY
Status: DISCONTINUED | OUTPATIENT
Start: 2025-02-15 | End: 2025-02-16

## 2025-02-14 RX ORDER — PANTOPRAZOLE SODIUM 40 MG/10ML
40 INJECTION, POWDER, LYOPHILIZED, FOR SOLUTION INTRAVENOUS
Status: DISCONTINUED | OUTPATIENT
Start: 2025-02-15 | End: 2025-02-14

## 2025-02-14 RX ORDER — METHADONE IN SOD CHLOR,ISO-OSM 10 MG/ML
SYRINGE (ML) INTRAVENOUS AS NEEDED
Status: DISCONTINUED | OUTPATIENT
Start: 2025-02-14 | End: 2025-02-14

## 2025-02-14 RX ORDER — PROTAMINE SULFATE 10 MG/ML
INJECTION, SOLUTION INTRAVENOUS AS NEEDED
Status: DISCONTINUED | OUTPATIENT
Start: 2025-02-14 | End: 2025-02-14

## 2025-02-14 RX ORDER — ALBUMIN HUMAN 50 G/1000ML
SOLUTION INTRAVENOUS AS NEEDED
Status: DISCONTINUED | OUTPATIENT
Start: 2025-02-14 | End: 2025-02-14

## 2025-02-14 RX ORDER — HYDROMORPHONE HYDROCHLORIDE 0.2 MG/ML
0.2 INJECTION INTRAMUSCULAR; INTRAVENOUS; SUBCUTANEOUS
Status: DISCONTINUED | OUTPATIENT
Start: 2025-02-14 | End: 2025-02-15

## 2025-02-14 RX ORDER — ACETAMINOPHEN 10 MG/ML
1000 INJECTION, SOLUTION INTRAVENOUS ONCE
Status: COMPLETED | OUTPATIENT
Start: 2025-02-14 | End: 2025-02-14

## 2025-02-14 RX ORDER — OXYCODONE HYDROCHLORIDE 10 MG/1
10 TABLET ORAL EVERY 4 HOURS PRN
Status: DISCONTINUED | OUTPATIENT
Start: 2025-02-14 | End: 2025-02-19

## 2025-02-14 RX ORDER — ACETAMINOPHEN 325 MG/1
650 TABLET ORAL EVERY 6 HOURS
Status: DISCONTINUED | OUTPATIENT
Start: 2025-02-14 | End: 2025-02-14

## 2025-02-14 RX ORDER — DEXTROSE 50 % IN WATER (D50W) INTRAVENOUS SYRINGE
25
Status: DISCONTINUED | OUTPATIENT
Start: 2025-02-14 | End: 2025-02-21

## 2025-02-14 RX ORDER — CALCIUM GLUCONATE 20 MG/ML
1 INJECTION, SOLUTION INTRAVENOUS EVERY 6 HOURS PRN
Status: DISCONTINUED | OUTPATIENT
Start: 2025-02-14 | End: 2025-02-18

## 2025-02-14 RX ORDER — HEPARIN SODIUM 1000 [USP'U]/ML
INJECTION, SOLUTION INTRAVENOUS; SUBCUTANEOUS AS NEEDED
Status: DISCONTINUED | OUTPATIENT
Start: 2025-02-14 | End: 2025-02-14

## 2025-02-14 RX ORDER — IPRATROPIUM BROMIDE AND ALBUTEROL SULFATE 2.5; .5 MG/3ML; MG/3ML
3 SOLUTION RESPIRATORY (INHALATION) ONCE
Status: DISCONTINUED | OUTPATIENT
Start: 2025-02-14 | End: 2025-02-15

## 2025-02-14 RX ORDER — POTASSIUM CHLORIDE 29.8 MG/ML
40 INJECTION INTRAVENOUS EVERY 6 HOURS PRN
Status: DISCONTINUED | OUTPATIENT
Start: 2025-02-14 | End: 2025-02-15

## 2025-02-14 RX ORDER — OXYCODONE HYDROCHLORIDE 5 MG/1
5 TABLET ORAL EVERY 4 HOURS PRN
Status: DISCONTINUED | OUTPATIENT
Start: 2025-02-14 | End: 2025-02-19

## 2025-02-14 RX ORDER — AMOXICILLIN 250 MG
2 CAPSULE ORAL 2 TIMES DAILY
Status: DISCONTINUED | OUTPATIENT
Start: 2025-02-14 | End: 2025-02-19

## 2025-02-14 RX ORDER — MIDAZOLAM HYDROCHLORIDE 1 MG/ML
INJECTION, SOLUTION INTRAMUSCULAR; INTRAVENOUS AS NEEDED
Status: DISCONTINUED | OUTPATIENT
Start: 2025-02-14 | End: 2025-02-14

## 2025-02-14 RX ORDER — ROCURONIUM BROMIDE 10 MG/ML
INJECTION, SOLUTION INTRAVENOUS AS NEEDED
Status: DISCONTINUED | OUTPATIENT
Start: 2025-02-14 | End: 2025-02-14

## 2025-02-14 RX ORDER — CALCIUM GLUCONATE 20 MG/ML
2 INJECTION, SOLUTION INTRAVENOUS EVERY 6 HOURS PRN
Status: DISCONTINUED | OUTPATIENT
Start: 2025-02-14 | End: 2025-02-18

## 2025-02-14 RX ORDER — PAPAVERINE HYDROCHLORIDE 30 MG/ML
INJECTION INTRAMUSCULAR; INTRAVENOUS AS NEEDED
Status: DISCONTINUED | OUTPATIENT
Start: 2025-02-14 | End: 2025-02-14 | Stop reason: HOSPADM

## 2025-02-14 RX ORDER — PROPOFOL 10 MG/ML
0-50 INJECTION, EMULSION INTRAVENOUS CONTINUOUS
Status: DISCONTINUED | OUTPATIENT
Start: 2025-02-14 | End: 2025-02-14

## 2025-02-14 RX ORDER — HEPARIN SODIUM 1000 [USP'U]/ML
INJECTION, SOLUTION INTRAVENOUS; SUBCUTANEOUS AS NEEDED
Status: DISCONTINUED | OUTPATIENT
Start: 2025-02-14 | End: 2025-02-14 | Stop reason: HOSPADM

## 2025-02-14 RX ORDER — CEFAZOLIN SODIUM 2 G/100ML
2 INJECTION, SOLUTION INTRAVENOUS EVERY 8 HOURS
Status: COMPLETED | OUTPATIENT
Start: 2025-02-14 | End: 2025-02-16

## 2025-02-14 RX ORDER — SODIUM CHLORIDE, SODIUM LACTATE, POTASSIUM CHLORIDE, CALCIUM CHLORIDE 600; 310; 30; 20 MG/100ML; MG/100ML; MG/100ML; MG/100ML
10 INJECTION, SOLUTION INTRAVENOUS CONTINUOUS
Status: DISCONTINUED | OUTPATIENT
Start: 2025-02-14 | End: 2025-02-15

## 2025-02-14 RX ORDER — NOREPINEPHRINE BITARTRATE 0.03 MG/ML
INJECTION, SOLUTION INTRAVENOUS CONTINUOUS PRN
Status: DISCONTINUED | OUTPATIENT
Start: 2025-02-14 | End: 2025-02-14

## 2025-02-14 RX ORDER — POTASSIUM CHLORIDE 14.9 MG/ML
20 INJECTION INTRAVENOUS EVERY 6 HOURS PRN
Status: DISCONTINUED | OUTPATIENT
Start: 2025-02-14 | End: 2025-02-15

## 2025-02-14 RX ORDER — ASPIRIN 300 MG/1
150 SUPPOSITORY RECTAL ONCE
Status: COMPLETED | OUTPATIENT
Start: 2025-02-14 | End: 2025-02-14

## 2025-02-14 RX ORDER — SODIUM CHLORIDE, SODIUM GLUCONATE, SODIUM ACETATE, POTASSIUM CHLORIDE AND MAGNESIUM CHLORIDE 30; 37; 368; 526; 502 MG/100ML; MG/100ML; MG/100ML; MG/100ML; MG/100ML
INJECTION, SOLUTION INTRAVENOUS CONTINUOUS PRN
Status: DISCONTINUED | OUTPATIENT
Start: 2025-02-14 | End: 2025-02-14

## 2025-02-14 RX ORDER — ACETAMINOPHEN 325 MG/1
650 TABLET ORAL EVERY 6 HOURS
Status: DISCONTINUED | OUTPATIENT
Start: 2025-02-14 | End: 2025-02-20

## 2025-02-14 RX ORDER — NALOXONE HYDROCHLORIDE 0.4 MG/ML
0.2 INJECTION, SOLUTION INTRAMUSCULAR; INTRAVENOUS; SUBCUTANEOUS EVERY 5 MIN PRN
Status: DISCONTINUED | OUTPATIENT
Start: 2025-02-14 | End: 2025-02-22 | Stop reason: HOSPADM

## 2025-02-14 RX ORDER — ATORVASTATIN CALCIUM 80 MG/1
80 TABLET, FILM COATED ORAL NIGHTLY
Status: DISCONTINUED | OUTPATIENT
Start: 2025-02-15 | End: 2025-02-22 | Stop reason: HOSPADM

## 2025-02-14 RX ORDER — SODIUM CHLORIDE 0.9 G/100ML
INJECTION, SOLUTION IRRIGATION AS NEEDED
Status: DISCONTINUED | OUTPATIENT
Start: 2025-02-14 | End: 2025-02-14 | Stop reason: HOSPADM

## 2025-02-14 RX ORDER — ACETAMINOPHEN 10 MG/ML
INJECTION, SOLUTION INTRAVENOUS
Status: COMPLETED
Start: 2025-02-14 | End: 2025-02-14

## 2025-02-14 RX ORDER — PROPOFOL 10 MG/ML
INJECTION, EMULSION INTRAVENOUS AS NEEDED
Status: DISCONTINUED | OUTPATIENT
Start: 2025-02-14 | End: 2025-02-14

## 2025-02-14 RX ORDER — MAGNESIUM SULFATE HEPTAHYDRATE 500 MG/ML
INJECTION, SOLUTION INTRAMUSCULAR; INTRAVENOUS AS NEEDED
Status: DISCONTINUED | OUTPATIENT
Start: 2025-02-14 | End: 2025-02-14

## 2025-02-14 RX ORDER — ACYCLOVIR 800 MG/1
800 TABLET ORAL DAILY
Status: DISCONTINUED | OUTPATIENT
Start: 2025-02-15 | End: 2025-02-22 | Stop reason: HOSPADM

## 2025-02-14 RX ORDER — MAGNESIUM SULFATE HEPTAHYDRATE 40 MG/ML
4 INJECTION, SOLUTION INTRAVENOUS EVERY 6 HOURS PRN
Status: DISCONTINUED | OUTPATIENT
Start: 2025-02-14 | End: 2025-02-18

## 2025-02-14 RX ORDER — VANCOMYCIN HYDROCHLORIDE 1 G/20ML
INJECTION, POWDER, LYOPHILIZED, FOR SOLUTION INTRAVENOUS AS NEEDED
Status: DISCONTINUED | OUTPATIENT
Start: 2025-02-14 | End: 2025-02-14 | Stop reason: HOSPADM

## 2025-02-14 RX ORDER — NOREPINEPHRINE BITARTRATE/D5W 8 MG/250ML
0-1 PLASTIC BAG, INJECTION (ML) INTRAVENOUS CONTINUOUS
Status: DISCONTINUED | OUTPATIENT
Start: 2025-02-14 | End: 2025-02-15

## 2025-02-14 RX ORDER — CALCIUM CHLORIDE INJECTION 100 MG/ML
INJECTION, SOLUTION INTRAVENOUS AS NEEDED
Status: DISCONTINUED | OUTPATIENT
Start: 2025-02-14 | End: 2025-02-14

## 2025-02-14 RX ORDER — FENTANYL CITRATE 50 UG/ML
INJECTION, SOLUTION INTRAMUSCULAR; INTRAVENOUS AS NEEDED
Status: DISCONTINUED | OUTPATIENT
Start: 2025-02-14 | End: 2025-02-14

## 2025-02-14 RX ORDER — AMIODARONE HYDROCHLORIDE 150 MG/3ML
INJECTION, SOLUTION INTRAVENOUS AS NEEDED
Status: DISCONTINUED | OUTPATIENT
Start: 2025-02-14 | End: 2025-02-14

## 2025-02-14 RX ORDER — NAPROXEN SODIUM 220 MG/1
81 TABLET, FILM COATED ORAL DAILY
Status: DISCONTINUED | OUTPATIENT
Start: 2025-02-14 | End: 2025-02-22 | Stop reason: HOSPADM

## 2025-02-14 RX ORDER — METHOCARBAMOL 100 MG/ML
1000 INJECTION, SOLUTION INTRAMUSCULAR; INTRAVENOUS ONCE
Status: COMPLETED | OUTPATIENT
Start: 2025-02-14 | End: 2025-02-14

## 2025-02-14 RX ORDER — ESMOLOL HYDROCHLORIDE 10 MG/ML
INJECTION INTRAVENOUS AS NEEDED
Status: DISCONTINUED | OUTPATIENT
Start: 2025-02-14 | End: 2025-02-14

## 2025-02-14 RX ORDER — POLYETHYLENE GLYCOL 3350 17 G/17G
17 POWDER, FOR SOLUTION ORAL DAILY
Status: DISCONTINUED | OUTPATIENT
Start: 2025-02-14 | End: 2025-02-22 | Stop reason: HOSPADM

## 2025-02-14 RX ORDER — ONDANSETRON HYDROCHLORIDE 2 MG/ML
INJECTION, SOLUTION INTRAVENOUS AS NEEDED
Status: DISCONTINUED | OUTPATIENT
Start: 2025-02-14 | End: 2025-02-14

## 2025-02-14 RX ORDER — CEFAZOLIN 1 G/1
INJECTION, POWDER, FOR SOLUTION INTRAVENOUS AS NEEDED
Status: DISCONTINUED | OUTPATIENT
Start: 2025-02-14 | End: 2025-02-14

## 2025-02-14 RX ADMIN — HEPARIN SODIUM 29000 UNITS: 1000 INJECTION INTRAVENOUS; SUBCUTANEOUS at 09:09

## 2025-02-14 RX ADMIN — NOREPINEPHRINE BITARTRATE 8 MCG: 1 INJECTION, SOLUTION, CONCENTRATE INTRAVENOUS at 10:35

## 2025-02-14 RX ADMIN — SODIUM CHLORIDE, POTASSIUM CHLORIDE, SODIUM LACTATE AND CALCIUM CHLORIDE 10 ML/HR: 600; 310; 30; 20 INJECTION, SOLUTION INTRAVENOUS at 13:15

## 2025-02-14 RX ADMIN — CALCIUM CHLORIDE 0.75 G: 100 INJECTION, SOLUTION INTRAVENOUS at 11:42

## 2025-02-14 RX ADMIN — Medication 10 MG: at 09:29

## 2025-02-14 RX ADMIN — CEFAZOLIN 2 G: 1 INJECTION, POWDER, FOR SOLUTION INTRAMUSCULAR; INTRAVENOUS at 08:29

## 2025-02-14 RX ADMIN — CEFAZOLIN SODIUM 2 G: 2 INJECTION, SOLUTION INTRAVENOUS at 20:30

## 2025-02-14 RX ADMIN — AMIODARONE HYDROCHLORIDE 1 MG/MIN: 1.8 INJECTION, SOLUTION INTRAVENOUS at 12:54

## 2025-02-14 RX ADMIN — DEXMEDETOMIDINE HYDROCHLORIDE 0.3 MCG/KG/HR: 4 INJECTION, SOLUTION INTRAVENOUS at 07:20

## 2025-02-14 RX ADMIN — HYDROMORPHONE HYDROCHLORIDE 0.2 MG: 0.2 INJECTION, SOLUTION INTRAMUSCULAR; INTRAVENOUS; SUBCUTANEOUS at 13:27

## 2025-02-14 RX ADMIN — INSULIN LISPRO 10 UNITS: 100 INJECTION, SOLUTION INTRAVENOUS; SUBCUTANEOUS at 17:16

## 2025-02-14 RX ADMIN — HYDROMORPHONE HYDROCHLORIDE 0.2 MG: 0.2 INJECTION, SOLUTION INTRAMUSCULAR; INTRAVENOUS; SUBCUTANEOUS at 16:25

## 2025-02-14 RX ADMIN — ACETAMINOPHEN 1000 MG: 10 INJECTION INTRAVENOUS at 14:18

## 2025-02-14 RX ADMIN — HYDROMORPHONE HYDROCHLORIDE 0.2 MG: 0.2 INJECTION, SOLUTION INTRAMUSCULAR; INTRAVENOUS; SUBCUTANEOUS at 16:06

## 2025-02-14 RX ADMIN — TRANEXAMIC ACID 1068 MG: 100 INJECTION INTRAVENOUS at 08:10

## 2025-02-14 RX ADMIN — ALBUMIN HUMAN 250 ML: 0.05 INJECTION, SOLUTION INTRAVENOUS at 10:50

## 2025-02-14 RX ADMIN — SUGAMMADEX 200 MG: 100 INJECTION, SOLUTION INTRAVENOUS at 12:04

## 2025-02-14 RX ADMIN — SUGAMMADEX 400 MG: 100 INJECTION, SOLUTION INTRAVENOUS at 11:17

## 2025-02-14 RX ADMIN — ROCURONIUM BROMIDE 100 MG: 10 INJECTION INTRAVENOUS at 07:42

## 2025-02-14 RX ADMIN — ACETAMINOPHEN 650 MG: 325 TABLET ORAL at 17:45

## 2025-02-14 RX ADMIN — SODIUM CHLORIDE, SODIUM GLUCONATE, SODIUM ACETATE, POTASSIUM CHLORIDE AND MAGNESIUM CHLORIDE: 526; 502; 368; 37; 30 INJECTION, SOLUTION INTRAVENOUS at 08:32

## 2025-02-14 RX ADMIN — FENTANYL CITRATE 100 MCG: 50 INJECTION, SOLUTION INTRAMUSCULAR; INTRAVENOUS at 08:37

## 2025-02-14 RX ADMIN — ROCURONIUM BROMIDE 30 MG: 10 INJECTION INTRAVENOUS at 08:31

## 2025-02-14 RX ADMIN — ESMOLOL HYDROCHLORIDE 30 MG: 100 INJECTION, SOLUTION INTRAVENOUS at 07:49

## 2025-02-14 RX ADMIN — NOREPINEPHRINE BITARTRATE 8 MCG: 1 INJECTION, SOLUTION, CONCENTRATE INTRAVENOUS at 08:56

## 2025-02-14 RX ADMIN — ASPIRIN 150 MG: 300 SUPPOSITORY RECTAL at 12:54

## 2025-02-14 RX ADMIN — SODIUM CHLORIDE, POTASSIUM CHLORIDE, SODIUM LACTATE AND CALCIUM CHLORIDE 5 ML/HR: 600; 310; 30; 20 INJECTION, SOLUTION INTRAVENOUS at 20:39

## 2025-02-14 RX ADMIN — FENTANYL CITRATE 100 MCG: 50 INJECTION, SOLUTION INTRAMUSCULAR; INTRAVENOUS at 08:34

## 2025-02-14 RX ADMIN — CALCIUM CHLORIDE 0.2 G: 100 INJECTION, SOLUTION INTRAVENOUS at 10:28

## 2025-02-14 RX ADMIN — METHOCARBAMOL 1000 MG: 1000 INJECTION, SOLUTION INTRAMUSCULAR; INTRAVENOUS at 14:42

## 2025-02-14 RX ADMIN — HEPARIN SODIUM 5000 UNITS: 1000 INJECTION, SOLUTION INTRAVENOUS; SUBCUTANEOUS at 10:06

## 2025-02-14 RX ADMIN — PROTAMINE SULFATE 300 MG: 10 INJECTION, SOLUTION INTRAVENOUS at 10:38

## 2025-02-14 RX ADMIN — MAGNESIUM SULFATE HEPTAHYDRATE 2 G: 500 INJECTION, SOLUTION INTRAMUSCULAR; INTRAVENOUS at 10:17

## 2025-02-14 RX ADMIN — SODIUM CHLORIDE, SODIUM GLUCONATE, SODIUM ACETATE, POTASSIUM CHLORIDE AND MAGNESIUM CHLORIDE: 526; 502; 368; 37; 30 INJECTION, SOLUTION INTRAVENOUS at 07:14

## 2025-02-14 RX ADMIN — SODIUM CHLORIDE, POTASSIUM CHLORIDE, SODIUM LACTATE AND CALCIUM CHLORIDE 5 ML/HR: 600; 310; 30; 20 INJECTION, SOLUTION INTRAVENOUS at 13:14

## 2025-02-14 RX ADMIN — MIDAZOLAM 1 MG: 1 INJECTION INTRAMUSCULAR; INTRAVENOUS at 07:26

## 2025-02-14 RX ADMIN — SODIUM CHLORIDE, POTASSIUM CHLORIDE, SODIUM LACTATE AND CALCIUM CHLORIDE 500 ML: 600; 310; 30; 20 INJECTION, SOLUTION INTRAVENOUS at 16:28

## 2025-02-14 RX ADMIN — ESMOLOL HYDROCHLORIDE 30 MG: 100 INJECTION, SOLUTION INTRAVENOUS at 07:41

## 2025-02-14 RX ADMIN — Medication 0.02 MCG/KG/MIN: at 08:17

## 2025-02-14 RX ADMIN — OXYCODONE HYDROCHLORIDE 10 MG: 10 TABLET ORAL at 20:30

## 2025-02-14 RX ADMIN — FENTANYL CITRATE 250 MCG: 50 INJECTION, SOLUTION INTRAMUSCULAR; INTRAVENOUS at 07:41

## 2025-02-14 RX ADMIN — AMIODARONE HYDROCHLORIDE 0.5 MG/MIN: 1.8 INJECTION, SOLUTION INTRAVENOUS at 18:15

## 2025-02-14 RX ADMIN — HYDROMORPHONE HYDROCHLORIDE 0.2 MG: 0.2 INJECTION, SOLUTION INTRAMUSCULAR; INTRAVENOUS; SUBCUTANEOUS at 15:13

## 2025-02-14 RX ADMIN — HYDROMORPHONE HYDROCHLORIDE 0.2 MG: 0.2 INJECTION, SOLUTION INTRAMUSCULAR; INTRAVENOUS; SUBCUTANEOUS at 13:06

## 2025-02-14 RX ADMIN — CEFAZOLIN 2 G: 1 INJECTION, POWDER, FOR SOLUTION INTRAMUSCULAR; INTRAVENOUS at 12:26

## 2025-02-14 RX ADMIN — POTASSIUM CHLORIDE 20 MEQ: 14.9 INJECTION, SOLUTION INTRAVENOUS at 15:09

## 2025-02-14 RX ADMIN — MIDAZOLAM 1 MG: 1 INJECTION INTRAMUSCULAR; INTRAVENOUS at 07:22

## 2025-02-14 RX ADMIN — AMIODARONE HYDROCHLORIDE 150 MG: 50 INJECTION, SOLUTION INTRAVENOUS at 10:20

## 2025-02-14 RX ADMIN — SENNOSIDES AND DOCUSATE SODIUM 2 TABLET: 50; 8.6 TABLET ORAL at 20:30

## 2025-02-14 RX ADMIN — NOREPINEPHRINE BITARTRATE 0.02 MCG/KG/MIN: 8 INJECTION, SOLUTION INTRAVENOUS at 15:09

## 2025-02-14 RX ADMIN — ACETAMINOPHEN 1000 MG: 10 INJECTION, SOLUTION INTRAVENOUS at 14:18

## 2025-02-14 RX ADMIN — SODIUM CHLORIDE, POTASSIUM CHLORIDE, SODIUM LACTATE AND CALCIUM CHLORIDE 500 ML: 600; 310; 30; 20 INJECTION, SOLUTION INTRAVENOUS at 17:36

## 2025-02-14 RX ADMIN — INSULIN LISPRO 5 UNITS: 100 INJECTION, SOLUTION INTRAVENOUS; SUBCUTANEOUS at 13:16

## 2025-02-14 RX ADMIN — ONDANSETRON 4 MG: 2 INJECTION, SOLUTION INTRAMUSCULAR; INTRAVENOUS at 11:28

## 2025-02-14 RX ADMIN — LIDOCAINE HYDROCHLORIDE 60 MG: 20 INJECTION INTRAVENOUS at 07:41

## 2025-02-14 RX ADMIN — PROPOFOL 60 MG: 10 INJECTION, EMULSION INTRAVENOUS at 07:41

## 2025-02-14 RX ADMIN — AMIODARONE HYDROCHLORIDE 0.5 MG/MIN: 1.8 INJECTION, SOLUTION INTRAVENOUS at 17:56

## 2025-02-14 SDOH — SOCIAL STABILITY: SOCIAL INSECURITY: WITHIN THE LAST YEAR, HAVE YOU BEEN HUMILIATED OR EMOTIONALLY ABUSED IN OTHER WAYS BY YOUR PARTNER OR EX-PARTNER?: NO

## 2025-02-14 SDOH — SOCIAL STABILITY: SOCIAL INSECURITY
WITHIN THE LAST YEAR, HAVE YOU BEEN RAPED OR FORCED TO HAVE ANY KIND OF SEXUAL ACTIVITY BY YOUR PARTNER OR EX-PARTNER?: NO

## 2025-02-14 SDOH — ECONOMIC STABILITY: HOUSING INSECURITY: IN THE LAST 12 MONTHS, WAS THERE A TIME WHEN YOU WERE NOT ABLE TO PAY THE MORTGAGE OR RENT ON TIME?: NO

## 2025-02-14 SDOH — SOCIAL STABILITY: SOCIAL INSECURITY: HAS ANYONE EVER THREATENED TO HURT YOUR FAMILY OR YOUR PETS?: NO

## 2025-02-14 SDOH — ECONOMIC STABILITY: HOUSING INSECURITY: AT ANY TIME IN THE PAST 12 MONTHS, WERE YOU HOMELESS OR LIVING IN A SHELTER (INCLUDING NOW)?: NO

## 2025-02-14 SDOH — ECONOMIC STABILITY: FOOD INSECURITY: WITHIN THE PAST 12 MONTHS, YOU WORRIED THAT YOUR FOOD WOULD RUN OUT BEFORE YOU GOT THE MONEY TO BUY MORE.: NEVER TRUE

## 2025-02-14 SDOH — SOCIAL STABILITY: SOCIAL INSECURITY: WITHIN THE LAST YEAR, HAVE YOU BEEN AFRAID OF YOUR PARTNER OR EX-PARTNER?: NO

## 2025-02-14 SDOH — SOCIAL STABILITY: SOCIAL INSECURITY: ABUSE: ADULT

## 2025-02-14 SDOH — ECONOMIC STABILITY: INCOME INSECURITY: IN THE PAST 12 MONTHS HAS THE ELECTRIC, GAS, OIL, OR WATER COMPANY THREATENED TO SHUT OFF SERVICES IN YOUR HOME?: NO

## 2025-02-14 SDOH — SOCIAL STABILITY: SOCIAL INSECURITY
WITHIN THE LAST YEAR, HAVE YOU BEEN KICKED, HIT, SLAPPED, OR OTHERWISE PHYSICALLY HURT BY YOUR PARTNER OR EX-PARTNER?: NO

## 2025-02-14 SDOH — ECONOMIC STABILITY: FOOD INSECURITY: HOW HARD IS IT FOR YOU TO PAY FOR THE VERY BASICS LIKE FOOD, HOUSING, MEDICAL CARE, AND HEATING?: NOT HARD AT ALL

## 2025-02-14 SDOH — SOCIAL STABILITY: SOCIAL INSECURITY: DOES ANYONE TRY TO KEEP YOU FROM HAVING/CONTACTING OTHER FRIENDS OR DOING THINGS OUTSIDE YOUR HOME?: NO

## 2025-02-14 SDOH — ECONOMIC STABILITY: FOOD INSECURITY: WITHIN THE PAST 12 MONTHS, THE FOOD YOU BOUGHT JUST DIDN'T LAST AND YOU DIDN'T HAVE MONEY TO GET MORE.: NEVER TRUE

## 2025-02-14 SDOH — SOCIAL STABILITY: SOCIAL INSECURITY: ARE YOU OR HAVE YOU BEEN THREATENED OR ABUSED PHYSICALLY, EMOTIONALLY, OR SEXUALLY BY ANYONE?: NO

## 2025-02-14 SDOH — SOCIAL STABILITY: SOCIAL INSECURITY: DO YOU FEEL UNSAFE GOING BACK TO THE PLACE WHERE YOU ARE LIVING?: NO

## 2025-02-14 SDOH — SOCIAL STABILITY: SOCIAL INSECURITY: HAVE YOU HAD THOUGHTS OF HARMING ANYONE ELSE?: NO

## 2025-02-14 SDOH — SOCIAL STABILITY: SOCIAL INSECURITY: WERE YOU ABLE TO COMPLETE ALL THE BEHAVIORAL HEALTH SCREENINGS?: YES

## 2025-02-14 SDOH — ECONOMIC STABILITY: HOUSING INSECURITY: IN THE PAST 12 MONTHS, HOW MANY TIMES HAVE YOU MOVED WHERE YOU WERE LIVING?: 0

## 2025-02-14 SDOH — SOCIAL STABILITY: SOCIAL INSECURITY: ARE THERE ANY APPARENT SIGNS OF INJURIES/BEHAVIORS THAT COULD BE RELATED TO ABUSE/NEGLECT?: NO

## 2025-02-14 SDOH — ECONOMIC STABILITY: TRANSPORTATION INSECURITY: IN THE PAST 12 MONTHS, HAS LACK OF TRANSPORTATION KEPT YOU FROM MEDICAL APPOINTMENTS OR FROM GETTING MEDICATIONS?: NO

## 2025-02-14 SDOH — SOCIAL STABILITY: SOCIAL INSECURITY: DO YOU FEEL ANYONE HAS EXPLOITED OR TAKEN ADVANTAGE OF YOU FINANCIALLY OR OF YOUR PERSONAL PROPERTY?: NO

## 2025-02-14 SDOH — SOCIAL STABILITY: SOCIAL INSECURITY: HAVE YOU HAD ANY THOUGHTS OF HARMING ANYONE ELSE?: NO

## 2025-02-14 ASSESSMENT — PAIN DESCRIPTION - LOCATION
LOCATION: CHEST

## 2025-02-14 ASSESSMENT — COGNITIVE AND FUNCTIONAL STATUS - GENERAL
MOVING TO AND FROM BED TO CHAIR: A LITTLE
PATIENT BASELINE BEDBOUND: NO
DRESSING REGULAR LOWER BODY CLOTHING: A LITTLE
HELP NEEDED FOR BATHING: A LITTLE
PERSONAL GROOMING: A LITTLE
TOILETING: A LITTLE
WALKING IN HOSPITAL ROOM: A LITTLE
MOBILITY SCORE: 18
DAILY ACTIVITIY SCORE: 19
TURNING FROM BACK TO SIDE WHILE IN FLAT BAD: A LITTLE
CLIMB 3 TO 5 STEPS WITH RAILING: A LITTLE
STANDING UP FROM CHAIR USING ARMS: A LITTLE
MOVING FROM LYING ON BACK TO SITTING ON SIDE OF FLAT BED WITH BEDRAILS: A LITTLE
DRESSING REGULAR UPPER BODY CLOTHING: A LITTLE

## 2025-02-14 ASSESSMENT — PAIN SCALES - GENERAL
PAINLEVEL_OUTOF10: 7
PAINLEVEL_OUTOF10: 8
PAINLEVEL_OUTOF10: 7
PAINLEVEL_OUTOF10: 0 - NO PAIN
PAINLEVEL_OUTOF10: 3
PAINLEVEL_OUTOF10: 8
PAINLEVEL_OUTOF10: 7
PAINLEVEL_OUTOF10: 3
PAINLEVEL_OUTOF10: 10 - WORST POSSIBLE PAIN

## 2025-02-14 ASSESSMENT — PAIN DESCRIPTION - ORIENTATION
ORIENTATION: MID

## 2025-02-14 ASSESSMENT — ACTIVITIES OF DAILY LIVING (ADL)
DRESSING YOURSELF: INDEPENDENT
FEEDING YOURSELF: INDEPENDENT
TOILETING: INDEPENDENT
PATIENT'S MEMORY ADEQUATE TO SAFELY COMPLETE DAILY ACTIVITIES?: YES
JUDGMENT_ADEQUATE_SAFELY_COMPLETE_DAILY_ACTIVITIES: YES
HEARING - LEFT EAR: FUNCTIONAL
WALKS IN HOME: INDEPENDENT
HEARING - RIGHT EAR: FUNCTIONAL
BATHING: INDEPENDENT
LACK_OF_TRANSPORTATION: NO
GROOMING: INDEPENDENT
ADEQUATE_TO_COMPLETE_ADL: YES

## 2025-02-14 ASSESSMENT — PAIN - FUNCTIONAL ASSESSMENT
PAIN_FUNCTIONAL_ASSESSMENT: CPOT (CRITICAL CARE PAIN OBSERVATION TOOL)
PAIN_FUNCTIONAL_ASSESSMENT: 0-10

## 2025-02-14 ASSESSMENT — LIFESTYLE VARIABLES
HOW MANY STANDARD DRINKS CONTAINING ALCOHOL DO YOU HAVE ON A TYPICAL DAY: 1 OR 2
AUDIT-C TOTAL SCORE: 4
AUDIT-C TOTAL SCORE: 4
HOW OFTEN DO YOU HAVE 6 OR MORE DRINKS ON ONE OCCASION: NEVER
SKIP TO QUESTIONS 9-10: 1
HOW OFTEN DO YOU HAVE A DRINK CONTAINING ALCOHOL: 4 OR MORE TIMES A WEEK

## 2025-02-14 ASSESSMENT — PATIENT HEALTH QUESTIONNAIRE - PHQ9
SUM OF ALL RESPONSES TO PHQ9 QUESTIONS 1 & 2: 0
2. FEELING DOWN, DEPRESSED OR HOPELESS: NOT AT ALL
1. LITTLE INTEREST OR PLEASURE IN DOING THINGS: NOT AT ALL

## 2025-02-14 NOTE — PROGRESS NOTES
1/1 CTICU    CT SURGERY  02/14 CABG x 2, MAZE PROCEDURE, PATSY CLIP, ablation     DC PLAN  TBD - Care Transitions is following to develop a safe & supportive discharge plan in collaboration with multidisciplinary team (&) patient/family/significant others.      PAYOR   Medicare A/B     PT/OT   ( ) awaiting    COMPLETED  (X) 02/14 - New admit - EPIC reviewed.     Constance Tracy (LSW, MSW)

## 2025-02-14 NOTE — ANESTHESIA PROCEDURE NOTES
Arterial Line:    Date/Time: 2/14/2025 7:36 AM    Staffing  Performed: CAA and NEVILLE   Authorized by: Tray Spencer MD    Performed by: LEIDY Mac    An arterial line was placed. Procedure performed using ultrasound guidance.in the OR for the following indication(s): continuous blood pressure monitoring and blood sampling needed.    A 20 gauge (size), 12 cm (length), Angiocath (type) catheter was placed into the Right brachial artery, secured by suture,   Seldinger technique used.  Events:  patient tolerated procedure well with no complications.

## 2025-02-14 NOTE — ANESTHESIA PROCEDURE NOTES
Airway  Date/Time: 2/14/2025 7:44 AM  Urgency: elective    Airway not difficult    Staffing  Performed: CAA and NEVILLE   Authorized by: Tray Spencer MD    Performed by: LEIDY Mac  Patient location during procedure: OR    Indications and Patient Condition  Indications for airway management: anesthesia and airway protection  Sedation level: deep  Preoxygenated: yes  Patient position: sniffing  MILS not maintained throughout  Mask difficulty assessment: 1 - vent by mask    Final Airway Details  Final airway type: endotracheal airway      Successful airway: ETT  Cuffed: yes   Successful intubation technique: direct laryngoscopy  Facilitating devices/methods: intubating stylet  Endotracheal tube insertion site: oral  Blade: Daniel  Blade size: #3  ETT size (mm): 7.0  Cormack-Lehane Classification: grade I - full view of glottis  Placement verified by: chest auscultation and capnometry   Measured from: lips  ETT to lips (cm): 21  Number of attempts at approach: 1

## 2025-02-14 NOTE — ANESTHESIA PROCEDURE NOTES
Peripheral IV  Date/Time: 2/14/2025 7:45 AM      Placement  Needle size: 16 G  Laterality: right  Location: hand  Site prep: alcohol  Technique: anatomical landmarks  Attempts: 1

## 2025-02-14 NOTE — H&P
CTICU History & Physical    Subjective   HPI:  Patient is a 74 y.o female with a pmh of breast cancer, atrial fibrillation, ascending aorta dilation, stable angina, hypertension, hyperlipidemia presenting to the CTICU for elective CABG . Patient previously stated dyspnea and exertional chest pressure/discomfort. Patient is now s/p CABG x 2, LIMA - LAD, SVG-OM, MAZE PROCEDURE, PATSY CLIP, ablation     Cardiac Testing:     TTE:  2/12:   1. Left ventricular ejection fraction is normal, by visual estimate at 55-60%.   2. There is normal right ventricular global systolic function.   3. The left atrial size is moderately dilated.   4. Aortic valve sclerosis.    [facility]:  [facility] 1/27/25  CONCLUSIONS:   1. After informed consent obtained with the conscious sedation. Patient had diagnostic catheterization done via right femoral artery using 6 [place] sheath. Finding as follow.      Left main appears to be fair size has no critical lesion.      Left anterior descending artery has ostial about 50% plaque seen. Mid LAD also has another long diffuse 90% lesion seen. Involved small diagonal branch.      Left circumflex artery appears to be codominant vessel has proximal area about 90% focal eccentric lesion seen. CAROL ANN flow 2 seen both distal LAD as well as a left circumflex artery.      Right coronary appears to be large caliber vessel and codominant vessel has no critical lesion.      Left ventriculogram done show ejection fraction by 50%. Aortic pressure was about 138/80 and LVEDP is about 10 mm per mercury.      Patient with essentially critical two-vessel coronary disease including an LAD as well as a left circumflex artery. Probably candidate for CABG.      Sheath was removed and Angio-Seal applied to right groin. Patient left the Cath Lab in stable condition.      CT surgery was consulted.     Procedure/Surgeon: Dr. Mc steele   Frontliner/Anesthesia: Dr Spencer     OR Course/Issues: None      CPB time: 72 min  Cross clamp time: 51  min   Circ arrest time: No  Echo Pre/Post:   -Pre: Afib, LV normal, trachAI, mild MR, Asc Aor 3.9, Rvnormal   - Post: Lv noraml, trace AI, mild MR, RV normal  Chest Tubes/Drains: left pleural, mediastinal    Temporary wires location/setting: AAI @ 80       Fluids  Crystalloid: 2.5L  Colloid: 1L  Cellsaver: 655  Products: none  EBL: 250  UOP: 205     Anesthesia  Intubation: Munoz 3   Intravenous Access: RIJm R brachial, 2 PIV   AICD: None  PPM: None  Regional anesthesia: PIF  Benzodiazepine dose/last administration: 2mg midazolam total  Opioid dose/last administration: 450mcg fentanyl total  NMB dose/last administration: 130mg rocuronium total  TOF/ reversal given: Sugammadex 1130  Antibiotic time: 829  Temperature on admission to ICU: 36    Past Medical History:   Diagnosis Date    Abnormal findings on diagnostic imaging of heart and coronary circulation 10/22/2023    Aneurysm (CMS-HCC) 10/23/2024    Arthritis of left acromioclavicular joint 02/15/2024    Ascending aorta dilatation (CMS-HCC) 11/22/2023    Atrial fibrillation (Multi) 10/23/2023    Biceps tendinitis of left shoulder 02/15/2024    Breast cancer (Multi) 2013    Cancer (Multi) 2013    Cataract     s/p extraction    Congenital dilatation of aorta ([facility]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    Edema of both lower extremities 01/13/2024    Essential hypertension 10/06/2023    Hx antineoplastic chemo     Hyperlipidemia 10/06/2023    Hypertension 10/06/2023    Labral tear of long head of biceps tendon, right, initial encounter 02/15/2024    Parathyroid disease (Multi)     s/p paratcyoiectomy)    Peripheral neuropathy     feet, bilatera    Personal history of irradiation     Personal history of other diseases of the musculoskeletal system and connective tissue 10/06/2023    History of arthritis    Primary osteoarthritis of both shoulders 02/15/2024    Shortness of breath 10/23/2023    Sleep apnea     CPAP     Past Surgical History:   Procedure Laterality Date    BI MAMMO GUIDED  BREAST LEFT LOCALIZATION Left 2013    BI MAMMO GUIDED LOCALIZATION BREAST LEFT [facility]    BREAST BIOPSY      BREAST LUMPECTOMY Left     CARDIAC CATHETERIZATION N/A 2025    Procedure: Left Heart Cath with Coronary Angiography and LV;  Surgeon: Paulo Dougherty MD;  Location: [facility] Cardiac Cath Lab;  Service: Cardiovascular;  Laterality: N/A;  no prior auth needed    CATARACT EXTRACTION Bilateral     HYSTERECTOMY      MAMMOGRAM DIAGNOSTIC BI Bilateral 2024    OTHER SURGICAL HISTORY  2022    Lumpectomy    US COMPLETE BREAST Left 2024     Medications Prior to Admission   Medication Sig Dispense Refill Last Dose/Taking    acyclovir (Zovirax) 800 mg tablet TAKE 1 TABLET BY MOUTH EVERY DAY 90 tablet 1 2025    atorvastatin (Lipitor) 10 mg tablet TAKE 1 TABLET BY MOUTH EVERY DAY 90 tablet 3 2025 at  4:00 AM    bisoprolol (Zebeta) 10 mg tablet Take 1 tablet (10 mg) by mouth once daily. 90 tablet 3 2025 at  4:00 AM    chlorhexidine (Hibiclens) 4 % external liquid Apply topically once daily as needed for wound care. Use for 5 days prior to surgery as body wash, do not use on face or genital region 473 mL 0 2025 Morning    cholecalciferol (VITAMIN D-3) 10 mcg (400 unit) tablet Take 1 capsule by mouth once daily.   Past Week    dilTIAZem ER (Tiazac) 240 mg 24 hr capsule Take 1 capsule (240 mg) by mouth once daily. 90 capsule 3 2025 at  4:00 AM    ferrous sulfate/ascorbate sod (FERROUS SULFATE-VITAMIN C ORAL) Take 1 tablet by mouth once daily.   Past Week    omega 3-dha-epa-fish oil (Fish OiL) 1,200 (144-216) mg capsule 1.5 capsules Orally   Past Week    vit B complex no.12/niacin,B3, (VITAMIN B COMPLEX NO.12-NIACIN ORAL) Take by mouth.  Vitamin B 12 TABS   Past Week    [] amoxicillin-pot clavulanate (Augmentin) 875-125 mg tablet Take 1 tablet (875 mg) by mouth 2 times a day for 7 days. (Patient not taking: Reported on 2025) 14 tablet 0 Not Taking     apixaban (Eliquis) 5 mg tablet Take 1 tablet (5 mg) by mouth 2 times a day. 60 tablet 11 2/10/2025 Morning    [] chlorhexidine (Peridex) 0.12 % solution Use 15 mL in the mouth or throat if needed for wound care for up to 2 days. Use as mouth wash for night before and morning of surgery 120 mL 0      Patient has no known allergies.  Social History     Tobacco Use    Smoking status: Never     Passive exposure: Never    Smokeless tobacco: Never   Vaping Use    Vaping status: Never Used   Substance Use Topics    Alcohol use: Yes     Alcohol/week: 7.0 standard drinks of alcohol     Types: 7 Glasses of wine per week     Comment: once a day    Drug use: Never     Family History   Problem Relation Name Age of Onset    Stroke Mother Christi Nair     Arthritis Mother Christi Nair     COPD Mother Christi Nair     Stroke Father Liborio Nair     Aneurysm Father Liborio Nair     Hypertension Father Liborio Nair        Objective   Vitals:  Most Recent:  Vitals:    25 0634   BP: 137/86   Pulse: 104   Resp: 16   Temp: 36.2 °C (97.2 °F)   SpO2: 97%       24hr Min/Max:  Temp  Min: 36.2 °C (97.2 °F)  Max: 36.2 °C (97.2 °F)  Pulse  Min: 104  Max: 104  BP  Min: 137/86  Max: 137/86  Resp  Min: 16  Max: 16  SpO2  Min: 97 %  Max: 97 %    I/O:  No intake/output data recorded.    LDA:  CVC 25 Double lumen Right Internal jugular (Active)   Placement Date/Time: 25 (c) 0800   Hand Hygiene Performed Prior to CVC Insertion: Yes  Site Prep: Chlorhexidine   Site Prep Agent has Completely Dried Before Insertion: Yes  All 5 Sterile Barriers Used (Gloves, Gown, Cap, Mask, Large Sterile Mervat...   Number of days: 0       Arterial Line 25 Right Brachial (Active)   Placement Date/Time: 25 (c) 0716   Size: 20 G  Orientation: Right  Location: Brachial  Securement Method: Sutured  Patient Tolerance: Tolerated well   Number of days: 0       ETT  7 mm (Active)   Placement Date/Time: 25 (c) 0796   Mask Ventilation: Vent by mask   Technique: Direct laryngoscopy  ETT Type: ETT - single  Single Lumen Tube Size: 7 mm  Cuffed: Yes  Laryngoscope: Daniel  Blade Size: 3  Location: Oral  Grade View: Full view o...   Number of days: 0       Urethral Catheter Temperature probe (Active)   Placement Date/Time: 02/14/25 0750   Placed by: NAZANIN TOBIAS  Hand Hygiene Completed: Yes  Catheter Type: Temperature probe  Catheter Balloon Size: 10 mL  Urine Returned: Yes   Number of days: 0       Physical Exam:   Physical Exam  Constitutional:       Appearance: Normal appearance.   HENT:      Head: Normocephalic and atraumatic.   Eyes:      Extraocular Movements: Extraocular movements intact.   Cardiovascular:      Rate and Rhythm: Normal rate and regular rhythm.   Pulmonary:      Effort: Pulmonary effort is normal.   Abdominal:      General: Abdomen is flat.      Palpations: Abdomen is soft.   Musculoskeletal:         General: Normal range of motion.      Cervical back: Normal range of motion.   Skin:     General: Skin is warm.      Comments: Bandaged incisional midsternal scar  CT mediastinal: 65 cc  CT l pleural 40 cc   Neurological:      General: No focal deficit present.      Mental Status: She is alert.      Comments: Follow commands            Lab Review:  Results from last 7 days   Lab Units 02/07/25  1114   WBC AUTO x10*3/uL 11.4*   HEMOGLOBIN g/dL 16.4*   HEMATOCRIT % 48.9*   PLATELETS AUTO x10*3/uL 214     Results from last 7 days   Lab Units 02/07/25  1114   SODIUM mmol/L 137   POTASSIUM mmol/L 4.2   CHLORIDE mmol/L 100   CO2 mmol/L 26   BUN mg/dL 25*   CREATININE mg/dL 0.69   CALCIUM mg/dL 9.6   PROTEIN TOTAL g/dL 6.4   BILIRUBIN TOTAL mg/dL 0.8   ALK PHOS U/L 88   ALT U/L 49*   AST U/L 22   GLUCOSE mg/dL 107*         Results from last 7 days   Lab Units 02/14/25  0740   POCT PH, ARTERIAL pH 7.40   POCT PCO2, ARTERIAL mm Hg 38   POCT PO2, ARTERIAL mm Hg 98*   POCT HCO3 CALCULATED, ARTERIAL mmol/L 23.5   POCT BASE EXCESS, ARTERIAL mmol/L -1.0        Most recent labs and imaging reviewed.    Daily Risk Screen  Intubated:  Central line:  Troy:    Assessment/Plan     Patient is a 74 y.o female with a pmh of breast cancer, atrial fibrillation, ascending aorta dilation, stable angina, hypertension, hyperlipidemia presenting to the CTICU for elective CABG . Patient previously stated dyspnea and exertional chest pressure/discomfort. Patient is now s/p CABG x 2, LIMA - LAD, SVG-OM, MAZE PROCEDURE, PATSY CLIP, ablation      Plan:  NEURO:    - Serial neuro and pain assessments   - Scheduled Tylenol   - PRN oxycodone  - PRN dilaudid for pain   - PT Consult, OOB to chair as tolerated, chair position if not tolerated   - CAM ICU score qshift  - Sleep/wake cycle hygiene     CV:  Patient has a history of atrial fibrillation,CAD  Is now status post CABG x 2, LIMA - LAD, SVG-OM, MAZE PROCEDURE, PATSY CLIP, ablation  Pre/Post EF: Normal Arrived to CTICU on 2/14 A/V epicardial wires set AAI @ 80.-->  - Maintain goal MAP >65  - Amiodarone drip for afib  - Mixed venous and CI Q4H  - Volume resuscitate as clinically indicated  - Maintain epicardial wires set AAI @ 80  - If CABG is 2/2 STEMI/unstable angina, will receive Plavix POD2  - Start statin  - Hold home eliquis, diltiazem, bisoprolol, lipitor      PULM:  No history of pulmonary disease.  Currently intubated on ventilator. Chest tubes l. Pleural, mediastinal.-->  - F/u post op CXR  - Currently on CPAP  - Wean FiO2 maintaining SpO2 >92%.   - IS q1h and OOB to chair when extubated  - Chest tubes to wall suction.    GI:   - NPO, will perform bedside swallow eval post extubation   - Colace/senna BID and miralax BID       :   No history of renal disease, baseline creatinine 0.6-0.8. Creatinine stable post-op. Troy in place and making adequate UOP. -->  - Continue troy catheter for strict I/Os.  - Goal UOP 0.5ml/kg/hr  - RFP as clinically indicated  - Replete electrolytes per CTICU protocol     ENDO:   - Maintain BG <180,  insulin per CTICU protocol     HEME:  Acute blood loss anemia and thrombocytopenia.-->    - Monitor drain output volume and characteristics  - CBC, coags, and fibrinogen post op and as clinically indicated  - Start ASA 6hrs post-op for CABG  - If CABG is 2/2 STEMI/unstable angina, will receive Plavix POD2  - SQH tomorrow   - SCDs for DVT prophylaxis.  - Last type and screen: 2/14     ID:  Afebrile, no current indications of infection. MRSA Negative.-->  - Trend temp q4h  - Periop cefazolin x 48hrs     Skin:  No active skin issues.  - preventative Mepilex dressings in place on sacrum and heels  - change preventative Mepilex weekly or more frequently as indicated (when moist/soiled)   - every shift skin assessment per nursing and weekly ICU skin rounds  - moisture barrier to be applied with antionette care  - active skin problems addressed with nursing on daily rounds     Proph:  SCDs  PPI     G:  Line  Right IJ MAC w Minimac placed 2/14  Right brachial a-line placed 2/14    F: Family: will update at bedside postoperatively.     A,B,C,D,E,F,G: reviewed    Kitty Anthony MD  Emergency Medicine, PGY-2       Dispo: CTICU care for now.    CTICU TEAM PHONE [phone]

## 2025-02-14 NOTE — ANESTHESIA PROCEDURE NOTES
Central Venous Line:    Date/Time: 2/14/2025 8:00 AM    A central venous line was placed in the OR for the following indication(s): central venous access and CVP monitoring.  Staffing  Performed: CAA and NEVILLE   Authorized by: Tray Spencer MD    Performed by: LEIDY Mac    Sterility preparation included the following: provider hand hygiene performed prior to central venous catheter insertion, all 5 sterile barriers used (gloves, gown, cap, mask, large sterile drape) during central venous catheter insertion, antiseptic used during central venous catheter insertion and skin prep agent completely dried prior to procedure.  The patient was placed in Trendelenburg position.    Right internal jugular vein was prepped.    The site was prepped with Chlorhexidine.  Size: 9 Fr   Length: 11.5  Number of Lumens: double lumen    This catheter was not an oximetric catheter.    During the procedure, the following specific steps were taken: target vein identified, needle advanced into vein and blood aspirated and guidewire advanced into vein.  Seldinger technique used.  Procedure performed using ultrasound guidance.  Sterile gel and probe cover used in ultrasound-guided central venous catheter insertion.    Intravenous verification was obtained by ultrasound, venous blood return and manometry.      Post insertion care included: all ports aspirated, all ports flushed easily, guidewire removed intact, Biopatch applied, line sutured in place and dressing applied.    During the procedure the patient experienced: patient tolerated procedure well with no complications.           images stored in chart           PT is aware and said he does treat osteoporsis

## 2025-02-14 NOTE — BRIEF OP NOTE
Date: 2025  OR Location: [facility] OR    Name: Radha Montalvo, : 1950, Age: 74 y.o., MRN: [id number], Sex: female    Diagnosis  Pre-op Diagnosis      * Coronary artery disease of native artery of native heart with stable angina pectoris [I25.118] Post-op Diagnosis     * Coronary artery disease of native artery of native heart with stable angina pectoris [I25.118]     Procedures  CABG (CORONARY ARTERY BYPASS GRAFT), X2 , LIMA - LAD, SVG-OM, MAZE PROCEDURE, PATSY CLIP  64717 - [place] CABG W/ARTERIAL GRAFT TWO ARTERIAL GRAFTS    CABG (CORONARY ARTERY BYPASS GRAFT), X2 , LIMA - LAD, SVG-OM, MAZE PROCEDURE, PATSY CLIP  77904 - OK CORONARY ARTERY BYP W/VEIN & ARTERY GRAFT 2 VEIN    Ablation A-Fib  53240 - OK ABLATION ARRHYTHMOGENIC FOCI/PATHWAY W/BYPASS  Median Sternotomy  CABGx2 (Lima-LAD, SVG-OM)  GP MAZE with [employer]compass Clamp and Cryo  PATSY Ligation with 40mm Atriclip  Posterior Percardial Window  ESVH R leg    Chest Tubes/Drains: 1 mediastinal CT, 1 L pleural CT       Temporary Pacing Wires: Atrial and Ventricular PMW    -Settings: AAI @ 84 bpm   -Underlying Rhythm:    Permanent pacer/ICD: No   -Preoperative settings:    -Intra-op/ Postoperative settings:    Sternotomy performed by: Dwayne Jay MD     Conduit Harvested by: Pancho HERNANDEZ, Agus HERNANDEZ    Sternal Wires placed by: Agus HERNANDEZ, Pancho HERNANDEZ    Arm/Leg/Groin Closure/Cutdown performed by: Randall HERNANDEZ    Cardio Pulmonary Bypass Time: 72 mins  Cross-clamp Time: 51 mins  Circulatory Arrest: No Time:     Is patient candidate for Emergency Re-sternotomy? Yes   -If yes, POD #10 is - 2025      Surgeons      * Dwayne Jay - Primary    Resident/Fellow/Other Assistant:  Surgeons and Role:     * Zack Dixon MD - Assisting  Agus HERNANDEZ    Staff:   Circulator: Kishor Huberub Person: Karo Banerjee Person: Marlyn  Surgical Assistant: Jacob  Surgical Assistant: Arturo  Surgical Assistant:  Deric    Anesthesia Staff: Anesthesiologist: MD NANCY Meehan-AA: LEIDY Mac  Perfusionist: Kristi Lopez  NEVILLE: Gretel Allison    Procedure Summary  Anesthesia: Anesthesia type not filed in the log.  ASA: ASA status not filed in the log.  Estimated Blood Loss: 250 mL  Intra-op Medications:   Administrations occurring from 0645 to 2035 on 02/14/25:   Medication Name Total Dose   sodium chloride 0.9 % irrigation solution 4,000 mL   vancomycin (Vancocin) vial for injection 8 g   papaverine injection 180 mg   heparin 1,000 unit/mL injection 10,000 Units   albumin human bottle 5% 250 mL   amiodarone (Nexterone) injection 150 mg/3mL 150 mg   calcium chloride 100 mg/mL syringe 0.2 g   ceFAZolin (Ancef) vial 1 g 2 g   dexmedeTOMIDine (Precedex) 400 mcg/100 mL NS ( premix) 288.19 mcg   electrolyte-A (Plasmalyte-A) Cannot be calculated   esmolol (Brevibloc) injection 60 mg   fentaNYL (Sublimaze) injection 50 mcg/mL 450 mcg   heparin injection 1,000 units/mL 29,000 Units   heparin injection 1,000 units/mL 5,000 Units   insulin regular (HumuLIN R, NovoLIN R) bolus from bag 5 Units   lidocaine (cardiac) injection 2% prefilled syringe 60 mg   magnesium sulfate 50 % injection 2 g   methadone (Dolophine) injection 10 mg   midazolam (Versed) 1 mg/1 mL 2 mg   norepinephrine (Levophed) 8 mg in sodium chloride 0.9% 250 mL (0.032 mg/mL) infusion (premix) 0.06 mg   norepinephrine (Levophed) 16 mcg/mL syringe for Anesthesia 16 mcg   propofol (Diprivan) injection 10 mg/mL 60 mg   protamine injection 300 mg   rocuronium (ZeMuron) 50 mg/5 mL injection 130 mg   tranexamic acid (Cyklokapron) 6,250 mg in sodium chloride 0.9% 312.5 mL (20 mg/mL) infusion 3,710.63 mg              Anesthesia Record               Intraprocedure I/O Totals          Intake    Dexmedetomidine 0.00 mL    The total shown is the total volume documented since Anesthesia Start was filed.    electrolyte-A (Plasmalyte-A) 1000.00 mL     Norepinephrine Drip 0.00 mL    The total shown is the total volume documented since Anesthesia Start was filed.    Tranexamic Acid 0.00 mL    The total shown is the total volume documented since Anesthesia Start was filed.    Insulin Drip 0.00 mL    The total shown is the total volume documented since Anesthesia Start was filed.    Total Intake 1000 mL       Output    Urine 175 mL    Total Output 175 mL       Net    Net Volume 825 mL          Specimen: No specimens collected               Findings: CAD, A-Fib    Complications:  None; patient tolerated the procedure well.     Disposition: ICU - intubated and hemodynamically stable.  Condition: stable  Specimens Collected: No specimens collected  Attending Attestation: I was present and scrubbed for the entire procedure.    Dwayne Jay  Phone Number: [phone]

## 2025-02-14 NOTE — ANESTHESIA POSTPROCEDURE EVALUATION
Patient: Radha Montalvo    Procedure Summary       Date: 02/14/25 Room / Location: [facility] OR 19 / [facility] OR    Anesthesia Start: 0714 Anesthesia Stop: 1249    Procedures:       CABG (CORONARY ARTERY BYPASS GRAFT), X2 , LIMA - LAD, SVG-OM, MAZE PROCEDURE, PATSY CLIP      Ablation A-Fib Diagnosis:       Coronary artery disease of native artery of native heart with stable angina pectoris      (Coronary artery disease of native artery of native heart with stable angina pectoris [I25.118])    Surgeons: Dwayne Jay MD Responsible Provider: Tray Spencer MD    Anesthesia Type: general ASA Status: 4            Anesthesia Type: general    Vitals Value Taken Time   BP 95/52 02/14/25 1523   Temp 36 02/14/25 1523   Pulse 66 02/14/25 1523   Resp 14 02/14/25 1523   SpO2 100 % 02/14/25 1523   Vitals shown include unfiled device data.    Anesthesia Post Evaluation    Patient location during evaluation: ICU  Patient participation: complete - patient participated  Level of consciousness: awake and sleepy but conscious  Pain management: adequate  Airway patency: patent  Cardiovascular status: acceptable  Respiratory status: acceptable  Hydration status: acceptable  Postoperative Nausea and Vomiting: none        There were no known notable events for this encounter.

## 2025-02-14 NOTE — ANESTHESIA PREPROCEDURE EVALUATION
Patient: Radha Montalvo    Procedure Information       Anesthesia Start Date/Time: 02/14/25 0714    Procedures:       CABG (CORONARY ARTERY BYPASS GRAFT), MAZE PROCEDURE, REPAIR AORTA, ASCENDING THORACIC      REPAIR, AORTA, ASCENDING THORACIC      Ablation A-Fib    Location: [facility] OR 19 / [facility] OR    Surgeons: Dwayne Jay MD            Relevant Problems   Anesthesia   (+) Corneal abrasion      Cardiac   (+) Atrial fibrillation (Multi)   (+) Chronic stable angina (CMS-HCC)   (+) Coronary artery disease of native artery of native heart with stable angina pectoris   (+) Hyperlipidemia   (+) Hypertension      Neuro   (+) Carpal tunnel syndrome of right wrist   (+) Cervical radiculitis      Endocrine   (+) Hyperparathyroidism (Multi)      Musculoskeletal   (+) Carpal tunnel syndrome of right wrist   (+) Degenerative disc disease, cervical   (+) Disorder of intervertebral disc of lumbar spine   (+) 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 : Y  Date of Last Liquid: 02/14/25  Time of Last Liquid: 0400  Date of Last Solid: 02/13/25  Time of Last Solid: 2300  Last Intake Type: Clear fluids  Time of Last Void: 0600           Past Medical History:   Diagnosis Date    Abnormal findings on diagnostic imaging of heart and coronary circulation 10/22/2023    Aneurysm (CMS-HCC) 10/23/2024    Arthritis of left acromioclavicular joint 02/15/2024    Ascending aorta dilatation (CMS-HCC) 11/22/2023    Atrial fibrillation (Multi) 10/23/2023    Biceps tendinitis of left shoulder 02/15/2024    Breast cancer (Multi) 2013    Cancer (Multi) 2013     Cataract     s/p extraction    Congenital dilatation of aorta ([facility]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    Edema of both lower extremities 01/13/2024    Essential hypertension 10/06/2023    Hx antineoplastic chemo     Hyperlipidemia 10/06/2023    Hypertension 10/06/2023    Labral tear of long head of biceps tendon, right, initial encounter 02/15/2024    Parathyroid disease (Multi)     s/p paratcyoiectomy)    Peripheral neuropathy     feet, bilatera    Personal history of irradiation     Personal history of other diseases of the musculoskeletal system and connective tissue 10/06/2023    History of arthritis    Primary osteoarthritis of both shoulders 02/15/2024    Shortness of breath 10/23/2023    Sleep apnea     CPAP      Past Surgical History:   Procedure Laterality Date    BI MAMMO GUIDED BREAST LEFT LOCALIZATION Left 02/25/2013    BI MAMMO GUIDED LOCALIZATION BREAST LEFT [facility]ICAL [facility]GAC[facility]    BREAST BIOPSY      BREAST LUMPECTOMY Left 2013    CARDIAC CATHETERIZATION N/A 01/27/2025    Procedure: Left Heart Cath with Coronary Angiography and LV;  Surgeon: Paulo Dougherty MD;  Location: [place] [facility];  Service: Cardiovascular;  Laterality: N/A;  no prior auth needed    CATARACT EXTRACTION Bilateral     HYSTERECTOMY  2000    MAMMOGRAM DIAGNOSTIC BI Bilateral 09/16/2024    OTHER SURGICAL HISTORY  04/27/2022    Lumpectomy    US COMPLETE BREAST Left 09/16/2024     Social History     Tobacco Use    Smoking status: Never     Passive exposure: Never    Smokeless tobacco: Never   Vaping Use    Vaping status: Never Used   Substance Use Topics    Alcohol use: Yes     Alcohol/week: 7.0 standard drinks of alcohol     Types: 7 Glasses of wine per week     Comment: once a day    Drug use: Never      Current Outpatient Medications   Medication Instructions    acyclovir (ZOVIRAX) 800 mg, oral, Daily    apixaban (ELIQUIS) 5 mg, oral, 2 times daily    atorvastatin (LIPITOR) 10 mg, oral, Daily    bisoprolol (ZEBETA) 10 mg, oral, Daily     chlorhexidine (Hibiclens) 4 % external liquid Topical, Daily PRN, Use for 5 days prior to surgery as body wash, do not use on face or genital region    cholecalciferol (VITAMIN D-3) 10 mcg (400 unit) tablet 1 capsule, Daily    dilTIAZem ER (TIAZAC) 240 mg, oral, Daily    ferrous sulfate/ascorbate sod (FERROUS SULFATE-VITAMIN C ORAL) 1 tablet, Daily    omega 3-dha-epa-fish oil (Fish OiL) 1,200 (144-216) mg capsule 1.5 capsules Orally    vit B complex no.12/niacin,B3, (VITAMIN B COMPLEX NO.12-NIACIN ORAL) Take by mouth.  Vitamin B 12 TABS      No Known Allergies     Chemistry    Lab Results   Component Value Date/Time     02/07/2025 1114    K 4.2 02/07/2025 1114     02/07/2025 1114    CO2 26 02/07/2025 1114    BUN 25 (H) 02/07/2025 1114    CREATININE 0.69 02/07/2025 1114    Lab Results   Component Value Date/Time    CALCIUM 9.6 02/07/2025 1114    ALKPHOS 88 02/07/2025 1114    AST 22 02/07/2025 1114    ALT 49 (H) 02/07/2025 1114    BILITOT 0.8 02/07/2025 1114          Lab Results   Component Value Date/Time    WBC 11.4 (H) 02/07/2025 1114    HGB 16.4 (H) 02/07/2025 1114    HCT 48.9 (H) 02/07/2025 1114     02/07/2025 1114     Lab Results   Component Value Date/Time    PROTIME 12.0 02/07/2025 1114    INR 1.1 02/07/2025 1114     Encounter Date: 01/27/25   ECG 12 Lead   Result Value    Ventricular Rate 83    Atrial Rate 41    QRS Duration 72    QT Interval 372    QTC Calculation(Bazett) 437    R Axis 6    T Axis 24    QRS Count 14    Q Onset 229    T Offset 415    QTC Fredericia 414    Narrative    Atrial fibrillation  Abnormal ECG  When compared with ECG of 27-JAN-2025 09:27, (unconfirmed)  Previous ECG has undetermined rhythm, needs review  Borderline criteria for Inferior infarct are no longer Present  ST no longer elevated in Inferior leads  T wave inversion no longer evident in Anterolateral leads  Confirmed by Paulo Dougherty ([id number]) on 1/28/2025 11:19:38 AM     No results found for this or any  "previous visit from the past 1095 days.       Visit Vitals  /86   Pulse 104   Temp 36.2 °C (97.2 °F) (Temporal)   Resp 16   Ht 1.702 m (5' 7\")   Wt 72.5 kg (159 lb 13.3 oz)   LMP  (LMP Unknown)   SpO2 97%   BMI 25.03 kg/m²   OB Status Hysterectomy   Smoking Status Never   BSA 1.85 m²     TTE PHYSICIAN INTERPRETATION:  Left Ventricle: Left ventricular ejection fraction is normal, by visual estimate at 55-60%. There are no regional wall motion abnormalities. The left ventricular cavity size is normal. There is mild increased septal and mildly increased posterior left ventricular wall thickness. There is left ventricular concentric remodeling. Left ventricular diastolic filling cannot be determined due to atrial fibrillation/flutter.  Left Atrium: The left atrial size is moderately dilated.  Right Ventricle: The right ventricle is normal in size. There is normal right ventricular global systolic function.  Right Atrium: The right atrial size is normal.  Aortic Valve: The aortic valve appears abnormal. There is mild aortic valve cusp calcification. There is mild aortic valve thickening. There is evidence of mildly elevated transaortic gradients consistent with sclerosis of the aortic valve.  The aortic valve dimensionless index is 0.84. There is no evidence of aortic valve regurgitation. The peak instantaneous gradient of the aortic valve is 4 mmHg. The mean gradient of the aortic valve is 2 mmHg.  Mitral Valve: The mitral valve is normal in structure. There is trace to mild mitral valve regurgitation.  Tricuspid Valve: The tricuspid valve is structurally normal. No evidence of tricuspid regurgitation.  Pulmonic Valve: The pulmonic valve is structurally normal. There is no indication of pulmonic valve regurgitation.  Pericardium: No pericardial effusion noted.  Aorta: The aortic root is abnormal. The ascending aorta is at the upper limits of normal.        CONCLUSIONS:   1. Left ventricular ejection fraction is " normal, by visual estimate at 55-60%.   2. There is normal right ventricular global systolic function.   3. The left atrial size is moderately dilated.   4. Aortic valve sclerosis.         CAROTID DUPLEX:  Mild atherosclerotic disease which is most conspicuous at the carotid  bulbs.      RIGHT:      RIGHT SIDE PEAK SYSTOLIC VELOCITY TABLE:  CCA  48 cm/s.  ICA  59 cm/s.  ECA  60 cm/s.          The ratio of the peak systolic velocity of the right ICA/CCA is  0.7.      RIGHT VERTEBRAL ARTERY:  The right vertebral artery demonstrates proximal anterograde flow.      LEFT:      LEFT SIDE PEAK SYSTOLIC VELOCITY TABLE:  CCA 54 cm/s.  ICA 61 cm/s.  ECA 79 cm/s.          The ratio of the peak systolic velocity of the left ICA/CCA is 1.1.      LEFT VERTEBRAL ARTERY:  The left vertebral artery demonstrates proximal anterograde flow.      IMPRESSION:  Utilizing the peak systolic velocities, the estimated degree of  stenosis within the internal carotid arteries is less than 50%  bilaterally.      Coronary Angiography:  The coronary circulation is co-dominant.     Coronary Angiography Comments:  After informed consent obtained with the conscious sedation. Patient had diagnostic catheterization done via right femoral artery using 6 Swedish sheath. Finding as follow.  Left main appears to be fair size has no critical lesion.  Left anterior descending artery has ostial about 50% plaque seen. Mid LAD also has another long diffuse 90% lesion seen. Involved small diagonal branch.  Left circumflex artery appears to be codominant vessel has proximal area about 90% focal eccentric lesion seen. CAROL ANN flow 2 seen both distal LAD as well as a left circumflex artery.  Right coronary appears to be large caliber vessel and codominant vessel has no critical lesion.  Left ventriculogram done show ejection fraction by 50%. Aortic pressure was about 138/80 and LVEDP is about 10 mm per mercury.  Patient with essentially critical two-vessel coronary  disease including an LAD as well as a left circumflex artery. Probably candidate for CABG.  Sheath was removed and Angio-Seal applied to right groin. Patient left the Cath Lab in stable condition.  CT surgery was consulted.           Left Main Coronary Artery:  The left main coronary artery is a normal caliber vessel. The left main arises normally from the left coronary sinus of Valsalva and bifurcates into the LAD and circumflex coronary arteries. The left main coronary artery showed no significant disease or stenosis greater than 30%.     Left Anterior Descending Coronary Artery Distribution:  The left anterior descending coronary artery is a medium-sized caliber vessel. The LAD arises normally from the left main coronary artery and reaches the apex of the LV. The LAD demonstrated severe ostial obstruction and severe mid-segment obstruction. The ostial and mid left anterior descending coronary artery showed 90% stenosis. This lesion was eccentric, irregular, long, tubular and diffuse. This lesion's characteristics fall within the ACC/AHA type C (low success, high risk) classification. This lesion determined to be suitable for bypass graft surgery.     Circumflex Coronary Artery Distribution:  The circumflex coronary artery is a medium-sized caliber vessel. The circumflex arises normally from the left main coronary artery. The circumflex revealed severe proximal obstruction. The proximal circumflex coronary artery showed 85% stenosis. This lesion was eccentric, discrete, focal and moderately angulated. This lesion determined to be suitable for bypass graft surgery.     Right Coronary Artery Distribution:     The right coronary artery is a normal caliber vessel. The RCA arises normally from the right sinus of Valsalva. The RCA showed no significant disease or stenosis greater than 30%.     Coronary Lesion Summary:  Vessel       Stenosis   Vessel Segment  LAD        90% stenosis ostial and mid  Circumflex 85%  stenosis    proximal       Anesthesia Evaluation      Airway   Mallampati: II  TM distance: >3 FB  Neck ROM: full  Dental    (+) upper dentures and lower dentures    Pulmonary    Cardiovascular     Rhythm: irregular  Rate: abnormal    Neuro/Psych      GI/Hepatic/Renal      Endo/Other    Abdominal                       Physical Exam    Airway  Mallampati: II  TM distance: >3 FB  Neck ROM: full     Cardiovascular   Rhythm: irregular  Rate: abnormal     Dental   (+) upper dentures, lower dentures     Pulmonary    Abdominal             Anesthesia Plan    History of general anesthesia?: yes  History of complications of general anesthesia?: no    ASA 4     general     intravenous induction   Postoperative administration of opioids is intended.  Trial extubation is planned.  Anesthetic plan and risks discussed with patient.  Use of blood products discussed with patient who consented to blood products.    Plan discussed with CAA and attending.

## 2025-02-14 NOTE — OP NOTE
CABG (CORONARY ARTERY BYPASS GRAFT), X2 , LIMA - LAD, SVG-OM, MAZE PROCEDURE, PATSY CLIP, Ablation A-Fib Operative Note     Date: 2025  OR Location: [facility] OR    Name: Radha Montalvo, : 1950, Age: 74 y.o., MRN: [id number], Sex: female    Diagnosis  Pre-op Diagnosis      * Coronary artery disease of native artery of native heart with stable angina pectoris [I25.118] Post-op Diagnosis     * Coronary artery disease of native artery of native heart with stable angina pectoris [I25.118]     Procedures  CABG (CORONARY ARTERY BYPASS GRAFT), X2 , LIMA - LAD, SVG-OM, MAZE PROCEDURE, PATSY CLIP  39725 - AR CABG W/ARTERIAL GRAFT TWO ARTERIAL GRAFTS    CABG (CORONARY ARTERY BYPASS GRAFT), X2 , LIMA - LAD, SVG-OM, MAZE PROCEDURE, PATSY CLIP  24522 - AR CORONARY ARTERY BYP W/VEIN & ARTERY GRAFT 2 VEIN    Ablation A-Fib  87139 - AR ABLATION ARRHYTHMOGENIC FOCI/PATHWAY W/BYPASS  CABG x 2 Skeletonized Chambers to Lad , SVG to OM  Endoscopic vein harvest  GP MAZE with encompass clamp and cryoablation  40 mm Atriaclip    Surgeons      * Dwayne Jay - Primary    Resident/Fellow/Other Assistant:  Surgeons and Role:     * Zack Dixon MD - Assisting  There was no qualified resident for this opeation   Staff:   Circulator: [facility]  Scrub Person: Karo  Scrub Person: Marlyn  Surgical Assistant: Jacob  Surgical Assistant: Arturo  Surgical Assistant: Deric    Anesthesia Staff: Anesthesiologist: Tray Spencer MD  C-AA: LEIDY Mac  Perfusionist: Kristi Lopez  [place]: Gretel Allison    Procedure Summary  Anesthesia: Anesthesia type not filed in the log.  ASA: ASA status not filed in the log.  Estimated Blood Loss: 200  mL  Intra-op Medications:   Administrations occurring from 645 to  on 25:   Medication Name Total Dose   sodium chloride 0.9 % irrigation solution 4,000 mL   vancomycin (Vancocin) vial for injection 8 g   papaverine injection 180 mg   heparin 1,000 unit/mL injection  10,000 Units   albumin human bottle 5% 250 mL   amiodarone (Nexterone) injection 150 mg/3mL 150 mg   calcium chloride 100 mg/mL syringe 0.2 g   ceFAZolin (Ancef) vial 1 g 2 g   dexmedeTOMIDine (Precedex) 400 mcg/100 mL NS ( premix) 288.19 mcg   electrolyte-A (Plasmalyte-A) Cannot be calculated   esmolol (Brevibloc) injection 60 mg   fentaNYL (Sublimaze) injection 50 mcg/mL 450 mcg   heparin injection 1,000 units/mL 29,000 Units   heparin injection 1,000 units/mL 5,000 Units   insulin regular (HumuLIN R, NovoLIN R) bolus from bag 5 Units   lidocaine (cardiac) injection 2% prefilled syringe 60 mg   magnesium sulfate 50 % injection 2 g   methadone (Dolophine) injection 10 mg   midazolam (Versed) 1 mg/1 mL 2 mg   norepinephrine (Levophed) 8 mg in sodium chloride 0.9% 250 mL (0.032 mg/mL) infusion (premix) 0.06 mg   norepinephrine (Levophed) 16 mcg/mL syringe for Anesthesia 16 mcg   propofol (Diprivan) injection 10 mg/mL 60 mg   protamine injection 300 mg   rocuronium (ZeMuron) 50 mg/5 mL injection 130 mg   tranexamic acid (Cyklokapron) 6,250 mg in sodium chloride 0.9% 312.5 mL (20 mg/mL) infusion 3,710.63 mg              Anesthesia Record               Intraprocedure I/O Totals          Intake    Dexmedetomidine 0.00 mL    The total shown is the total volume documented since Anesthesia Start was filed.    electrolyte-A (Plasmalyte-A) 1000.00 mL    Norepinephrine Drip 0.00 mL    The total shown is the total volume documented since Anesthesia Start was filed.    Tranexamic Acid 0.00 mL    The total shown is the total volume documented since Anesthesia Start was filed.    Insulin Drip 0.00 mL    The total shown is the total volume documented since Anesthesia Start was filed.    Cell Saver 425 mL    Total Intake 1425 mL       Output    Urine 175 mL    Total Output 175 mL       Net    Net Volume 1250 mL          Specimen: No specimens collected              Drains and/or Catheters:   Urethral Catheter Temperature  probe (Active)       Tourniquet Times:         Implants:  Implants       Type Name Action Serial No.      Other Cardiac Implant GUIDE, SELECTION, MOLLY ATRICLIP - [id number] Implanted               Findings: 1.There were no pericardial adhesions or effusions.   2. The ascending aorta was soft without evidence of atherosclerosis with a diameter of 3.9 by AJAY, similar to the 4 cm of the CT scan. Dr. OMER Ochoa was present during the surgery to asses the need for ascending aortic replacement  3. The heart was A. Fib and demonstrated normal  ventricular function.    4.The patient had severe diffuse coronary artery disease, however we were able to find locations on the LAD and OM that was suitable for grafting.  5.  The conduits were suitable for grafting.  6.  The flow of the graft after the protamine were acceptable.     Indications: Radha Montalvo is an 74 y.o. female who is having surgery for Coronary artery disease of native artery of native heart with stable angina pectoris [I25.118]. The patient is a 74-year-old woman that was sent to our outpatient clinic after being diagnosed with symptomatic for short of breath double vessel disease.  The patient have a family history of coronary disease, with his father her father having bypass surgery many years ago.  She also have a history of atrial fibrillation that was diagnosed almost a year ago on Eliquis.  Incidental finding on a CT scan was an aortic dilatation that has been followed by Dr. Wang in the aortic center for least 2 years.   We extensively discussed with the patient the risk of the surgery, including the risk of mortality and morbidity especially stroke,  renal failure and infection but also the benefit in terms of long-term prognosis and symptoms relief.  After that the patient agreed to proceed with the surgery.     The patient was seen in the preoperative area. The risks, benefits, complications, treatment options, non-operative  alternatives, expected recovery and outcomes were discussed with the patient. The possibilities of reaction to medication, pulmonary aspiration, injury to surrounding structures, bleeding, recurrent infection, the need for additional procedures, failure to diagnose a condition, and creating a complication requiring transfusion or operation were discussed with the patient. The patient concurred with the proposed plan, giving informed consent.  The site of surgery was properly noted/marked if necessary per policy. The patient has been actively warmed in preoperative area. Preoperative antibiotics have been ordered and given within 1 hours of incision. Venous thrombosis prophylaxis have been ordered including chemical prophylaxis    Procedure Details: After informed consent, and positive identification, the patient was brought to the operative theatre. They were placed on the operating room table in the supine position and an arterial monitoring line was placed. They subsequently  underwent induction of anesthesia, and delta-tracheal intubation.  A AJAY probe was placed in the esophagus, and central intravenous access obtained. They were then prepped and draped in a sterile surgical fashion.      A surgical time-out was performed with the correct patient, correct procedure, laterality, timing and dosage of antibiotics, availability of blood, administration of beta blocker, fire risk, and sterility of  instruments were all verified, before beginning the case.     A median sternotomy was performed.  The left and right internal thoracic arteries were dissected from the chest wall in a skeletonized manner and saphenous vein was harvested endoscopic from the thigh.  The thymus was divided and the pericardium was opened.  The ascending aorta was palpated and it was without evidence of atherosclerosis.  The patient was heparinized.  The distal ascending aorta and right atrium were cannulated for cardiopulmonary bypass and ante  grade and retrograde cardioplegia cannulas were placed.  The patient was placed on cardiopulmonary bypass, the ascending aorta was cross clamped, and the heart was arrested and protected with cold blood cardioplegia.  During the remainder of the cross-clamp time cold blood cardioplegia was given every 15 to 20 minutes.    Before clamping the Aorta we performed the division of ligament of Marshal and the dissection needed for  the application of the encompass Clamo to create the box lesion set in the left atrium . Three sets  of lesion were performed uneventfully.  The cross clamp was then applied to the aorta, and the heart was arrested. We open the tip of the PATSY and trough there complete with cryoprobe the rest of the endocavitary lesions, the line to the mitral annulus and then the line to the pulmonary veins. The coronary sinus waas them ablate from the outside.  The right side cryo lesions were performed immediatly after, the inter cavae, the tricuspid one and finally the one towards the right atrial appendage.  The coronary were grafted after the full maze was completed.  Reverse length of saphenous vein was anastomosed to the OM As an end-to-side anastomosis using 7-0 Prolene in a running fashion.  The proximal of the vein was anastomosed to the ascending aorta using, 6-0 Prolene and a 4 mm punch using single cross clamp technique.  The left internal thoracic artery was anastomosed in situ graft to the LAD  An end-to-side anastomosis was performed using 7-0 Prolene in a running fashion.  Immediately after the flow of the graft was measured.   The heart and ascending aorta were de-aired and the aortic cross-clamp was removed.  When the patient's heart was completely recovered and thoroughly de-aired he was weaned from cardiopulmonary bypass without difficulty.  All cannulas were removed and the heparin effect was reversed with protamine.  Hemostasis was obtained, mediastinal and bilateral pleural chest tubes  were placed, right ventricular pacemaker wires were placed on the diaphragmatic surface of the RV, and the wounds were closed in the standard fashion.  The patient tolerated the procedure well and was brought to the intensive care unit in stable condition.  To sponge counts, instrument counts, and needle counts were reported correct by the circulating nurse.  There were no specimens sent to pathology.  The drains included mediastinal and pleural chest tube.     Complications:  None; patient tolerated the procedure well.    Disposition: ICU - intubated and hemodynamically stable.  Condition: stable         Task Performed by RNFA or Surgical Assistant:  Deric Goldsmith was the assistant. He performed the endoscopic vein harvest as well            Additional Details: The aorta was less than 4 cm     Attending Attestation: I was present and scrubbed for the entire procedure.    Dwayne Jay  Phone Number: [phone]

## 2025-02-15 ENCOUNTER — APPOINTMENT (OUTPATIENT)
Dept: RADIOLOGY | Facility: HOSPITAL | Age: 75
End: 2025-02-15
Payer: MEDICARE

## 2025-02-15 LAB
ABO GROUP (TYPE) IN BLOOD: NORMAL
ALBUMIN SERPL BCP-MCNC: 3.5 G/DL (ref 3.4–5)
ALBUMIN SERPL BCP-MCNC: 3.7 G/DL (ref 3.4–5)
ANION GAP BLDA CALCULATED.4IONS-SCNC: 10 MMO/L (ref 10–25)
ANION GAP BLDA CALCULATED.4IONS-SCNC: 12 MMO/L (ref 10–25)
ANION GAP BLDA CALCULATED.4IONS-SCNC: 8 MMO/L (ref 10–25)
ANION GAP SERPL CALC-SCNC: 12 MMOL/L (ref 10–20)
ANION GAP SERPL CALC-SCNC: 13 MMOL/L (ref 10–20)
ANTIBODY SCREEN: NORMAL
BASE EXCESS BLDA CALC-SCNC: -1.7 MMOL/L (ref -2–3)
BASE EXCESS BLDA CALC-SCNC: -1.7 MMOL/L (ref -2–3)
BASE EXCESS BLDA CALC-SCNC: 0.7 MMOL/L (ref -2–3)
BODY TEMPERATURE: 37 DEGREES CELSIUS
BUN SERPL-MCNC: 18 MG/DL (ref 6–23)
BUN SERPL-MCNC: 20 MG/DL (ref 6–23)
CA-I BLD-SCNC: 1.06 MMOL/L (ref 1.1–1.33)
CA-I BLDA-SCNC: 1.09 MMOL/L (ref 1.1–1.33)
CA-I BLDA-SCNC: 1.15 MMOL/L (ref 1.1–1.33)
CA-I BLDA-SCNC: 1.19 MMOL/L (ref 1.1–1.33)
CALCIUM SERPL-MCNC: 8 MG/DL (ref 8.6–10.6)
CALCIUM SERPL-MCNC: 8.5 MG/DL (ref 8.6–10.6)
CHLORIDE BLDA-SCNC: 102 MMOL/L (ref 98–107)
CHLORIDE BLDA-SCNC: 102 MMOL/L (ref 98–107)
CHLORIDE BLDA-SCNC: 104 MMOL/L (ref 98–107)
CHLORIDE SERPL-SCNC: 103 MMOL/L (ref 98–107)
CHLORIDE SERPL-SCNC: 107 MMOL/L (ref 98–107)
CO2 SERPL-SCNC: 24 MMOL/L (ref 21–32)
CO2 SERPL-SCNC: 24 MMOL/L (ref 21–32)
CREAT SERPL-MCNC: 0.7 MG/DL (ref 0.5–1.05)
CREAT SERPL-MCNC: 0.88 MG/DL (ref 0.5–1.05)
EGFRCR SERPLBLD CKD-EPI 2021: 69 ML/MIN/1.73M*2
EGFRCR SERPLBLD CKD-EPI 2021: >90 ML/MIN/1.73M*2
ERYTHROCYTE [DISTWIDTH] IN BLOOD BY AUTOMATED COUNT: 13.1 % (ref 11.5–14.5)
GLUCOSE BLD MANUAL STRIP-MCNC: 122 MG/DL (ref 74–99)
GLUCOSE BLD MANUAL STRIP-MCNC: 123 MG/DL (ref 74–99)
GLUCOSE BLD MANUAL STRIP-MCNC: 137 MG/DL (ref 74–99)
GLUCOSE BLD MANUAL STRIP-MCNC: 140 MG/DL (ref 74–99)
GLUCOSE BLD MANUAL STRIP-MCNC: 141 MG/DL (ref 74–99)
GLUCOSE BLD MANUAL STRIP-MCNC: 146 MG/DL (ref 74–99)
GLUCOSE BLDA-MCNC: 142 MG/DL (ref 74–99)
GLUCOSE BLDA-MCNC: 149 MG/DL (ref 74–99)
GLUCOSE BLDA-MCNC: 187 MG/DL (ref 74–99)
GLUCOSE SERPL-MCNC: 134 MG/DL (ref 74–99)
GLUCOSE SERPL-MCNC: 145 MG/DL (ref 74–99)
HCO3 BLDA-SCNC: 23 MMOL/L (ref 22–26)
HCO3 BLDA-SCNC: 24.3 MMOL/L (ref 22–26)
HCO3 BLDA-SCNC: 25.4 MMOL/L (ref 22–26)
HCT VFR BLD AUTO: 40.7 % (ref 36–46)
HCT VFR BLD EST: 42 % (ref 36–46)
HCT VFR BLD EST: 43 % (ref 36–46)
HCT VFR BLD EST: 43 % (ref 36–46)
HGB BLD-MCNC: 13.8 G/DL (ref 12–16)
HGB BLDA-MCNC: 14 G/DL (ref 12–16)
HGB BLDA-MCNC: 14.2 G/DL (ref 12–16)
HGB BLDA-MCNC: 14.4 G/DL (ref 12–16)
INHALED O2 CONCENTRATION: 40 %
INHALED O2 CONCENTRATION: 50 %
INHALED O2 CONCENTRATION: 60 %
LACTATE BLDA-SCNC: 1.1 MMOL/L (ref 0.4–2)
LACTATE BLDA-SCNC: 1.3 MMOL/L (ref 0.4–2)
LACTATE BLDA-SCNC: 1.8 MMOL/L (ref 0.4–2)
MAGNESIUM SERPL-MCNC: 2.61 MG/DL (ref 1.6–2.4)
MCH RBC QN AUTO: 34.8 PG (ref 26–34)
MCHC RBC AUTO-ENTMCNC: 33.9 G/DL (ref 32–36)
MCV RBC AUTO: 103 FL (ref 80–100)
NRBC BLD-RTO: 0 /100 WBCS (ref 0–0)
OXYHGB MFR BLDA: 91.5 % (ref 94–98)
OXYHGB MFR BLDA: 92.6 % (ref 94–98)
OXYHGB MFR BLDA: 94.8 % (ref 94–98)
PCO2 BLDA: 38 MM HG (ref 38–42)
PCO2 BLDA: 40 MM HG (ref 38–42)
PCO2 BLDA: 45 MM HG (ref 38–42)
PH BLDA: 7.34 PH (ref 7.38–7.42)
PH BLDA: 7.39 PH (ref 7.38–7.42)
PH BLDA: 7.41 PH (ref 7.38–7.42)
PHOSPHATE SERPL-MCNC: 4.3 MG/DL (ref 2.5–4.9)
PHOSPHATE SERPL-MCNC: 4.5 MG/DL (ref 2.5–4.9)
PLATELET # BLD AUTO: 152 X10*3/UL (ref 150–450)
PO2 BLDA: 66 MM HG (ref 85–95)
PO2 BLDA: 68 MM HG (ref 85–95)
PO2 BLDA: 83 MM HG (ref 85–95)
POTASSIUM BLDA-SCNC: 4 MMOL/L (ref 3.5–5.3)
POTASSIUM BLDA-SCNC: 4 MMOL/L (ref 3.5–5.3)
POTASSIUM BLDA-SCNC: 4.1 MMOL/L (ref 3.5–5.3)
POTASSIUM SERPL-SCNC: 3.8 MMOL/L (ref 3.5–5.3)
POTASSIUM SERPL-SCNC: 4.3 MMOL/L (ref 3.5–5.3)
RBC # BLD AUTO: 3.96 X10*6/UL (ref 4–5.2)
RH FACTOR (ANTIGEN D): NORMAL
SAO2 % BLDA: 94 % (ref 94–100)
SAO2 % BLDA: 95 % (ref 94–100)
SAO2 % BLDA: 97 % (ref 94–100)
SODIUM BLDA-SCNC: 131 MMOL/L (ref 136–145)
SODIUM BLDA-SCNC: 131 MMOL/L (ref 136–145)
SODIUM BLDA-SCNC: 136 MMOL/L (ref 136–145)
SODIUM SERPL-SCNC: 135 MMOL/L (ref 136–145)
SODIUM SERPL-SCNC: 140 MMOL/L (ref 136–145)
WBC # BLD AUTO: 11.4 X10*3/UL (ref 4.4–11.3)

## 2025-02-15 PROCEDURE — 2020000001 HC ICU ROOM DAILY

## 2025-02-15 PROCEDURE — 94640 AIRWAY INHALATION TREATMENT: CPT

## 2025-02-15 PROCEDURE — 85018 HEMOGLOBIN: CPT

## 2025-02-15 PROCEDURE — 85027 COMPLETE CBC AUTOMATED: CPT

## 2025-02-15 PROCEDURE — 2500000002 HC RX 250 W HCPCS SELF ADMINISTERED DRUGS (ALT 637 FOR MEDICARE OP, ALT 636 FOR OP/ED)

## 2025-02-15 PROCEDURE — 5A0945A ASSISTANCE WITH RESPIRATORY VENTILATION, 24-96 CONSECUTIVE HOURS, HIGH NASAL FLOW/VELOCITY: ICD-10-PCS | Performed by: THORACIC SURGERY (CARDIOTHORACIC VASCULAR SURGERY)

## 2025-02-15 PROCEDURE — 2500000001 HC RX 250 WO HCPCS SELF ADMINISTERED DRUGS (ALT 637 FOR MEDICARE OP)

## 2025-02-15 PROCEDURE — 2500000004 HC RX 250 GENERAL PHARMACY W/ HCPCS (ALT 636 FOR OP/ED)

## 2025-02-15 PROCEDURE — 2500000004 HC RX 250 GENERAL PHARMACY W/ HCPCS (ALT 636 FOR OP/ED): Performed by: STUDENT IN AN ORGANIZED HEALTH CARE EDUCATION/TRAINING PROGRAM

## 2025-02-15 PROCEDURE — 97161 PT EVAL LOW COMPLEX 20 MIN: CPT | Mod: GP

## 2025-02-15 PROCEDURE — 82330 ASSAY OF CALCIUM: CPT

## 2025-02-15 PROCEDURE — 99291 CRITICAL CARE FIRST HOUR: CPT

## 2025-02-15 PROCEDURE — 71045 X-RAY EXAM CHEST 1 VIEW: CPT | Performed by: RADIOLOGY

## 2025-02-15 PROCEDURE — 97530 THERAPEUTIC ACTIVITIES: CPT | Mod: GP

## 2025-02-15 PROCEDURE — 37799 UNLISTED PX VASCULAR SURGERY: CPT

## 2025-02-15 PROCEDURE — 86901 BLOOD TYPING SEROLOGIC RH(D): CPT

## 2025-02-15 PROCEDURE — 93010 ELECTROCARDIOGRAM REPORT: CPT | Performed by: INTERNAL MEDICINE

## 2025-02-15 PROCEDURE — 82947 ASSAY GLUCOSE BLOOD QUANT: CPT

## 2025-02-15 PROCEDURE — 2500000001 HC RX 250 WO HCPCS SELF ADMINISTERED DRUGS (ALT 637 FOR MEDICARE OP): Performed by: STUDENT IN AN ORGANIZED HEALTH CARE EDUCATION/TRAINING PROGRAM

## 2025-02-15 PROCEDURE — 83735 ASSAY OF MAGNESIUM: CPT

## 2025-02-15 PROCEDURE — 71045 X-RAY EXAM CHEST 1 VIEW: CPT

## 2025-02-15 PROCEDURE — 80069 RENAL FUNCTION PANEL: CPT

## 2025-02-15 PROCEDURE — 2500000005 HC RX 250 GENERAL PHARMACY W/O HCPCS

## 2025-02-15 PROCEDURE — 94660 CPAP INITIATION&MGMT: CPT

## 2025-02-15 RX ORDER — ONDANSETRON HYDROCHLORIDE 2 MG/ML
4 INJECTION, SOLUTION INTRAVENOUS EVERY 8 HOURS PRN
Status: DISCONTINUED | OUTPATIENT
Start: 2025-02-15 | End: 2025-02-22 | Stop reason: HOSPADM

## 2025-02-15 RX ORDER — HYDRALAZINE HYDROCHLORIDE 20 MG/ML
5 INJECTION INTRAMUSCULAR; INTRAVENOUS AS NEEDED
Status: DISCONTINUED | OUTPATIENT
Start: 2025-02-15 | End: 2025-02-16

## 2025-02-15 RX ORDER — HEPARIN SODIUM 5000 [USP'U]/ML
5000 INJECTION, SOLUTION INTRAVENOUS; SUBCUTANEOUS EVERY 8 HOURS
Status: DISCONTINUED | OUTPATIENT
Start: 2025-02-15 | End: 2025-02-20

## 2025-02-15 RX ORDER — IPRATROPIUM BROMIDE AND ALBUTEROL SULFATE 2.5; .5 MG/3ML; MG/3ML
3 SOLUTION RESPIRATORY (INHALATION)
Status: DISCONTINUED | OUTPATIENT
Start: 2025-02-15 | End: 2025-02-17

## 2025-02-15 RX ORDER — FUROSEMIDE 10 MG/ML
40 INJECTION INTRAMUSCULAR; INTRAVENOUS ONCE
Status: COMPLETED | OUTPATIENT
Start: 2025-02-15 | End: 2025-02-15

## 2025-02-15 RX ORDER — POTASSIUM CHLORIDE 20 MEQ/1
20 TABLET, EXTENDED RELEASE ORAL EVERY 6 HOURS PRN
Status: DISCONTINUED | OUTPATIENT
Start: 2025-02-15 | End: 2025-02-18

## 2025-02-15 RX ORDER — IPRATROPIUM BROMIDE AND ALBUTEROL SULFATE 2.5; .5 MG/3ML; MG/3ML
3 SOLUTION RESPIRATORY (INHALATION)
Status: DISCONTINUED | OUTPATIENT
Start: 2025-02-15 | End: 2025-02-15

## 2025-02-15 RX ORDER — FUROSEMIDE 10 MG/ML
20 INJECTION INTRAMUSCULAR; INTRAVENOUS ONCE
Status: COMPLETED | OUTPATIENT
Start: 2025-02-15 | End: 2025-02-15

## 2025-02-15 RX ORDER — INSULIN LISPRO 100 [IU]/ML
0-15 INJECTION, SOLUTION INTRAVENOUS; SUBCUTANEOUS 4 TIMES DAILY
Status: DISCONTINUED | OUTPATIENT
Start: 2025-02-15 | End: 2025-02-19

## 2025-02-15 RX ORDER — HYDROMORPHONE HYDROCHLORIDE 0.2 MG/ML
0.2 INJECTION INTRAMUSCULAR; INTRAVENOUS; SUBCUTANEOUS EVERY 2 HOUR PRN
Status: DISCONTINUED | OUTPATIENT
Start: 2025-02-15 | End: 2025-02-16

## 2025-02-15 RX ORDER — KETOROLAC TROMETHAMINE 30 MG/ML
15 INJECTION, SOLUTION INTRAMUSCULAR; INTRAVENOUS ONCE
Status: COMPLETED | OUTPATIENT
Start: 2025-02-15 | End: 2025-02-15

## 2025-02-15 RX ORDER — POTASSIUM CHLORIDE 20 MEQ/1
40 TABLET, EXTENDED RELEASE ORAL EVERY 6 HOURS PRN
Status: DISCONTINUED | OUTPATIENT
Start: 2025-02-15 | End: 2025-02-18

## 2025-02-15 RX ORDER — POTASSIUM CHLORIDE 1.5 G/1.58G
20 POWDER, FOR SOLUTION ORAL EVERY 6 HOURS PRN
Status: DISCONTINUED | OUTPATIENT
Start: 2025-02-15 | End: 2025-02-18

## 2025-02-15 RX ORDER — IPRATROPIUM BROMIDE AND ALBUTEROL SULFATE 2.5; .5 MG/3ML; MG/3ML
3 SOLUTION RESPIRATORY (INHALATION) ONCE
Status: COMPLETED | OUTPATIENT
Start: 2025-02-15 | End: 2025-02-15

## 2025-02-15 RX ORDER — FUROSEMIDE 10 MG/ML
60 INJECTION INTRAMUSCULAR; INTRAVENOUS ONCE
Status: COMPLETED | OUTPATIENT
Start: 2025-02-16 | End: 2025-02-16

## 2025-02-15 RX ORDER — ONDANSETRON 4 MG/1
4 TABLET, FILM COATED ORAL EVERY 8 HOURS PRN
Status: DISCONTINUED | OUTPATIENT
Start: 2025-02-15 | End: 2025-02-18

## 2025-02-15 RX ORDER — DILTIAZEM HYDROCHLORIDE 30 MG/1
30 TABLET, FILM COATED ORAL EVERY 6 HOURS
Status: DISCONTINUED | OUTPATIENT
Start: 2025-02-15 | End: 2025-02-16

## 2025-02-15 RX ORDER — POTASSIUM CHLORIDE 1.5 G/1.58G
40 POWDER, FOR SOLUTION ORAL EVERY 6 HOURS PRN
Status: DISCONTINUED | OUTPATIENT
Start: 2025-02-15 | End: 2025-02-18

## 2025-02-15 RX ORDER — EPINEPHRINE IN 0.9 % SOD CHLOR 4MG/250ML
0-1 PLASTIC BAG, INJECTION (ML) INTRAVENOUS CONTINUOUS
Status: DISCONTINUED | OUTPATIENT
Start: 2025-02-15 | End: 2025-02-15

## 2025-02-15 RX ADMIN — ACYCLOVIR 800 MG: 800 TABLET ORAL at 09:05

## 2025-02-15 RX ADMIN — FUROSEMIDE 20 MG: 10 INJECTION, SOLUTION INTRAVENOUS at 12:43

## 2025-02-15 RX ADMIN — SODIUM CHLORIDE, POTASSIUM CHLORIDE, SODIUM LACTATE AND CALCIUM CHLORIDE 500 ML: 600; 310; 30; 20 INJECTION, SOLUTION INTRAVENOUS at 11:14

## 2025-02-15 RX ADMIN — OXYCODONE HYDROCHLORIDE 10 MG: 10 TABLET ORAL at 05:44

## 2025-02-15 RX ADMIN — OXYCODONE HYDROCHLORIDE 10 MG: 10 TABLET ORAL at 14:15

## 2025-02-15 RX ADMIN — POLYETHYLENE GLYCOL 3350 17 G: 17 POWDER, FOR SOLUTION ORAL at 09:03

## 2025-02-15 RX ADMIN — SENNOSIDES AND DOCUSATE SODIUM 2 TABLET: 50; 8.6 TABLET ORAL at 09:04

## 2025-02-15 RX ADMIN — HEPARIN SODIUM 5000 UNITS: 5000 INJECTION INTRAVENOUS; SUBCUTANEOUS at 09:05

## 2025-02-15 RX ADMIN — CEFAZOLIN SODIUM 2 G: 2 INJECTION, SOLUTION INTRAVENOUS at 20:09

## 2025-02-15 RX ADMIN — HYDROMORPHONE HYDROCHLORIDE 0.2 MG: 0.2 INJECTION, SOLUTION INTRAMUSCULAR; INTRAVENOUS; SUBCUTANEOUS at 08:56

## 2025-02-15 RX ADMIN — OXYCODONE HYDROCHLORIDE 10 MG: 10 TABLET ORAL at 10:13

## 2025-02-15 RX ADMIN — FUROSEMIDE 40 MG: 10 INJECTION, SOLUTION INTRAVENOUS at 20:10

## 2025-02-15 RX ADMIN — BISOPROLOL FUMARATE 10 MG: 10 TABLET, FILM COATED ORAL at 09:04

## 2025-02-15 RX ADMIN — INSULIN LISPRO 1 UNITS: 100 INJECTION, SOLUTION INTRAVENOUS; SUBCUTANEOUS at 05:45

## 2025-02-15 RX ADMIN — Medication 9 L/MIN: at 20:27

## 2025-02-15 RX ADMIN — KETOROLAC TROMETHAMINE 15 MG: 30 INJECTION, SOLUTION INTRAMUSCULAR; INTRAVENOUS at 08:56

## 2025-02-15 RX ADMIN — IPRATROPIUM BROMIDE AND ALBUTEROL SULFATE 3 ML: .5; 3 SOLUTION RESPIRATORY (INHALATION) at 20:27

## 2025-02-15 RX ADMIN — CEFAZOLIN SODIUM 2 G: 2 INJECTION, SOLUTION INTRAVENOUS at 11:59

## 2025-02-15 RX ADMIN — AMIODARONE HYDROCHLORIDE 0.5 MG/MIN: 1.8 INJECTION, SOLUTION INTRAVENOUS at 06:05

## 2025-02-15 RX ADMIN — ACETAMINOPHEN 650 MG: 325 TABLET ORAL at 05:43

## 2025-02-15 RX ADMIN — ACETAMINOPHEN 650 MG: 325 TABLET ORAL at 11:21

## 2025-02-15 RX ADMIN — CALCIUM GLUCONATE 1 G: 20 INJECTION, SOLUTION INTRAVENOUS at 05:43

## 2025-02-15 RX ADMIN — ASPIRIN 81 MG CHEWABLE TABLET 81 MG: 81 TABLET CHEWABLE at 09:04

## 2025-02-15 RX ADMIN — SODIUM CHLORIDE, POTASSIUM CHLORIDE, SODIUM LACTATE AND CALCIUM CHLORIDE 500 ML: 600; 310; 30; 20 INJECTION, SOLUTION INTRAVENOUS at 09:30

## 2025-02-15 RX ADMIN — HYDROMORPHONE HYDROCHLORIDE 0.2 MG: 0.2 INJECTION, SOLUTION INTRAMUSCULAR; INTRAVENOUS; SUBCUTANEOUS at 08:02

## 2025-02-15 RX ADMIN — ACETAMINOPHEN 650 MG: 325 TABLET ORAL at 18:31

## 2025-02-15 RX ADMIN — INSULIN LISPRO 5 UNITS: 100 INJECTION, SOLUTION INTRAVENOUS; SUBCUTANEOUS at 20:09

## 2025-02-15 RX ADMIN — CEFAZOLIN SODIUM 2 G: 2 INJECTION, SOLUTION INTRAVENOUS at 05:43

## 2025-02-15 RX ADMIN — DILTIAZEM HYDROCHLORIDE 30 MG: 30 TABLET, FILM COATED ORAL at 18:31

## 2025-02-15 RX ADMIN — SENNOSIDES AND DOCUSATE SODIUM 2 TABLET: 50; 8.6 TABLET ORAL at 20:10

## 2025-02-15 RX ADMIN — POTASSIUM CHLORIDE 20 MEQ: 1500 TABLET, EXTENDED RELEASE ORAL at 18:31

## 2025-02-15 RX ADMIN — HEPARIN SODIUM 5000 UNITS: 5000 INJECTION INTRAVENOUS; SUBCUTANEOUS at 18:46

## 2025-02-15 RX ADMIN — ATORVASTATIN CALCIUM 80 MG: 80 TABLET, FILM COATED ORAL at 20:10

## 2025-02-15 RX ADMIN — ACETAMINOPHEN 650 MG: 325 TABLET ORAL at 00:04

## 2025-02-15 RX ADMIN — FUROSEMIDE 20 MG: 10 INJECTION, SOLUTION INTRAVENOUS at 13:55

## 2025-02-15 RX ADMIN — DILTIAZEM HYDROCHLORIDE 30 MG: 30 TABLET, FILM COATED ORAL at 05:43

## 2025-02-15 RX ADMIN — AMIODARONE HYDROCHLORIDE 0.5 MG/MIN: 1.8 INJECTION, SOLUTION INTRAVENOUS at 18:33

## 2025-02-15 RX ADMIN — DILTIAZEM HYDROCHLORIDE 30 MG: 30 TABLET, FILM COATED ORAL at 11:21

## 2025-02-15 RX ADMIN — OXYCODONE HYDROCHLORIDE 10 MG: 10 TABLET ORAL at 20:09

## 2025-02-15 RX ADMIN — HYDROMORPHONE HYDROCHLORIDE 0.2 MG: 0.2 INJECTION, SOLUTION INTRAMUSCULAR; INTRAVENOUS; SUBCUTANEOUS at 00:21

## 2025-02-15 RX ADMIN — HYDRALAZINE HYDROCHLORIDE 5 MG: 20 INJECTION INTRAMUSCULAR; INTRAVENOUS at 11:58

## 2025-02-15 RX ADMIN — IPRATROPIUM BROMIDE AND ALBUTEROL SULFATE 3 ML: .5; 3 SOLUTION RESPIRATORY (INHALATION) at 08:54

## 2025-02-15 ASSESSMENT — PAIN DESCRIPTION - LOCATION
LOCATION: CHEST

## 2025-02-15 ASSESSMENT — COGNITIVE AND FUNCTIONAL STATUS - GENERAL
CLIMB 3 TO 5 STEPS WITH RAILING: A LOT
STANDING UP FROM CHAIR USING ARMS: A LITTLE
WALKING IN HOSPITAL ROOM: A LITTLE
MOVING FROM LYING ON BACK TO SITTING ON SIDE OF FLAT BED WITH BEDRAILS: A LITTLE
TURNING FROM BACK TO SIDE WHILE IN FLAT BAD: A LITTLE
MOBILITY SCORE: 17
MOVING TO AND FROM BED TO CHAIR: A LITTLE

## 2025-02-15 ASSESSMENT — ACTIVITIES OF DAILY LIVING (ADL): ADL_ASSISTANCE: INDEPENDENT

## 2025-02-15 ASSESSMENT — PAIN SCALES - GENERAL
PAINLEVEL_OUTOF10: 5 - MODERATE PAIN
PAINLEVEL_OUTOF10: 5 - MODERATE PAIN
PAINLEVEL_OUTOF10: 6
PAINLEVEL_OUTOF10: 3
PAINLEVEL_OUTOF10: 7
PAINLEVEL_OUTOF10: 3
PAINLEVEL_OUTOF10: 7
PAINLEVEL_OUTOF10: 3
PAINLEVEL_OUTOF10: 7
PAINLEVEL_OUTOF10: 1
PAINLEVEL_OUTOF10: 7
PAINLEVEL_OUTOF10: 7
PAINLEVEL_OUTOF10: 8
PAINLEVEL_OUTOF10: 7
PAINLEVEL_OUTOF10: 7
PAINLEVEL_OUTOF10: 3

## 2025-02-15 ASSESSMENT — PAIN - FUNCTIONAL ASSESSMENT
PAIN_FUNCTIONAL_ASSESSMENT: 0-10

## 2025-02-15 NOTE — PROGRESS NOTES
"CTICU Progress Note  Radha Montalvo/[id number]    Admit Date: 2/14/2025  Hospital Length of Stay: 1   ICU Length of Stay: 18h   CT SURGEON:     SUBJECTIVE:   No new events ovn     MEDICATIONS  Infusions:  amiodarone, Last Rate: 0.5 mg/min (02/15/25 0605)  lactated Ringer's, Last Rate: 10 mL/hr (02/15/25 0600)  lactated Ringer's, Last Rate: 5 mL/hr (02/15/25 0600)  norepinephrine, Last Rate: Stopped (02/14/25 1810)      Scheduled:  acetaminophen, 650 mg, q6h  acyclovir, 800 mg, Daily  aspirin, 81 mg, Daily  atorvastatin, 80 mg, Nightly  bisoprolol, 10 mg, Daily  ceFAZolin, 2 g, q8h  dilTIAZem, 30 mg, q6h  insulin lispro, 0-15 Units, q4h  ipratropium-albuteroL, 3 mL, Once  polyethylene glycol, 17 g, Daily  sennosides-docusate sodium, 2 tablet, BID      PRN:  alteplase, 2 mg, PRN  calcium gluconate, 1 g, q6h PRN  calcium gluconate, 2 g, q6h PRN  dextrose, 25 g, q15 min PRN   Or  glucagon, 1 mg, q15 min PRN  HYDROmorphone, 0.2 mg, q15 min PRN  magnesium sulfate, 2 g, q6h PRN  magnesium sulfate, 4 g, q6h PRN  naloxone, 0.2 mg, q5 min PRN  oxyCODONE, 10 mg, q4h PRN  oxyCODONE, 5 mg, q4h PRN  oxygen, , Continuous PRN - O2/gases  potassium chloride, 20 mEq, q6h PRN  potassium chloride, 40 mEq, q6h PRN        PHYSICAL EXAM:   Visit Vitals  /86   Pulse 78   Temp 36.1 °C (97 °F) (Temporal)   Resp 22   Ht 1.702 m (5' 7\")   Wt 75 kg (165 lb 5.5 oz)   LMP  (LMP Unknown)   SpO2 96%   BMI 25.90 kg/m²   OB Status Hysterectomy   Smoking Status Never   BSA 1.88 m²     Wt Readings from Last 5 Encounters:   02/15/25 75 kg (165 lb 5.5 oz)   02/07/25 71.2 kg (157 lb)   02/02/25 71.7 kg (158 lb)   01/30/25 71.3 kg (157 lb 3 oz)   01/28/25 71.7 kg (158 lb)     INTAKE/OUTPUT:  I/O last 3 completed shifts:  In: 5127.5 (68.4 mL/kg) [I.V.:655.8 (8.7 mL/kg); Blood:655; IV Piggyback:3816.7]  Out: 1835 (24.5 mL/kg) [Urine:1115 (0.4 mL/kg/hr); Blood:250; Chest Tube:470]  Weight: 75 kg        LDA:  CVC 02/14/25 Double lumen Right Internal " jugular (Active)   Placement Date/Time: 02/14/25 (c) 0800   Hand Hygiene Performed Prior to CVC Insertion: Yes  Site Prep: Chlorhexidine   Site Prep Agent has Completely Dried Before Insertion: Yes  All 5 Sterile Barriers Used (Gloves, Gown, Cap, Mask, Large Sterile Mervat...   Number of days: 0       Arterial Line 02/14/25 Right Brachial (Active)   Placement Date/Time: 02/14/25 (c) 0736   Size: 20 G  Orientation: Right  Location: Brachial  Securement Method: Sutured  Patient Tolerance: Tolerated well   Number of days: 0       Urethral Catheter Temperature probe (Active)   Placement Date/Time: 02/14/25 0750   Placed by: NAZANIN TOBIAS  Hand Hygiene Completed: Yes  Catheter Type: Temperature probe  Catheter Balloon Size: 10 mL  Urine Returned: Yes   Number of days: 0       Chest Tube 1 Anterior Mediastinal (Active)   Placement Date/Time: 02/14/25 1200   Placed by: OR  Hand Hygiene Completed: Yes  Tube Number: 1  Chest Tube Orientation: Anterior  Chest Tube Location: Mediastinal  Chest Tube Drainage System: Suction   Number of days: 0       Chest Tube 2 Left Pleural (Active)   Placement Date/Time: 02/14/25 1200   Placed by: OR  Hand Hygiene Completed: Yes  Tube Number: 2  Chest Tube Orientation: Left  Chest Tube Location: Pleural  Chest Tube Drainage System: Suction   Number of days: 0         Vent settings:  FiO2 (%):  [40 %-100 %] 40 %    Physical Exam:     Physical Exam  Constitutional:       Appearance: Normal appearance.   HENT:      Head: Normocephalic and atraumatic.   Eyes:      Extraocular Movements: Extraocular movements intact.   Cardiovascular:      Rate and Rhythm: Normal rate and regular rhythm.   Pulmonary:      Effort: Pulmonary effort is normal.   Abdominal:      General: Abdomen is flat.      Palpations: Abdomen is soft.   Musculoskeletal:         General: Normal range of motion.      Cervical back: Normal range of motion.   Skin:     General: Skin is warm.      Comments: Bandaged incisional midsternal  scar  CT mediastinal:   CT l pleural   Neurological:      General: No focal deficit present.      Mental Status: She is alert.      Comments: Follow commands        Invasive Hemodynamics:    Most Recent Range Past 24hrs   BP (Art) 143/82 Arterial Line BP 1  Min: 86/56  Max: 151/87   MAP(Art) 106 mmHg Arterial Line MAP 1 (mmHg)   Min: 68 mmHg  Max: 114 mmHg   RA/CVP   No data recorded   PA   No data recorded   PA(mean)   No data recorded   CO   No data recorded   CI   No data recorded   Mixed Venous   No data recorded   SVR    No data recorded         Assessment/Plan   Patient is a 74 y.o female with a pmh of breast cancer, atrial fibrillation, ascending aorta dilation, stable angina, hypertension, hyperlipidemia presenting to the CTICU for elective CABG . Patient previously stated dyspnea and exertional chest pressure/discomfort. Patient is now s/p CABG x 2, LIMA - LAD, SVG-OM, MAZE PROCEDURE, PATSY CLIP, ablation      Plan:  NEURO:    - Serial neuro and pain assessments   - Scheduled Tylenol   - PRN oxycodone  - PRN dilaudid for pain   - PT Consult, OOB to chair as tolerated, chair position if not tolerated   - CAM ICU score qshift  - Sleep/wake cycle hygiene     CV:  Patient has a history of atrial fibrillation,CAD  Is now status post CABG x 2, LIMA - LAD, SVG-OM, MAZE PROCEDURE, PATSY CLIP, ablation  Pre/Post EF: Normal Arrived to CTICU on 2/14 A/V epicardial wires set AAI @ 80.-->  - Maintain goal MAP >65  - Amiodarone drip for afib-> will transition off   - Mixed venous and CI Q4H  - Volume resuscitate as clinically indicated  - Maintain epicardial wires set AAI @ 80  - If CABG is 2/2 STEMI/unstable angina, will receive Plavix POD2  - Start diltiazem, bisoprolol  - Hold home eliquis,     PULM:  No history of pulmonary disease.  Currently intubated on ventilator. Chest tubes l. Pleural, mediastinal.-->  - CPAP @ night   - Wean FiO2 maintaining SpO2 >92%.   - IS q1h and OOB to chair   - Chest tubes to wall suction->  plans to remove today if volume adequate   - Currently on 8L NC, likely secondary to pain and hx of emphysema-> Toradol ordered, DuoNeb ordered     GI:   - Adult regular diet   - Colace/senna BID and miralax BID     :   No history of renal disease, baseline creatinine 0.6-0.8. Creatinine stable post-op. Troy in place and making adequate UOP. -->  -will dc troy catheter   - Goal UOP 0.5ml/kg/hr  - RFP as clinically indicated  - Replete electrolytes per CTICU protocol     ENDO:   - Maintain BG <180, insulin per CTICU protocol     HEME:  Acute blood loss anemia and thrombocytopenia.-->    - Monitor drain output volume and characteristics  - CBC, coags, and fibrinogen post op and as clinically indicated  - Start ASA 6hrs post-op for CABG  - If CABG is 2/2 STEMI/unstable angina, will receive Plavix POD2  - SQH restarted   - SCDs for DVT prophylaxis.  - Last type and screen: 2/14     ID:  Afebrile, no current indications of infection. MRSA Negative.-->  - Trend temp q4h  - Periop cefazolin x 48hrs     Skin:  No active skin issues.  - preventative Mepilex dressings in place on sacrum and heels  - change preventative Mepilex weekly or more frequently as indicated (when moist/soiled)   - every shift skin assessment per nursing and weekly ICU skin rounds  - moisture barrier to be applied with antionette care  - active skin problems addressed with nursing on daily rounds     Proph:  SCDs  PPI     G:  Line  Right IJ MAC w [place] placed 2/14  Right brachial a-line placed 2/14     F: Family: will update at bedside postoperatively.     A,B,C,D,E,F,G: reviewed     Kitty Anthony MD  Emergency Medicine, PGY-2        Dispo: CTICU care for now.    CTICU TEAM PHONE [phone]

## 2025-02-15 NOTE — PROGRESS NOTES
Physical Therapy    Physical Therapy Evaluation & Treatment    Patient Name: Radha Montalvo  MRN: [id number]  Department: [facility] CTICU  Room: 17/17-A  Today's Date: 2/15/2025   Time Calculation  Start Time: 0909  Stop Time: 0953  Time Calculation (min): 44 min    Assessment/Plan   PT Assessment  PT Assessment Results: Decreased endurance, Decreased strength, Impaired balance, Decreased mobility, Pain  Rehab Prognosis: Good  Barriers to Discharge Home: No anticipated barriers  Evaluation/Treatment Tolerance: Patient tolerated treatment well  Medical Staff Made Aware: Yes  End of Session Communication: Bedside nurse  Assessment Comment: Pt performed functional transfers with Min A and ambulated 50' with thoracic walker with CGA this date. Pt will continue to benefit from skilled PT to improve functional mobility.  End of Session Patient Position: Up in chair, Alarm off, not on at start of session   IP OR SWING BED PT PLAN  Inpatient or Swing Bed: Inpatient  PT Plan  Treatment/Interventions: Bed mobility, Transfer training, Gait training, Stair training, Balance training, Strengthening, Endurance training, Range of motion, Therapeutic exercise, Therapeutic activity, Home exercise program, Positioning  PT Plan: Ongoing PT  PT Frequency: 3 times per week  PT Discharge Recommendations: Low intensity level of continued care  Equipment Recommended upon Discharge: Wheeled walker  PT Recommended Transfer Status: Assist x1  PT - OK to Discharge: Yes      Subjective     General Visit Information:  General  Reason for Referral: Patient is now s/p CABG x 2, LIMA - LAD, SVG-OM, MAZE PROCEDURE, PATSY CLIP, ablation 2/14  Past Medical History Relevant to Rehab: 74 y.o female with a pmh of breast cancer, atrial fibrillation, ascending aorta dilation, stable angina, hypertension, hyperlipidemia  Prior to Session Communication: Bedside nurse  Patient Position Received: Up in chair, Alarm off, not on at start of session  Preferred Learning  Style: auditory, verbal, visual  General Comment: Pt awake and willing to participate in PT evaluation. (Lines: A line, tele, 2 chest tubes, 8L O2 NC, 0.5 woodrow [place])  Home Living:  Home Living  Type of Home: House  Lives With: Spouse  Home Adaptive Equipment: None  Home Layout: One level  Home Access: Stairs to enter with rails  Entrance Stairs-Number of Steps: 3  Bathroom Shower/Tub: Walk-in shower  Bathroom Equipment: Shower chair with back  Home Living Comments: Pt reported that her son and  are able to assist at home upon DC  Prior Level of Function:  Prior Function Per Pt/Caregiver Report  Level of [place]: Independent with ADLs and functional transfers  Receives Help From: Family  ADL Assistance: Independent  Homemaking Assistance: Independent  Ambulatory Assistance: Independent  Vocational: Retired  Prior Function Comments: Drives, no falls; homebound ambulation for past few months due to increased SOB with activity  Precautions:  Precautions  Hearing/Visual Limitations: WFL  Medical Precautions: Cardiac precautions, Fall precautions  Post-Surgical Precautions: Move in the Tube  Precautions Comment: MAP>65; SpO2>92%       02/15/25 0909 02/15/25 0953   Vital Signs   Vitals Session Pre PT Post PT   Heart Rate 81 82   Heart Rate Source Monitor Monitor   SpO2 97 % 93 %   /82 145/83   BP Method Arterial line Arterial line   Patient Position Sitting Sitting            Objective   Pain:  Pain Assessment  Pain Assessment: 0-10  0-10 (Numeric) Pain Score: 7  Pain Type: Surgical pain  Pain Location: Chest  Pain Interventions: Repositioned  Response to Interventions: Resting quietly  Cognition:  Cognition  Overall Cognitive Status: Within Functional Limits  Orientation Level: Oriented X4    General Assessments:    Activity Tolerance  Endurance: Tolerates 10 - 20 min exercise with multiple rests  Early Mobility/Exercise Safety Screen: Proceed with mobilization - No exclusion criteria met  Activity  Tolerance Comments: Vitals stable throughout session    Sensation  Light Touch: No apparent deficits    Strength  Strength Comments: Grossly at least 3/5 based on functional mobility  Strength  Strength Comments: Grossly at least 3/5 based on functional mobility    Perception  Inattention/Neglect: Appears intact  Initiation: Appears intact  Motor Planning: Appears intact  Perseveration: Not present      Coordination  Movements are Fluid and Coordinated: Yes    Postural Control  Postural Control: Within Functional Limits    Static Standing Balance  Static Standing-Balance Support: Bilateral upper extremity supported  Static Standing-Level of Assistance: Contact guard  Functional Assessments:  Bed Mobility  Bed Mobility: No    Transfers  Transfer: Yes  Transfer 1  Transfer From 1: Sit to, Stand to  Transfer to 1: Stand, Sit  Technique 1: Sit to stand, Stand to sit  Transfer Device 1: Thoracic walker  Transfer Level of Assistance 1: Minimum assistance  Trials/Comments 1: Min A for initial hip elevation to stand progressed to CGA in standing; verbal cues for hand placement on thoracic walker    Ambulation/Gait Training  Ambulation/Gait Training Performed: Yes  Ambulation/Gait Training 1  Surface 1: Level tile  Device 1: Thoracic walker  Assistance 1: Contact guard  Quality of Gait 1: Inconsistent stride length, Decreased step length, Forward flexed posture  Comments/Distance (ft) 1: x50' with CGA for safety and thoracic walker management  Extremity/Trunk Assessments:  Cervical Spine   Cervical Spine: Within Functional Limits  Lumbar Spine   Lumbar Spine : Within Functional Limits    RLE   RLE : Within Functional Limits  LLE   LLE : Within Functional Limits  Treatments:  Therapeutic Activity  Therapeutic Activity Performed: Yes  Therapeutic Activity 1: Increased time for advanced ICU line management required for functional mobility  Therapeutic Activity 2: Pt stood and performed 10 standing marches prior to ambulation;  CGA for safety with thoracic walker  Therapeutic Activity 3: Pt provided education on MITT precautions via verbal/visual demonstration  Therapeutic Activity 4: Education provided to pt on use of incentive spirometer during session  Outcome Measures:  [facility] Basic Mobility  Turning from your back to your side while in a flat bed without using bedrails: A little  Moving from lying on your back to sitting on the side of a flat bed without using bedrails: A little  Moving to and from bed to chair (including a wheelchair): A little  Standing up from a chair using your arms (e.g. wheelchair or bedside chair): A little  To walk in hospital room: A little  Climbing 3-5 steps with railing: A lot  Basic Mobility - Total Score: 17    FSS-ICU  Ambulation: Walks >/ or equal to 50 feet with any assistance x1  Rolling: Minimal assistance (performs 75% or more of task)  Sitting: Supervision or set-up only  Transfer Sit-to-Stand: Minimal assistance (performs 75% or more of task)  Transfer Supine-to-Sit: Minimal assistance (performs 75% or more of task)  Total Score: 19      Early Mobility/Exercise Safety Screen: Proceed with mobilization - No exclusion criteria met  ICU Mobility Scale: Walking with assistance of 1 person [8]    Encounter Problems       Encounter Problems (Active)       Balance       Pt will demonstrate improved sitting/standing static/dynamic balance activities without LOB via score of 24/28 on the [place] for increase in safety prior to DC.  (Progressing)       Start:  02/15/25    Expected End:  03/01/25               Mobility       Pt will ambulate >100ft with appropriate form, SBA or less, LRAD, and no LOB for safe DC.  (Progressing)       Start:  02/15/25    Expected End:  03/01/25            Pt will tolerate >15 minutes of upright standing activity without seated rest breaks with no changes in vital signs for improved functional mobility.  (Progressing)       Start:  02/15/25    Expected End:  03/01/25             Pt will ambulate up/down >3stairs with hand rail with CGA or less to safely access their home upon DC.   (Progressing)       Start:  02/15/25    Expected End:  03/01/25               PT Transfers       Pt will perform bed mobility and sit<>stand transfers with SBA or less and use of LRAD to safely DC.  (Progressing)       Start:  02/15/25    Expected End:  03/01/25                   Education Documentation  Precautions, taught by Naseem Warner PT at 2/15/2025 11:10 AM.  Learner: Patient  Readiness: Acceptance  Method: Explanation  Response: Verbalizes Understanding  Comment: Pt provided education on MITT precautions    Body Mechanics, taught by Naseem Warner PT at 2/15/2025 11:10 AM.  Learner: Patient  Readiness: Acceptance  Method: Explanation  Response: Verbalizes Understanding  Comment: Pt provided education on MITT precautions    Mobility Training, taught by Naseem Warner PT at 2/15/2025 11:10 AM.  Learner: Patient  Readiness: Acceptance  Method: Explanation  Response: Verbalizes Understanding  Comment: Pt provided education on MITT precautions    Education Comments  No comments found.    NASEEM WARNER PT

## 2025-02-15 NOTE — CARE PLAN
Problem: Pain - Adult  Goal: Verbalizes/displays adequate comfort level or baseline comfort level  Outcome: Progressing     Problem: Safety - Adult  Goal: Free from fall injury  Outcome: Met     Problem: Nutrition  Goal: Nutrient intake appropriate for maintaining nutritional needs  Outcome: Progressing

## 2025-02-16 ENCOUNTER — APPOINTMENT (OUTPATIENT)
Dept: RADIOLOGY | Facility: HOSPITAL | Age: 75
End: 2025-02-16
Payer: MEDICARE

## 2025-02-16 LAB
ALBUMIN SERPL BCP-MCNC: 3.2 G/DL (ref 3.4–5)
ALBUMIN SERPL BCP-MCNC: 3.8 G/DL (ref 3.4–5)
ANION GAP BLDA CALCULATED.4IONS-SCNC: 10 MMO/L (ref 10–25)
ANION GAP BLDA CALCULATED.4IONS-SCNC: 11 MMO/L (ref 10–25)
ANION GAP BLDA CALCULATED.4IONS-SCNC: 11 MMO/L (ref 10–25)
ANION GAP BLDA CALCULATED.4IONS-SCNC: 12 MMO/L (ref 10–25)
ANION GAP SERPL CALC-SCNC: 11 MMOL/L (ref 10–20)
ANION GAP SERPL CALC-SCNC: 13 MMOL/L (ref 10–20)
BASE EXCESS BLDA CALC-SCNC: -0.2 MMOL/L (ref -2–3)
BASE EXCESS BLDA CALC-SCNC: -0.7 MMOL/L (ref -2–3)
BASE EXCESS BLDA CALC-SCNC: 0.1 MMOL/L (ref -2–3)
BASE EXCESS BLDA CALC-SCNC: 2.1 MMOL/L (ref -2–3)
BODY TEMPERATURE: 37 DEGREES CELSIUS
BUN SERPL-MCNC: 21 MG/DL (ref 6–23)
BUN SERPL-MCNC: 22 MG/DL (ref 6–23)
CA-I BLD-SCNC: 1.15 MMOL/L (ref 1.1–1.33)
CA-I BLDA-SCNC: 1.11 MMOL/L (ref 1.1–1.33)
CA-I BLDA-SCNC: 1.13 MMOL/L (ref 1.1–1.33)
CA-I BLDA-SCNC: 1.14 MMOL/L (ref 1.1–1.33)
CA-I BLDA-SCNC: 1.17 MMOL/L (ref 1.1–1.33)
CALCIUM SERPL-MCNC: 8 MG/DL (ref 8.6–10.6)
CALCIUM SERPL-MCNC: 8.3 MG/DL (ref 8.6–10.6)
CHLORIDE BLDA-SCNC: 100 MMOL/L (ref 98–107)
CHLORIDE BLDA-SCNC: 100 MMOL/L (ref 98–107)
CHLORIDE BLDA-SCNC: 101 MMOL/L (ref 98–107)
CHLORIDE BLDA-SCNC: 97 MMOL/L (ref 98–107)
CHLORIDE SERPL-SCNC: 104 MMOL/L (ref 98–107)
CHLORIDE SERPL-SCNC: 98 MMOL/L (ref 98–107)
CO2 SERPL-SCNC: 24 MMOL/L (ref 21–32)
CO2 SERPL-SCNC: 26 MMOL/L (ref 21–32)
CREAT SERPL-MCNC: 0.88 MG/DL (ref 0.5–1.05)
CREAT SERPL-MCNC: 0.94 MG/DL (ref 0.5–1.05)
EGFRCR SERPLBLD CKD-EPI 2021: 64 ML/MIN/1.73M*2
EGFRCR SERPLBLD CKD-EPI 2021: 69 ML/MIN/1.73M*2
ERYTHROCYTE [DISTWIDTH] IN BLOOD BY AUTOMATED COUNT: 13.2 % (ref 11.5–14.5)
GLUCOSE BLD MANUAL STRIP-MCNC: 116 MG/DL (ref 74–99)
GLUCOSE BLD MANUAL STRIP-MCNC: 139 MG/DL (ref 74–99)
GLUCOSE BLD MANUAL STRIP-MCNC: 143 MG/DL (ref 74–99)
GLUCOSE BLDA-MCNC: 109 MG/DL (ref 74–99)
GLUCOSE BLDA-MCNC: 143 MG/DL (ref 74–99)
GLUCOSE BLDA-MCNC: 160 MG/DL (ref 74–99)
GLUCOSE BLDA-MCNC: 178 MG/DL (ref 74–99)
GLUCOSE SERPL-MCNC: 147 MG/DL (ref 74–99)
GLUCOSE SERPL-MCNC: 97 MG/DL (ref 74–99)
HCO3 BLDA-SCNC: 23.4 MMOL/L (ref 22–26)
HCO3 BLDA-SCNC: 23.8 MMOL/L (ref 22–26)
HCO3 BLDA-SCNC: 24 MMOL/L (ref 22–26)
HCO3 BLDA-SCNC: 25.5 MMOL/L (ref 22–26)
HCT VFR BLD AUTO: 40.7 % (ref 36–46)
HCT VFR BLD EST: 38 % (ref 36–46)
HCT VFR BLD EST: 38 % (ref 36–46)
HCT VFR BLD EST: 42 % (ref 36–46)
HCT VFR BLD EST: 42 % (ref 36–46)
HGB BLD-MCNC: 13.6 G/DL (ref 12–16)
HGB BLDA-MCNC: 12.5 G/DL (ref 12–16)
HGB BLDA-MCNC: 12.8 G/DL (ref 12–16)
HGB BLDA-MCNC: 13.9 G/DL (ref 12–16)
HGB BLDA-MCNC: 14.1 G/DL (ref 12–16)
INHALED O2 CONCENTRATION: 40 %
INHALED O2 CONCENTRATION: 50 %
INHALED O2 CONCENTRATION: 55 %
INHALED O2 CONCENTRATION: 56 %
LACTATE BLDA-SCNC: 1.1 MMOL/L (ref 0.4–2)
LACTATE BLDA-SCNC: 1.3 MMOL/L (ref 0.4–2)
LACTATE BLDA-SCNC: 1.3 MMOL/L (ref 0.4–2)
LACTATE BLDA-SCNC: 1.5 MMOL/L (ref 0.4–2)
MAGNESIUM SERPL-MCNC: 2.02 MG/DL (ref 1.6–2.4)
MCH RBC QN AUTO: 34.2 PG (ref 26–34)
MCHC RBC AUTO-ENTMCNC: 33.4 G/DL (ref 32–36)
MCV RBC AUTO: 102 FL (ref 80–100)
NRBC BLD-RTO: 0 /100 WBCS (ref 0–0)
OXYHGB MFR BLDA: 86.8 % (ref 94–98)
OXYHGB MFR BLDA: 87.5 % (ref 94–98)
OXYHGB MFR BLDA: 89.9 % (ref 94–98)
OXYHGB MFR BLDA: 90.3 % (ref 94–98)
PCO2 BLDA: 35 MM HG (ref 38–42)
PCO2 BLDA: 35 MM HG (ref 38–42)
PCO2 BLDA: 36 MM HG (ref 38–42)
PCO2 BLDA: 37 MM HG (ref 38–42)
PH BLDA: 7.42 PH (ref 7.38–7.42)
PH BLDA: 7.42 PH (ref 7.38–7.42)
PH BLDA: 7.44 PH (ref 7.38–7.42)
PH BLDA: 7.47 PH (ref 7.38–7.42)
PHOSPHATE SERPL-MCNC: 3.3 MG/DL (ref 2.5–4.9)
PHOSPHATE SERPL-MCNC: 3.8 MG/DL (ref 2.5–4.9)
PLATELET # BLD AUTO: 156 X10*3/UL (ref 150–450)
PO2 BLDA: 55 MM HG (ref 85–95)
PO2 BLDA: 56 MM HG (ref 85–95)
PO2 BLDA: 60 MM HG (ref 85–95)
PO2 BLDA: 62 MM HG (ref 85–95)
POTASSIUM BLDA-SCNC: 3.4 MMOL/L (ref 3.5–5.3)
POTASSIUM BLDA-SCNC: 3.5 MMOL/L (ref 3.5–5.3)
POTASSIUM BLDA-SCNC: 4.1 MMOL/L (ref 3.5–5.3)
POTASSIUM BLDA-SCNC: 4.5 MMOL/L (ref 3.5–5.3)
POTASSIUM SERPL-SCNC: 3.3 MMOL/L (ref 3.5–5.3)
POTASSIUM SERPL-SCNC: 3.9 MMOL/L (ref 3.5–5.3)
RBC # BLD AUTO: 3.98 X10*6/UL (ref 4–5.2)
SAO2 % BLDA: 90 % (ref 94–100)
SAO2 % BLDA: 90 % (ref 94–100)
SAO2 % BLDA: 92 % (ref 94–100)
SAO2 % BLDA: 93 % (ref 94–100)
SODIUM BLDA-SCNC: 129 MMOL/L (ref 136–145)
SODIUM BLDA-SCNC: 131 MMOL/L (ref 136–145)
SODIUM BLDA-SCNC: 131 MMOL/L (ref 136–145)
SODIUM BLDA-SCNC: 132 MMOL/L (ref 136–145)
SODIUM SERPL-SCNC: 134 MMOL/L (ref 136–145)
SODIUM SERPL-SCNC: 135 MMOL/L (ref 136–145)
WBC # BLD AUTO: 13 X10*3/UL (ref 4.4–11.3)

## 2025-02-16 PROCEDURE — 99291 CRITICAL CARE FIRST HOUR: CPT

## 2025-02-16 PROCEDURE — 83735 ASSAY OF MAGNESIUM: CPT

## 2025-02-16 PROCEDURE — 2500000001 HC RX 250 WO HCPCS SELF ADMINISTERED DRUGS (ALT 637 FOR MEDICARE OP)

## 2025-02-16 PROCEDURE — 2500000004 HC RX 250 GENERAL PHARMACY W/ HCPCS (ALT 636 FOR OP/ED)

## 2025-02-16 PROCEDURE — 80069 RENAL FUNCTION PANEL: CPT

## 2025-02-16 PROCEDURE — 71045 X-RAY EXAM CHEST 1 VIEW: CPT

## 2025-02-16 PROCEDURE — 2500000004 HC RX 250 GENERAL PHARMACY W/ HCPCS (ALT 636 FOR OP/ED): Mod: TB

## 2025-02-16 PROCEDURE — 2020000001 HC ICU ROOM DAILY

## 2025-02-16 PROCEDURE — 2500000004 HC RX 250 GENERAL PHARMACY W/ HCPCS (ALT 636 FOR OP/ED): Mod: JZ,TB

## 2025-02-16 PROCEDURE — 71045 X-RAY EXAM CHEST 1 VIEW: CPT | Performed by: RADIOLOGY

## 2025-02-16 PROCEDURE — 84295 ASSAY OF SERUM SODIUM: CPT

## 2025-02-16 PROCEDURE — 82805 BLOOD GASES W/O2 SATURATION: CPT

## 2025-02-16 PROCEDURE — 94640 AIRWAY INHALATION TREATMENT: CPT

## 2025-02-16 PROCEDURE — 82435 ASSAY OF BLOOD CHLORIDE: CPT

## 2025-02-16 PROCEDURE — 2500000001 HC RX 250 WO HCPCS SELF ADMINISTERED DRUGS (ALT 637 FOR MEDICARE OP): Performed by: NURSE PRACTITIONER

## 2025-02-16 PROCEDURE — 85027 COMPLETE CBC AUTOMATED: CPT

## 2025-02-16 PROCEDURE — 2500000001 HC RX 250 WO HCPCS SELF ADMINISTERED DRUGS (ALT 637 FOR MEDICARE OP): Performed by: STUDENT IN AN ORGANIZED HEALTH CARE EDUCATION/TRAINING PROGRAM

## 2025-02-16 PROCEDURE — 82330 ASSAY OF CALCIUM: CPT

## 2025-02-16 PROCEDURE — 2500000004 HC RX 250 GENERAL PHARMACY W/ HCPCS (ALT 636 FOR OP/ED): Performed by: STUDENT IN AN ORGANIZED HEALTH CARE EDUCATION/TRAINING PROGRAM

## 2025-02-16 PROCEDURE — 2500000004 HC RX 250 GENERAL PHARMACY W/ HCPCS (ALT 636 FOR OP/ED): Mod: TB | Performed by: NURSE PRACTITIONER

## 2025-02-16 PROCEDURE — 37799 UNLISTED PX VASCULAR SURGERY: CPT

## 2025-02-16 PROCEDURE — 2500000002 HC RX 250 W HCPCS SELF ADMINISTERED DRUGS (ALT 637 FOR MEDICARE OP, ALT 636 FOR OP/ED)

## 2025-02-16 PROCEDURE — 94660 CPAP INITIATION&MGMT: CPT

## 2025-02-16 PROCEDURE — 93010 ELECTROCARDIOGRAM REPORT: CPT | Performed by: INTERNAL MEDICINE

## 2025-02-16 PROCEDURE — 2500000002 HC RX 250 W HCPCS SELF ADMINISTERED DRUGS (ALT 637 FOR MEDICARE OP, ALT 636 FOR OP/ED): Performed by: NURSE PRACTITIONER

## 2025-02-16 PROCEDURE — P9047 ALBUMIN (HUMAN), 25%, 50ML: HCPCS | Performed by: NURSE PRACTITIONER

## 2025-02-16 PROCEDURE — 82947 ASSAY GLUCOSE BLOOD QUANT: CPT

## 2025-02-16 RX ORDER — DILTIAZEM HYDROCHLORIDE 30 MG/1
60 TABLET, FILM COATED ORAL EVERY 6 HOURS
Status: DISCONTINUED | OUTPATIENT
Start: 2025-02-17 | End: 2025-02-19

## 2025-02-16 RX ORDER — ALBUMIN HUMAN 250 G/1000ML
25 SOLUTION INTRAVENOUS ONCE
Status: COMPLETED | OUTPATIENT
Start: 2025-02-16 | End: 2025-02-17

## 2025-02-16 RX ORDER — MAGNESIUM SULFATE HEPTAHYDRATE 40 MG/ML
2 INJECTION, SOLUTION INTRAVENOUS ONCE
Status: COMPLETED | OUTPATIENT
Start: 2025-02-16 | End: 2025-02-16

## 2025-02-16 RX ORDER — HYDROMORPHONE HYDROCHLORIDE 0.2 MG/ML
0.2 INJECTION INTRAMUSCULAR; INTRAVENOUS; SUBCUTANEOUS ONCE
Status: COMPLETED | OUTPATIENT
Start: 2025-02-16 | End: 2025-02-16

## 2025-02-16 RX ORDER — BUMETANIDE 0.25 MG/ML
2 INJECTION, SOLUTION INTRAMUSCULAR; INTRAVENOUS ONCE
Status: DISCONTINUED | OUTPATIENT
Start: 2025-02-16 | End: 2025-02-16

## 2025-02-16 RX ORDER — BUMETANIDE 0.25 MG/ML
2 INJECTION, SOLUTION INTRAMUSCULAR; INTRAVENOUS ONCE
Status: COMPLETED | OUTPATIENT
Start: 2025-02-16 | End: 2025-02-16

## 2025-02-16 RX ORDER — BUMETANIDE 0.25 MG/ML
1 INJECTION, SOLUTION INTRAMUSCULAR; INTRAVENOUS ONCE
Status: COMPLETED | OUTPATIENT
Start: 2025-02-16 | End: 2025-02-16

## 2025-02-16 RX ORDER — POTASSIUM CHLORIDE 14.9 MG/ML
20 INJECTION INTRAVENOUS
Status: DISCONTINUED | OUTPATIENT
Start: 2025-02-16 | End: 2025-02-16

## 2025-02-16 RX ORDER — BUDESONIDE 0.5 MG/2ML
0.5 INHALANT ORAL
Status: DISCONTINUED | OUTPATIENT
Start: 2025-02-16 | End: 2025-02-20

## 2025-02-16 RX ORDER — BISOPROLOL FUMARATE 10 MG/1
20 TABLET, FILM COATED ORAL DAILY
Status: DISCONTINUED | OUTPATIENT
Start: 2025-02-17 | End: 2025-02-22 | Stop reason: HOSPADM

## 2025-02-16 RX ORDER — HYDRALAZINE HYDROCHLORIDE 20 MG/ML
10 INJECTION INTRAMUSCULAR; INTRAVENOUS EVERY 4 HOURS PRN
Status: DISCONTINUED | OUTPATIENT
Start: 2025-02-16 | End: 2025-02-22 | Stop reason: HOSPADM

## 2025-02-16 RX ORDER — POTASSIUM CHLORIDE 29.8 MG/ML
40 INJECTION INTRAVENOUS ONCE
Status: COMPLETED | OUTPATIENT
Start: 2025-02-16 | End: 2025-02-16

## 2025-02-16 RX ORDER — BISOPROLOL FUMARATE 10 MG/1
10 TABLET, FILM COATED ORAL ONCE
Status: COMPLETED | OUTPATIENT
Start: 2025-02-16 | End: 2025-02-16

## 2025-02-16 RX ADMIN — BUMETANIDE 2 MG: 0.25 INJECTION INTRAMUSCULAR; INTRAVENOUS at 09:50

## 2025-02-16 RX ADMIN — ATORVASTATIN CALCIUM 80 MG: 80 TABLET, FILM COATED ORAL at 20:07

## 2025-02-16 RX ADMIN — BUMETANIDE 1 MG: 0.25 INJECTION INTRAMUSCULAR; INTRAVENOUS at 15:01

## 2025-02-16 RX ADMIN — BISOPROLOL FUMARATE 10 MG: 10 TABLET, FILM COATED ORAL at 11:17

## 2025-02-16 RX ADMIN — HYDRALAZINE HYDROCHLORIDE 10 MG: 20 INJECTION INTRAMUSCULAR; INTRAVENOUS at 11:18

## 2025-02-16 RX ADMIN — INSULIN LISPRO 5 UNITS: 100 INJECTION, SOLUTION INTRAVENOUS; SUBCUTANEOUS at 20:07

## 2025-02-16 RX ADMIN — ALBUMIN HUMAN 25 G: 0.25 SOLUTION INTRAVENOUS at 09:29

## 2025-02-16 RX ADMIN — ACYCLOVIR 800 MG: 800 TABLET ORAL at 08:41

## 2025-02-16 RX ADMIN — MAGNESIUM SULFATE HEPTAHYDRATE 2 G: 40 INJECTION, SOLUTION INTRAVENOUS at 09:37

## 2025-02-16 RX ADMIN — SENNOSIDES AND DOCUSATE SODIUM 2 TABLET: 50; 8.6 TABLET ORAL at 08:42

## 2025-02-16 RX ADMIN — ACETAMINOPHEN 650 MG: 325 TABLET ORAL at 05:14

## 2025-02-16 RX ADMIN — POTASSIUM CHLORIDE 40 MEQ: 29.8 INJECTION, SOLUTION INTRAVENOUS at 14:59

## 2025-02-16 RX ADMIN — BUMETANIDE 1 MG/HR: 0.25 INJECTION INTRAMUSCULAR; INTRAVENOUS at 21:25

## 2025-02-16 RX ADMIN — AMIODARONE HYDROCHLORIDE 1 MG/MIN: 1.8 INJECTION, SOLUTION INTRAVENOUS at 22:32

## 2025-02-16 RX ADMIN — OXYCODONE HYDROCHLORIDE 10 MG: 10 TABLET ORAL at 20:07

## 2025-02-16 RX ADMIN — FUROSEMIDE 60 MG: 10 INJECTION, SOLUTION INTRAVENOUS at 01:58

## 2025-02-16 RX ADMIN — DILTIAZEM HYDROCHLORIDE 30 MG: 30 TABLET, FILM COATED ORAL at 11:18

## 2025-02-16 RX ADMIN — CEFAZOLIN SODIUM 2 G: 2 INJECTION, SOLUTION INTRAVENOUS at 13:35

## 2025-02-16 RX ADMIN — ACETAMINOPHEN 650 MG: 325 TABLET ORAL at 17:57

## 2025-02-16 RX ADMIN — AMIODARONE HYDROCHLORIDE 0.5 MG/MIN: 1.8 INJECTION, SOLUTION INTRAVENOUS at 09:50

## 2025-02-16 RX ADMIN — POTASSIUM CHLORIDE 20 MEQ: 1.5 POWDER, FOR SOLUTION ORAL at 06:13

## 2025-02-16 RX ADMIN — IPRATROPIUM BROMIDE AND ALBUTEROL SULFATE 3 ML: .5; 3 SOLUTION RESPIRATORY (INHALATION) at 14:59

## 2025-02-16 RX ADMIN — HEPARIN SODIUM 5000 UNITS: 5000 INJECTION INTRAVENOUS; SUBCUTANEOUS at 00:25

## 2025-02-16 RX ADMIN — OXYCODONE HYDROCHLORIDE 5 MG: 5 TABLET ORAL at 11:17

## 2025-02-16 RX ADMIN — HEPARIN SODIUM 5000 UNITS: 5000 INJECTION INTRAVENOUS; SUBCUTANEOUS at 17:57

## 2025-02-16 RX ADMIN — ASPIRIN 81 MG CHEWABLE TABLET 81 MG: 81 TABLET CHEWABLE at 08:41

## 2025-02-16 RX ADMIN — DILTIAZEM HYDROCHLORIDE 30 MG: 30 TABLET, FILM COATED ORAL at 00:25

## 2025-02-16 RX ADMIN — ONDANSETRON 4 MG: 2 INJECTION INTRAMUSCULAR; INTRAVENOUS at 07:58

## 2025-02-16 RX ADMIN — AMIODARONE HYDROCHLORIDE 1 MG/MIN: 1.8 INJECTION, SOLUTION INTRAVENOUS at 13:36

## 2025-02-16 RX ADMIN — AMIODARONE HYDROCHLORIDE 150 MG: 1.5 INJECTION, SOLUTION INTRAVENOUS at 10:02

## 2025-02-16 RX ADMIN — OXYCODONE HYDROCHLORIDE 10 MG: 10 TABLET ORAL at 15:00

## 2025-02-16 RX ADMIN — IPRATROPIUM BROMIDE AND ALBUTEROL SULFATE 3 ML: .5; 3 SOLUTION RESPIRATORY (INHALATION) at 08:33

## 2025-02-16 RX ADMIN — BUDESONIDE 0.5 MG: 0.5 INHALANT RESPIRATORY (INHALATION) at 14:59

## 2025-02-16 RX ADMIN — BUMETANIDE 2 MG: 0.25 INJECTION INTRAMUSCULAR; INTRAVENOUS at 20:07

## 2025-02-16 RX ADMIN — HEPARIN SODIUM 5000 UNITS: 5000 INJECTION INTRAVENOUS; SUBCUTANEOUS at 08:41

## 2025-02-16 RX ADMIN — OXYCODONE HYDROCHLORIDE 10 MG: 10 TABLET ORAL at 05:50

## 2025-02-16 RX ADMIN — POLYETHYLENE GLYCOL 3350 17 G: 17 POWDER, FOR SOLUTION ORAL at 08:41

## 2025-02-16 RX ADMIN — DILTIAZEM HYDROCHLORIDE 30 MG: 30 TABLET, FILM COATED ORAL at 17:57

## 2025-02-16 RX ADMIN — AMIODARONE HYDROCHLORIDE 0.5 MG/MIN: 1.8 INJECTION, SOLUTION INTRAVENOUS at 05:14

## 2025-02-16 RX ADMIN — DILTIAZEM HYDROCHLORIDE 30 MG: 30 TABLET, FILM COATED ORAL at 05:14

## 2025-02-16 RX ADMIN — SENNOSIDES AND DOCUSATE SODIUM 2 TABLET: 50; 8.6 TABLET ORAL at 20:07

## 2025-02-16 RX ADMIN — CEFAZOLIN SODIUM 2 G: 2 INJECTION, SOLUTION INTRAVENOUS at 04:18

## 2025-02-16 RX ADMIN — ACETAMINOPHEN 650 MG: 325 TABLET ORAL at 00:25

## 2025-02-16 RX ADMIN — BISOPROLOL FUMARATE 10 MG: 10 TABLET, FILM COATED ORAL at 08:42

## 2025-02-16 RX ADMIN — HYDROMORPHONE HYDROCHLORIDE 0.2 MG: 0.2 INJECTION, SOLUTION INTRAMUSCULAR; INTRAVENOUS; SUBCUTANEOUS at 12:22

## 2025-02-16 RX ADMIN — ACETAMINOPHEN 650 MG: 325 TABLET ORAL at 11:17

## 2025-02-16 ASSESSMENT — PAIN SCALES - GENERAL
PAINLEVEL_OUTOF10: 1
PAINLEVEL_OUTOF10: 2
PAINLEVEL_OUTOF10: 0 - NO PAIN
PAINLEVEL_OUTOF10: 7
PAINLEVEL_OUTOF10: 5 - MODERATE PAIN
PAINLEVEL_OUTOF10: 3
PAINLEVEL_OUTOF10: 3
PAINLEVEL_OUTOF10: 9
PAINLEVEL_OUTOF10: 3
PAINLEVEL_OUTOF10: 3
PAINLEVEL_OUTOF10: 5 - MODERATE PAIN
PAINLEVEL_OUTOF10: 7

## 2025-02-16 ASSESSMENT — PAIN DESCRIPTION - LOCATION
LOCATION: CHEST

## 2025-02-16 ASSESSMENT — PAIN - FUNCTIONAL ASSESSMENT
PAIN_FUNCTIONAL_ASSESSMENT: 0-10

## 2025-02-16 NOTE — CARE PLAN
Problem: Discharge Planning  Goal: Discharge to home or other facility with appropriate resources  Outcome: Progressing     Problem: Chronic Conditions and Co-morbidities  Goal: Patient's chronic conditions and co-morbidity symptoms are monitored and maintained or improved  Outcome: Progressing     Problem: Nutrition  Goal: Nutrient intake appropriate for maintaining nutritional needs  Outcome: Progressing

## 2025-02-16 NOTE — PROGRESS NOTES
"CTICU Progress Note  Radha Montalvo/[id number]    Admit Date: 2/14/2025  Hospital Length of Stay: 2   ICU Length of Stay: 1d 18h   CT SURGEON:     SUBJECTIVE:   No new events ovn     MEDICATIONS  Infusions:  amiodarone, Last Rate: 0.5 mg/min (02/16/25 0700)      Scheduled:  acetaminophen, 650 mg, q6h  acyclovir, 800 mg, Daily  aspirin, 81 mg, Daily  atorvastatin, 80 mg, Nightly  bisoprolol, 10 mg, Daily  ceFAZolin, 2 g, q8h  dilTIAZem, 30 mg, q6h  heparin (porcine), 5,000 Units, q8h  insulin lispro, 0-15 Units, 4x daily  ipratropium-albuteroL, 3 mL, q6h  polyethylene glycol, 17 g, Daily  sennosides-docusate sodium, 2 tablet, BID      PRN:  alteplase, 2 mg, PRN  calcium gluconate, 1 g, q6h PRN  calcium gluconate, 2 g, q6h PRN  dextrose, 25 g, q15 min PRN   Or  glucagon, 1 mg, q15 min PRN  hydrALAZINE, 5 mg, PRN  HYDROmorphone, 0.2 mg, q2h PRN  magnesium sulfate, 2 g, q6h PRN  magnesium sulfate, 4 g, q6h PRN  naloxone, 0.2 mg, q5 min PRN  ondansetron, 4 mg, q8h PRN   Or  ondansetron, 4 mg, q8h PRN  oxyCODONE, 10 mg, q4h PRN  oxyCODONE, 5 mg, q4h PRN  oxygen, , Continuous PRN - O2/gases  potassium chloride CR, 20 mEq, q6h PRN   Or  potassium chloride, 20 mEq, q6h PRN  potassium chloride CR, 40 mEq, q6h PRN   Or  potassium chloride, 40 mEq, q6h PRN        PHYSICAL EXAM:   Visit Vitals  /83   Pulse 70   Temp 35.8 °C (96.4 °F) (Temporal)   Resp 16   Ht 1.702 m (5' 7\")   Wt 75 kg (165 lb 5.5 oz)   LMP  (LMP Unknown)   SpO2 92%   BMI 25.90 kg/m²   OB Status Hysterectomy   Smoking Status Never   BSA 1.88 m²     Wt Readings from Last 5 Encounters:   02/15/25 75 kg (165 lb 5.5 oz)   02/07/25 71.2 kg (157 lb)   02/02/25 71.7 kg (158 lb)   01/30/25 71.3 kg (157 lb 3 oz)   01/28/25 71.7 kg (158 lb)     INTAKE/OUTPUT:  I/O last 3 completed shifts:  In: 4479.1 (59.7 mL/kg) [I.V.:2204.1 (29.4 mL/kg); IV Piggyback:2275]  Out: 2315 (30.9 mL/kg) [Urine:1855 (0.7 mL/kg/hr); Chest Tube:460]  Weight: 75 kg        LDA:  CVC 02/14/25 " Double lumen Right Internal jugular (Active)   Placement Date/Time: 02/14/25 (c) 0800   Hand Hygiene Performed Prior to CVC Insertion: Yes  Site Prep: Chlorhexidine   Site Prep Agent has Completely Dried Before Insertion: Yes  All 5 Sterile Barriers Used (Gloves, Gown, Cap, Mask, Large Sterile Mervat...   Number of days: 0       Arterial Line 02/14/25 Right Brachial (Active)   Placement Date/Time: 02/14/25 (c) 0736   Size: 20 G  Orientation: Right  Location: Brachial  Securement Method: Sutured  Patient Tolerance: Tolerated well   Number of days: 0       Urethral Catheter Temperature probe (Active)   Placement Date/Time: 02/14/25 0750   Placed by: NAZANIN TOBIAS  Hand Hygiene Completed: Yes  Catheter Type: Temperature probe  Catheter Balloon Size: 10 mL  Urine Returned: Yes   Number of days: 0       Chest Tube 1 Anterior Mediastinal (Active)   Placement Date/Time: 02/14/25 1200   Placed by: OR  Hand Hygiene Completed: Yes  Tube Number: 1  Chest Tube Orientation: Anterior  Chest Tube Location: Mediastinal  Chest Tube Drainage System: Suction   Number of days: 0       Chest Tube 2 Left Pleural (Active)   Placement Date/Time: 02/14/25 1200   Placed by: OR  Hand Hygiene Completed: Yes  Tube Number: 2  Chest Tube Orientation: Left  Chest Tube Location: Pleural  Chest Tube Drainage System: Suction   Number of days: 0         Vent settings:  FiO2 (%):  [50 %-60 %] 50 %    Physical Exam:     Physical Exam  Constitutional:       Appearance: Normal appearance.   HENT:      Head: Normocephalic and atraumatic.   Eyes:      Extraocular Movements: Extraocular movements intact.   Cardiovascular:      Rate and Rhythm: Normal rate and regular rhythm.   Pulmonary:      Effort: Pulmonary effort is normal. Diminished sounds near bases   Abdominal:      General: Abdomen is flat.      Palpations: Abdomen is soft.   Musculoskeletal:         General: Normal range of motion.      Cervical back: Normal range of motion.   Skin:     General:  Skin is warm.      Comments: midsternal incisional scar, mildly tender, mildly erythematous, no leakage or openings   CT mediastinal:   CT l pleural   Neurological:      General: No focal deficit present.      Mental Status: She is alert.      Comments: Follow commands        Invasive Hemodynamics:    Most Recent Range Past 24hrs   BP (Art) 104/56 Arterial Line BP 1  Min: 12/7  Max: 148/85   MAP(Art) 73 mmHg Arterial Line MAP 1 (mmHg)   Min: 9 mmHg  Max: 110 mmHg   RA/CVP   No data recorded   PA   No data recorded   PA(mean)   No data recorded   CO   No data recorded   CI   No data recorded   Mixed Venous   No data recorded   SVR    No data recorded         Assessment/Plan   Patient is a 74 y.o female with a pmh of breast cancer, atrial fibrillation, ascending aorta dilation, stable angina, hypertension, hyperlipidemia presenting to the CTICU for elective CABG . Patient previously stated dyspnea and exertional chest pressure/discomfort. Patient is now s/p CABG x 2, LIMA - LAD, SVG-OM, MAZE PROCEDURE, PATSY CLIP, ablation      Plan:  NEURO:    - Serial neuro and pain assessments   - Scheduled Tylenol   - PRN oxycodone  - PT Consult, OOB to chair as tolerated, chair position if not tolerated   - CAM ICU score qshift  - Sleep/wake cycle hygiene     CV:  Patient has a history of atrial fibrillation,CAD  Is now status post CABG x 2, LIMA - LAD, SVG-OM, MAZE PROCEDURE, PATSY CLIP, ablation  Pre/Post EF: Normal Arrived to CTICU on 2/14 A/V epicardial wires set VVI @ 50-->  - Maintain goal MAP >65  - 150 amio bolus and increase drip from .50 to 1   - Mixed venous and CI Q4H  - Volume resuscitate as clinically indicated  - Maintain epicardial wires set VVI @ 50-> turned off   - magnesium for PACs  - home diltiazem,   - home bisoprolol -> increased dose   - Hold home eliquis for now      PULM:  No history of pulmonary disease.  Currently intubated on ventilator. Chest tubes l. Pleural, mediastinal.-->  - CPAP @ night   - inhalers  scheduled duoneb, pulmicort  - Wean FiO2 maintaining SpO2 >92%.   - IS q1h and OOB to chair   - Chest tubes to wall suction-> plans to remove today if volume adequate   - Currently on 10L NC,     GI:   - Adult regular diet   - Colace/senna BID and miralax BID     :   No history of renal disease, baseline creatinine 0.6-0.8. Creatinine stable post-op. Troy in place and making adequate UOP. -->  - troy catheter   - Goal UOP 0.5ml/kg/hr  - RFP as clinically indicated  - Replete electrolytes per CTICU protocol  - Bumex given     ENDO:   - Maintain BG <180, insulin per CTICU protocol     HEME:  Acute blood loss anemia and thrombocytopenia.-->    - Monitor drain output volume and characteristics  - CBC, coags, and fibrinogen post op and as clinically indicated  - Start ASA 6hrs post-op for CABG  - If CABG is 2/2 STEMI/unstable angina, will receive Plavix POD2  - SQH restarted   - SCDs for DVT prophylaxis.  - Last type and screen: 2/14     ID:  Afebrile, no current indications of infection. MRSA Negative.-->  - Trend temp q4h  - Periop cefazolin x 48hrs     Skin:  No active skin issues.  - preventative Mepilex dressings in place on sacrum and heels  - change preventative Mepilex weekly or more frequently as indicated (when moist/soiled)   - every shift skin assessment per nursing and weekly ICU skin rounds  - moisture barrier to be applied with antionette care  - active skin problems addressed with nursing on daily rounds     Proph:  SCDs  PPI     G:  Line  Right IJ MAC w Minimac placed 2/14  Right brachial a-line placed 2/14     F: Family: will update at bedside postoperatively.     A,B,C,D,E,F,G: reviewed     Kitty Anthony MD  Emergency Medicine, PGY-2        Dispo: CTICU care for now.    CTICU TEAM PHONE [phone]

## 2025-02-17 ENCOUNTER — APPOINTMENT (OUTPATIENT)
Dept: CARDIOLOGY | Facility: HOSPITAL | Age: 75
End: 2025-02-17
Payer: MEDICARE

## 2025-02-17 ENCOUNTER — APPOINTMENT (OUTPATIENT)
Dept: RADIOLOGY | Facility: HOSPITAL | Age: 75
End: 2025-02-17
Payer: MEDICARE

## 2025-02-17 VITALS
OXYGEN SATURATION: 95 % | TEMPERATURE: 96.8 F | HEIGHT: 67 IN | RESPIRATION RATE: 18 BRPM | DIASTOLIC BLOOD PRESSURE: 83 MMHG | SYSTOLIC BLOOD PRESSURE: 145 MMHG | BODY MASS INDEX: 25.95 KG/M2 | WEIGHT: 165.34 LBS | HEART RATE: 111 BPM

## 2025-02-17 LAB
ALBUMIN SERPL BCP-MCNC: 3.2 G/DL (ref 3.4–5)
ALBUMIN SERPL BCP-MCNC: 3.5 G/DL (ref 3.4–5)
ALBUMIN SERPL BCP-MCNC: 3.6 G/DL (ref 3.4–5)
ANION GAP BLDA CALCULATED.4IONS-SCNC: 8 MMO/L (ref 10–25)
ANION GAP SERPL CALC-SCNC: 15 MMOL/L (ref 10–20)
ANION GAP SERPL CALC-SCNC: 15 MMOL/L (ref 10–20)
ANION GAP SERPL CALC-SCNC: 17 MMOL/L (ref 10–20)
ATRIAL RATE: 88 BPM
BASE EXCESS BLDA CALC-SCNC: 2.4 MMOL/L (ref -2–3)
BODY TEMPERATURE: 37 DEGREES CELSIUS
BUN SERPL-MCNC: 21 MG/DL (ref 6–23)
BUN SERPL-MCNC: 25 MG/DL (ref 6–23)
BUN SERPL-MCNC: 26 MG/DL (ref 6–23)
CA-I BLD-SCNC: 0.96 MMOL/L (ref 1.1–1.33)
CA-I BLD-SCNC: 1.09 MMOL/L (ref 1.1–1.33)
CA-I BLDA-SCNC: 1.09 MMOL/L (ref 1.1–1.33)
CALCIUM SERPL-MCNC: 7.5 MG/DL (ref 8.6–10.6)
CALCIUM SERPL-MCNC: 8.2 MG/DL (ref 8.6–10.6)
CALCIUM SERPL-MCNC: 8.3 MG/DL (ref 8.6–10.6)
CHLORIDE BLDA-SCNC: 100 MMOL/L (ref 98–107)
CHLORIDE SERPL-SCNC: 101 MMOL/L (ref 98–107)
CHLORIDE SERPL-SCNC: 97 MMOL/L (ref 98–107)
CHLORIDE SERPL-SCNC: 98 MMOL/L (ref 98–107)
CO2 SERPL-SCNC: 22 MMOL/L (ref 21–32)
CO2 SERPL-SCNC: 25 MMOL/L (ref 21–32)
CO2 SERPL-SCNC: 26 MMOL/L (ref 21–32)
CREAT SERPL-MCNC: 0.84 MG/DL (ref 0.5–1.05)
CREAT SERPL-MCNC: 0.92 MG/DL (ref 0.5–1.05)
CREAT SERPL-MCNC: 0.99 MG/DL (ref 0.5–1.05)
EGFRCR SERPLBLD CKD-EPI 2021: 60 ML/MIN/1.73M*2
EGFRCR SERPLBLD CKD-EPI 2021: 65 ML/MIN/1.73M*2
EGFRCR SERPLBLD CKD-EPI 2021: 73 ML/MIN/1.73M*2
ERYTHROCYTE [DISTWIDTH] IN BLOOD BY AUTOMATED COUNT: 13.2 % (ref 11.5–14.5)
GLUCOSE BLD MANUAL STRIP-MCNC: 100 MG/DL (ref 74–99)
GLUCOSE BLD MANUAL STRIP-MCNC: 107 MG/DL (ref 74–99)
GLUCOSE BLD MANUAL STRIP-MCNC: 112 MG/DL (ref 74–99)
GLUCOSE BLD MANUAL STRIP-MCNC: 155 MG/DL (ref 74–99)
GLUCOSE BLD MANUAL STRIP-MCNC: 89 MG/DL (ref 74–99)
GLUCOSE BLDA-MCNC: 112 MG/DL (ref 74–99)
GLUCOSE SERPL-MCNC: 113 MG/DL (ref 74–99)
GLUCOSE SERPL-MCNC: 135 MG/DL (ref 74–99)
GLUCOSE SERPL-MCNC: 179 MG/DL (ref 74–99)
HCO3 BLDA-SCNC: 26.4 MMOL/L (ref 22–26)
HCT VFR BLD AUTO: 37.6 % (ref 36–46)
HCT VFR BLD EST: 36 % (ref 36–46)
HGB BLD-MCNC: 12.6 G/DL (ref 12–16)
HGB BLDA-MCNC: 12.1 G/DL (ref 12–16)
INHALED O2 CONCENTRATION: 50 %
LACTATE BLDA-SCNC: 0.8 MMOL/L (ref 0.4–2)
MAGNESIUM SERPL-MCNC: 1.81 MG/DL (ref 1.6–2.4)
MAGNESIUM SERPL-MCNC: 1.95 MG/DL (ref 1.6–2.4)
MCH RBC QN AUTO: 33.9 PG (ref 26–34)
MCHC RBC AUTO-ENTMCNC: 33.5 G/DL (ref 32–36)
MCV RBC AUTO: 101 FL (ref 80–100)
NRBC BLD-RTO: 0 /100 WBCS (ref 0–0)
OXYHGB MFR BLDA: 95.2 % (ref 94–98)
PCO2 BLDA: 38 MM HG (ref 38–42)
PH BLDA: 7.45 PH (ref 7.38–7.42)
PHOSPHATE SERPL-MCNC: 3.2 MG/DL (ref 2.5–4.9)
PHOSPHATE SERPL-MCNC: 3.3 MG/DL (ref 2.5–4.9)
PHOSPHATE SERPL-MCNC: 3.6 MG/DL (ref 2.5–4.9)
PLATELET # BLD AUTO: 150 X10*3/UL (ref 150–450)
PO2 BLDA: 78 MM HG (ref 85–95)
POTASSIUM BLDA-SCNC: 2.8 MMOL/L (ref 3.5–5.3)
POTASSIUM SERPL-SCNC: 3 MMOL/L (ref 3.5–5.3)
POTASSIUM SERPL-SCNC: 3.2 MMOL/L (ref 3.5–5.3)
POTASSIUM SERPL-SCNC: 3.7 MMOL/L (ref 3.5–5.3)
Q ONSET: 215 MS
QRS COUNT: 16 BEATS
QRS DURATION: 78 MS
QT INTERVAL: 410 MS
QTC CALCULATION(BAZETT): 528 MS
QTC FREDERICIA: 486 MS
R AXIS: -20 DEGREES
RBC # BLD AUTO: 3.72 X10*6/UL (ref 4–5.2)
SAO2 % BLDA: 98 % (ref 94–100)
SODIUM BLDA-SCNC: 132 MMOL/L (ref 136–145)
SODIUM SERPL-SCNC: 135 MMOL/L (ref 136–145)
SODIUM SERPL-SCNC: 135 MMOL/L (ref 136–145)
SODIUM SERPL-SCNC: 136 MMOL/L (ref 136–145)
T AXIS: 35 DEGREES
T OFFSET: 420 MS
VENTRICULAR RATE: 100 BPM
WBC # BLD AUTO: 11.5 X10*3/UL (ref 4.4–11.3)

## 2025-02-17 PROCEDURE — 97530 THERAPEUTIC ACTIVITIES: CPT | Mod: GO

## 2025-02-17 PROCEDURE — 2500000002 HC RX 250 W HCPCS SELF ADMINISTERED DRUGS (ALT 637 FOR MEDICARE OP, ALT 636 FOR OP/ED): Performed by: NURSE PRACTITIONER

## 2025-02-17 PROCEDURE — 37799 UNLISTED PX VASCULAR SURGERY: CPT | Performed by: NURSE PRACTITIONER

## 2025-02-17 PROCEDURE — 2500000004 HC RX 250 GENERAL PHARMACY W/ HCPCS (ALT 636 FOR OP/ED): Performed by: NURSE PRACTITIONER

## 2025-02-17 PROCEDURE — 82947 ASSAY GLUCOSE BLOOD QUANT: CPT

## 2025-02-17 PROCEDURE — 94640 AIRWAY INHALATION TREATMENT: CPT

## 2025-02-17 PROCEDURE — 83735 ASSAY OF MAGNESIUM: CPT | Performed by: STUDENT IN AN ORGANIZED HEALTH CARE EDUCATION/TRAINING PROGRAM

## 2025-02-17 PROCEDURE — 2500000002 HC RX 250 W HCPCS SELF ADMINISTERED DRUGS (ALT 637 FOR MEDICARE OP, ALT 636 FOR OP/ED)

## 2025-02-17 PROCEDURE — 93005 ELECTROCARDIOGRAM TRACING: CPT

## 2025-02-17 PROCEDURE — 71045 X-RAY EXAM CHEST 1 VIEW: CPT

## 2025-02-17 PROCEDURE — 2500000004 HC RX 250 GENERAL PHARMACY W/ HCPCS (ALT 636 FOR OP/ED): Performed by: STUDENT IN AN ORGANIZED HEALTH CARE EDUCATION/TRAINING PROGRAM

## 2025-02-17 PROCEDURE — 2500000005 HC RX 250 GENERAL PHARMACY W/O HCPCS

## 2025-02-17 PROCEDURE — 82435 ASSAY OF BLOOD CHLORIDE: CPT

## 2025-02-17 PROCEDURE — 2500000001 HC RX 250 WO HCPCS SELF ADMINISTERED DRUGS (ALT 637 FOR MEDICARE OP)

## 2025-02-17 PROCEDURE — 37799 UNLISTED PX VASCULAR SURGERY: CPT | Performed by: STUDENT IN AN ORGANIZED HEALTH CARE EDUCATION/TRAINING PROGRAM

## 2025-02-17 PROCEDURE — 82435 ASSAY OF BLOOD CHLORIDE: CPT | Performed by: STUDENT IN AN ORGANIZED HEALTH CARE EDUCATION/TRAINING PROGRAM

## 2025-02-17 PROCEDURE — 2500000004 HC RX 250 GENERAL PHARMACY W/ HCPCS (ALT 636 FOR OP/ED)

## 2025-02-17 PROCEDURE — 94660 CPAP INITIATION&MGMT: CPT

## 2025-02-17 PROCEDURE — 2500000001 HC RX 250 WO HCPCS SELF ADMINISTERED DRUGS (ALT 637 FOR MEDICARE OP): Performed by: STUDENT IN AN ORGANIZED HEALTH CARE EDUCATION/TRAINING PROGRAM

## 2025-02-17 PROCEDURE — 82330 ASSAY OF CALCIUM: CPT | Performed by: STUDENT IN AN ORGANIZED HEALTH CARE EDUCATION/TRAINING PROGRAM

## 2025-02-17 PROCEDURE — 85027 COMPLETE CBC AUTOMATED: CPT | Performed by: STUDENT IN AN ORGANIZED HEALTH CARE EDUCATION/TRAINING PROGRAM

## 2025-02-17 PROCEDURE — 2500000004 HC RX 250 GENERAL PHARMACY W/ HCPCS (ALT 636 FOR OP/ED): Mod: TB | Performed by: NURSE PRACTITIONER

## 2025-02-17 PROCEDURE — 2020000001 HC ICU ROOM DAILY

## 2025-02-17 PROCEDURE — 71045 X-RAY EXAM CHEST 1 VIEW: CPT | Performed by: RADIOLOGY

## 2025-02-17 PROCEDURE — 2500000001 HC RX 250 WO HCPCS SELF ADMINISTERED DRUGS (ALT 637 FOR MEDICARE OP): Performed by: NURSE PRACTITIONER

## 2025-02-17 PROCEDURE — 84100 ASSAY OF PHOSPHORUS: CPT | Performed by: NURSE PRACTITIONER

## 2025-02-17 PROCEDURE — 97165 OT EVAL LOW COMPLEX 30 MIN: CPT | Mod: GO

## 2025-02-17 PROCEDURE — 99291 CRITICAL CARE FIRST HOUR: CPT

## 2025-02-17 RX ORDER — BUMETANIDE 0.25 MG/ML
1 INJECTION, SOLUTION INTRAMUSCULAR; INTRAVENOUS ONCE
Status: COMPLETED | OUTPATIENT
Start: 2025-02-17 | End: 2025-02-17

## 2025-02-17 RX ORDER — FLUTICASONE PROPIONATE 50 MCG
2 SPRAY, SUSPENSION (ML) NASAL DAILY
Status: DISCONTINUED | OUTPATIENT
Start: 2025-02-17 | End: 2025-02-22 | Stop reason: HOSPADM

## 2025-02-17 RX ORDER — MAGNESIUM SULFATE HEPTAHYDRATE 40 MG/ML
2 INJECTION, SOLUTION INTRAVENOUS ONCE
Status: COMPLETED | OUTPATIENT
Start: 2025-02-17 | End: 2025-02-17

## 2025-02-17 RX ORDER — AMIODARONE HYDROCHLORIDE 200 MG/1
400 TABLET ORAL 2 TIMES DAILY
Status: DISCONTINUED | OUTPATIENT
Start: 2025-02-17 | End: 2025-02-19

## 2025-02-17 RX ORDER — IPRATROPIUM BROMIDE AND ALBUTEROL SULFATE 2.5; .5 MG/3ML; MG/3ML
3 SOLUTION RESPIRATORY (INHALATION) EVERY 6 HOURS PRN
Status: DISCONTINUED | OUTPATIENT
Start: 2025-02-17 | End: 2025-02-22 | Stop reason: HOSPADM

## 2025-02-17 RX ORDER — POTASSIUM CHLORIDE 29.8 MG/ML
40 INJECTION INTRAVENOUS ONCE
Status: COMPLETED | OUTPATIENT
Start: 2025-02-17 | End: 2025-02-17

## 2025-02-17 RX ADMIN — SENNOSIDES AND DOCUSATE SODIUM 2 TABLET: 50; 8.6 TABLET ORAL at 20:07

## 2025-02-17 RX ADMIN — FLUTICASONE PROPIONATE 2 SPRAY: 50 SPRAY, METERED NASAL at 20:53

## 2025-02-17 RX ADMIN — ACETAMINOPHEN 650 MG: 325 TABLET ORAL at 00:25

## 2025-02-17 RX ADMIN — POLYETHYLENE GLYCOL 3350 17 G: 17 POWDER, FOR SOLUTION ORAL at 08:30

## 2025-02-17 RX ADMIN — AMIODARONE HYDROCHLORIDE 1 MG/MIN: 1.8 INJECTION, SOLUTION INTRAVENOUS at 04:11

## 2025-02-17 RX ADMIN — POTASSIUM CHLORIDE 40 MEQ: 1500 TABLET, EXTENDED RELEASE ORAL at 12:25

## 2025-02-17 RX ADMIN — ACETAMINOPHEN 650 MG: 325 TABLET ORAL at 17:56

## 2025-02-17 RX ADMIN — DILTIAZEM HYDROCHLORIDE 60 MG: 30 TABLET, FILM COATED ORAL at 00:25

## 2025-02-17 RX ADMIN — ATORVASTATIN CALCIUM 80 MG: 80 TABLET, FILM COATED ORAL at 20:07

## 2025-02-17 RX ADMIN — HEPARIN SODIUM 5000 UNITS: 5000 INJECTION INTRAVENOUS; SUBCUTANEOUS at 16:28

## 2025-02-17 RX ADMIN — AMIODARONE HYDROCHLORIDE 0.5 MG/MIN: 1.8 INJECTION, SOLUTION INTRAVENOUS at 10:27

## 2025-02-17 RX ADMIN — AMIODARONE HYDROCHLORIDE 400 MG: 200 TABLET ORAL at 20:07

## 2025-02-17 RX ADMIN — BUDESONIDE 0.5 MG: 0.5 INHALANT RESPIRATORY (INHALATION) at 19:54

## 2025-02-17 RX ADMIN — BISOPROLOL FUMARATE 20 MG: 10 TABLET, FILM COATED ORAL at 08:31

## 2025-02-17 RX ADMIN — OXYCODONE HYDROCHLORIDE 10 MG: 10 TABLET ORAL at 06:30

## 2025-02-17 RX ADMIN — POTASSIUM CHLORIDE 40 MEQ: 1.5 POWDER, FOR SOLUTION ORAL at 01:56

## 2025-02-17 RX ADMIN — HEPARIN SODIUM 5000 UNITS: 5000 INJECTION INTRAVENOUS; SUBCUTANEOUS at 00:25

## 2025-02-17 RX ADMIN — ASPIRIN 81 MG CHEWABLE TABLET 81 MG: 81 TABLET CHEWABLE at 08:30

## 2025-02-17 RX ADMIN — POTASSIUM CHLORIDE 40 MEQ: 1500 TABLET, EXTENDED RELEASE ORAL at 20:06

## 2025-02-17 RX ADMIN — HEPARIN SODIUM 5000 UNITS: 5000 INJECTION INTRAVENOUS; SUBCUTANEOUS at 08:31

## 2025-02-17 RX ADMIN — CALCIUM GLUCONATE 1 G: 20 INJECTION, SOLUTION INTRAVENOUS at 06:13

## 2025-02-17 RX ADMIN — DILTIAZEM HYDROCHLORIDE 60 MG: 30 TABLET, FILM COATED ORAL at 12:25

## 2025-02-17 RX ADMIN — BUMETANIDE 2 MG/HR: 0.25 INJECTION INTRAMUSCULAR; INTRAVENOUS at 13:04

## 2025-02-17 RX ADMIN — AMIODARONE HYDROCHLORIDE 400 MG: 200 TABLET ORAL at 10:23

## 2025-02-17 RX ADMIN — ACYCLOVIR 800 MG: 800 TABLET ORAL at 08:31

## 2025-02-17 RX ADMIN — IPRATROPIUM BROMIDE AND ALBUTEROL SULFATE 3 ML: .5; 3 SOLUTION RESPIRATORY (INHALATION) at 08:38

## 2025-02-17 RX ADMIN — ACETAMINOPHEN 650 MG: 325 TABLET ORAL at 05:01

## 2025-02-17 RX ADMIN — DILTIAZEM HYDROCHLORIDE 60 MG: 30 TABLET, FILM COATED ORAL at 05:01

## 2025-02-17 RX ADMIN — Medication 13 L/MIN: at 09:08

## 2025-02-17 RX ADMIN — BUMETANIDE 1 MG: 0.25 INJECTION, SOLUTION INTRAMUSCULAR; INTRAVENOUS at 10:10

## 2025-02-17 RX ADMIN — INSULIN LISPRO 5 UNITS: 100 INJECTION, SOLUTION INTRAVENOUS; SUBCUTANEOUS at 12:42

## 2025-02-17 RX ADMIN — ACETAMINOPHEN 650 MG: 325 TABLET ORAL at 12:25

## 2025-02-17 RX ADMIN — MAGNESIUM SULFATE HEPTAHYDRATE 2 G: 40 INJECTION, SOLUTION INTRAVENOUS at 10:23

## 2025-02-17 RX ADMIN — POTASSIUM CHLORIDE 40 MEQ: 29.8 INJECTION, SOLUTION INTRAVENOUS at 03:57

## 2025-02-17 RX ADMIN — BUDESONIDE 0.5 MG: 0.5 INHALANT RESPIRATORY (INHALATION) at 08:37

## 2025-02-17 RX ADMIN — SENNOSIDES AND DOCUSATE SODIUM 2 TABLET: 50; 8.6 TABLET ORAL at 08:30

## 2025-02-17 RX ADMIN — DILTIAZEM HYDROCHLORIDE 60 MG: 30 TABLET, FILM COATED ORAL at 23:32

## 2025-02-17 RX ADMIN — ACETAMINOPHEN 650 MG: 325 TABLET ORAL at 23:32

## 2025-02-17 RX ADMIN — Medication 13 L/MIN: at 15:24

## 2025-02-17 RX ADMIN — DILTIAZEM HYDROCHLORIDE 60 MG: 30 TABLET, FILM COATED ORAL at 17:56

## 2025-02-17 ASSESSMENT — COGNITIVE AND FUNCTIONAL STATUS - GENERAL
TOILETING: A LITTLE
HELP NEEDED FOR BATHING: A LITTLE
DRESSING REGULAR LOWER BODY CLOTHING: A LITTLE
PERSONAL GROOMING: A LITTLE
DAILY ACTIVITIY SCORE: 20

## 2025-02-17 ASSESSMENT — PAIN - FUNCTIONAL ASSESSMENT
PAIN_FUNCTIONAL_ASSESSMENT: 0-10
PAIN_FUNCTIONAL_ASSESSMENT: WONG-BAKER FACES
PAIN_FUNCTIONAL_ASSESSMENT: 0-10

## 2025-02-17 ASSESSMENT — PAIN SCALES - GENERAL
PAINLEVEL_OUTOF10: 0 - NO PAIN
PAINLEVEL_OUTOF10: 4
PAINLEVEL_OUTOF10: 0 - NO PAIN
PAINLEVEL_OUTOF10: 8
PAINLEVEL_OUTOF10: 0 - NO PAIN

## 2025-02-17 ASSESSMENT — PAIN SCALES - WONG BAKER: WONGBAKER_NUMERICALRESPONSE: HURTS LITTLE BIT

## 2025-02-17 ASSESSMENT — PAIN DESCRIPTION - LOCATION: LOCATION: CHEST

## 2025-02-17 ASSESSMENT — ACTIVITIES OF DAILY LIVING (ADL)
ADL_ASSISTANCE: INDEPENDENT
BATHING_ASSISTANCE: MINIMAL

## 2025-02-17 NOTE — PROGRESS NOTES
"CTICU Progress Note  Radha Montalvo/[id number]    Admit Date: 2/14/2025  Hospital Length of Stay: 3   ICU Length of Stay: 2d 18h   CT SURGEON:     SUBJECTIVE:   OVN patient placed on Bumex drip    MEDICATIONS  Infusions:  amiodarone, Last Rate: 1 mg/min (02/17/25 0600)  bumetanide, Last Rate: 1 mg/hr (02/17/25 0600)      Scheduled:  acetaminophen, 650 mg, q6h  acyclovir, 800 mg, Daily  aspirin, 81 mg, Daily  atorvastatin, 80 mg, Nightly  bisoprolol, 20 mg, Daily  budesonide, 0.5 mg, BID  dilTIAZem, 60 mg, q6h  heparin (porcine), 5,000 Units, q8h  insulin lispro, 0-15 Units, 4x daily  ipratropium-albuteroL, 3 mL, q6h  polyethylene glycol, 17 g, Daily  potassium chloride, 40 mEq, Once  sennosides-docusate sodium, 2 tablet, BID      PRN:  alteplase, 2 mg, PRN  calcium gluconate, 1 g, q6h PRN  calcium gluconate, 2 g, q6h PRN  dextrose, 25 g, q15 min PRN   Or  glucagon, 1 mg, q15 min PRN  hydrALAZINE, 10 mg, q4h PRN  magnesium sulfate, 2 g, q6h PRN  magnesium sulfate, 4 g, q6h PRN  naloxone, 0.2 mg, q5 min PRN  ondansetron, 4 mg, q8h PRN   Or  ondansetron, 4 mg, q8h PRN  oxyCODONE, 10 mg, q4h PRN  oxyCODONE, 5 mg, q4h PRN  oxygen, , Continuous PRN - O2/gases  potassium chloride CR, 20 mEq, q6h PRN   Or  potassium chloride, 20 mEq, q6h PRN  potassium chloride CR, 40 mEq, q6h PRN   Or  potassium chloride, 40 mEq, q6h PRN        PHYSICAL EXAM:   Visit Vitals  /83   Pulse 102   Temp 36 °C (96.8 °F) (Temporal)   Resp 16   Ht 1.702 m (5' 7\")   Wt 75 kg (165 lb 5.5 oz)   LMP  (LMP Unknown)   SpO2 90%   BMI 25.90 kg/m²   OB Status Hysterectomy   Smoking Status Never   BSA 1.88 m²     Wt Readings from Last 5 Encounters:   02/15/25 75 kg (165 lb 5.5 oz)   02/07/25 71.2 kg (157 lb)   02/02/25 71.7 kg (158 lb)   01/30/25 71.3 kg (157 lb 3 oz)   01/28/25 71.7 kg (158 lb)     INTAKE/OUTPUT:  I/O last 3 completed shifts:  In: 2218.9 (29.6 mL/kg) [I.V.:1768.9 (23.6 mL/kg); IV Piggyback:450]  Out: 4535 (60.5 mL/kg) [Urine:4465 " (1.7 mL/kg/hr); Chest Tube:70]  Weight: 75 kg        LDA:  CVC 02/14/25 Double lumen Right Internal jugular (Active)   Placement Date/Time: 02/14/25 (c) 0800   Hand Hygiene Performed Prior to CVC Insertion: Yes  Site Prep: Chlorhexidine   Site Prep Agent has Completely Dried Before Insertion: Yes  All 5 Sterile Barriers Used (Gloves, Gown, Cap, Mask, Large Sterile Mervat...   Number of days: 0       Arterial Line 02/14/25 Right Brachial (Active)   Placement Date/Time: 02/14/25 (c) 0736   Size: 20 G  Orientation: Right  Location: Brachial  Securement Method: Sutured  Patient Tolerance: Tolerated well   Number of days: 0       Urethral Catheter Temperature probe (Active)   Placement Date/Time: 02/14/25 0750   Placed by: NAZANIN TOBIAS  Hand Hygiene Completed: Yes  Catheter Type: Temperature probe  Catheter Balloon Size: 10 mL  Urine Returned: Yes   Number of days: 0       Chest Tube 1 Anterior Mediastinal (Active)   Placement Date/Time: 02/14/25 1200   Placed by: OR  Hand Hygiene Completed: Yes  Tube Number: 1  Chest Tube Orientation: Anterior  Chest Tube Location: Mediastinal  Chest Tube Drainage System: Suction   Number of days: 0       Chest Tube 2 Left Pleural (Active)   Placement Date/Time: 02/14/25 1200   Placed by: OR  Hand Hygiene Completed: Yes  Tube Number: 2  Chest Tube Orientation: Left  Chest Tube Location: Pleural  Chest Tube Drainage System: Suction   Number of days: 0         Vent settings:  FiO2 (%):  [50 %-55 %] 50 %    Physical Exam:     Physical Exam  Constitutional:       Appearance: Normal appearance.   HENT:      Head: Normocephalic and atraumatic.   Eyes:      Extraocular Movements: Extraocular movements intact.   Cardiovascular:      Rate and Rhythm: Normal rate and regular rhythm.   Pulmonary:      Effort: Pulmonary effort is normal. Diminished sounds near bases   Abdominal:      General: Abdomen is flat.      Palpations: Abdomen is soft.   Musculoskeletal:         General: Normal range of  motion.      Cervical back: Normal range of motion.   Skin:     General: Skin is warm.      Comments: midsternal incisional scar, mildly tender, mildly erythematous, no leakage or openings   Right hand/UE swelling >left UE  Neurological:      General: No focal deficit present.      Mental Status: She is alert.      Comments: Follow commands        Invasive Hemodynamics:    Most Recent Range Past 24hrs   BP (Art) 116/75 Arterial Line BP 1  Min: 98/63  Max: 138/84   MAP(Art) 91 mmHg Arterial Line MAP 1 (mmHg)   Min: 77 mmHg  Max: 105 mmHg   RA/CVP   No data recorded   PA   No data recorded   PA(mean)   No data recorded   CO   No data recorded   CI   No data recorded   Mixed Venous   No data recorded   SVR    No data recorded         Assessment/Plan   Patient is a 74 y.o female with a pmh of breast cancer, atrial fibrillation, ascending aorta dilation, stable angina, hypertension, hyperlipidemia presenting to the CTICU for elective CABG . Patient previously stated dyspnea and exertional chest pressure/discomfort. Patient is now s/p CABG x 2, LIMA - LAD, SVG-OM, MAZE PROCEDURE, PATSY CLIP, ablation      Plan:  NEURO:    - Serial neuro and pain assessments   - Scheduled Tylenol   - PRN oxycodone  - PT Consult, OOB to chair as tolerated, chair position if not tolerated   - CAM ICU score qshift  - Sleep/wake cycle hygiene     CV:  Patient has a history of atrial fibrillation,CAD  Is now status post CABG x 2, LIMA - LAD, SVG-OM, MAZE PROCEDURE, PATSY CLIP, ablation  Pre/Post EF: Normal Arrived to CTICU on 2/14   - Maintain goal MAP >65  - Amio drip dec from 1->0.5 will start on PO amio with plan to transition off drip   - Volume resuscitate as clinically indicated  - Maintain epicardial wires set VVI @ 50-> turned off   - magnesium for PACs given 2/16,   - home diltiazem increased to 60mg q6h  - home bisoprolol -> increased dose 20mg daily  - Hold home eliquis for now      PULM:  No history of pulmonary disease.    - CPAP @  night   - inhalers scheduled pulmicort, Duoneb PRN  - IS q1h and OOB to chair   - Currently on 10L NC,     GI:   - Adult regular diet   - Colace/senna BID and miralax BID     :   No history of renal disease, baseline creatinine 0.6-0.8. Creatinine stable post-op. Troy in place and making adequate UOP. -->  - troy catheter   - Goal UOP 200cc/hr  - RFP in afternoon   - Replete electrolytes per CTICU protocol  - Bumex drip started ovn->@1mg/hr now increased to 2mg/hr will titrate based on urine output     ENDO:   - Maintain BG <180, insulin per CTICU protocol     HEME:    - ASA scheduled   - SQH restarted 2/16  - SCDs for DVT prophylaxis.  - Last type and screen: 2/15     ID:  Afebrile, no current indications of infection. MRSA Negative.-->  - Trend temp q4h     Skin:  No active skin issues.  - preventative Mepilex dressings in place on sacrum and heels  - change preventative Mepilex weekly or more frequently as indicated (when moist/soiled)   - every shift skin assessment per nursing and weekly ICU skin rounds  - moisture barrier to be applied with antionette care  - active skin problems addressed with nursing on daily rounds     Proph:  SCDs/SQH       G:  Line  Right IJ MAC w Minimac placed 2/14-> will remove today   Right brachial a-line placed 2/14     F: Family: will update at bedside postoperatively.     A,B,C,D,E,F,G: reviewed     Kitty Anthony MD  Emergency Medicine, PGY-2        Dispo: CTICU care for now.    CTICU TEAM PHONE [phone]

## 2025-02-17 NOTE — PROGRESS NOTES
Occupational Therapy    Evaluation and Treatment    Patient Name: Radha Montalvo  MRN: [id number]  Today's Date: 2/17/2025  Room: 17/17  Time Calculation  Start Time: 0920  Stop Time: 0952  Time Calculation (min): 32 min    Assessment  IP OT Assessment  OT Assessment: CGA and slow pace for functional mobility with in room. Mild SOB and fituges easily.  Prognosis: Good  Barriers to Discharge Home: Physical needs  Physical Needs: Intermittent mobility assistance needed, Intermittent ADL assistance needed  Evaluation/Treatment Tolerance: Patient tolerated treatment well  Medical Staff Made Aware: Yes  End of Session Communication: Bedside nurse  End of Session Patient Position: Up in chair, Alarm off, not on at start of session  Plan:  Inpatient Plan  Treatment Interventions: ADL retraining, Functional transfer training, UE strengthening/ROM, Endurance training, Neuromuscular reeducation  OT Frequency: 2 times per week  OT Discharge Recommendations: Low intensity level of continued care  Equipment Recommended upon Discharge: Wheeled walker  OT Recommended Transfer Status:  (CGA)  OT - OK to Discharge: Yes  OT Assessment  OT Assessment Results: Decreased ADL status, Decreased endurance, Decreased functional mobility  Prognosis: Good  Evaluation/Treatment Tolerance: Patient tolerated treatment well  Medical Staff Made Aware: Yes    Subjective   Current Problem:  1. Congenital dilatation of aorta (HHS-HCC)  Anesthesia Intraoperative Transesophageal Echocardiogram    Anesthesia Intraoperative Transesophageal Echocardiogram      2. Coronary artery disease of native artery of native heart with stable angina pectoris  Electrophysiology procedure    Electrophysiology procedure    Anesthesia Intraoperative Transesophageal Echocardiogram    Anesthesia Intraoperative Transesophageal Echocardiogram    Anesthesia Intraoperative Transesophageal Echocardiogram    Anesthesia Intraoperative Transesophageal Echocardiogram      3.  Ascending aorta dilatation (CMS-HCC)  Anesthesia Intraoperative Transesophageal Echocardiogram    Anesthesia Intraoperative Transesophageal Echocardiogram    Anesthesia Intraoperative Transesophageal Echocardiogram    Anesthesia Intraoperative Transesophageal Echocardiogram        General:  Reason for Referral: 73 y/o female now s/p CABG x 2, LIMA - LAD, SVG-OM, MAZE PROCEDURE, PATSY CLIP, ablation 2/14  Past Medical History Relevant to Rehab: breast cancer, atrial fibrillation, ascending aorta dilation, stable angina, hypertension, hyperlipidemia  Prior to Session Communication: Bedside nurse  Patient Position Received: Up in chair, Alarm off, not on at start of session  Family/Caregiver Present: Yes  Caregiver Feedback: Son and dtr present and supportive  General Comment: Pt agreeable to participating. Mild SOB with ambulation. (Amio 1, Bumex 1)   Precautions:  Hearing/Visual Limitations: WFL  Medical Precautions: Cardiac precautions, Fall precautions, Oxygen therapy device and L/min (13LO2 via HFNC)  Post-Surgical Precautions: Move in the Tube  Precautions Comment: MAP>65; SpO2>88%  Vital Signs:   02/17/25 0920 02/17/25 0950   Vital Signs   Vitals Session Pre OT Post OT   Heart Rate 87 90   Resp 26 22   SpO2 90 % 90 %   /63 119/69   MAP (mmHg) 77 87   BP Method Arterial line Arterial line   Patient Position Sitting Sitting       Pain:  Pain Assessment  Pain Assessment: Chawla-Baker FACES  Chawla-Baker FACES Pain Rating: Hurts little bit  Pain Type: Acute pain  Pain Location: Chest  Pain Orientation: Mid  Pain Interventions: Repositioned  Lines/Tubes/Drains:  CVC 02/14/25 Double lumen Right Internal jugular (Active)   Number of days: 3       Arterial Line 02/14/25 Right Brachial (Active)   Number of days: 3       Urethral Catheter Temperature probe (Active)   Number of days: 3         Objective   Cognition:  Overall Cognitive Status: Within Functional Limits  Orientation Level: Oriented X4           Home  Living:  Type of Home: House  Lives With: Spouse  Home Adaptive Equipment: None  Home Layout: One level  Home Access: Stairs to enter with rails  Entrance Stairs-Number of Steps: 3  Bathroom Shower/Tub: Walk-in shower  Bathroom Equipment: Shower chair with back   Prior Function:  Level of [place]: Independent with ADLs and functional transfers, Independent with homemaking with ambulation  Receives Help From: Family  ADL Assistance: Independent  Homemaking Assistance: Independent  Ambulatory Assistance: Independent  Vocational: Retired  Prior Function Comments: (+)drives, denies falls. Decreased endruance recently.  IADL History:     ADL:  Grooming Assistance: Stand by  Bathing Assistance: Minimal  UE Dressing Assistance: Stand by  LE Dressing Assistance: Minimal  Toileting Assistance with Device: Minimal  Activity Tolerance:  Endurance: Tolerates 10 - 20 min exercise with multiple rests  Early Mobility/Exercise Safety Screen: Proceed with mobilization - No exclusion criteria met  Activity Tolerance Comments: Mild SOB and fatigued with activity  Rate of Perceived Dyspnea (RPD): 4  Rate of Perceived Exertion (RPE): 5  Balance:  Static Sitting Balance  Static Sitting-Level of Assistance: Close supervision  Static Standing Balance  Static Standing-Level of Assistance: Contact guard  Bed Mobility/Transfers: Bed Mobility/Transfers: Bed Mobility  Bed Mobility: No   and Transfers  Transfer: Yes  Transfer 1  Transfer From 1: Sit to  Transfer to 1: Stand, Chair with arms  Technique 1: Sit to stand, Stand to sit  Transfer Device 1: Walker  Transfer Level of Assistance 1: Minimum assistance  Transfers 2  Transfer From 2: Sit to  Transfer to 2: Stand, Toilet  Technique 2: Sit to stand, Stand to sit  Transfer Device 2: Walker  Transfer Level of Assistance 2: Minimum assistance (Use of grab bar)  IADL's:      Vision:     and Vision - Complex Assessment  Ocular Range of Motion: Within Functional Limits  Sensation:  Light  Touch: No apparent deficits  Strength:  Strength Comments: BUE strength observed to be at least 3+/5  Perception:  Inattention/Neglect: Appears intact  Coordination:  Movements are Fluid and Coordinated: Yes   Hand Function:  Hand Function  Gross Grasp: Functional  Coordination: Functional  Extremities:    ,  ,  , and        Outcome Measures: [facility] Daily Activity  Putting on and taking off regular lower body clothing: A little  Bathing (including washing, rinsing, drying): A little  Putting on and taking off regular upper body clothing: None  Toileting, which includes using toilet, bedpan or urinal: A little  Taking care of personal grooming such as brushing teeth: A little  Eating Meals: None  Daily Activity - Total Score: 20        ICU Mobility Screen  Early Mobility/Exercise Safety Screen: Proceed with mobilization - No exclusion criteria met,          Education Documentation  Handouts, taught by Helena Kendrick OT at 2/17/2025 11:28 AM.  Learner: Patient  Readiness: Acceptance  Method: Explanation  Response: Verbalizes Understanding    Body Mechanics, taught by Helena Kendrick OT at 2/17/2025 11:28 AM.  Learner: Patient  Readiness: Acceptance  Method: Explanation  Response: Verbalizes Understanding    Precautions, taught by Helena Kendrick OT at 2/17/2025 11:28 AM.  Learner: Patient  Readiness: Acceptance  Method: Explanation  Response: Verbalizes Understanding    ADL Training, taught by Helena Kendrick OT at 2/17/2025 11:28 AM.  Learner: Patient  Readiness: Acceptance  Method: Explanation  Response: Verbalizes Understanding    Education Comments  No comments found.        Goals:   Encounter Problems       Encounter Problems (Active)       ADLs       Patient will complete lower body dressing with SBA  for donning and doffing all LE clothes in order to increase Indep with task participation.        Start:  02/17/25    Expected End:  03/03/25            Patient will complete toileting, including clothing  management and hygiene, with SBA in order to maximize functional Indep with task completion.        Start:  02/17/25    Expected End:  03/03/25               COGNITION/SAFETY       Pt will identify and adhere to post-op MITT precautions during ADL and functional activity tasks with no more than min verbal cues in order to increase safety and independence upon discharge.        Start:  02/17/25    Expected End:  03/03/25            Pt will identify and incorporate 3 EC/WS strategies into daily routines for increased safety and Indep.        Start:  02/17/25    Expected End:  03/03/25               EXERCISE/STRENGTHENING       Pt will increase endurance to tolerate 15-20min of activity with no more than 1 rest break in order to increase ability to engage in ADL completion.        Start:  02/17/25    Expected End:  03/03/25               MOBILITY       Pt will demo increased functional mobility to tolerate tasks necessary to complete ADL routine with Sup.       Start:  02/17/25    Expected End:  03/03/25               TRANSFERS       Patient will complete functional transfers using least restrictive device with Sup   in order to maximize functional potential and increase safety.        Start:  02/17/25    Expected End:  03/03/25                   Treatment Completed on Evaluation            Therapy/Activity:     Therapeutic Activity  Therapeutic Activity Performed: Yes  Therapeutic Activity 1: Increased time for advanced ICU line management required for functional mobility  Therapeutic Activity 2: Pt ambulated chair->toilet->chair using FWW with CGA. Slow pace and standing rest breaks d/t mold SOB. Vitals remained stable.  Therapeutic Activity 3: MITT education regarding LB dressing and functional task completion.          02/17/25 at 11:29 AM   Helena Kendrick, OT   Rehab Office: [phone]

## 2025-02-17 NOTE — CARE PLAN
Problem: Discharge Planning  Goal: Discharge to home or other facility with appropriate resources  2/17/2025 1408 by Cassie Thompson RN  Outcome: Progressing  2/17/2025 1408 by Cassie Thompson RN  Outcome: Progressing  2/17/2025 1406 by Cassie Thompson RN  Outcome: Progressing     Problem: Chronic Conditions and Co-morbidities  Goal: Patient's chronic conditions and co-morbidity symptoms are monitored and maintained or improved  2/17/2025 1408 by Cassie Thompson RN  Outcome: Progressing  2/17/2025 1408 by Cassie Thompson RN  Outcome: Progressing  2/17/2025 1406 by Cassie Thompson RN  Outcome: Progressing     Problem: Nutrition  Goal: Nutrient intake appropriate for maintaining nutritional needs  2/17/2025 1408 by Cassie Thompson RN  Outcome: Progressing  2/17/2025 1408 by Cassie Thompson RN  Outcome: Progressing  2/17/2025 1406 by Cassie Thompson RN  Outcome: Progressing     Problem: Skin  Goal: Decreased wound size/increased tissue granulation at next dressing change  2/17/2025 1408 by Cassie Thompson RN  Outcome: Progressing  2/17/2025 1408 by Cassie Thompson RN  Outcome: Progressing  Goal: Participates in plan/prevention/treatment measures  2/17/2025 1408 by Cassie Thompson RN  Outcome: Progressing  2/17/2025 1408 by Cassie Thompson RN  Outcome: Progressing  Goal: Prevent/manage excess moisture  2/17/2025 1408 by Cassie Thompson RN  Outcome: Progressing  2/17/2025 1408 by Cassie Thompson RN  Outcome: Progressing  Goal: Prevent/minimize sheer/friction injuries  2/17/2025 1408 by Cassie Thompson RN  Outcome: Progressing  2/17/2025 1408 by Cassie Thompson RN  Outcome: Progressing  Goal: Promote/optimize nutrition  2/17/2025 1408 by Cassie Thompson RN  Outcome: Progressing  2/17/2025 1408 by Cassie Thompson RN  Outcome: Progressing  Goal: Promote skin healing  2/17/2025 1408 by Cassie Thompson RN  Outcome: Progressing  2/17/2025 1408 by Cassie Thompson RN  Outcome: Progressing     Problem: Pain  Goal: Takes deep breaths with improved pain control throughout the shift  2/17/2025 1408 by Cassie Thompson RN  Outcome: Progressing  2/17/2025/2025 1408 by Cassie Vo, RN  Outcome: Progressing  Goal:  Turns in bed with improved pain control throughout the shift  2/17/2025 1408 by Cassie Thompson RN  Outcome: Progressing  2/17/2025 1408 by Cassie Thompson RN  Outcome: Progressing  Goal: Walks with improved pain control throughout the shift  2/17/2025 1408 by Cassie Thompson RN  Outcome: Progressing  2/17/2025 1408 by Cassie Thompson RN  Outcome: Progressing  Goal: Performs ADL's with improved pain control throughout shift  2/17/2025 1408 by Cassie Thompson RN  Outcome: Progressing  2/17/2025 1408 by Cassie Thompson RN  Outcome: Progressing  Goal: Participates in PT with improved pain control throughout the shift  2/17/2025 1408 by Cassie Thompson RN  Outcome: Progressing  2/17/2025 1408 by Cassie Thompson RN  Outcome: Progressing  Goal: Free from opioid side effects throughout the shift  2/17/2025 1408 by Cassie Thompson RN  Outcome: Progressing  2/17/2025 1408 by Cassie Thompson RN  Outcome: Progressing  Goal: Free from acute confusion related to pain meds throughout the shift  2/17/2025 1408 by Cassie Thompson RN  Outcome: Progressing  2/17/2025 1408 by Cassie Thompson RN  Outcome: Progressing     Problem: Fall/Injury  Goal: Not fall by end of shift  2/17/2025 1408 by Cassie Thompson RN  Outcome: Progressing  2/17/2025 1408 by Cassie Thompson RN  Outcome: Progressing  Goal: Be free from injury by end of the shift  2/17/2025 1408 by Cassie Thompson RN  Outcome: Progressing  2/17/2025 1408 by Cassie Thompson RN  Outcome: Progressing  Goal: Verbalize understanding of personal risk factors for fall in the hospital  2/17/2025 1408 by Cassie Thompson RN  Outcome: Progressing  2/17/2025 1408 by Cassie Thompson RN  Outcome: Progressing  Goal: Verbalize understanding of risk factor reduction measures to prevent injury from fall in the home  2/17/2025 1408 by Cassie Thompson RN  Outcome: Progressing  2/17/2025 1408 by Cassie Thompson RN  Outcome: Progressing  Goal: Use assistive devices by end of the shift  2/17/2025 1408 by Cassie Thompson RN  Outcome: Progressing  2/17/2025 1408 by Cassie Vo, RN  Outcome: Progressing  Goal: Pace activities to prevent fatigue by end of the  shift  2/17/2025 1408 by Cassie Thompson RN  Outcome: Progressing  2/17/2025 1408 by Cassie Thompson RN  Outcome: Progressing     Problem: Safety - Medical Restraint  Goal: Remains free of injury from restraints (Restraint for Interference with Medical Device)  Outcome: Progressing  Goal: Free from restraint(s) (Restraint for Interference with Medical Device)  Outcome: Progressing   The patient's goals for the shift include diuretics and HDS     The clinical goals for the shift include diuretics and HDS     Over the shift, the patient did not make progress toward the following goals. Barriers to progression include. Recommendations to address these barriers include.

## 2025-02-18 ENCOUNTER — APPOINTMENT (OUTPATIENT)
Dept: CARDIOLOGY | Facility: HOSPITAL | Age: 75
End: 2025-02-18
Payer: MEDICARE

## 2025-02-18 ENCOUNTER — APPOINTMENT (OUTPATIENT)
Dept: RADIOLOGY | Facility: HOSPITAL | Age: 75
End: 2025-02-18
Payer: MEDICARE

## 2025-02-18 ENCOUNTER — APPOINTMENT (OUTPATIENT)
Dept: CARDIOLOGY | Facility: CLINIC | Age: 75
End: 2025-02-18
Payer: MEDICARE

## 2025-02-18 LAB
ABO GROUP (TYPE) IN BLOOD: NORMAL
ALBUMIN SERPL BCP-MCNC: 3.3 G/DL (ref 3.4–5)
ALBUMIN SERPL BCP-MCNC: 3.4 G/DL (ref 3.4–5)
ANION GAP SERPL CALC-SCNC: 16 MMOL/L (ref 10–20)
ANION GAP SERPL CALC-SCNC: 19 MMOL/L (ref 10–20)
ANTIBODY SCREEN: NORMAL
APTT PPP: 28 SECONDS (ref 27–38)
BLOOD EXPIRATION DATE: NORMAL
BUN SERPL-MCNC: 27 MG/DL (ref 6–23)
BUN SERPL-MCNC: 32 MG/DL (ref 6–23)
CA-I BLD-SCNC: 0.95 MMOL/L (ref 1.1–1.33)
CA-I BLD-SCNC: 1.08 MMOL/L (ref 1.1–1.33)
CALCIUM SERPL-MCNC: 8.1 MG/DL (ref 8.6–10.6)
CALCIUM SERPL-MCNC: 8.6 MG/DL (ref 8.6–10.6)
CHLORIDE SERPL-SCNC: 93 MMOL/L (ref 98–107)
CHLORIDE SERPL-SCNC: 97 MMOL/L (ref 98–107)
CO2 SERPL-SCNC: 28 MMOL/L (ref 21–32)
CO2 SERPL-SCNC: 29 MMOL/L (ref 21–32)
CREAT SERPL-MCNC: 0.94 MG/DL (ref 0.5–1.05)
CREAT SERPL-MCNC: 1.1 MG/DL (ref 0.5–1.05)
DISPENSE STATUS: NORMAL
EGFRCR SERPLBLD CKD-EPI 2021: 53 ML/MIN/1.73M*2
EGFRCR SERPLBLD CKD-EPI 2021: 64 ML/MIN/1.73M*2
ERYTHROCYTE [DISTWIDTH] IN BLOOD BY AUTOMATED COUNT: 13.3 % (ref 11.5–14.5)
GLUCOSE BLD MANUAL STRIP-MCNC: 100 MG/DL (ref 74–99)
GLUCOSE BLD MANUAL STRIP-MCNC: 133 MG/DL (ref 74–99)
GLUCOSE BLD MANUAL STRIP-MCNC: 74 MG/DL (ref 74–99)
GLUCOSE BLD MANUAL STRIP-MCNC: 96 MG/DL (ref 74–99)
GLUCOSE SERPL-MCNC: 101 MG/DL (ref 74–99)
GLUCOSE SERPL-MCNC: 206 MG/DL (ref 74–99)
HCT VFR BLD AUTO: 36.5 % (ref 36–46)
HGB BLD-MCNC: 12.7 G/DL (ref 12–16)
INR PPP: 1.2 (ref 0.9–1.1)
MAGNESIUM SERPL-MCNC: 1.83 MG/DL (ref 1.6–2.4)
MAGNESIUM SERPL-MCNC: 1.94 MG/DL (ref 1.6–2.4)
MCH RBC QN AUTO: 35.1 PG (ref 26–34)
MCHC RBC AUTO-ENTMCNC: 34.8 G/DL (ref 32–36)
MCV RBC AUTO: 101 FL (ref 80–100)
NRBC BLD-RTO: 0 /100 WBCS (ref 0–0)
PHOSPHATE SERPL-MCNC: 3.1 MG/DL (ref 2.5–4.9)
PHOSPHATE SERPL-MCNC: 3.3 MG/DL (ref 2.5–4.9)
PLATELET # BLD AUTO: 210 X10*3/UL (ref 150–450)
POTASSIUM SERPL-SCNC: 3.7 MMOL/L (ref 3.5–5.3)
POTASSIUM SERPL-SCNC: 3.7 MMOL/L (ref 3.5–5.3)
PRODUCT BLOOD TYPE: 600
PRODUCT CODE: NORMAL
PROTHROMBIN TIME: 13.1 SECONDS (ref 9.8–12.8)
RBC # BLD AUTO: 3.62 X10*6/UL (ref 4–5.2)
RH FACTOR (ANTIGEN D): NORMAL
SODIUM SERPL-SCNC: 137 MMOL/L (ref 136–145)
SODIUM SERPL-SCNC: 137 MMOL/L (ref 136–145)
UNIT ABO: NORMAL
UNIT NUMBER: NORMAL
UNIT RH: NORMAL
UNIT VOLUME: 281
UNIT VOLUME: 282
UNIT VOLUME: 289
UNIT VOLUME: 350
WBC # BLD AUTO: 11.3 X10*3/UL (ref 4.4–11.3)
XM INTEP: NORMAL

## 2025-02-18 PROCEDURE — 86900 BLOOD TYPING SEROLOGIC ABO: CPT | Performed by: STUDENT IN AN ORGANIZED HEALTH CARE EDUCATION/TRAINING PROGRAM

## 2025-02-18 PROCEDURE — 83735 ASSAY OF MAGNESIUM: CPT | Performed by: STUDENT IN AN ORGANIZED HEALTH CARE EDUCATION/TRAINING PROGRAM

## 2025-02-18 PROCEDURE — 94640 AIRWAY INHALATION TREATMENT: CPT

## 2025-02-18 PROCEDURE — 2500000002 HC RX 250 W HCPCS SELF ADMINISTERED DRUGS (ALT 637 FOR MEDICARE OP, ALT 636 FOR OP/ED): Performed by: STUDENT IN AN ORGANIZED HEALTH CARE EDUCATION/TRAINING PROGRAM

## 2025-02-18 PROCEDURE — 2500000004 HC RX 250 GENERAL PHARMACY W/ HCPCS (ALT 636 FOR OP/ED): Mod: TB | Performed by: STUDENT IN AN ORGANIZED HEALTH CARE EDUCATION/TRAINING PROGRAM

## 2025-02-18 PROCEDURE — 71045 X-RAY EXAM CHEST 1 VIEW: CPT | Performed by: RADIOLOGY

## 2025-02-18 PROCEDURE — 2500000005 HC RX 250 GENERAL PHARMACY W/O HCPCS

## 2025-02-18 PROCEDURE — 82947 ASSAY GLUCOSE BLOOD QUANT: CPT

## 2025-02-18 PROCEDURE — 80069 RENAL FUNCTION PANEL: CPT | Performed by: STUDENT IN AN ORGANIZED HEALTH CARE EDUCATION/TRAINING PROGRAM

## 2025-02-18 PROCEDURE — 2500000004 HC RX 250 GENERAL PHARMACY W/ HCPCS (ALT 636 FOR OP/ED): Mod: TB | Performed by: NURSE PRACTITIONER

## 2025-02-18 PROCEDURE — 85730 THROMBOPLASTIN TIME PARTIAL: CPT | Performed by: STUDENT IN AN ORGANIZED HEALTH CARE EDUCATION/TRAINING PROGRAM

## 2025-02-18 PROCEDURE — 97530 THERAPEUTIC ACTIVITIES: CPT | Mod: GP

## 2025-02-18 PROCEDURE — 2500000005 HC RX 250 GENERAL PHARMACY W/O HCPCS: Performed by: NURSE PRACTITIONER

## 2025-02-18 PROCEDURE — 2500000001 HC RX 250 WO HCPCS SELF ADMINISTERED DRUGS (ALT 637 FOR MEDICARE OP)

## 2025-02-18 PROCEDURE — 36415 COLL VENOUS BLD VENIPUNCTURE: CPT | Performed by: STUDENT IN AN ORGANIZED HEALTH CARE EDUCATION/TRAINING PROGRAM

## 2025-02-18 PROCEDURE — 2500000002 HC RX 250 W HCPCS SELF ADMINISTERED DRUGS (ALT 637 FOR MEDICARE OP, ALT 636 FOR OP/ED): Performed by: NURSE PRACTITIONER

## 2025-02-18 PROCEDURE — 37799 UNLISTED PX VASCULAR SURGERY: CPT | Performed by: STUDENT IN AN ORGANIZED HEALTH CARE EDUCATION/TRAINING PROGRAM

## 2025-02-18 PROCEDURE — 2500000001 HC RX 250 WO HCPCS SELF ADMINISTERED DRUGS (ALT 637 FOR MEDICARE OP): Performed by: STUDENT IN AN ORGANIZED HEALTH CARE EDUCATION/TRAINING PROGRAM

## 2025-02-18 PROCEDURE — 2500000002 HC RX 250 W HCPCS SELF ADMINISTERED DRUGS (ALT 637 FOR MEDICARE OP, ALT 636 FOR OP/ED)

## 2025-02-18 PROCEDURE — 85027 COMPLETE CBC AUTOMATED: CPT | Performed by: STUDENT IN AN ORGANIZED HEALTH CARE EDUCATION/TRAINING PROGRAM

## 2025-02-18 PROCEDURE — 2500000004 HC RX 250 GENERAL PHARMACY W/ HCPCS (ALT 636 FOR OP/ED)

## 2025-02-18 PROCEDURE — 99291 CRITICAL CARE FIRST HOUR: CPT | Performed by: STUDENT IN AN ORGANIZED HEALTH CARE EDUCATION/TRAINING PROGRAM

## 2025-02-18 PROCEDURE — 2500000004 HC RX 250 GENERAL PHARMACY W/ HCPCS (ALT 636 FOR OP/ED): Performed by: STUDENT IN AN ORGANIZED HEALTH CARE EDUCATION/TRAINING PROGRAM

## 2025-02-18 PROCEDURE — 82330 ASSAY OF CALCIUM: CPT | Performed by: STUDENT IN AN ORGANIZED HEALTH CARE EDUCATION/TRAINING PROGRAM

## 2025-02-18 PROCEDURE — 2500000001 HC RX 250 WO HCPCS SELF ADMINISTERED DRUGS (ALT 637 FOR MEDICARE OP): Performed by: NURSE PRACTITIONER

## 2025-02-18 PROCEDURE — 2060000001 HC INTERMEDIATE ICU ROOM DAILY

## 2025-02-18 PROCEDURE — 97116 GAIT TRAINING THERAPY: CPT | Mod: GP

## 2025-02-18 PROCEDURE — 71045 X-RAY EXAM CHEST 1 VIEW: CPT

## 2025-02-18 PROCEDURE — 94668 MNPJ CHEST WALL SBSQ: CPT

## 2025-02-18 PROCEDURE — 93005 ELECTROCARDIOGRAM TRACING: CPT

## 2025-02-18 PROCEDURE — 94660 CPAP INITIATION&MGMT: CPT

## 2025-02-18 RX ORDER — BUMETANIDE 0.25 MG/ML
2 INJECTION, SOLUTION INTRAMUSCULAR; INTRAVENOUS ONCE
Status: COMPLETED | OUTPATIENT
Start: 2025-02-18 | End: 2025-02-18

## 2025-02-18 RX ORDER — IRON POLYSACCHARIDE COMPLEX 150 MG
150 CAPSULE ORAL DAILY
Status: DISCONTINUED | OUTPATIENT
Start: 2025-02-18 | End: 2025-02-21

## 2025-02-18 RX ORDER — MULTIVIT-MIN/IRON FUM/FOLIC AC 7.5 MG-4
1 TABLET ORAL DAILY
Status: DISCONTINUED | OUTPATIENT
Start: 2025-02-18 | End: 2025-02-22 | Stop reason: HOSPADM

## 2025-02-18 RX ADMIN — BISOPROLOL FUMARATE 20 MG: 10 TABLET, FILM COATED ORAL at 08:00

## 2025-02-18 RX ADMIN — HEPARIN SODIUM 5000 UNITS: 5000 INJECTION INTRAVENOUS; SUBCUTANEOUS at 17:39

## 2025-02-18 RX ADMIN — ASPIRIN 81 MG CHEWABLE TABLET 81 MG: 81 TABLET CHEWABLE at 08:00

## 2025-02-18 RX ADMIN — ACETAMINOPHEN 650 MG: 325 TABLET ORAL at 23:49

## 2025-02-18 RX ADMIN — BUMETANIDE 2 MG/HR: 0.25 INJECTION INTRAMUSCULAR; INTRAVENOUS at 02:29

## 2025-02-18 RX ADMIN — HEPARIN SODIUM 5000 UNITS: 5000 INJECTION INTRAVENOUS; SUBCUTANEOUS at 00:01

## 2025-02-18 RX ADMIN — POLYETHYLENE GLYCOL 3350 17 G: 17 POWDER, FOR SOLUTION ORAL at 08:00

## 2025-02-18 RX ADMIN — DILTIAZEM HYDROCHLORIDE 60 MG: 30 TABLET, FILM COATED ORAL at 06:24

## 2025-02-18 RX ADMIN — DILTIAZEM HYDROCHLORIDE 60 MG: 30 TABLET, FILM COATED ORAL at 17:44

## 2025-02-18 RX ADMIN — AMIODARONE HYDROCHLORIDE 400 MG: 200 TABLET ORAL at 20:27

## 2025-02-18 RX ADMIN — DILTIAZEM HYDROCHLORIDE 60 MG: 30 TABLET, FILM COATED ORAL at 11:10

## 2025-02-18 RX ADMIN — SENNOSIDES AND DOCUSATE SODIUM 2 TABLET: 50; 8.6 TABLET ORAL at 08:00

## 2025-02-18 RX ADMIN — HEPARIN SODIUM 5000 UNITS: 5000 INJECTION INTRAVENOUS; SUBCUTANEOUS at 08:00

## 2025-02-18 RX ADMIN — BUMETANIDE 2 MG: 0.25 INJECTION INTRAMUSCULAR; INTRAVENOUS at 11:10

## 2025-02-18 RX ADMIN — ONDANSETRON 4 MG: 2 INJECTION INTRAMUSCULAR; INTRAVENOUS at 00:01

## 2025-02-18 RX ADMIN — DILTIAZEM HYDROCHLORIDE 60 MG: 30 TABLET, FILM COATED ORAL at 23:48

## 2025-02-18 RX ADMIN — Medication 5 L/MIN: at 14:42

## 2025-02-18 RX ADMIN — BUDESONIDE 0.5 MG: 0.5 INHALANT RESPIRATORY (INHALATION) at 19:00

## 2025-02-18 RX ADMIN — ATORVASTATIN CALCIUM 80 MG: 80 TABLET, FILM COATED ORAL at 20:27

## 2025-02-18 RX ADMIN — BUDESONIDE 0.5 MG: 0.5 INHALANT RESPIRATORY (INHALATION) at 08:42

## 2025-02-18 RX ADMIN — AMIODARONE HYDROCHLORIDE 400 MG: 200 TABLET ORAL at 08:00

## 2025-02-18 RX ADMIN — BUMETANIDE 2 MG: 0.25 INJECTION INTRAMUSCULAR; INTRAVENOUS at 13:46

## 2025-02-18 RX ADMIN — Medication 6 L/MIN: at 17:22

## 2025-02-18 RX ADMIN — ACETAMINOPHEN 650 MG: 325 TABLET ORAL at 06:24

## 2025-02-18 RX ADMIN — CALCIUM GLUCONATE 1 G: 20 INJECTION, SOLUTION INTRAVENOUS at 07:39

## 2025-02-18 RX ADMIN — POTASSIUM CHLORIDE 20 MEQ: 1500 TABLET, EXTENDED RELEASE ORAL at 06:24

## 2025-02-18 RX ADMIN — ACYCLOVIR 800 MG: 800 TABLET ORAL at 08:00

## 2025-02-18 RX ADMIN — POTASSIUM CHLORIDE 20 MEQ: 1500 TABLET, EXTENDED RELEASE ORAL at 07:39

## 2025-02-18 SDOH — ECONOMIC STABILITY: INCOME INSECURITY: IN THE PAST 12 MONTHS HAS THE ELECTRIC, GAS, OIL, OR WATER COMPANY THREATENED TO SHUT OFF SERVICES IN YOUR HOME?: NO

## 2025-02-18 SDOH — HEALTH STABILITY: PHYSICAL HEALTH
HOW OFTEN DO YOU NEED TO HAVE SOMEONE HELP YOU WHEN YOU READ INSTRUCTIONS, PAMPHLETS, OR OTHER WRITTEN MATERIAL FROM YOUR DOCTOR OR PHARMACY?: RARELY

## 2025-02-18 SDOH — SOCIAL STABILITY: SOCIAL INSECURITY: WITHIN THE LAST YEAR, HAVE YOU BEEN HUMILIATED OR EMOTIONALLY ABUSED IN OTHER WAYS BY YOUR PARTNER OR EX-PARTNER?: NO

## 2025-02-18 SDOH — HEALTH STABILITY: MENTAL HEALTH: HOW OFTEN DO YOU HAVE A DRINK CONTAINING ALCOHOL?: NEVER

## 2025-02-18 SDOH — HEALTH STABILITY: MENTAL HEALTH
DO YOU FEEL STRESS - TENSE, RESTLESS, NERVOUS, OR ANXIOUS, OR UNABLE TO SLEEP AT NIGHT BECAUSE YOUR MIND IS TROUBLED ALL THE TIME - THESE DAYS?: ONLY A LITTLE

## 2025-02-18 SDOH — SOCIAL STABILITY: SOCIAL NETWORK
DO YOU BELONG TO ANY CLUBS OR ORGANIZATIONS SUCH AS CHURCH GROUPS, UNIONS, FRATERNAL OR ATHLETIC GROUPS, OR SCHOOL GROUPS?: NO

## 2025-02-18 SDOH — SOCIAL STABILITY: SOCIAL NETWORK
IN A TYPICAL WEEK, HOW MANY TIMES DO YOU TALK ON THE PHONE WITH FAMILY, FRIENDS, OR NEIGHBORS?: MORE THAN THREE TIMES A WEEK

## 2025-02-18 SDOH — ECONOMIC STABILITY: FOOD INSECURITY: WITHIN THE PAST 12 MONTHS, YOU WORRIED THAT YOUR FOOD WOULD RUN OUT BEFORE YOU GOT THE MONEY TO BUY MORE.: NEVER TRUE

## 2025-02-18 SDOH — HEALTH STABILITY: MENTAL HEALTH: HOW MANY DRINKS CONTAINING ALCOHOL DO YOU HAVE ON A TYPICAL DAY WHEN YOU ARE DRINKING?: PATIENT DOES NOT DRINK

## 2025-02-18 SDOH — SOCIAL STABILITY: SOCIAL NETWORK: HOW OFTEN DO YOU ATTEND CHURCH OR RELIGIOUS SERVICES?: NEVER

## 2025-02-18 SDOH — SOCIAL STABILITY: SOCIAL NETWORK: HOW OFTEN DO YOU GET TOGETHER WITH FRIENDS OR RELATIVES?: MORE THAN THREE TIMES A WEEK

## 2025-02-18 SDOH — SOCIAL STABILITY: SOCIAL INSECURITY: WITHIN THE LAST YEAR, HAVE YOU BEEN AFRAID OF YOUR PARTNER OR EX-PARTNER?: NO

## 2025-02-18 SDOH — HEALTH STABILITY: MENTAL HEALTH: HOW OFTEN DO YOU HAVE SIX OR MORE DRINKS ON ONE OCCASION?: NEVER

## 2025-02-18 SDOH — ECONOMIC STABILITY: FOOD INSECURITY: WITHIN THE PAST 12 MONTHS, THE FOOD YOU BOUGHT JUST DIDN'T LAST AND YOU DIDN'T HAVE MONEY TO GET MORE.: NEVER TRUE

## 2025-02-18 SDOH — SOCIAL STABILITY: SOCIAL INSECURITY: ARE YOU MARRIED, WIDOWED, DIVORCED, SEPARATED, NEVER MARRIED, OR LIVING WITH A PARTNER?: MARRIED

## 2025-02-18 SDOH — SOCIAL STABILITY: SOCIAL NETWORK: HOW OFTEN DO YOU ATTEND MEETINGS OF THE CLUBS OR ORGANIZATIONS YOU BELONG TO?: NEVER

## 2025-02-18 SDOH — HEALTH STABILITY: PHYSICAL HEALTH: ON AVERAGE, HOW MANY MINUTES DO YOU ENGAGE IN EXERCISE AT THIS LEVEL?: 0 MIN

## 2025-02-18 SDOH — HEALTH STABILITY: PHYSICAL HEALTH: ON AVERAGE, HOW MANY DAYS PER WEEK DO YOU ENGAGE IN MODERATE TO STRENUOUS EXERCISE (LIKE A BRISK WALK)?: 0 DAYS

## 2025-02-18 ASSESSMENT — PAIN SCALES - GENERAL
PAINLEVEL_OUTOF10: 1
PAINLEVEL_OUTOF10: 0 - NO PAIN
PAINLEVEL_OUTOF10: 0 - NO PAIN
PAINLEVEL_OUTOF10: 3
PAINLEVEL_OUTOF10: 0 - NO PAIN

## 2025-02-18 ASSESSMENT — COGNITIVE AND FUNCTIONAL STATUS - GENERAL
MOBILITY SCORE: 16
WALKING IN HOSPITAL ROOM: A LITTLE
STANDING UP FROM CHAIR USING ARMS: A LOT
TURNING FROM BACK TO SIDE WHILE IN FLAT BAD: A LITTLE
CLIMB 3 TO 5 STEPS WITH RAILING: A LOT
MOVING TO AND FROM BED TO CHAIR: A LITTLE
MOVING FROM LYING ON BACK TO SITTING ON SIDE OF FLAT BED WITH BEDRAILS: A LITTLE

## 2025-02-18 ASSESSMENT — PAIN - FUNCTIONAL ASSESSMENT
PAIN_FUNCTIONAL_ASSESSMENT: 0-10

## 2025-02-18 ASSESSMENT — LIFESTYLE VARIABLES
AUDIT-C TOTAL SCORE: 0
SKIP TO QUESTIONS 9-10: 1

## 2025-02-18 ASSESSMENT — ACTIVITIES OF DAILY LIVING (ADL): LACK_OF_TRANSPORTATION: NO

## 2025-02-18 ASSESSMENT — PAIN DESCRIPTION - LOCATION: LOCATION: CHEST

## 2025-02-18 NOTE — PROGRESS NOTES
Physical Therapy    Physical Therapy Treatment    Patient Name: Radha Montalvo  MRN: [id number]  Department: [facility]  Room: 17/17-A  Today's Date: 2/18/2025  Time Calculation  Start Time: 0956  Stop Time: 1030  Time Calculation (min): 34 min    Assessment/Plan   PT Assessment  PT Assessment Results: Decreased endurance, Decreased strength, Impaired balance, Decreased mobility, Pain  Rehab Prognosis: Good  Barriers to Discharge Home: No anticipated barriers  Evaluation/Treatment Tolerance: Patient tolerated treatment well  Medical Staff Made Aware: Yes  End of Session Communication: Bedside nurse  End of Session Patient Position: Bed, 3 rail up, Alarm off, not on at start of session  PT Plan  Inpatient/Swing Bed or Outpatient: Inpatient  PT Plan  Treatment/Interventions: Bed mobility, Transfer training, Gait training, Stair training, Neuromuscular re-education, Strengthening, Endurance training, Therapeutic exercise, Therapeutic activity  PT Plan: Ongoing PT  PT Frequency: 5 times per week  PT Discharge Recommendations: Low intensity level of continued care  Equipment Recommended upon Discharge: Wheeled walker  PT Recommended Transfer Status: Assist x1  PT - OK to Discharge: Yes    General Visit Information:   PT  Visit  PT Received On: 02/18/25  Response to Previous Treatment: Patient with no complaints from previous session.  General  Family/Caregiver Present: Yes  Caregiver Feedback: Son present throughout session  Prior to Session Communication: Bedside nurse  Patient Position Received: Up in chair, Alarm off, not on at start of session  Preferred Learning Style: auditory, verbal  General Comment: Pt awake, alert and willing to participate in PT session.  Pt completed bed mobility, sit<>stand transfers and ambulation with thoracic walker ~100ft.  CGA-modAx1 provided throughout session. Vitals stable. (isadora Martinez foley)    Subjective   Precautions:  Precautions  Medical Precautions: Cardiac precautions, Fall  precautions, Oxygen therapy device and L/min (7L)  Post-Surgical Precautions: Move in the Tube  Precautions Comment: MAP>65; SpO2>88%     02/18/25 0956 02/18/25 1030   Vital Signs   Vitals Session Pre PT Post PT   Heart Rate 108 105   Resp 22 21   SpO2 93 % 94 %   /64 106/61   MAP (mmHg) 79 78   BP Method Arterial line Arterial line   Patient Position Sitting Lying   Vital Signs Comment  --  Vitals stable with mobility     Objective   Pain:  Pain Assessment  Pain Assessment: 0-10  0-10 (Numeric) Pain Score: 0 - No pain  Cognition:  Cognition  Overall Cognitive Status: Within Functional Limits  Orientation Level: Oriented X4  Activity Tolerance:  Activity Tolerance  Endurance: Tolerates 10 - 20 min exercise with multiple rests  Early Mobility/Exercise Safety Screen: Proceed with mobilization - No exclusion criteria met  Treatments:  Therapeutic Activity  Therapeutic Activity Performed: Yes  Therapeutic Activity 1: Pt utilized restroom during session.  Static standing with thoracic walker, CGA for hygeine, wide JANI, no instability noted.  Therapeutic Activity 2: x3 sit<>stand transfer without break from chair with modAx1, B arm in arm  Therapeutic Activity 3: Seated rest break in chair after ambulation, prior to sit<>stand transfers d/t fatigue.    Bed Mobility  Bed Mobility: Yes  Bed Mobility 1  Bed Mobility 1: Sitting to supine  Level of Assistance 1: Minimum assistance (x1)  Bed Mobility Comments 1: HOB elevated, assistance with advancing LEs onto the bed  Bed Mobility 2  Bed Mobility  2:  (Boost towards HOB)  Level of Assistance 2: Dependent, +2  Bed Mobility Comments 2: TAPs utilized    Ambulation/Gait Training  Ambulation/Gait Training Performed: Yes  Ambulation/Gait Training 1  Surface 1: Level tile  Device 1: Thoracic walker  Assistance 1: Contact guard  Quality of Gait 1: Inconsistent stride length, Decreased step length, Forward flexed posture  Comments/Distance (ft) 1: ~100ft (Limited distance d/t  reported fatigue and SOB however vitals stable.)  Ambulation/Gait Training 2  Surface 2: Level tile  Device 2: Thoracic walker  Assistance 2: Contact guard  Comments/Distance (ft) 2: ~12ft (Chair>toilet)  Transfers  Transfer: Yes  Transfer 1  Transfer From 1: Sit to, Stand to  Transfer to 1: Sit, Stand  Technique 1: Sit to stand, Stand to sit  Transfer Device 1: Thoracic walker  Transfer Level of Assistance 1: Moderate assistance (x1)  Trials/Comments 1: x4 transfers from chair, x1 transfer from toilet (use of GB)    Stairs  Stairs: No    Outcome Measures:  [facility] Basic Mobility  Turning from your back to your side while in a flat bed without using bedrails: A little  Moving from lying on your back to sitting on the side of a flat bed without using bedrails: A little  Moving to and from bed to chair (including a wheelchair): A little  Standing up from a chair using your arms (e.g. wheelchair or bedside chair): A lot  To walk in hospital room: A little  Climbing 3-5 steps with railing: A lot  Basic Mobility - Total Score: 16    FSS-ICU  Ambulation: Walks >/ or equal to 50 feet with any assistance x1  Rolling: Minimal assistance (performs 75% or more of task)  Sitting: Supervision or set-up only  Transfer Sit-to-Stand: Moderate assistance (performs 50 - 74% of task)  Transfer Supine-to-Sit: Minimal assistance (performs 75% or more of task)  Total Score: 18    Early Mobility/Exercise Safety Screen: Proceed with mobilization - No exclusion criteria met  ICU Mobility Scale: Walking with assistance of 1 person [8]    Education Documentation  Precautions, taught by Emelia Felipe PT at 2/18/2025 12:59 PM.  Learner: Patient  Readiness: Acceptance  Method: Explanation, Demonstration  Response: Demonstrated Understanding    Body Mechanics, taught by Emelia Felipe PT at 2/18/2025 12:59 PM.  Learner: Patient  Readiness: Acceptance  Method: Explanation, Demonstration  Response: Demonstrated Understanding    Mobility Training,  taught by Emelia Felipe, PT at 2/18/2025 12:59 PM.  Learner: Patient  Readiness: Acceptance  Method: Explanation, Demonstration  Response: Demonstrated Understanding    Education Comments  No comments found.    EDUCATION:       Encounter Problems       Encounter Problems (Active)       Balance       Pt will demonstrate improved sitting/standing static/dynamic balance activities without LOB via score of 24/28 on the [place] for increase in safety prior to DC.  (Progressing)       Start:  02/15/25    Expected End:  03/01/25               Mobility       Pt will ambulate >100ft with appropriate form, SBA or less, LRAD, and no LOB for safe DC.  (Progressing)       Start:  02/15/25    Expected End:  03/01/25            Pt will tolerate >15 minutes of upright standing activity without seated rest breaks with no changes in vital signs for improved functional mobility.  (Progressing)       Start:  02/15/25    Expected End:  03/01/25            Pt will ambulate up/down >3stairs with hand rail with CGA or less to safely access their home upon DC.   (Progressing)       Start:  02/15/25    Expected End:  03/01/25               PT Transfers       Pt will perform bed mobility and sit<>stand transfers with SBA or less and use of LRAD to safely DC.  (Progressing)       Start:  02/15/25    Expected End:  03/01/25

## 2025-02-18 NOTE — PROGRESS NOTES
"CTICU Progress Note  Radha Montalvo/[id number]    Admit Date: 2/14/2025  Hospital Length of Stay: 4   ICU Length of Stay: 3d 21h   CT SURGEON:     SUBJECTIVE:   Continued on bumex ggt overnight with good diuresis. Started on home CPAP overnight but with discomfort with mask fitting so was placed on 10L NC. Patient having good Bms.    MEDICATIONS  Infusions:  amiodarone, Last Rate: Stopped (02/17/25 2030)      Scheduled:  acetaminophen, 650 mg, q6h  acyclovir, 800 mg, Daily  amiodarone, 400 mg, BID  aspirin, 81 mg, Daily  atorvastatin, 80 mg, Nightly  bisoprolol, 20 mg, Daily  budesonide, 0.5 mg, BID  bumetanide, 2 mg, Once  dilTIAZem, 60 mg, q6h  fluticasone, 2 spray, Daily  heparin (porcine), 5,000 Units, q8h  insulin lispro, 0-15 Units, 4x daily  polyethylene glycol, 17 g, Daily  sennosides-docusate sodium, 2 tablet, BID      PRN:  alteplase, 2 mg, PRN  calcium gluconate, 1 g, q6h PRN  calcium gluconate, 2 g, q6h PRN  dextrose, 25 g, q15 min PRN   Or  glucagon, 1 mg, q15 min PRN  hydrALAZINE, 10 mg, q4h PRN  ipratropium-albuteroL, 3 mL, q6h PRN  magnesium sulfate, 2 g, q6h PRN  magnesium sulfate, 4 g, q6h PRN  naloxone, 0.2 mg, q5 min PRN  ondansetron, 4 mg, q8h PRN   Or  ondansetron, 4 mg, q8h PRN  oxyCODONE, 10 mg, q4h PRN  oxyCODONE, 5 mg, q4h PRN  oxygen, , Continuous PRN - O2/gases  potassium chloride CR, 20 mEq, q6h PRN   Or  potassium chloride, 20 mEq, q6h PRN  potassium chloride CR, 40 mEq, q6h PRN   Or  potassium chloride, 40 mEq, q6h PRN        PHYSICAL EXAM:   Visit Vitals  /65   Pulse 94   Temp 36 °C (96.8 °F)   Resp 17   Ht 1.702 m (5' 7\")   Wt 73.8 kg (162 lb 11.2 oz)   LMP  (LMP Unknown)   SpO2 95%   BMI 25.48 kg/m²   OB Status Hysterectomy   Smoking Status Never   BSA 1.87 m²     Wt Readings from Last 5 Encounters:   02/18/25 73.8 kg (162 lb 11.2 oz)   02/07/25 71.2 kg (157 lb)   02/02/25 71.7 kg (158 lb)   01/30/25 71.3 kg (157 lb 3 oz)   01/28/25 71.7 kg (158 lb)     INTAKE/OUTPUT:  I/O " last 3 completed shifts:  In: 1686.6 (22.9 mL/kg) [P.O.:590; I.V.:946.6 (12.8 mL/kg); IV Piggyback:150]  Out: 8130 (110.2 mL/kg) [Urine:8130 (3.1 mL/kg/hr)]  Weight: 73.8 kg      LDA:  Arterial Line 02/14/25 Right Brachial (Active)   Placement Date/Time: 02/14/25 (c) 0736   Size: 20 G  Orientation: Right  Location: Brachial  Securement Method: Sutured  Patient Tolerance: Tolerated well   Number of days: 3       Urethral Catheter Temperature probe (Active)   Placement Date/Time: 02/14/25 0750   Placed by: NAZANIN TOBIAS  Hand Hygiene Completed: Yes  Catheter Type: Temperature probe  Catheter Balloon Size: 10 mL  Urine Returned: Yes   Number of days: 3     Physical Exam:   Constitutional: Awake, alert, no complaints this AM  Skin: Warm and dry throughout  Eyes: Anicteric sclera, clear  Respiratory/Thorax: Diminished breath sounds on LLL field otherwise CTA. On 10L NC  Cardiovascular: RRR, no murmurs appreciated, a fib on tele, sternum stable with signs of healing.  Gastrointestinal: abdomen soft, non-distended  Genitourinary: Torres in place draining clear yellow urine  Extremities: Palpable radial pulses 2+ bilaterally, 1+ pulses to bilateral PT/DP, warm throughout, no pitting edema, right brachial arterial line present - dressing clean, dry, and intact, no hematoma or bleeding  Neurological: alert, awake, PERRL - 2mm bilaterally.     Invasive Hemodynamics:    Most Recent Range Past 24hrs   BP (Art) 108/71 Arterial Line BP 1  Min: 104/64  Max: 128/76   MAP(Art) 86 mmHg Arterial Line MAP 1 (mmHg)   Min: 79 mmHg  Max: 96 mmHg   RA/CVP   No data recorded   PA   No data recorded   PA(mean)   No data recorded   CO   No data recorded   CI   No data recorded   Mixed Venous   No data recorded   SVR    No data recorded     Daily Risk Screen:  critically ill patient who need accurate urinary output measurements    Assessment/Plan   Radha Montalvo is a 74 y.o female with a pmh of breast cancer, atrial fibrillation, ascending  aorta dilation, stable angina, hypertension, hyperlipidemia presenting to the CTICU for elective CABG . Patient previously stated dyspnea and exertional chest pressure/discomfort. Patient is now s/p CABG x 2, LIMA - LAD, SVG-OM, MAZE PROCEDURE, PATSY CLIP, ablation. Has remained in the ICU due to need for aggressive diuresis and increased O2 requirements. Has been diuresing well on a bumex drip with decreasing O2 requirement.      Plan:  NEURO:    - Serial neuro and pain assessments   - Scheduled Tylenol   - PRN oxycodone  - PT Consult, OOB to chair as tolerated, chair position if not tolerated   - CAM ICU score qshift  - Sleep/wake cycle hygiene     CV:  Patient has a history of atrial fibrillation,CAD  Is now status post CABG x 2, LIMA - LAD, SVG-OM, MAZE PROCEDURE, PATSY CLIP, ablation  Pre/Post EF: Normal  - Maintain goal MAP >65  - PO Amiodarone 400mg BID  - Volume resuscitate as clinically indicated  - magnesium for PACs given 2/16  - home diltiazem increased to 60mg q6h  - home bisoprolol -> increased dose 20mg daily  - Hold home eliquis for now     PULM:  No history of pulmonary disease.    - CPAP @ night   - inhalers scheduled pulmicort, Duoneb PRN  - IS q1h and OOB to chair   - Currently on 7L NC, wean as tolerated     GI:   - Adult regular diet   - Colace/senna BID and miralax BID     :   No history of renal disease, baseline creatinine 0.6-0.8. Creatinine stable post-op. Troy in place and making adequate UOP. -->  - troy catheter   - Goal net -1 to 2L  - RFP in afternoon   - Replete electrolytes per CTICU protocol  - DC bumex drip now, spot doses as needed     ENDO:   - Maintain BG <180, insulin per CTICU protocol     HEME:    - ASA scheduled   - SQH restarted 2/16  - SCDs for DVT prophylaxis.  - Last type and screen: 2/18     ID:  Afebrile, no current indications of infection. MRSA Negative.-->  - Trend temp q4h     Skin:  No active skin issues.  - preventative Mepilex dressings in place on sacrum and  heels  - change preventative Mepilex weekly or more frequently as indicated (when moist/soiled)   - every shift skin assessment per nursing and weekly ICU skin rounds  - moisture barrier to be applied with antionette care  - active skin problems addressed with nursing on daily rounds     Proph:  SCDs/SQH      G:  Line  Right IJ MAC  removed 2/17  Right brachial a-line placed 2/14--Will remove today     F: Family: will update at bedsid     A,B,C,D,E,F,G: reviewed      Dispo: CTICU care for now with potential of floor transfer later today pending O2 weans  CTICU TEAM PHONE [phone]    Jenni Martínez DO, PGY-6  Pediatric Critical Care Fellow &   Anesthesiology Resident

## 2025-02-18 NOTE — PROGRESS NOTES
02/18/25 1041   Discharge Planning   Living Arrangements Spouse/significant other  (Normally lives with spouse. Son to stay with them at [place].)   Support Systems Spouse/significant other;Children;Friends/neighbors   Assistance Needed none / no home O2 / no HD / no ADs   Type of Residence Private residence   Number of Stairs to Enter Residence 3   Number of Stairs Within Residence 0  (ranch)   Do you have animals or pets at home? Yes   Type of Animals or Pets dog   Who is requesting discharge planning? Provider   Home or Post Acute Services In home services   Type of Home Care Services Home nursing visits;Home OT;Home PT   Expected Discharge Disposition Home H  ([facility])   Does the patient need discharge transport arranged? No   Financial Resource Strain   How hard is it for you to pay for the very basics like food, housing, medical care, and heating? Not hard   Housing Stability   In the last 12 months, was there a time when you were not able to pay the mortgage or rent on time? N   In the past 12 months, how many times have you moved where you were living? 0   At any time in the past 12 months, were you homeless or living in a shelter (including now)? N   Transportation Needs   In the past 12 months, has lack of transportation kept you from medical appointments or from getting medications? no   In the past 12 months, has lack of transportation kept you from meetings, work, or from getting things needed for daily living? No   Patient Choice   Provider Choice list and CMS website (https://medicare.gov/care-compare#search) for post-acute Quality and Resource Measure Data were provided and reviewed with: Patient;Family  (2/18 AOC with [facility] (patient and son, Michel))   Patient / Family choosing to utilize agency / facility established prior to hospitalization No   Stroke Family Assessment   Stroke Family Assessment Needed No   Intensity of Service   Intensity of Service 0-30 min     DC/SDOH assessments with patient and  son, Michel at bedside. Verified EPIC info of contact / address / PCP / pharmacy. Reviewed [facility] loc as a service, right to choice and list to choose.     Constance Tracy (LSW, MSW)

## 2025-02-18 NOTE — SIGNIFICANT EVENT
Rapid Response Nurse Note: RADAR alert: 6    Pager time:   Arrival time: 1800  Event end time:   Location: T3  [] Triage by phone or secure messaging    Rapid response initiated by:  [] Rapid response RN [] Family [] Nursing Supervisor [] Physician   [x] RADAR auto page [] Sepsis auto-page [] RN [] RT   [] NP/PA [] Other:     Primary reason for call:   [] BAT [] New CPAP/BiPAP [] Bleeding [] Change in mental status   [] Chest pain [] Code blue [] FiO2 >/= 50% [] HR </= 40 bpm   [] HR >/= 130 bpm [] Hyperglycemia [] Hypoglycemia [x] RADAR    [] RR </= 8 bpm [] RR >/= 30 bpm [] SBP </= 90 mmHg [] SpO2 < 90%   [] Seizure [] Sepsis [] Shortness of breath  [] Staff concern: see comments     Initial VS and/or RADAR VS: T not listed with page °C; HR 83; RR 26; /63; SPO2 not listed with page.    Providers present at bedside (if applicable):     Name of ICU Provider contacted (if applicable):     Interventions:  [x] None [] ABG/VBG [] Assist w/ICU transfer [] BAT paged    [] Bag mask [] Blood [] Cardioversion [] Code Blue   [] Code blue for intubation [] Code status changed [] Chest x-ray [] EKG   [] IV fluid/bolus [] KUB x-ray [] Labs/cultures [] Medication   [] Nebulizer treatment [] NIPPV (CPAP/BiPAP) [] Oxygen [] Oral airway   [] Peripheral IV [] Palliative care consult [] CT/MRI [] Sepsis protocol    [] Suctioned [] Other:     Outcome:  [] Coded and  [] Code blue for intubation [] Coded and transferred to ICU []  on division   [x] Remained on division (no change)tele [] Remained on division + additional monitoring [] Remained in ED [] Transferred to ED   [] Transferred to ICU [] Transferred to inpatient status [] Transferred for interventions (procedure) [] Transferred to ICU stepdown    [] Transferred to surgery [] Transferred to telemetry [] Sepsis protocol [] STEMI protocol   [] Stroke protocol [x] Bedside nurse instructed to page rapid response for any concerns or acute change in  condition/VS     Additional Comments: RADAR alert- score of 6. Upon arrival- pt sitting in the chair and RR even and non labored. Family present. Pt new transfer from the ICU to SDU. Updated with the bedside nurse and no immediate concerns. Pt to remain T3/SDU.

## 2025-02-18 NOTE — CARE PLAN
Problem: Discharge Planning  Goal: Discharge to home or other facility with appropriate resources  Outcome: Progressing     Problem: Chronic Conditions and Co-morbidities  Goal: Patient's chronic conditions and co-morbidity symptoms are monitored and maintained or improved  Outcome: Progressing     Problem: Nutrition  Goal: Nutrient intake appropriate for maintaining nutritional needs  Outcome: Progressing     Problem: Skin  Goal: Decreased wound size/increased tissue granulation at next dressing change  Outcome: Progressing  Goal: Participates in plan/prevention/treatment measures  Outcome: Progressing  Goal: Prevent/manage excess moisture  Outcome: Progressing  Goal: Prevent/minimize sheer/friction injuries  Outcome: Progressing  Goal: Promote/optimize nutrition  Outcome: Progressing  Goal: Promote skin healing  Outcome: Progressing     Problem: Pain  Goal: Takes deep breaths with improved pain control throughout the shift  Outcome: Progressing  Goal: Turns in bed with improved pain control throughout the shift  Outcome: Progressing  Goal: Walks with improved pain control throughout the shift  Outcome: Progressing  Goal: Performs ADL's with improved pain control throughout shift  Outcome: Progressing  Goal: Participates in PT with improved pain control throughout the shift  Outcome: Progressing  Goal: Free from opioid side effects throughout the shift  Outcome: Progressing  Goal: Free from acute confusion related to pain meds throughout the shift  Outcome: Progressing     Problem: Fall/Injury  Goal: Not fall by end of shift  Outcome: Progressing  Goal: Be free from injury by end of the shift  Outcome: Progressing  Goal: Verbalize understanding of personal risk factors for fall in the hospital  Outcome: Progressing  Goal: Verbalize understanding of risk factor reduction measures to prevent injury from fall in the home  Outcome: Progressing  Goal: Use assistive devices by end of the shift  Outcome:  Progressing  Goal: Pace activities to prevent fatigue by end of the shift  Outcome: Progressing     Problem: Safety - Medical Restraint  Goal: Remains free of injury from restraints (Restraint for Interference with Medical Device)  Outcome: Progressing  Goal: Free from restraint(s) (Restraint for Interference with Medical Device)  Outcome: Progressing   The patient's goals for the shift include HDS      The clinical goals for the shift include HDS    Over the shift, the patient did not make progress toward the following goals. Barriers to progression include. Recommendations to address these barriers include.

## 2025-02-19 ENCOUNTER — APPOINTMENT (OUTPATIENT)
Dept: RADIOLOGY | Facility: HOSPITAL | Age: 75
End: 2025-02-19
Payer: MEDICARE

## 2025-02-19 LAB
ALBUMIN SERPL BCP-MCNC: 3.2 G/DL (ref 3.4–5)
ANION GAP SERPL CALC-SCNC: 11 MMOL/L (ref 10–20)
ATRIAL RATE: 41 BPM
ATRIAL RATE: 58 BPM
ATRIAL RATE: 60 BPM
BUN SERPL-MCNC: 38 MG/DL (ref 6–23)
CALCIUM SERPL-MCNC: 9 MG/DL (ref 8.6–10.6)
CHLORIDE SERPL-SCNC: 95 MMOL/L (ref 98–107)
CO2 SERPL-SCNC: 36 MMOL/L (ref 21–32)
CREAT SERPL-MCNC: 1.03 MG/DL (ref 0.5–1.05)
EGFRCR SERPLBLD CKD-EPI 2021: 57 ML/MIN/1.73M*2
ERYTHROCYTE [DISTWIDTH] IN BLOOD BY AUTOMATED COUNT: 13.7 % (ref 11.5–14.5)
GLUCOSE BLD MANUAL STRIP-MCNC: 97 MG/DL (ref 74–99)
GLUCOSE SERPL-MCNC: 93 MG/DL (ref 74–99)
HCT VFR BLD AUTO: 37.7 % (ref 36–46)
HGB BLD-MCNC: 12.5 G/DL (ref 12–16)
MAGNESIUM SERPL-MCNC: 2.02 MG/DL (ref 1.6–2.4)
MCH RBC QN AUTO: 34.1 PG (ref 26–34)
MCHC RBC AUTO-ENTMCNC: 33.2 G/DL (ref 32–36)
MCV RBC AUTO: 103 FL (ref 80–100)
NRBC BLD-RTO: 0 /100 WBCS (ref 0–0)
P AXIS: 81 DEGREES
P OFFSET: 139 MS
P ONSET: 90 MS
PHOSPHATE SERPL-MCNC: 3.1 MG/DL (ref 2.5–4.9)
PLATELET # BLD AUTO: 256 X10*3/UL (ref 150–450)
POTASSIUM SERPL-SCNC: 3.2 MMOL/L (ref 3.5–5.3)
PR INTERVAL: 244 MS
Q ONSET: 203 MS
Q ONSET: 212 MS
Q ONSET: 218 MS
QRS COUNT: 12 BEATS
QRS COUNT: 14 BEATS
QRS COUNT: 9 BEATS
QRS DURATION: 156 MS
QRS DURATION: 68 MS
QRS DURATION: 78 MS
QT INTERVAL: 446 MS
QT INTERVAL: 458 MS
QT INTERVAL: 508 MS
QTC CALCULATION(BAZETT): 498 MS
QTC CALCULATION(BAZETT): 498 MS
QTC CALCULATION(BAZETT): 541 MS
QTC FREDERICIA: 480 MS
QTC FREDERICIA: 502 MS
QTC FREDERICIA: 512 MS
R AXIS: -13 DEGREES
R AXIS: -33 DEGREES
R AXIS: 104 DEGREES
RBC # BLD AUTO: 3.67 X10*6/UL (ref 4–5.2)
SODIUM SERPL-SCNC: 139 MMOL/L (ref 136–145)
T AXIS: -68 DEGREES
T AXIS: 24 DEGREES
T AXIS: 9 DEGREES
T OFFSET: 432 MS
T OFFSET: 441 MS
T OFFSET: 466 MS
VENTRICULAR RATE: 58 BPM
VENTRICULAR RATE: 75 BPM
VENTRICULAR RATE: 84 BPM
WBC # BLD AUTO: 9.2 X10*3/UL (ref 4.4–11.3)

## 2025-02-19 PROCEDURE — 85027 COMPLETE CBC AUTOMATED: CPT | Performed by: NURSE PRACTITIONER

## 2025-02-19 PROCEDURE — 2500000005 HC RX 250 GENERAL PHARMACY W/O HCPCS

## 2025-02-19 PROCEDURE — 2500000001 HC RX 250 WO HCPCS SELF ADMINISTERED DRUGS (ALT 637 FOR MEDICARE OP): Performed by: STUDENT IN AN ORGANIZED HEALTH CARE EDUCATION/TRAINING PROGRAM

## 2025-02-19 PROCEDURE — 2500000004 HC RX 250 GENERAL PHARMACY W/ HCPCS (ALT 636 FOR OP/ED): Performed by: STUDENT IN AN ORGANIZED HEALTH CARE EDUCATION/TRAINING PROGRAM

## 2025-02-19 PROCEDURE — 71045 X-RAY EXAM CHEST 1 VIEW: CPT | Performed by: RADIOLOGY

## 2025-02-19 PROCEDURE — 2500000004 HC RX 250 GENERAL PHARMACY W/ HCPCS (ALT 636 FOR OP/ED)

## 2025-02-19 PROCEDURE — 94640 AIRWAY INHALATION TREATMENT: CPT

## 2025-02-19 PROCEDURE — 80069 RENAL FUNCTION PANEL: CPT | Performed by: NURSE PRACTITIONER

## 2025-02-19 PROCEDURE — 2500000004 HC RX 250 GENERAL PHARMACY W/ HCPCS (ALT 636 FOR OP/ED): Performed by: NURSE PRACTITIONER

## 2025-02-19 PROCEDURE — 83735 ASSAY OF MAGNESIUM: CPT | Performed by: NURSE PRACTITIONER

## 2025-02-19 PROCEDURE — 1200000002 HC GENERAL ROOM WITH TELEMETRY DAILY

## 2025-02-19 PROCEDURE — 2500000001 HC RX 250 WO HCPCS SELF ADMINISTERED DRUGS (ALT 637 FOR MEDICARE OP): Performed by: NURSE PRACTITIONER

## 2025-02-19 PROCEDURE — 2500000001 HC RX 250 WO HCPCS SELF ADMINISTERED DRUGS (ALT 637 FOR MEDICARE OP)

## 2025-02-19 PROCEDURE — 99232 SBSQ HOSP IP/OBS MODERATE 35: CPT

## 2025-02-19 PROCEDURE — 2500000002 HC RX 250 W HCPCS SELF ADMINISTERED DRUGS (ALT 637 FOR MEDICARE OP, ALT 636 FOR OP/ED): Performed by: STUDENT IN AN ORGANIZED HEALTH CARE EDUCATION/TRAINING PROGRAM

## 2025-02-19 PROCEDURE — 71045 X-RAY EXAM CHEST 1 VIEW: CPT

## 2025-02-19 PROCEDURE — 36415 COLL VENOUS BLD VENIPUNCTURE: CPT | Performed by: NURSE PRACTITIONER

## 2025-02-19 PROCEDURE — 82947 ASSAY GLUCOSE BLOOD QUANT: CPT

## 2025-02-19 PROCEDURE — 2500000002 HC RX 250 W HCPCS SELF ADMINISTERED DRUGS (ALT 637 FOR MEDICARE OP, ALT 636 FOR OP/ED)

## 2025-02-19 RX ORDER — OXYCODONE HYDROCHLORIDE 5 MG/1
5 TABLET ORAL EVERY 4 HOURS PRN
Status: DISCONTINUED | OUTPATIENT
Start: 2025-02-19 | End: 2025-02-22 | Stop reason: HOSPADM

## 2025-02-19 RX ORDER — POTASSIUM CHLORIDE 20 MEQ/1
40 TABLET, EXTENDED RELEASE ORAL EVERY 4 HOURS
Status: COMPLETED | OUTPATIENT
Start: 2025-02-19 | End: 2025-02-19

## 2025-02-19 RX ORDER — ACETAZOLAMIDE 500 MG/5ML
500 INJECTION, POWDER, LYOPHILIZED, FOR SOLUTION INTRAVENOUS ONCE
Status: COMPLETED | OUTPATIENT
Start: 2025-02-19 | End: 2025-02-19

## 2025-02-19 RX ORDER — BUMETANIDE 0.25 MG/ML
2 INJECTION, SOLUTION INTRAMUSCULAR; INTRAVENOUS ONCE
Status: DISCONTINUED | OUTPATIENT
Start: 2025-02-19 | End: 2025-02-19

## 2025-02-19 RX ORDER — TALC
6 POWDER (GRAM) TOPICAL NIGHTLY
Status: DISCONTINUED | OUTPATIENT
Start: 2025-02-19 | End: 2025-02-22 | Stop reason: HOSPADM

## 2025-02-19 RX ORDER — DILTIAZEM HYDROCHLORIDE 30 MG/1
90 TABLET, FILM COATED ORAL ONCE
Status: COMPLETED | OUTPATIENT
Start: 2025-02-19 | End: 2025-02-19

## 2025-02-19 RX ORDER — DILTIAZEM HYDROCHLORIDE 30 MG/1
90 TABLET, FILM COATED ORAL EVERY 6 HOURS
Status: DISCONTINUED | OUTPATIENT
Start: 2025-02-19 | End: 2025-02-19

## 2025-02-19 RX ORDER — DILTIAZEM HYDROCHLORIDE 120 MG/1
120 CAPSULE, COATED, EXTENDED RELEASE ORAL DAILY
Status: DISCONTINUED | OUTPATIENT
Start: 2025-02-19 | End: 2025-02-22 | Stop reason: HOSPADM

## 2025-02-19 RX ORDER — DILTIAZEM HYDROCHLORIDE 30 MG/1
60 TABLET, FILM COATED ORAL EVERY 6 HOURS
Status: DISCONTINUED | OUTPATIENT
Start: 2025-02-19 | End: 2025-02-19

## 2025-02-19 RX ORDER — AMIODARONE HYDROCHLORIDE 200 MG/1
200 TABLET ORAL DAILY
Status: DISCONTINUED | OUTPATIENT
Start: 2025-02-27 | End: 2025-02-22 | Stop reason: HOSPADM

## 2025-02-19 RX ORDER — AMIODARONE HYDROCHLORIDE 200 MG/1
400 TABLET ORAL 2 TIMES DAILY
Status: DISCONTINUED | OUTPATIENT
Start: 2025-02-19 | End: 2025-02-22 | Stop reason: HOSPADM

## 2025-02-19 RX ORDER — CALCIUM CARBONATE 200(500)MG
500 TABLET,CHEWABLE ORAL 4 TIMES DAILY PRN
Status: DISCONTINUED | OUTPATIENT
Start: 2025-02-19 | End: 2025-02-22 | Stop reason: HOSPADM

## 2025-02-19 RX ADMIN — Medication 1 TABLET: at 09:47

## 2025-02-19 RX ADMIN — FLUTICASONE PROPIONATE 2 SPRAY: 50 SPRAY, METERED NASAL at 09:48

## 2025-02-19 RX ADMIN — DILTIAZEM HYDROCHLORIDE 90 MG: 30 TABLET, FILM COATED ORAL at 18:56

## 2025-02-19 RX ADMIN — BUDESONIDE 0.5 MG: 0.5 INHALANT RESPIRATORY (INHALATION) at 09:29

## 2025-02-19 RX ADMIN — HEPARIN SODIUM 5000 UNITS: 5000 INJECTION INTRAVENOUS; SUBCUTANEOUS at 16:21

## 2025-02-19 RX ADMIN — POTASSIUM CHLORIDE 40 MEQ: 1500 TABLET, EXTENDED RELEASE ORAL at 09:47

## 2025-02-19 RX ADMIN — AMIODARONE HYDROCHLORIDE 400 MG: 200 TABLET ORAL at 09:47

## 2025-02-19 RX ADMIN — HEPARIN SODIUM 5000 UNITS: 5000 INJECTION INTRAVENOUS; SUBCUTANEOUS at 01:15

## 2025-02-19 RX ADMIN — ASPIRIN 81 MG CHEWABLE TABLET 81 MG: 81 TABLET CHEWABLE at 09:47

## 2025-02-19 RX ADMIN — Medication 6 MG: at 20:55

## 2025-02-19 RX ADMIN — ACETAZOLAMIDE 500 MG: 500 INJECTION, POWDER, LYOPHILIZED, FOR SOLUTION INTRAVENOUS at 13:28

## 2025-02-19 RX ADMIN — DILTIAZEM HYDROCHLORIDE 60 MG: 30 TABLET, FILM COATED ORAL at 06:55

## 2025-02-19 RX ADMIN — POTASSIUM CHLORIDE 40 MEQ: 1500 TABLET, EXTENDED RELEASE ORAL at 13:49

## 2025-02-19 RX ADMIN — ACYCLOVIR 800 MG: 800 TABLET ORAL at 09:47

## 2025-02-19 RX ADMIN — BISOPROLOL FUMARATE 20 MG: 10 TABLET, FILM COATED ORAL at 09:49

## 2025-02-19 RX ADMIN — AMIODARONE HYDROCHLORIDE 400 MG: 200 TABLET ORAL at 20:54

## 2025-02-19 RX ADMIN — ACETAMINOPHEN 650 MG: 325 TABLET ORAL at 18:57

## 2025-02-19 RX ADMIN — CALCIUM CARBONATE (ANTACID) CHEW TAB 500 MG 500 MG: 500 CHEW TAB at 16:19

## 2025-02-19 RX ADMIN — ONDANSETRON 4 MG: 2 INJECTION INTRAMUSCULAR; INTRAVENOUS at 17:00

## 2025-02-19 RX ADMIN — HEPARIN SODIUM 5000 UNITS: 5000 INJECTION INTRAVENOUS; SUBCUTANEOUS at 09:48

## 2025-02-19 RX ADMIN — ATORVASTATIN CALCIUM 80 MG: 80 TABLET, FILM COATED ORAL at 20:55

## 2025-02-19 RX ADMIN — BUDESONIDE 0.5 MG: 0.5 INHALANT RESPIRATORY (INHALATION) at 22:36

## 2025-02-19 RX ADMIN — ACETAMINOPHEN 650 MG: 325 TABLET ORAL at 13:24

## 2025-02-19 RX ADMIN — POLYSACCHARIDE-IRON COMPLEX 150 MG: 150 CAPSULE ORAL at 09:47

## 2025-02-19 RX ADMIN — ACETAMINOPHEN 650 MG: 325 TABLET ORAL at 06:55

## 2025-02-19 RX ADMIN — DILTIAZEM HYDROCHLORIDE 60 MG: 30 TABLET ORAL at 13:48

## 2025-02-19 RX ADMIN — ACETAZOLAMIDE 500 MG: 500 INJECTION, POWDER, LYOPHILIZED, FOR SOLUTION INTRAVENOUS at 16:42

## 2025-02-19 ASSESSMENT — COGNITIVE AND FUNCTIONAL STATUS - GENERAL
DRESSING REGULAR UPPER BODY CLOTHING: A LITTLE
TOILETING: A LITTLE
DAILY ACTIVITIY SCORE: 20
MOBILITY SCORE: 19
HELP NEEDED FOR BATHING: A LITTLE
STANDING UP FROM CHAIR USING ARMS: A LITTLE
MOVING TO AND FROM BED TO CHAIR: A LITTLE
DRESSING REGULAR LOWER BODY CLOTHING: A LITTLE
CLIMB 3 TO 5 STEPS WITH RAILING: A LITTLE
TURNING FROM BACK TO SIDE WHILE IN FLAT BAD: A LITTLE
WALKING IN HOSPITAL ROOM: A LITTLE

## 2025-02-19 ASSESSMENT — PAIN SCALES - GENERAL
PAINLEVEL_OUTOF10: 3
PAINLEVEL_OUTOF10: 3
PAINLEVEL_OUTOF10: 2
PAINLEVEL_OUTOF10: 3

## 2025-02-19 ASSESSMENT — PAIN - FUNCTIONAL ASSESSMENT
PAIN_FUNCTIONAL_ASSESSMENT: 0-10
PAIN_FUNCTIONAL_ASSESSMENT: 0-10

## 2025-02-19 NOTE — PROGRESS NOTES
"CARDIAC SURGERY DAILY PROGRESS NOTE    Radha Montalvo is a 74 y.o. female, with a PMH of breast cancer, atrial fibrillation, ascending aorta dilation, stable angina, hypertension, hyperlipidemia presenting to the CTICU for elective CABG . Patient previously stated dyspnea and exertional chest pressure/discomfort, she presented to [facility] on [facility]/14/[facility] for CABG x2 (LIMA to LAD, SVG to OM), MAZE, PATSY clip.       OPERATION/PROCEDURE: CABG x 2 Skeletonized Chambers to Lad , SVG to OM  Endoscopic vein harvest  GP MAZE with encompass clamp and cryoablation  40 mm Atriaclip with Dwayne Jay MD    CTICU Course: Fluid volume overload and post-op respiratory insufficiency.   Transferred to T3 on 2/18      Interval History:   No acute overnight events.     SUBJECTIVE:  Poor sleep overnight. Pain adequately controlled. Breathing improving, continues to expectorate thin mucous.     Objective   /65   Pulse 82   Temp 36.4 °C (97.5 °F)   Resp 23   Ht 1.702 m (5' 7\")   Wt 72.3 kg (159 lb 4.8 oz)   LMP  (LMP Unknown)   SpO2 96%   BMI 24.95 kg/m²   0-10 (Numeric) Pain Score: 3   3 Day Weight Change: Unable to Calculate    Intake and Output    Intake/Output Summary (Last 24 hours) at 2/19/2025 1055  Last data filed at 2/19/2025 1000  Gross per 24 hour   Intake 1380 ml   Output 1495 ml   Net -115 ml       Physical Exam  Physical Exam  Constitutional:       General: She is not in acute distress.     Appearance: Normal appearance. She is not toxic-appearing.   Eyes:      Conjunctiva/sclera: Conjunctivae normal.   Cardiovascular:      Rate and Rhythm: Normal rate. Rhythm irregular.      Pulses: Normal pulses.      Comments: A and V wires, capped  Pulmonary:      Effort: Pulmonary effort is normal.      Breath sounds: Normal breath sounds.   Abdominal:      General: Abdomen is flat. Bowel sounds are normal. There is no distension.      Palpations: Abdomen is soft.   Musculoskeletal:      Right lower " leg: Edema present.      Left lower leg: Edema present.      Comments: Trace, right more than left.    Right upper extremity mildly more edematous than left. No tenderness, redness, focal swelling.    Skin:     General: Skin is warm and dry.      Capillary Refill: Capillary refill takes less than 2 seconds.      Comments: Midsternal incision open to air. No s/s infection. Chest tube sites with dressing in place, clean, dry and intact.    Neurological:      General: No focal deficit present.      Mental Status: She is alert.   Psychiatric:         Mood and Affect: Mood normal.         Behavior: Behavior normal.         Thought Content: Thought content normal.         Judgment: Judgment normal.         Medications  Scheduled medications  acetaminophen, 650 mg, oral, q6h  acetazolamide, 500 mg, intravenous, Once  acyclovir, 800 mg, oral, Daily  amiodarone, 400 mg, oral, BID  aspirin, 81 mg, oral, Daily  atorvastatin, 80 mg, oral, Nightly  bisoprolol, 20 mg, oral, Daily  budesonide, 0.5 mg, nebulization, BID  dilTIAZem, 60 mg, oral, q6h  fluticasone, 2 spray, Each Nostril, Daily  heparin (porcine), 5,000 Units, subcutaneous, q8h  iron polysaccharides, 150 mg, oral, Daily  melatonin, 6 mg, oral, Nightly  multivitamin with minerals, 1 tablet, oral, Daily  oxygen, , inhalation, Continuous - 02/gases  polyethylene glycol, 17 g, oral, Daily  potassium chloride CR, 40 mEq, oral, q4h    Continuous medications   PRN medications  PRN medications: alteplase, dextrose **OR** glucagon, hydrALAZINE, ipratropium-albuteroL, naloxone, [DISCONTINUED] ondansetron **OR** ondansetron, oxyCODONE    Labs  Results for orders placed or performed during the hospital encounter of 02/14/25 (from the past 24 hours)   POCT GLUCOSE   Result Value Ref Range    POCT Glucose 100 (H) 74 - 99 mg/dL   Renal Function Panel   Result Value Ref Range    Glucose 206 (H) 74 - 99 mg/dL    Sodium 137 136 - 145 mmol/L    Potassium 3.7 3.5 - 5.3 mmol/L    Chloride  93 (L) 98 - 107 mmol/L    Bicarbonate 29 21 - 32 mmol/L    Anion Gap 19 10 - 20 mmol/L    Urea Nitrogen 32 (H) 6 - 23 mg/dL    Creatinine 1.10 (H) 0.50 - 1.05 mg/dL    eGFR 53 (L) >60 mL/min/1.73m*2    Calcium 8.6 8.6 - 10.6 mg/dL    Phosphorus 3.1 2.5 - 4.9 mg/dL    Albumin 3.4 3.4 - 5.0 g/dL   Magnesium   Result Value Ref Range    Magnesium 1.83 1.60 - 2.40 mg/dL   Calcium, Ionized   Result Value Ref Range    POCT Calcium, Ionized 0.95 (L) 1.1 - 1.33 mmol/L   POCT GLUCOSE   Result Value Ref Range    POCT Glucose 133 (H) 74 - 99 mg/dL   Magnesium   Result Value Ref Range    Magnesium 2.02 1.60 - 2.40 mg/dL   CBC   Result Value Ref Range    WBC 9.2 4.4 - 11.3 x10*3/uL    nRBC 0.0 0.0 - 0.0 /100 WBCs    RBC 3.67 (L) 4.00 - 5.20 x10*6/uL    Hemoglobin 12.5 12.0 - 16.0 g/dL    Hematocrit 37.7 36.0 - 46.0 %     (H) 80 - 100 fL    MCH 34.1 (H) 26.0 - 34.0 pg    MCHC 33.2 32.0 - 36.0 g/dL    RDW 13.7 11.5 - 14.5 %    Platelets 256 150 - 450 x10*3/uL   Renal Function Panel   Result Value Ref Range    Glucose 93 74 - 99 mg/dL    Sodium 139 136 - 145 mmol/L    Potassium 3.2 (L) 3.5 - 5.3 mmol/L    Chloride 95 (L) 98 - 107 mmol/L    Bicarbonate 36 (H) 21 - 32 mmol/L    Anion Gap 11 10 - 20 mmol/L    Urea Nitrogen 38 (H) 6 - 23 mg/dL    Creatinine 1.03 0.50 - 1.05 mg/dL    eGFR 57 (L) >60 mL/min/1.73m*2    Calcium 9.0 8.6 - 10.6 mg/dL    Phosphorus 3.1 2.5 - 4.9 mg/dL    Albumin 3.2 (L) 3.4 - 5.0 g/dL   POCT GLUCOSE   Result Value Ref Range    POCT Glucose 97 74 - 99 mg/dL     *Note: Due to a large number of results and/or encounters for the requested time period, some results have not been displayed. A complete set of results can be found in Results Review.               IMAGING/ DIAGNOSTIC TESTING:  I have personally reviewed the following test result(s):          IMPRESSION & PLAN:  POD # 5 s/p CABG x2, MAZE, LAAC  - Increase activity/ ambulation; PT/OT  - Encourage IS, C/DB; respiratory therapy; wean O2 as kalpesh   -  Cardiac rehab referral   - Continue cardiac meds: ASA, BB, statin   - Pain and anticonstipation meds  - 2v CXR ordered for   - Postop echo not indicated  - Cut epicardial wires prior to discharge   - Tele until discharge  - Optimize nutrition and electrolytes    Chronic Atrial Fibrillation  - Tele: afib 80-90  - Continue BB  - Continue diltiazem 60 mg q6hrs, transition to home dose as tolerated  - Amiodarone 400 mg BID, will transition to 200 mg daily after 10g load  - Adjust medications as tolerated  - Will need to resume home eliquis    Hypertension  - Continue diltiazem  - Continue home bisoprolol    Hyperlipidemia  - Continue high intensity statin  - Lipid monitoring per PCP    Acute Blood Loss Anemia   Recent Labs     25  0628 25  0157 25  0024 25  0024 02/15/25  0014 25  1246 25  1114   HGB 12.5 12.7 12.6 13.6 13.8 13.5 16.4*   HCT 37.7 36.5 37.6 40.7 40.7 38.6 48.9*   - MV, PO Iron x1mo  - Daily labs, transfuse as indicated    Thrombocytopenia  Recent Labs     25  0628 25  0157 25  0024 25  0024 02/15/25  0014 25  1246 25  1114    210 150 156 152 140* 214   - Etiology likely postop/CPB related  - Continue to trend with daily CBCs    Hypokalemia, repleted  Hypervolemia, improving  Contraction Alkalosis  Volume/Electrolyte Status: Preop wt Weight: 72.5 kg (159 lb 13.3 oz)   Vitals:    25 0654   Weight: 72.3 kg (159 lb 4.8 oz)     - : 72.3 kg --> diuril 500 mg BID, 40 mEQ K x2  - Adjust diuresis as needed for postop cardiac surgery hypervolemia  - Replete electrolytes for hypokalemia/hypomagnesemia/hypophosphatemia as needed   - Daily weights and strict I&Os  - Daily RFP while admitted    Leukocytosis  Recent Labs     25  0628 25  0157 25  0024 25  0024 02/15/25  0014 25  1246 25  1114   WBC 9.2 11.3 11.5* 13.0* 11.4* 11.4* 11.4*     Temp (36hrs), Av.2 °C (97.2 °F), Min:36 °C (96.8  °F), Max:36.6 °C (97.9 °F)     - aggressive pulmonary hygiene  - monitor for s/s infection  - likely atelectasis/ postoperative in etiology  - daily CBC to follow      VTE Prophylaxis: SCDs/TEDs, ambulation, SQ heparin  Code Status: Full Code    Dispo  - PT/OT recs low intensity  - Would benefit from homecare RN for cardiac surgery carepath  - Anticipate discharge 2-3 days, pending O2 weaning, diuresis, 2v CXR  - Will continue to assess discharge needs      ODALYS Negron-CNP  Cardiac Surgery [facility]  [facility]  Team Phone [phone]    2/19/2025  10:55 AM

## 2025-02-19 NOTE — SIGNIFICANT EVENT
Rapid Response Nurse Note: RADAR alert: 6    Pager time: [phone]  Arrival time: 642  Event end time: 645  Location: T3-29  [x] Triage by phone or secure messaging    Rapid response initiated by:  [] Rapid response RN [] Family [] Nursing Supervisor [] Physician   [x] RADAR auto page [] Sepsis auto-page [] RN [] RT   [] NP/PA [] Other:     Primary reason for call:   [] BAT [] New CPAP/BiPAP [] Bleeding [] Change in mental status   [] Chest pain [] Code blue [] FiO2 >/= 50% [] HR </= 40 bpm   [] HR >/= 130 bpm [] Hyperglycemia [] Hypoglycemia [x] RADAR    [] RR </= 8 bpm [] RR >/= 30 bpm [] SBP </= 90 mmHg [] SpO2 < 90%   [] Seizure [] Sepsis [] Shortness of breath  [] Staff concern: see comments     Initial VS and/or RADAR VS: T 36 °C; HR 89; RR 21; /67; SPO2 %.    Providers present at bedside (if applicable):     Name of ICU Provider contacted (if applicable):     Interventions:  [x] None [] ABG/VBG [] Assist w/ICU transfer [] BAT paged    [] Bag mask [] Blood [] Cardioversion [] Code Blue   [] Code blue for intubation [] Code status changed [] Chest x-ray [] EKG   [] IV fluid/bolus [] KUB x-ray [] Labs/cultures [] Medication   [] Nebulizer treatment [] NIPPV (CPAP/BiPAP) [] Oxygen [] Oral airway   [] Peripheral IV [] Palliative care consult [] CT/MRI [] Sepsis protocol    [] Suctioned [] Other:     Outcome:  [] Coded and  [] Code blue for intubation [] Coded and transferred to ICU []  on division   [x] Remained on division (no change) [] Remained on division + additional monitoring [] Remained in ED [] Transferred to ED   [] Transferred to ICU [] Transferred to inpatient status [] Transferred for interventions (procedure) [] Transferred to ICU stepdown    [] Transferred to surgery [] Transferred to telemetry [] Sepsis protocol [] STEMI protocol   [] Stroke protocol [x] Bedside nurse instructed to page rapid response for any concerns or acute change in condition/VS     Additional Comments:  Reviewed above RADAR VS with bedside RN.  VS within patient's current trends.   No interventions by rapid response team indicated at this time.  Staff to page rapid response for any concerns or acute change in condition/VS.

## 2025-02-19 NOTE — PROGRESS NOTES
Physical Therapy                 Therapy Communication Note    Patient Name: Radha Montalvo  MRN: [id number]  Department: [place] [address]  Room: [address] [place], [place] [address]  Today's Date: 2/19/2025     Discipline: Physical Therapy    PT Missed Visit: Yes     Missed Visit Reason: Missed Visit Reason:  (ATtempted follow up however mobility aide just finishing with pt.)    Missed Time: Attempt    Comment:

## 2025-02-19 NOTE — SIGNIFICANT EVENT
Rapid Response Nurse Note: RADAR alert: 7    Pager time: 13  Arrival time: 13  Event end time: 16  Location:  302  [] Triage by phone or secure messaging    Rapid response initiated by:  [] Rapid response RN [] Family [] Nursing Supervisor [] Physician   [x] RADAR auto page [] Sepsis auto-page [] RN [] RT   [] NP/PA [] Other:     Primary reason for call:   [] BAT [] New CPAP/BiPAP [] Bleeding [] Change in mental status   [] Chest pain [] Code blue [] FiO2 >/= 50% [] HR </= 40 bpm   [] HR >/= 130 bpm [] Hyperglycemia [] Hypoglycemia [x] RADAR    [] RR </= 8 bpm [] RR >/= 30 bpm [] SBP </= 90 mmHg [] SpO2 < 90%   [] Seizure [] Sepsis [] Shortness of breath  [] Staff concern: see comments     RADAR VS: T n/a °C; HR 94; RR 23; /77; SPO2 92% on supplemental oxygen.    Interventions:  [x] None [] ABG/VBG [] Assist w/ICU transfer [] BAT paged    [] Bag mask [] Blood [] Cardioversion [] Code Blue   [] Code blue for intubation [] Code status changed [] Chest x-ray [] EKG   [] IV fluid/bolus [] KUB x-ray [] Labs/cultures [] Medication   [] Nebulizer treatment [] NIPPV (CPAP/BiPAP) [] Oxygen [] Oral airway   [] Peripheral IV [] Palliative care consult [] CT/MRI [] Sepsis protocol    [] Suctioned [] Other:     Outcome:  [] Coded and  [] Code blue for intubation [] Coded and transferred to ICU []  on division   [x] Remained on division (no change) [] Remained on division + additional monitoring [] Remained in ED [] Transferred to ED   [] Transferred to ICU [] Transferred to inpatient status [] Transferred for interventions (procedure) [] Transferred to ICU stepdown    [] Transferred to surgery [] Transferred to telemetry [] Sepsis protocol [] STEMI protocol   [] Stroke protocol [x] Bedside nurse instructed to page rapid response for any concerns or acute change in condition/VS     Additional Comments: Reviewed above RADAR VS with bedside RN.  VS within patient's current trends.  Staff to page rapid  response for any concerns or acute change in condition/VS.

## 2025-02-19 NOTE — DOCUMENTATION CLARIFICATION NOTE
"    PATIENT:               LIAN MACKAY  ACCT #:                  [id number]  MRN:                       [id number]  :                       1950  ADMIT DATE:       2025 5:17 AM  DISCH DATE:  RESPONDING PROVIDER #:        [id number]          PROVIDER RESPONSE TEXT:    Non Cardiogenic acute pulmonary edema    CDI QUERY TEXT:    Clarification    Instruction:    Based on your assessment of the patient and the clinical information, please provide the requested documentation by clicking on the appropriate radio button and enter any additional information if prompted.    Question: Please clarify if there is a diagnosis related to the clinical information    When answering this query, please exercise your independent professional judgment. The fact that a question is being asked, does not imply that any particular answer is desired or expected.    The patient's clinical indicators include:  Clinical Information:  PN: 74 YOF underwent CABG surgery on     Clinical Indicators:  HP attestation \"Extubated in OR -> CPAP\"   PN \"Currently on 10L NC\" \"Bumex given\"   PN \" No history of pulmonary disease\", \"remained in the ICU due to need for aggressive diuresis\"    2/15 16:04 CXR \"Right middle lobe/lower lobe atelectasis/fluid again noted\"   03:32 CXR \"lingular atelectasis/edema\"   11:29 CXR \"Continued edema and effusions and correlate with fluid status\"    Treatment: Diurese:  Bumex dose given.   started Bumex drip.   stop Bumex drip, Bumex prn.  O2 supplementation: CPAP and HFNC.  Aggressive pulmonary toilet measures.    Risk Factors: Elderly.  CABG surgery. Acute post op pulmonary insufficiency.  Options provided:  -- Non Cardiogenic acute pulmonary edema  -- Other - I will add my own diagnosis  -- Refer to Clinical Documentation Reviewer    Query created by: Giulia Mercedes on 2025 8:12 AM      Electronically signed by:  CHRISTINA CONSTANTINO DO 2025 11:43 AM          "

## 2025-02-20 ENCOUNTER — APPOINTMENT (OUTPATIENT)
Dept: RADIOLOGY | Facility: HOSPITAL | Age: 75
End: 2025-02-20
Payer: MEDICARE

## 2025-02-20 ENCOUNTER — HOME HEALTH ADMISSION (OUTPATIENT)
Dept: HOME HEALTH SERVICES | Facility: HOME HEALTH | Age: 75
End: 2025-02-20
Payer: MEDICARE

## 2025-02-20 LAB
ALBUMIN SERPL BCP-MCNC: 3.3 G/DL (ref 3.4–5)
ANION GAP SERPL CALC-SCNC: 13 MMOL/L (ref 10–20)
BUN SERPL-MCNC: 39 MG/DL (ref 6–23)
CALCIUM SERPL-MCNC: 9.2 MG/DL (ref 8.6–10.6)
CHLORIDE SERPL-SCNC: 103 MMOL/L (ref 98–107)
CO2 SERPL-SCNC: 27 MMOL/L (ref 21–32)
CREAT SERPL-MCNC: 1.02 MG/DL (ref 0.5–1.05)
EGFRCR SERPLBLD CKD-EPI 2021: 58 ML/MIN/1.73M*2
ERYTHROCYTE [DISTWIDTH] IN BLOOD BY AUTOMATED COUNT: 13.7 % (ref 11.5–14.5)
GLUCOSE SERPL-MCNC: 97 MG/DL (ref 74–99)
HCT VFR BLD AUTO: 36.7 % (ref 36–46)
HGB BLD-MCNC: 11.7 G/DL (ref 12–16)
MAGNESIUM SERPL-MCNC: 2.19 MG/DL (ref 1.6–2.4)
MCH RBC QN AUTO: 34.2 PG (ref 26–34)
MCHC RBC AUTO-ENTMCNC: 31.9 G/DL (ref 32–36)
MCV RBC AUTO: 107 FL (ref 80–100)
NRBC BLD-RTO: 0 /100 WBCS (ref 0–0)
PHOSPHATE SERPL-MCNC: 4.4 MG/DL (ref 2.5–4.9)
PLATELET # BLD AUTO: 287 X10*3/UL (ref 150–450)
POTASSIUM SERPL-SCNC: 3.5 MMOL/L (ref 3.5–5.3)
RBC # BLD AUTO: 3.42 X10*6/UL (ref 4–5.2)
SODIUM SERPL-SCNC: 139 MMOL/L (ref 136–145)
WBC # BLD AUTO: 9 X10*3/UL (ref 4.4–11.3)

## 2025-02-20 PROCEDURE — 2500000001 HC RX 250 WO HCPCS SELF ADMINISTERED DRUGS (ALT 637 FOR MEDICARE OP): Performed by: STUDENT IN AN ORGANIZED HEALTH CARE EDUCATION/TRAINING PROGRAM

## 2025-02-20 PROCEDURE — 2500000005 HC RX 250 GENERAL PHARMACY W/O HCPCS

## 2025-02-20 PROCEDURE — 99232 SBSQ HOSP IP/OBS MODERATE 35: CPT

## 2025-02-20 PROCEDURE — 83735 ASSAY OF MAGNESIUM: CPT | Performed by: NURSE PRACTITIONER

## 2025-02-20 PROCEDURE — 85027 COMPLETE CBC AUTOMATED: CPT | Performed by: NURSE PRACTITIONER

## 2025-02-20 PROCEDURE — 97530 THERAPEUTIC ACTIVITIES: CPT | Mod: GP,CQ

## 2025-02-20 PROCEDURE — 2500000004 HC RX 250 GENERAL PHARMACY W/ HCPCS (ALT 636 FOR OP/ED)

## 2025-02-20 PROCEDURE — 2500000002 HC RX 250 W HCPCS SELF ADMINISTERED DRUGS (ALT 637 FOR MEDICARE OP, ALT 636 FOR OP/ED)

## 2025-02-20 PROCEDURE — 71046 X-RAY EXAM CHEST 2 VIEWS: CPT

## 2025-02-20 PROCEDURE — 80069 RENAL FUNCTION PANEL: CPT | Performed by: NURSE PRACTITIONER

## 2025-02-20 PROCEDURE — 2500000004 HC RX 250 GENERAL PHARMACY W/ HCPCS (ALT 636 FOR OP/ED): Performed by: STUDENT IN AN ORGANIZED HEALTH CARE EDUCATION/TRAINING PROGRAM

## 2025-02-20 PROCEDURE — 2500000005 HC RX 250 GENERAL PHARMACY W/O HCPCS: Performed by: NURSE PRACTITIONER

## 2025-02-20 PROCEDURE — 36415 COLL VENOUS BLD VENIPUNCTURE: CPT | Performed by: NURSE PRACTITIONER

## 2025-02-20 PROCEDURE — 97116 GAIT TRAINING THERAPY: CPT | Mod: GP,CQ

## 2025-02-20 PROCEDURE — 71046 X-RAY EXAM CHEST 2 VIEWS: CPT | Performed by: RADIOLOGY

## 2025-02-20 PROCEDURE — 2500000001 HC RX 250 WO HCPCS SELF ADMINISTERED DRUGS (ALT 637 FOR MEDICARE OP)

## 2025-02-20 PROCEDURE — RXMED WILLOW AMBULATORY MEDICATION CHARGE

## 2025-02-20 PROCEDURE — 2500000001 HC RX 250 WO HCPCS SELF ADMINISTERED DRUGS (ALT 637 FOR MEDICARE OP): Performed by: NURSE PRACTITIONER

## 2025-02-20 PROCEDURE — 1200000002 HC GENERAL ROOM WITH TELEMETRY DAILY

## 2025-02-20 RX ORDER — OXYCODONE HYDROCHLORIDE 5 MG/1
5 TABLET ORAL EVERY 4 HOURS PRN
Qty: 15 TABLET | Refills: 0 | Status: SHIPPED | OUTPATIENT
Start: 2025-02-20 | End: 2025-02-27

## 2025-02-20 RX ORDER — ATORVASTATIN CALCIUM 80 MG/1
80 TABLET, FILM COATED ORAL NIGHTLY
Qty: 30 TABLET | Refills: 0 | Status: SHIPPED | OUTPATIENT
Start: 2025-02-20 | End: 2025-03-24

## 2025-02-20 RX ORDER — GUAIFENESIN 600 MG/1
600 TABLET, EXTENDED RELEASE ORAL 2 TIMES DAILY
COMMUNITY
Start: 2025-02-20 | End: 2025-02-22

## 2025-02-20 RX ORDER — BISOPROLOL FUMARATE 10 MG/1
20 TABLET, FILM COATED ORAL DAILY
Qty: 60 TABLET | Refills: 0 | Status: SHIPPED | OUTPATIENT
Start: 2025-02-21 | End: 2025-03-24

## 2025-02-20 RX ORDER — POTASSIUM CHLORIDE 20 MEQ/1
40 TABLET, EXTENDED RELEASE ORAL ONCE
Status: COMPLETED | OUTPATIENT
Start: 2025-02-20 | End: 2025-02-20

## 2025-02-20 RX ORDER — FLUTICASONE PROPIONATE 50 MCG
2 SPRAY, SUSPENSION (ML) NASAL DAILY
COMMUNITY
Start: 2025-02-21

## 2025-02-20 RX ORDER — ACETAMINOPHEN 325 MG/1
650 TABLET ORAL EVERY 6 HOURS PRN
Status: DISCONTINUED | OUTPATIENT
Start: 2025-02-20 | End: 2025-02-22 | Stop reason: HOSPADM

## 2025-02-20 RX ORDER — DILTIAZEM HYDROCHLORIDE 120 MG/1
120 CAPSULE, EXTENDED RELEASE ORAL DAILY
Qty: 30 CAPSULE | Refills: 0 | Status: SHIPPED | OUTPATIENT
Start: 2025-02-21 | End: 2025-03-24

## 2025-02-20 RX ORDER — POLYETHYLENE GLYCOL 3350 17 G/17G
17 POWDER, FOR SOLUTION ORAL DAILY
COMMUNITY
Start: 2025-02-21 | End: 2025-03-23

## 2025-02-20 RX ORDER — GUAIFENESIN 600 MG/1
600 TABLET, EXTENDED RELEASE ORAL 2 TIMES DAILY
Status: COMPLETED | OUTPATIENT
Start: 2025-02-20 | End: 2025-02-21

## 2025-02-20 RX ORDER — NAPROXEN SODIUM 220 MG/1
81 TABLET, FILM COATED ORAL DAILY
Qty: 30 TABLET | Refills: 0 | Status: SHIPPED | OUTPATIENT
Start: 2025-02-21 | End: 2025-03-24

## 2025-02-20 RX ORDER — ACETAMINOPHEN 325 MG/1
650 TABLET ORAL EVERY 6 HOURS PRN
COMMUNITY
Start: 2025-02-20

## 2025-02-20 RX ORDER — FUROSEMIDE 10 MG/ML
60 INJECTION INTRAMUSCULAR; INTRAVENOUS ONCE
Status: COMPLETED | OUTPATIENT
Start: 2025-02-20 | End: 2025-02-20

## 2025-02-20 RX ORDER — AMIODARONE HYDROCHLORIDE 200 MG/1
TABLET ORAL
Qty: 58 TABLET | Refills: 0 | Status: SHIPPED | OUTPATIENT
Start: 2025-02-20 | End: 2025-03-31

## 2025-02-20 RX ADMIN — ACETAMINOPHEN 650 MG: 325 TABLET ORAL at 06:39

## 2025-02-20 RX ADMIN — GUAIFENESIN 600 MG: 600 TABLET ORAL at 21:07

## 2025-02-20 RX ADMIN — APIXABAN 5 MG: 5 TABLET, FILM COATED ORAL at 11:36

## 2025-02-20 RX ADMIN — FLUTICASONE PROPIONATE 2 SPRAY: 50 SPRAY, METERED NASAL at 08:17

## 2025-02-20 RX ADMIN — HEPARIN SODIUM 5000 UNITS: 5000 INJECTION INTRAVENOUS; SUBCUTANEOUS at 08:14

## 2025-02-20 RX ADMIN — DILTIAZEM HYDROCHLORIDE 120 MG: 120 CAPSULE, COATED, EXTENDED RELEASE ORAL at 08:16

## 2025-02-20 RX ADMIN — Medication 2 L/MIN: at 07:38

## 2025-02-20 RX ADMIN — GUAIFENESIN 600 MG: 600 TABLET ORAL at 10:04

## 2025-02-20 RX ADMIN — ASPIRIN 81 MG CHEWABLE TABLET 81 MG: 81 TABLET CHEWABLE at 08:16

## 2025-02-20 RX ADMIN — APIXABAN 5 MG: 5 TABLET, FILM COATED ORAL at 21:06

## 2025-02-20 RX ADMIN — AMIODARONE HYDROCHLORIDE 400 MG: 200 TABLET ORAL at 08:16

## 2025-02-20 RX ADMIN — HEPARIN SODIUM 5000 UNITS: 5000 INJECTION INTRAVENOUS; SUBCUTANEOUS at 00:22

## 2025-02-20 RX ADMIN — POTASSIUM CHLORIDE 40 MEQ: 1500 TABLET, EXTENDED RELEASE ORAL at 10:05

## 2025-02-20 RX ADMIN — Medication 6 MG: at 21:06

## 2025-02-20 RX ADMIN — ACETAMINOPHEN 650 MG: 325 TABLET ORAL at 00:22

## 2025-02-20 RX ADMIN — POLYSACCHARIDE-IRON COMPLEX 150 MG: 150 CAPSULE ORAL at 08:15

## 2025-02-20 RX ADMIN — POLYETHYLENE GLYCOL 3350 17 G: 17 POWDER, FOR SOLUTION ORAL at 08:13

## 2025-02-20 RX ADMIN — ACYCLOVIR 800 MG: 800 TABLET ORAL at 08:16

## 2025-02-20 RX ADMIN — FUROSEMIDE 60 MG: 10 INJECTION, SOLUTION INTRAVENOUS at 10:04

## 2025-02-20 RX ADMIN — BISOPROLOL FUMARATE 20 MG: 10 TABLET, FILM COATED ORAL at 08:16

## 2025-02-20 RX ADMIN — Medication 1 TABLET: at 08:16

## 2025-02-20 RX ADMIN — AMIODARONE HYDROCHLORIDE 400 MG: 200 TABLET ORAL at 21:06

## 2025-02-20 RX ADMIN — ATORVASTATIN CALCIUM 80 MG: 80 TABLET, FILM COATED ORAL at 21:06

## 2025-02-20 ASSESSMENT — COGNITIVE AND FUNCTIONAL STATUS - GENERAL
TOILETING: A LITTLE
CLIMB 3 TO 5 STEPS WITH RAILING: A LOT
MOVING TO AND FROM BED TO CHAIR: A LITTLE
STANDING UP FROM CHAIR USING ARMS: A LITTLE
HELP NEEDED FOR BATHING: A LITTLE
MOBILITY SCORE: 19
MOBILITY SCORE: 19
MOBILITY SCORE: 16
HELP NEEDED FOR BATHING: A LITTLE
WALKING IN HOSPITAL ROOM: A LITTLE
CLIMB 3 TO 5 STEPS WITH RAILING: A LITTLE
STANDING UP FROM CHAIR USING ARMS: A LOT
DAILY ACTIVITIY SCORE: 20
DRESSING REGULAR LOWER BODY CLOTHING: A LITTLE
WALKING IN HOSPITAL ROOM: A LITTLE
DRESSING REGULAR UPPER BODY CLOTHING: A LITTLE
DRESSING REGULAR UPPER BODY CLOTHING: A LITTLE
TURNING FROM BACK TO SIDE WHILE IN FLAT BAD: A LITTLE
MOVING TO AND FROM BED TO CHAIR: A LITTLE
TOILETING: A LITTLE
WALKING IN HOSPITAL ROOM: A LITTLE
STANDING UP FROM CHAIR USING ARMS: A LITTLE
DAILY ACTIVITIY SCORE: 20
TURNING FROM BACK TO SIDE WHILE IN FLAT BAD: A LITTLE
MOVING FROM LYING ON BACK TO SITTING ON SIDE OF FLAT BED WITH BEDRAILS: A LITTLE
MOVING TO AND FROM BED TO CHAIR: A LITTLE
TURNING FROM BACK TO SIDE WHILE IN FLAT BAD: A LITTLE
DRESSING REGULAR LOWER BODY CLOTHING: A LITTLE
CLIMB 3 TO 5 STEPS WITH RAILING: A LITTLE

## 2025-02-20 ASSESSMENT — PAIN SCALES - GENERAL
PAINLEVEL_OUTOF10: 3
PAINLEVEL_OUTOF10: 2
PAINLEVEL_OUTOF10: 0 - NO PAIN

## 2025-02-20 ASSESSMENT — PAIN - FUNCTIONAL ASSESSMENT
PAIN_FUNCTIONAL_ASSESSMENT: 0-10
PAIN_FUNCTIONAL_ASSESSMENT: 0-10

## 2025-02-20 NOTE — CARE PLAN
The patient's goals for the shift include      The clinical goals for the shift include safety      Problem: Discharge Planning  Goal: Discharge to home or other facility with appropriate resources  Outcome: Progressing     Problem: Chronic Conditions and Co-morbidities  Goal: Patient's chronic conditions and co-morbidity symptoms are monitored and maintained or improved  Outcome: Progressing     Problem: Nutrition  Goal: Nutrient intake appropriate for maintaining nutritional needs  Outcome: Progressing     Problem: Skin  Goal: Decreased wound size/increased tissue granulation at next dressing change  Outcome: Progressing  Goal: Participates in plan/prevention/treatment measures  Outcome: Progressing  Goal: Prevent/manage excess moisture  Outcome: Progressing  Goal: Prevent/minimize sheer/friction injuries  Outcome: Progressing  Goal: Promote/optimize nutrition  Outcome: Progressing  Goal: Promote skin healing  Outcome: Progressing     Problem: Pain  Goal: Takes deep breaths with improved pain control throughout the shift  Outcome: Progressing  Goal: Turns in bed with improved pain control throughout the shift  Outcome: Progressing  Goal: Walks with improved pain control throughout the shift  Outcome: Progressing  Goal: Performs ADL's with improved pain control throughout shift  Outcome: Progressing  Goal: Participates in PT with improved pain control throughout the shift  Outcome: Progressing  Goal: Free from opioid side effects throughout the shift  Outcome: Progressing  Goal: Free from acute confusion related to pain meds throughout the shift  Outcome: Progressing     Problem: Fall/Injury  Goal: Not fall by end of shift  Outcome: Progressing  Goal: Be free from injury by end of the shift  Outcome: Progressing  Goal: Verbalize understanding of personal risk factors for fall in the hospital  Outcome: Progressing  Goal: Verbalize understanding of risk factor reduction measures to prevent injury from fall in the  home  Outcome: Progressing  Goal: Use assistive devices by end of the shift  Outcome: Progressing  Goal: Pace activities to prevent fatigue by end of the shift  Outcome: Progressing

## 2025-02-20 NOTE — CARE PLAN
The patient's goals for the shift include safety    The clinical goals for the shift include safety    Problem: Discharge Planning  Goal: Discharge to home or other facility with appropriate resources  Outcome: Progressing     Problem: Chronic Conditions and Co-morbidities  Goal: Patient's chronic conditions and co-morbidity symptoms are monitored and maintained or improved  Outcome: Progressing     Problem: Nutrition  Goal: Nutrient intake appropriate for maintaining nutritional needs  Outcome: Progressing     Problem: Skin  Goal: Decreased wound size/increased tissue granulation at next dressing change  Outcome: Progressing  Goal: Participates in plan/prevention/treatment measures  Outcome: Progressing  Goal: Prevent/manage excess moisture  Outcome: Progressing  Goal: Prevent/minimize sheer/friction injuries  Outcome: Progressing  Goal: Promote/optimize nutrition  Outcome: Progressing  Goal: Promote skin healing  Outcome: Progressing     Problem: Pain  Goal: Takes deep breaths with improved pain control throughout the shift  Outcome: Progressing  Goal: Turns in bed with improved pain control throughout the shift  Outcome: Progressing  Goal: Walks with improved pain control throughout the shift  Outcome: Progressing  Goal: Performs ADL's with improved pain control throughout shift  Outcome: Progressing  Goal: Participates in PT with improved pain control throughout the shift  Outcome: Progressing  Goal: Free from opioid side effects throughout the shift  Outcome: Progressing  Goal: Free from acute confusion related to pain meds throughout the shift  Outcome: Progressing     Problem: Fall/Injury  Goal: Not fall by end of shift  Outcome: Progressing  Goal: Be free from injury by end of the shift  Outcome: Progressing  Goal: Verbalize understanding of personal risk factors for fall in the hospital  Outcome: Progressing  Goal: Verbalize understanding of risk factor reduction measures to prevent injury from fall in the  home  Outcome: Progressing  Goal: Use assistive devices by end of the shift  Outcome: Progressing  Goal: Pace activities to prevent fatigue by end of the shift  Outcome: Progressing

## 2025-02-20 NOTE — PROGRESS NOTES
"CARDIAC SURGERY DAILY PROGRESS NOTE    Radha Montalvo is a 74 y.o. female, with a PMH of breast cancer, atrial fibrillation, ascending aorta dilation, stable angina, hypertension, hyperlipidemia presenting to the CTICU for elective CABG . Patient previously stated dyspnea and exertional chest pressure/discomfort, she presented to [facility] on 2/14/2025 for CABG x2 (LIMA to LAD, SVG to OM), MAZE, PATSY clip.       OPERATION/PROCEDURE: CABG x 2 Skeletonized Chambers to Lad , SVG to OM  Endoscopic vein harvest  GP MAZE with encompass clamp and cryoablation  40 mm Atriaclip with Dwayne Jay MD    CTICU Course: Fluid volume overload and post-op respiratory insufficiency.   Transferred to T3 on 2/18      Interval History:   No acute overnight events.     SUBJECTIVE:  Continues to have poor sleep related to interruptions. Pain well controlled.     Objective   BP 98/62 (BP Location: Right arm, Patient Position: Lying)   Pulse 91   Temp 36.4 °C (97.5 °F) (Temporal)   Resp 18   Ht 1.702 m (5' 7\")   Wt 71.4 kg (157 lb 6.4 oz)   LMP  (LMP Unknown)   SpO2 92%   BMI 24.65 kg/m²   0-10 (Numeric) Pain Score: 3   3 Day Weight Change: Unable to Calculate    Intake and Output    Intake/Output Summary (Last 24 hours) at 2/20/2025 0733  Last data filed at 2/19/2025 2058  Gross per 24 hour   Intake 600 ml   Output 450 ml   Net 150 ml       Physical Exam  Physical Exam  Constitutional:       General: She is not in acute distress.     Appearance: Normal appearance. She is not toxic-appearing.   Eyes:      Conjunctiva/sclera: Conjunctivae normal.   Cardiovascular:      Rate and Rhythm: Normal rate. Rhythm irregular.      Pulses: Normal pulses.      Comments: A and V wires, capped  Afib on monitor, 60-70s  Pulmonary:      Effort: Pulmonary effort is normal.      Breath sounds: Normal breath sounds.      Comments: 2L NC  Clear, thin expectorant  Abdominal:      General: Abdomen is flat. Bowel sounds are normal. " There is no distension.      Palpations: Abdomen is soft.   Musculoskeletal:      Right lower leg: Edema present.      Left lower leg: No edema.      Comments: Trace, right more than left.    Right upper extremity mildly more edematous than left. No tenderness, redness, focal swelling.    Skin:     General: Skin is warm and dry.      Capillary Refill: Capillary refill takes less than 2 seconds.      Comments: Midsternal incision open to air. No s/s infection. Chest tube sites with dressing in place, clean, dry and intact.    Neurological:      General: No focal deficit present.      Mental Status: She is alert.   Psychiatric:         Mood and Affect: Mood normal.         Behavior: Behavior normal.         Thought Content: Thought content normal.         Judgment: Judgment normal.         Medications  Scheduled medications  acetaminophen, 650 mg, oral, q6h  acyclovir, 800 mg, oral, Daily  amiodarone, 400 mg, oral, BID   Followed by  [START ON 2/27/2025] amiodarone, 200 mg, oral, Daily  aspirin, 81 mg, oral, Daily  atorvastatin, 80 mg, oral, Nightly  bisoprolol, 20 mg, oral, Daily  budesonide, 0.5 mg, nebulization, BID  dilTIAZem CD, 120 mg, oral, Daily  fluticasone, 2 spray, Each Nostril, Daily  heparin (porcine), 5,000 Units, subcutaneous, q8h  iron polysaccharides, 150 mg, oral, Daily  melatonin, 6 mg, oral, Nightly  multivitamin with minerals, 1 tablet, oral, Daily  oxygen, , inhalation, Continuous - 02/gases  polyethylene glycol, 17 g, oral, Daily    Continuous medications   PRN medications  PRN medications: alteplase, calcium carbonate, dextrose **OR** glucagon, hydrALAZINE, ipratropium-albuteroL, naloxone, [DISCONTINUED] ondansetron **OR** ondansetron, oxyCODONE    Labs  Results for orders placed or performed during the hospital encounter of 02/14/25 (from the past 24 hours)   POCT GLUCOSE   Result Value Ref Range    POCT Glucose 97 74 - 99 mg/dL     *Note: Due to a large number of results and/or encounters for  the requested time period, some results have not been displayed. A complete set of results can be found in Results Review.           IMAGING/ DIAGNOSTIC TESTING:  I have personally reviewed the following test result(s):    CXR, daily labs    IMPRESSION & PLAN:  POD # 6 s/p CABG x2, MAZE, LAAC  - Increase activity/ ambulation; PT/OT  - Encourage IS, C/DB; respiratory therapy; wean O2 as kalpesh   - Cardiac rehab referral   - Continue cardiac meds: ASA, BB, statin   - Pain and anticonstipation meds  - 2v CXR completed 2/20  - Postop echo not indicated  - Cut epicardial wires prior to discharge   - Tele until discharge  - Optimize nutrition and electrolytes    Chronic Atrial Fibrillation  - Tele: afib 80-90  - Continue BB  - Continue diltiazem 90 mg daily  - Amiodarone 400 mg BID, will transition to 200 mg daily after 10g load  - Adjust medications as tolerated  - Resume home eliquis today    Hypertension  - Continue diltiazem  - Continue home bisoprolol    Hyperlipidemia  - Continue high intensity statin  - Lipid monitoring per PCP    Acute Blood Loss Anemia   Recent Labs     02/19/25  0628 02/18/25  0157 02/17/25  0024 02/16/25  0024 02/15/25  0014 02/14/25  1246 02/07/25  1114   HGB 12.5 12.7 12.6 13.6 13.8 13.5 16.4*   HCT 37.7 36.5 37.6 40.7 40.7 38.6 48.9*   - MV, iron not indicated  - Daily labs, transfuse as indicated    Thrombocytopenia  Recent Labs     02/19/25  0628 02/18/25  0157 02/17/25  0024 02/16/25  0024 02/15/25  0014 02/14/25  1246 02/07/25  1114    210 150 156 152 140* 214   - Etiology likely postop/CPB related  - Continue to trend with daily CBCs    Hypokalemia, repleted  Hypervolemia, improving  Contraction Alkalosis, resolved  Volume/Electrolyte Status: Preop wt Weight: 72.5 kg (159 lb 13.3 oz)   Vitals:    02/20/25 0512   Weight: 71.4 kg (157 lb 6.4 oz)     - 2/19: 72.3 kg --> diuril 500 mg BID, 40 mEQ K x2  - 2/20: 71.4 kg --> lasix 60 mg IV daily, 40 mEq K  - Adjust diuresis as needed for  postop cardiac surgery hypervolemia  - Replete electrolytes for hypokalemia/hypomagnesemia/hypophosphatemia as needed   - Daily weights and strict I&Os  - Daily RFP while admitted    Leukocytosis  Recent Labs     25  0628 25  0157 25  0024 25  0024 02/15/25  0014 25  1246 25  1114   WBC 9.2 11.3 11.5* 13.0* 11.4* 11.4* 11.4*     Temp (36hrs), Av.4 °C (97.5 °F), Min:36 °C (96.8 °F), Max:37 °C (98.6 °F)     - aggressive pulmonary hygiene  - monitor for s/s infection  - likely atelectasis/ postoperative in etiology  - daily CBC to follow    VTE Prophylaxis: SCDs/TEDs, ambulation, SQ heparin  Code Status: Full Code    Dispo  - PT/OT recs low intensity  - Would benefit from homecare RN for cardiac surgery carepath  - Anticipate discharge 1-2 days, pending O2 weaning, diuresis  - Will continue to assess discharge needs      ODALYS Negron-CNP  Cardiac Surgery [facility]  [facility]  Team Phone [phone]    2025  7:33 AM

## 2025-02-20 NOTE — PROGRESS NOTES
02/20/25 1044   Discharge Planning   Expected Discharge Disposition Home H  ([facility])   Does the patient need discharge transport arranged? No     Met with patient and introduced myself as Care Coordinator and member of the discharge planning team.  Patient is s/p CABG x2. She plans to return home at time of discharge with  Home Care. Care Coordinator will continue to follow for home going needs.

## 2025-02-20 NOTE — PROGRESS NOTES
Physical Therapy                 Therapy Communication Note    Patient Name: Radha Montalvo  MRN: [id number]  Department: [place] [address]  Room: [address] [place] [address]  Today's Date: 2/20/2025     Discipline: Physical Therapy    PT Missed Visit: Yes     Missed Visit Reason: Missed Visit Reason:  (Attempted follow up as pt. back from x-ray however pt. reports dizziness and therefore just returned to bed from sitting up in chair attempting to recover. RN aware.)    Missed Time: Attempt    Comment:

## 2025-02-20 NOTE — SIGNIFICANT EVENT
Rapid Response Nurse Note: RADAR alert: 7    Pager time: 527  Arrival time: 527  Event end time: 531  Location: [facility]  [] Triage by phone or secure messaging    Rapid response initiated by:  [] Rapid response RN [] Family [] Nursing Supervisor [] Physician   [x] RADAR auto page [] Sepsis auto-page [] RN [] RT   [] NP/PA [] Other:     Primary reason for call:   [] BAT [] New CPAP/BiPAP [] Bleeding [] Change in mental status   [] Chest pain [] Code blue [] FiO2 >/= 50% [] HR </= 40 bpm   [] HR >/= 130 bpm [] Hyperglycemia [] Hypoglycemia [x] RADAR    [] RR </= 8 bpm [] RR >/= 30 bpm [] SBP </= 90 mmHg [] SpO2 < 90%   [] Seizure [] Sepsis [] Shortness of breath  [] Staff concern: see comments     Initial VS and/or RADAR VS: T 36.4 °C; HR 91; RR 18; BP 98/62; SPO2 92%.    Interventions:  [x] None [] ABG/VBG [] Assist w/ICU transfer [] BAT paged    [] Bag mask [] Blood [] Cardioversion [] Code Blue   [] Code blue for intubation [] Code status changed [] Chest x-ray [] EKG   [] IV fluid/bolus [] KUB x-ray [] Labs/cultures [] Medication   [] Nebulizer treatment [] NIPPV (CPAP/BiPAP) [] Oxygen [] Oral airway   [] Peripheral IV [] Palliative care consult [] CT/MRI [] Sepsis protocol    [] Suctioned [] Other:     Outcome:  [] Coded and  [] Code blue for intubation [] Coded and transferred to ICU []  on division   [x] Remained on division (no change) [] Remained on division + additional monitoring [] Remained in ED [] Transferred to ED   [] Transferred to ICU [] Transferred to inpatient status [] Transferred for interventions (procedure) [] Transferred to ICU stepdown    [] Transferred to surgery [] Transferred to telemetry [] Sepsis protocol [] STEMI protocol   [] Stroke protocol [x] Bedside nurse instructed to page rapid response for any concerns or acute change in condition/VS     Additional Comments: Reviewed above RADAR VS with bedside RN.  Vital signs within patient's current trends.  No acute change in  condition.  No interventions by rapid response team indicated at this time.  Staff to page rapid response for any concerns or acute change in condition or VS.

## 2025-02-20 NOTE — PROGRESS NOTES
Physical Therapy    Physical Therapy Treatment    Patient Name: Radha Montalvo  MRN: [id number]  Department: [place] [address]  Room: [address] [place] [address]  Today's Date: 2/20/2025  Time Calculation  Start Time: 1211  Stop Time: 1253  Time Calculation (min): 42 min         Assessment/Plan   PT Assessment  PT Assessment Results: Decreased endurance, Decreased strength, Impaired balance, Decreased mobility, Pain  Barriers to Discharge Home: No anticipated barriers  Evaluation/Treatment Tolerance: Patient tolerated treatment well  End of Session Communication: Bedside nurse  Assessment Comment: Pt. tolerated session well however required increased time resting in between walks. Pt. appears SOB and sp02 initially dropping to 91 but within less than 30 seconds improves to 95-98%. Pt. has been recommended for low intensity and will benefit once discharged.  End of Session Patient Position: Up in chair, Alarm on  PT Plan  Inpatient/Swing Bed or Outpatient: Inpatient  PT Plan  Treatment/Interventions: Transfer training, Gait training, Therapeutic activity  PT Plan: Ongoing PT  PT Frequency: 5 times per week  PT Discharge Recommendations: Low intensity level of continued care  Equipment Recommended upon Discharge: Wheeled walker  PT Recommended Transfer Status: Assist x1, Assistive device  PT - OK to Discharge: Yes      General Visit Information:   PT  Visit  PT Received On: 02/20/25  General  Reason for Referral: 73 y/o female now s/p CABG x 2, LIMA - LAD, SVG-OM, MAZE PROCEDURE, PATSY CLIP, ablation 2/14  Past Medical History Relevant to Rehab: breast cancer, atrial fibrillation, ascending aorta dilation, stable angina, hypertension, hyperlipidemia  PT Missed Visit: Yes  Missed Visit Reason:  (Attempted follow up as pt. back from x-ray however pt. reports dizziness and therefore just returned to bed from sitting up in chair attempting to recover. RN aware.)  Family/Caregiver Present: Yes  Caregiver Feedback:  and friend in room - very  supportive.  Prior to Session Communication: Bedside nurse  Patient Position Received: Up in chair, Alarm off, not on at start of session  General Comment: Pt. seated in bed side chair upon arrival. Pt. reports no current dizziness and motivated to participate. Cleared with RN    Subjective   Precautions:  Precautions  Hearing/Visual Limitations: WFL  Medical Precautions: Cardiac precautions, Fall precautions, Oxygen therapy device and L/min  Post-Surgical Precautions: Move in the Tube     Date/Time Vitals Session Patient Position Pulse Resp SpO2 BP MAP (mmHg)    02/20/25 1211 --  --  89  --  95 %  100/58  --     02/20/25 1302 --  --  92  18  96 %  100/59  73                 Objective   Pain:  Pain Assessment  Pain Assessment: 0-10  0-10 (Numeric) Pain Score: 2  Cognition:  Cognition  Overall Cognitive Status: Within Functional Limits  Orientation Level: Oriented X4  Coordination:  Movements are Fluid and Coordinated: Yes  Postural Control:  Postural Control  Postural Control: Within Functional Limits  Static Sitting Balance  Static Sitting-Balance Support: Feet supported  Static Sitting-Level of Assistance:  (supervision)  Static Standing Balance  Static Standing-Balance Support: Bilateral upper extremity supported  Static Standing-Level of Assistance: Contact guard  Extremity/Trunk Assessments:                      Activity Tolerance:  Activity Tolerance  Endurance: Decreased tolerance for upright activites (although improving.)  Treatments:  Therapeutic Activity  Therapeutic Activity Performed: Yes  Therapeutic Activity 1: Increased time taking vitals and increased time taking rest breaks.  Therapeutic Activity 2: Donned gown mod assist.    Ambulation/Gait Training  Ambulation/Gait Training Performed: Yes  Ambulation/Gait Training 1  Surface 1:  (Pt. amb. 60' x1 80'x2 and 50' x 1 using a wh. walker and cga. Pt. required increased time resting in between walks due to fatigue /SOB (sp02 WNL) Pt. has decreased heel  strike, cues needed for positioning inside the walker and reminders to breathe.)  Quality of Gait 1:  (Pt. tends to be slightly panicky when getting close to sitting however pt. keeps walker close and reaches back for bench with cues.)  Transfers  Transfer: Yes  Transfer 1  Transfer From 1: Sit to, Stand to  Transfer to 1: Sit, Stand  Transfer Level of Assistance 1: Moderate assistance  Trials/Comments 1: Increased assist assist to boost.  Transfers 2  Transfer From 2: Stand to  Transfer to 2: Sit  Transfer Level of Assistance 2: Minimum assistance (reminders to reach back -min assist to lower slowly.)    Outcome Measures:  [facility] Basic Mobility  Turning from your back to your side while in a flat bed without using bedrails: A little  Moving from lying on your back to sitting on the side of a flat bed without using bedrails: A little  Moving to and from bed to chair (including a wheelchair): A little  Standing up from a chair using your arms (e.g. wheelchair or bedside chair): A lot  To walk in hospital room: A little  Climbing 3-5 steps with railing: A lot  Basic Mobility - Total Score: 16    Education Documentation  Precautions, taught by Jina Cisneros PTA at 2/20/2025  1:08 PM.  Learner: Patient  Readiness: Acceptance  Method: Explanation, Demonstration  Response: Needs Reinforcement  Comment: Cues for positioning inside the walker and reminders to reach back for chair arms.    Mobility Training, taught by Jina Cisneros PTA at 2/20/2025  1:08 PM.  Learner: Patient  Readiness: Acceptance  Method: Explanation, Demonstration  Response: Needs Reinforcement  Comment: Cues for positioning inside the walker and reminders to reach back for chair arms.    Education Comments  No comments found.        OP EDUCATION:       Encounter Problems       Encounter Problems (Active)       Balance       Pt will demonstrate improved sitting/standing static/dynamic balance activities without LOB via score of 24/28 on the Tinetti for  increase in safety prior to DC.  (Progressing)       Start:  02/15/25    Expected End:  03/01/25               Mobility       Pt will ambulate >100ft with appropriate form, SBA or less, LRAD, and no LOB for safe DC.  (Progressing)       Start:  02/15/25    Expected End:  03/01/25            Pt will tolerate >15 minutes of upright standing activity without seated rest breaks with no changes in vital signs for improved functional mobility.  (Progressing)       Start:  02/15/25    Expected End:  03/01/25            Pt will ambulate up/down >3stairs with hand rail with CGA or less to safely access their home upon DC.   (Progressing)       Start:  02/15/25    Expected End:  03/01/25               PT Transfers       Pt will perform bed mobility and sit<>stand transfers with SBA or less and use of LRAD to safely DC.  (Progressing)       Start:  02/15/25    Expected End:  03/01/25

## 2025-02-20 NOTE — PROGRESS NOTES
Physical Therapy                 Therapy Communication Note    Patient Name: Radha Montalvo  MRN: [id number]  Department:   Room: [facility]  Today's Date: 2/20/2025     Discipline: Physical Therapy    PT Missed Visit: Yes     Missed Visit Reason: Missed Visit Reason:  (Attempted follow up however pt. off floor at x-ray at this time.)    Missed Time: Attempt    Comment:

## 2025-02-20 NOTE — CARE PLAN
The patient's goals for the shift include      The clinical goals for the shift include O2 saturations will remain above 92% while on 2 L NC throughout the 12 hour shift.      Problem: Discharge Planning  Goal: Discharge to home or other facility with appropriate resources  Outcome: Progressing  Flowsheets (Taken 2/19/2025 1936)  Discharge to home or other facility with appropriate resources:   Identify barriers to discharge with patient and caregiver   Arrange for needed discharge resources and transportation as appropriate     Problem: Chronic Conditions and Co-morbidities  Goal: Patient's chronic conditions and co-morbidity symptoms are monitored and maintained or improved  Outcome: Progressing  Flowsheets (Taken 2/19/2025 1936)  Care Plan - Patient's Chronic Conditions and Co-Morbidity Symptoms are Monitored and Maintained or Improved:   Monitor and assess patient's chronic conditions and comorbid symptoms for stability, deterioration, or improvement   Collaborate with multidisciplinary team to address chronic and comorbid conditions and prevent exacerbation or deterioration     Problem: Nutrition  Goal: Nutrient intake appropriate for maintaining nutritional needs  Outcome: Progressing

## 2025-02-21 PROBLEM — Z95.1 S/P CABG X 2: Status: ACTIVE | Noted: 2025-02-21

## 2025-02-21 LAB
ALBUMIN SERPL BCP-MCNC: 3.5 G/DL (ref 3.4–5)
ANION GAP SERPL CALC-SCNC: 14 MMOL/L (ref 10–20)
BUN SERPL-MCNC: 34 MG/DL (ref 6–23)
CALCIUM SERPL-MCNC: 9 MG/DL (ref 8.6–10.6)
CHLORIDE SERPL-SCNC: 103 MMOL/L (ref 98–107)
CO2 SERPL-SCNC: 25 MMOL/L (ref 21–32)
CREAT SERPL-MCNC: 0.95 MG/DL (ref 0.5–1.05)
EGFRCR SERPLBLD CKD-EPI 2021: 63 ML/MIN/1.73M*2
ERYTHROCYTE [DISTWIDTH] IN BLOOD BY AUTOMATED COUNT: 13.7 % (ref 11.5–14.5)
GLUCOSE SERPL-MCNC: 100 MG/DL (ref 74–99)
HCT VFR BLD AUTO: 37.8 % (ref 36–46)
HGB BLD-MCNC: 11.9 G/DL (ref 12–16)
MAGNESIUM SERPL-MCNC: 2.09 MG/DL (ref 1.6–2.4)
MCH RBC QN AUTO: 34.3 PG (ref 26–34)
MCHC RBC AUTO-ENTMCNC: 31.5 G/DL (ref 32–36)
MCV RBC AUTO: 109 FL (ref 80–100)
NRBC BLD-RTO: 0 /100 WBCS (ref 0–0)
PHOSPHATE SERPL-MCNC: 3.7 MG/DL (ref 2.5–4.9)
PLATELET # BLD AUTO: 345 X10*3/UL (ref 150–450)
POTASSIUM SERPL-SCNC: 4 MMOL/L (ref 3.5–5.3)
RBC # BLD AUTO: 3.47 X10*6/UL (ref 4–5.2)
SODIUM SERPL-SCNC: 138 MMOL/L (ref 136–145)
WBC # BLD AUTO: 10.7 X10*3/UL (ref 4.4–11.3)

## 2025-02-21 PROCEDURE — 36415 COLL VENOUS BLD VENIPUNCTURE: CPT | Performed by: NURSE PRACTITIONER

## 2025-02-21 PROCEDURE — 2500000004 HC RX 250 GENERAL PHARMACY W/ HCPCS (ALT 636 FOR OP/ED): Performed by: STUDENT IN AN ORGANIZED HEALTH CARE EDUCATION/TRAINING PROGRAM

## 2025-02-21 PROCEDURE — 2500000005 HC RX 250 GENERAL PHARMACY W/O HCPCS

## 2025-02-21 PROCEDURE — 2500000004 HC RX 250 GENERAL PHARMACY W/ HCPCS (ALT 636 FOR OP/ED): Performed by: NURSE PRACTITIONER

## 2025-02-21 PROCEDURE — RXMED WILLOW AMBULATORY MEDICATION CHARGE

## 2025-02-21 PROCEDURE — 2500000004 HC RX 250 GENERAL PHARMACY W/ HCPCS (ALT 636 FOR OP/ED)

## 2025-02-21 PROCEDURE — 2500000001 HC RX 250 WO HCPCS SELF ADMINISTERED DRUGS (ALT 637 FOR MEDICARE OP)

## 2025-02-21 PROCEDURE — 99232 SBSQ HOSP IP/OBS MODERATE 35: CPT | Performed by: NURSE PRACTITIONER

## 2025-02-21 PROCEDURE — 2500000002 HC RX 250 W HCPCS SELF ADMINISTERED DRUGS (ALT 637 FOR MEDICARE OP, ALT 636 FOR OP/ED): Performed by: NURSE PRACTITIONER

## 2025-02-21 PROCEDURE — 83735 ASSAY OF MAGNESIUM: CPT | Performed by: NURSE PRACTITIONER

## 2025-02-21 PROCEDURE — 2500000002 HC RX 250 W HCPCS SELF ADMINISTERED DRUGS (ALT 637 FOR MEDICARE OP, ALT 636 FOR OP/ED)

## 2025-02-21 PROCEDURE — 85027 COMPLETE CBC AUTOMATED: CPT | Performed by: NURSE PRACTITIONER

## 2025-02-21 PROCEDURE — 2500000001 HC RX 250 WO HCPCS SELF ADMINISTERED DRUGS (ALT 637 FOR MEDICARE OP): Performed by: NURSE PRACTITIONER

## 2025-02-21 PROCEDURE — 1200000002 HC GENERAL ROOM WITH TELEMETRY DAILY

## 2025-02-21 PROCEDURE — 2500000001 HC RX 250 WO HCPCS SELF ADMINISTERED DRUGS (ALT 637 FOR MEDICARE OP): Performed by: STUDENT IN AN ORGANIZED HEALTH CARE EDUCATION/TRAINING PROGRAM

## 2025-02-21 PROCEDURE — 80069 RENAL FUNCTION PANEL: CPT | Performed by: NURSE PRACTITIONER

## 2025-02-21 PROCEDURE — 2500000005 HC RX 250 GENERAL PHARMACY W/O HCPCS: Performed by: NURSE PRACTITIONER

## 2025-02-21 RX ORDER — FUROSEMIDE 20 MG/1
20 TABLET ORAL DAILY
Qty: 14 TABLET | Refills: 0 | Status: SHIPPED | OUTPATIENT
Start: 2025-02-22 | End: 2025-03-08

## 2025-02-21 RX ORDER — FUROSEMIDE 10 MG/ML
60 INJECTION INTRAMUSCULAR; INTRAVENOUS ONCE
Status: COMPLETED | OUTPATIENT
Start: 2025-02-21 | End: 2025-02-21

## 2025-02-21 RX ORDER — FUROSEMIDE 20 MG/1
20 TABLET ORAL DAILY
Status: DISCONTINUED | OUTPATIENT
Start: 2025-02-22 | End: 2025-02-22 | Stop reason: HOSPADM

## 2025-02-21 RX ORDER — POTASSIUM CHLORIDE 20 MEQ/1
10 TABLET, EXTENDED RELEASE ORAL ONCE
Status: COMPLETED | OUTPATIENT
Start: 2025-02-21 | End: 2025-02-21

## 2025-02-21 RX ORDER — POTASSIUM CHLORIDE 750 MG/1
10 TABLET, FILM COATED, EXTENDED RELEASE ORAL DAILY
Qty: 14 TABLET | Refills: 0 | Status: SHIPPED | OUTPATIENT
Start: 2025-02-21 | End: 2025-03-08

## 2025-02-21 RX ADMIN — APIXABAN 5 MG: 5 TABLET, FILM COATED ORAL at 21:00

## 2025-02-21 RX ADMIN — Medication 2 L/MIN: at 07:50

## 2025-02-21 RX ADMIN — ASPIRIN 81 MG CHEWABLE TABLET 81 MG: 81 TABLET CHEWABLE at 08:49

## 2025-02-21 RX ADMIN — FLUTICASONE PROPIONATE 2 SPRAY: 50 SPRAY, METERED NASAL at 08:52

## 2025-02-21 RX ADMIN — AMIODARONE HYDROCHLORIDE 400 MG: 200 TABLET ORAL at 08:49

## 2025-02-21 RX ADMIN — ACYCLOVIR 800 MG: 800 TABLET ORAL at 08:49

## 2025-02-21 RX ADMIN — GUAIFENESIN 600 MG: 600 TABLET ORAL at 21:00

## 2025-02-21 RX ADMIN — POLYETHYLENE GLYCOL 3350 17 G: 17 POWDER, FOR SOLUTION ORAL at 08:47

## 2025-02-21 RX ADMIN — Medication 6 MG: at 21:00

## 2025-02-21 RX ADMIN — FUROSEMIDE 60 MG: 10 INJECTION, SOLUTION INTRAVENOUS at 11:45

## 2025-02-21 RX ADMIN — APIXABAN 5 MG: 5 TABLET, FILM COATED ORAL at 08:49

## 2025-02-21 RX ADMIN — GUAIFENESIN 600 MG: 600 TABLET ORAL at 08:49

## 2025-02-21 RX ADMIN — BISOPROLOL FUMARATE 20 MG: 10 TABLET, FILM COATED ORAL at 08:49

## 2025-02-21 RX ADMIN — ATORVASTATIN CALCIUM 80 MG: 80 TABLET, FILM COATED ORAL at 21:00

## 2025-02-21 RX ADMIN — DILTIAZEM HYDROCHLORIDE 120 MG: 120 CAPSULE, COATED, EXTENDED RELEASE ORAL at 08:49

## 2025-02-21 RX ADMIN — AMIODARONE HYDROCHLORIDE 400 MG: 200 TABLET ORAL at 21:00

## 2025-02-21 RX ADMIN — POTASSIUM CHLORIDE 10 MEQ: 1500 TABLET, EXTENDED RELEASE ORAL at 08:51

## 2025-02-21 RX ADMIN — POLYSACCHARIDE-IRON COMPLEX 150 MG: 150 CAPSULE ORAL at 08:49

## 2025-02-21 RX ADMIN — Medication 1 TABLET: at 08:49

## 2025-02-21 SDOH — ECONOMIC STABILITY: TRANSPORTATION INSECURITY

## 2025-02-21 SDOH — ECONOMIC STABILITY: FOOD INSECURITY: WITHIN THE PAST 12 MONTHS, THE FOOD YOU BOUGHT JUST DIDN'T LAST AND YOU DIDN'T HAVE MONEY TO GET MORE.: PATIENT DECLINED

## 2025-02-21 SDOH — ECONOMIC STABILITY: HOUSING INSECURITY: IN THE LAST 12 MONTHS, HOW MANY PLACES HAVE YOU LIVED?: 1

## 2025-02-21 SDOH — ECONOMIC STABILITY: HOUSING INSECURITY
IN THE LAST 12 MONTHS, WAS THERE A TIME WHEN YOU DID NOT HAVE A STEADY PLACE TO SLEEP OR SLEPT IN A SHELTER (INCLUDING NOW)?: NO

## 2025-02-21 SDOH — ECONOMIC STABILITY: TRANSPORTATION INSECURITY: IN THE PAST 12 MONTHS, HAS LACK OF TRANSPORTATION KEPT YOU FROM MEDICAL APPOINTMENTS OR FROM GETTING MEDICATIONS?: NO

## 2025-02-21 SDOH — ECONOMIC STABILITY: HOUSING INSECURITY: IN THE PAST 12 MONTHS HAS THE ELECTRIC, GAS, OIL, OR WATER COMPANY THREATENED TO SHUT OFF SERVICES IN YOUR HOME?: NO

## 2025-02-21 SDOH — ECONOMIC STABILITY: FOOD INSECURITY
WITHIN THE PAST 12 MONTHS, YOU WORRIED THAT YOUR FOOD WOULD RUN OUT BEFORE YOU GOT THE MONEY TO BUY MORE.: PATIENT DECLINED

## 2025-02-21 SDOH — ECONOMIC STABILITY: INCOME INSECURITY: IN THE LAST 12 MONTHS, WAS THERE A TIME WHEN YOU WERE NOT ABLE TO PAY THE MORTGAGE OR RENT ON TIME?: PATIENT DECLINED

## 2025-02-21 SDOH — ECONOMIC STABILITY: HOUSING INSECURITY

## 2025-02-21 SDOH — ECONOMIC STABILITY: HOUSING INSECURITY: IN THE LAST 12 MONTHS, WAS THERE A TIME WHEN YOU WERE NOT ABLE TO PAY THE MORTGAGE OR RENT ON TIME?: PATIENT DECLINED

## 2025-02-21 SDOH — ECONOMIC STABILITY: GENERAL

## 2025-02-21 SDOH — ECONOMIC STABILITY: TRANSPORTATION INSECURITY
IN THE PAST 12 MONTHS, HAS LACK OF TRANSPORTATION KEPT YOU FROM MEETINGS, WORK, OR FROM GETTING THINGS NEEDED FOR DAILY LIVING?: NO

## 2025-02-21 SDOH — ECONOMIC STABILITY: FOOD INSECURITY: WITHIN THE PAST 12 MONTHS, YOU WORRIED THAT YOUR FOOD WOULD RUN OUT BEFORE YOU GOT MONEY TO BUY MORE.: PATIENT DECLINED

## 2025-02-21 SDOH — ECONOMIC STABILITY: FOOD INSECURITY

## 2025-02-21 SDOH — ECONOMIC STABILITY: TRANSPORTATION INSECURITY
IN THE PAST 12 MONTHS, HAS THE LACK OF TRANSPORTATION KEPT YOU FROM MEDICAL APPOINTMENTS OR FROM GETTING MEDICATIONS?: NO

## 2025-02-21 ASSESSMENT — SOCIAL DETERMINANTS OF HEALTH (SDOH): IN THE PAST 12 MONTHS, HAS THE ELECTRIC, GAS, OIL, OR WATER COMPANY THREATENED TO SHUT OFF SERVICE IN YOUR HOME?: NO

## 2025-02-21 ASSESSMENT — COGNITIVE AND FUNCTIONAL STATUS - GENERAL
HELP NEEDED FOR BATHING: A LITTLE
STANDING UP FROM CHAIR USING ARMS: A LITTLE
MOBILITY SCORE: 19
MOVING TO AND FROM BED TO CHAIR: A LITTLE
DAILY ACTIVITIY SCORE: 20
MOBILITY SCORE: 19
MOVING TO AND FROM BED TO CHAIR: A LITTLE
DAILY ACTIVITIY SCORE: 20
DRESSING REGULAR UPPER BODY CLOTHING: A LITTLE
TOILETING: A LITTLE
TURNING FROM BACK TO SIDE WHILE IN FLAT BAD: A LITTLE
TURNING FROM BACK TO SIDE WHILE IN FLAT BAD: A LITTLE
DRESSING REGULAR LOWER BODY CLOTHING: A LITTLE
CLIMB 3 TO 5 STEPS WITH RAILING: A LITTLE
STANDING UP FROM CHAIR USING ARMS: A LITTLE
DRESSING REGULAR LOWER BODY CLOTHING: A LITTLE
HELP NEEDED FOR BATHING: A LITTLE
CLIMB 3 TO 5 STEPS WITH RAILING: A LITTLE
WALKING IN HOSPITAL ROOM: A LITTLE
WALKING IN HOSPITAL ROOM: A LITTLE
TOILETING: A LITTLE
DRESSING REGULAR UPPER BODY CLOTHING: A LITTLE

## 2025-02-21 ASSESSMENT — PAIN SCALES - GENERAL
PAINLEVEL_OUTOF10: 3
PAINLEVEL_OUTOF10: 0 - NO PAIN
PAINLEVEL_OUTOF10: 0 - NO PAIN

## 2025-02-21 ASSESSMENT — PAIN - FUNCTIONAL ASSESSMENT
PAIN_FUNCTIONAL_ASSESSMENT: 0-10
PAIN_FUNCTIONAL_ASSESSMENT: 0-10

## 2025-02-21 ASSESSMENT — ACTIVITIES OF DAILY LIVING (ADL): LACK_OF_TRANSPORTATION: NO

## 2025-02-21 NOTE — PROGRESS NOTES
"CARDIAC SURGERY DAILY PROGRESS NOTE    Radha Montalvo is a 74 y.o. female, with a PMH of breast cancer, atrial fibrillation, ascending aorta dilation, stable angina, hypertension, hyperlipidemia presenting to the CTICU for elective CABG . Patient previously stated dyspnea and exertional chest pressure/discomfort, she presented to [facility]/14/[facility] for CABG x2 (LIMA to LAD, SVG to OM), MAZE, PATSY clip.     OPERATION/PROCEDURE: CABG x 2 Skeletonized Chambers to Lad , SVG to OM  Endoscopic vein harvest  GP MAZE with encompass clamp and cryoablation  40 mm Atriaclip with Dwayne Jay MD    CTICU Course: Fluid volume overload and post-op respiratory insufficiency.   Transferred to T3 on 2/18    Interval History:   No acute overnight events.     SUBJECTIVE:  Continues to have poor sleep related to interruptions. Pain well controlled.     Objective   /69 (BP Location: Right arm, Patient Position: Lying)   Pulse 97   Temp 36.3 °C (97.3 °F) (Temporal)   Resp 18   Ht 1.702 m (5' 7\")   Wt 71.1 kg (156 lb 12.8 oz)   LMP  (LMP Unknown)   SpO2 95%   BMI 24.56 kg/m²   0-10 (Numeric) Pain Score: 3   3 Day Weight Change: -0.892 kg (-1 lb 15.5 oz) per day    Intake and Output    Intake/Output Summary (Last 24 hours) at 2/21/2025 1054  Last data filed at 2/21/2025 0922  Gross per 24 hour   Intake 480 ml   Output 850 ml   Net -370 ml       Physical Exam  Physical Exam  Vitals and nursing note reviewed.   Constitutional:       General: She is not in acute distress.     Appearance: Normal appearance.   HENT:      Head: Normocephalic and atraumatic.      Mouth/Throat:      Mouth: Mucous membranes are moist.   Eyes:      Conjunctiva/sclera: Conjunctivae normal.   Cardiovascular:      Rate and Rhythm: Normal rate. Rhythm irregular.      Pulses: Normal pulses.      Heart sounds: Normal heart sounds.      Comments: A and V wires, capped  Afib on monitor, 80s-90s  Pulmonary:      Effort: Pulmonary " effort is normal.      Breath sounds: Normal breath sounds.      Comments: 2L NC  CPAP with sleep (home machine)  Clear, thin expectorant  Abdominal:      General: Bowel sounds are normal. There is no distension.      Palpations: Abdomen is soft.      Comments: BM 2/20   Musculoskeletal:      Cervical back: Neck supple.      Comments: WHELAN  Trace BLE edema   Skin:     General: Skin is warm and dry.      Capillary Refill: Capillary refill takes less than 2 seconds.      Comments: midsternal chest incision C/D/I, well approximated, no s/s infection, R SVG incision C/D/I, well approximated, no s/s infection    Neurological:      General: No focal deficit present.      Mental Status: She is alert and oriented to person, place, and time.   Psychiatric:         Mood and Affect: Mood normal.         Behavior: Behavior normal.       Medications  Scheduled medications  acyclovir, 800 mg, oral, Daily  amiodarone, 400 mg, oral, BID   Followed by  [START ON 2/27/2025] amiodarone, 200 mg, oral, Daily  apixaban, 5 mg, oral, BID  aspirin, 81 mg, oral, Daily  atorvastatin, 80 mg, oral, Nightly  bisoprolol, 20 mg, oral, Daily  dilTIAZem CD, 120 mg, oral, Daily  fluticasone, 2 spray, Each Nostril, Daily  furosemide, 60 mg, intravenous, Once  guaiFENesin, 600 mg, oral, BID  iron polysaccharides, 150 mg, oral, Daily  melatonin, 6 mg, oral, Nightly  multivitamin with minerals, 1 tablet, oral, Daily  oxygen, , inhalation, Continuous - 02/gases  polyethylene glycol, 17 g, oral, Daily    Continuous medications   PRN medications  PRN medications: acetaminophen, calcium carbonate, hydrALAZINE, ipratropium-albuteroL, naloxone, [DISCONTINUED] ondansetron **OR** ondansetron, oxyCODONE    Labs  Results for orders placed or performed during the hospital encounter of 02/14/25 (from the past 24 hours)   Magnesium   Result Value Ref Range    Magnesium 2.09 1.60 - 2.40 mg/dL   CBC   Result Value Ref Range    WBC 10.7 4.4 - 11.3 x10*3/uL    nRBC 0.0  0.0 - 0.0 /100 WBCs    RBC 3.47 (L) 4.00 - 5.20 x10*6/uL    Hemoglobin 11.9 (L) 12.0 - 16.0 g/dL    Hematocrit 37.8 36.0 - 46.0 %     (H) 80 - 100 fL    MCH 34.3 (H) 26.0 - 34.0 pg    MCHC 31.5 (L) 32.0 - 36.0 g/dL    RDW 13.7 11.5 - 14.5 %    Platelets 345 150 - 450 x10*3/uL   Renal Function Panel   Result Value Ref Range    Glucose 100 (H) 74 - 99 mg/dL    Sodium 138 136 - 145 mmol/L    Potassium 4.0 3.5 - 5.3 mmol/L    Chloride 103 98 - 107 mmol/L    Bicarbonate 25 21 - 32 mmol/L    Anion Gap 14 10 - 20 mmol/L    Urea Nitrogen 34 (H) 6 - 23 mg/dL    Creatinine 0.95 0.50 - 1.05 mg/dL    eGFR 63 >60 mL/min/1.73m*2    Calcium 9.0 8.6 - 10.6 mg/dL    Phosphorus 3.7 2.5 - 4.9 mg/dL    Albumin 3.5 3.4 - 5.0 g/dL     *Note: Due to a large number of results and/or encounters for the requested time period, some results have not been displayed. A complete set of results can be found in Results Review.           IMAGING/ DIAGNOSTIC TESTING:  I have personally reviewed the following test result(s):      XR chest 2 views 02/20/2025  Impression  1.  Interval improvement in findings of pulmonary edema with otherwise stable small to moderate left and small right pleural effusions.  2. Hazy right mid lung airspace opacity may represent alveolar edema versus infection.        =================  IMPRESSION & PLAN:  =================  POD # 7 s/p CABG x2, MAZE, LAAC on 2/14  - Increase activity/ ambulation; PT/OT  - Encourage IS, C/DB; respiratory therapy; wean O2 as kalpesh -> on 2L  - Cardiac rehab referral   - Continue cardiac meds: ASA, statin, BB, diltiazem  - Pain and anticonstipation meds  - 2v CXR completed 2/20  - Postop echo not indicated  - Cut epicardial wires prior to discharge   - Tele until discharge  - Optimize nutrition and electrolytes    Pulm - DARIO on CPAP  - continue home CPAP    Chronic Atrial Fibrillation  - Tele: afib 80-90  - Continue BB  - Continue diltiazem 90 mg daily  - Amiodarone 400 mg BID, will  transition to 200 mg daily after 10g load  - Adjust medications as tolerated  - Resumed home eliquis     Hypertension  - Continue diltiazem  - Continue home bisoprolol    Hyperlipidemia  - Continue high intensity statin  - Lipid monitoring per PCP/cardiology    Acute Blood Loss Anemia   Recent Labs     25  0638 25  0716 25  0628 25  0157 25  0024 25  0024 02/15/25  0014   HGB 11.9* 11.7* 12.5 12.7 12.6 13.6 13.8   HCT 37.8 36.7 37.7 36.5 37.6 40.7 40.7   - MV, iron not indicated  - Daily labs, transfuse as indicated    Thrombocytopenia - resolved  Recent Labs     25  0638 25  0716 25  0628 25  0157 25  0024 25  0024 02/15/25  0014    287 256 210 150 156 152   - Etiology likely postop/CPB related  - Continue to trend with daily CBCs    Hypokalemia, repleted  Hypervolemia, improving  Contraction Alkalosis, resolved  Volume/Electrolyte Status: Preop wt Weight: 72.5 kg (159 lb 13.3 oz)   Vitals:    25 0121   Weight: 71.1 kg (156 lb 12.8 oz)     - : 72.3 kg --> diuril 500 mg BID, 40 mEQ K x2  - : 71.4 kg --> lasix 60 mg IV x1, 40 mEq K  -  71.1 kg --> lasix 60 mg IV x1, 10 meq K  - Adjust diuresis as needed for postop cardiac surgery hypervolemia  - Replete electrolytes for hypokalemia/hypomagnesemia/hypophosphatemia as needed   - Daily weights and strict I&Os  - Daily RFP while admitted    Leukocytosis, resolved  Recent Labs     25  0638 25  0716 25  0628 25  0157 25  0024 25  0024 02/15/25  0014   WBC 10.7 9.0 9.2 11.3 11.5* 13.0* 11.4*     Temp (36hrs), Av.3 °C (97.4 °F), Min:35.8 °C (96.4 °F), Max:37 °C (98.6 °F)  - aggressive pulmonary hygiene  - monitor for s/s infection  - likely atelectasis/ postoperative in etiology  - daily CBC to follow    VTE Prophylaxis: SCDs/TEDs, ambulation, on Eliquis  Code Status: Full Code    Dispo  - PT/OT recs low intensity  - Would benefit from  homecare RN for cardiac surgery carepath and PT  - 2/21 ordered walker for dc  - Anticipate discharge in next couple of days, pending pulm/volume status  - Will continue to assess discharge needs    ODALYS Morocho-CNP  Cardiac Surgery [facility]  [facility]  Team Phone [phone]

## 2025-02-21 NOTE — PROGRESS NOTES
Patient was given FWWalker for home use. DME referral was sent in [facility]. Mikayla Chaney RN

## 2025-02-21 NOTE — DISCHARGE INSTRUCTIONS
CARDIAC SURGERY   Don't forget to “Keep Your Move in the Tube!!”     Please refer to the “Move in the Tube” handout.  -- Load bearing activities can be completed if you are “staying in the tube.” If you are attempting load bearing activities, let pain be your guide with when trying an activity “out of the tube”. If an activity hurts or is uncomfortable go back to doing it while you stay “in the tube”. There is no time limit to “stay in the tube”.     -- Non-load bearing activities, which are your activities of daily living, can be completed with your arms “out of the tube” as long as you remain pain free. Some activities of daily living examples are dressing, personal care, showering, washing hair, and toilet hygiene.     Don't forget to KEEP YOUR MOVE IN THE TUBE and think of a T. Gibran dinosaur!    ===========================================================================================    Additional Post-Operative Instructions:  - Remember to use your Incentive spirometer 10x/hr while awake. Remember to cough and deep breath.  - No NSAIDs (common over-the-counter NSAIDs are ibuprofen/Motrin/Advil, naproxen/Naprosyn/Aleve) for 3 months after cardiac surgery; if NSAIDs needed after 3 months, clear use with cardiologist before starting.       Your home medications may have changed after surgery. Carefully compare this list with your prescription bottles at home and set aside any medications you are told to not take so you do not confuse them. Do not dispose of any medications until your follow-up, since your doctors may restart some at your follow-up appointments. It is important to bring a complete, current list of your medications to any medical appointments or hospitalizations.    For any questions about your discharge, please call the cardiac surgery office at [phone]

## 2025-02-22 ENCOUNTER — DOCUMENTATION (OUTPATIENT)
Dept: HOME HEALTH SERVICES | Facility: HOME HEALTH | Age: 75
End: 2025-02-22
Payer: MEDICARE

## 2025-02-22 ENCOUNTER — PHARMACY VISIT (OUTPATIENT)
Dept: PHARMACY | Facility: CLINIC | Age: 75
End: 2025-02-22
Payer: COMMERCIAL

## 2025-02-22 VITALS
OXYGEN SATURATION: 94 % | HEART RATE: 99 BPM | BODY MASS INDEX: 24.34 KG/M2 | DIASTOLIC BLOOD PRESSURE: 66 MMHG | HEIGHT: 67 IN | RESPIRATION RATE: 16 BRPM | WEIGHT: 155.1 LBS | SYSTOLIC BLOOD PRESSURE: 104 MMHG | TEMPERATURE: 97 F

## 2025-02-22 LAB
ALBUMIN SERPL BCP-MCNC: 3.5 G/DL (ref 3.4–5)
ANION GAP SERPL CALC-SCNC: 10 MMOL/L (ref 10–20)
BUN SERPL-MCNC: 30 MG/DL (ref 6–23)
CALCIUM SERPL-MCNC: 9.1 MG/DL (ref 8.6–10.6)
CHLORIDE SERPL-SCNC: 104 MMOL/L (ref 98–107)
CO2 SERPL-SCNC: 30 MMOL/L (ref 21–32)
CREAT SERPL-MCNC: 0.88 MG/DL (ref 0.5–1.05)
EGFRCR SERPLBLD CKD-EPI 2021: 69 ML/MIN/1.73M*2
ERYTHROCYTE [DISTWIDTH] IN BLOOD BY AUTOMATED COUNT: 13.5 % (ref 11.5–14.5)
GLUCOSE SERPL-MCNC: 106 MG/DL (ref 74–99)
HCT VFR BLD AUTO: 37.2 % (ref 36–46)
HGB BLD-MCNC: 12.1 G/DL (ref 12–16)
MAGNESIUM SERPL-MCNC: 2.15 MG/DL (ref 1.6–2.4)
MCH RBC QN AUTO: 34 PG (ref 26–34)
MCHC RBC AUTO-ENTMCNC: 32.5 G/DL (ref 32–36)
MCV RBC AUTO: 105 FL (ref 80–100)
NRBC BLD-RTO: 0 /100 WBCS (ref 0–0)
PHOSPHATE SERPL-MCNC: 3.8 MG/DL (ref 2.5–4.9)
PLATELET # BLD AUTO: 385 X10*3/UL (ref 150–450)
POTASSIUM SERPL-SCNC: 4.1 MMOL/L (ref 3.5–5.3)
RBC # BLD AUTO: 3.56 X10*6/UL (ref 4–5.2)
SODIUM SERPL-SCNC: 140 MMOL/L (ref 136–145)
WBC # BLD AUTO: 10.8 X10*3/UL (ref 4.4–11.3)

## 2025-02-22 PROCEDURE — 83735 ASSAY OF MAGNESIUM: CPT | Performed by: NURSE PRACTITIONER

## 2025-02-22 PROCEDURE — 2500000002 HC RX 250 W HCPCS SELF ADMINISTERED DRUGS (ALT 637 FOR MEDICARE OP, ALT 636 FOR OP/ED)

## 2025-02-22 PROCEDURE — 36415 COLL VENOUS BLD VENIPUNCTURE: CPT | Performed by: NURSE PRACTITIONER

## 2025-02-22 PROCEDURE — 2500000004 HC RX 250 GENERAL PHARMACY W/ HCPCS (ALT 636 FOR OP/ED): Performed by: NURSE PRACTITIONER

## 2025-02-22 PROCEDURE — 85027 COMPLETE CBC AUTOMATED: CPT | Performed by: NURSE PRACTITIONER

## 2025-02-22 PROCEDURE — 2500000001 HC RX 250 WO HCPCS SELF ADMINISTERED DRUGS (ALT 637 FOR MEDICARE OP): Performed by: STUDENT IN AN ORGANIZED HEALTH CARE EDUCATION/TRAINING PROGRAM

## 2025-02-22 PROCEDURE — 2500000001 HC RX 250 WO HCPCS SELF ADMINISTERED DRUGS (ALT 637 FOR MEDICARE OP): Performed by: NURSE PRACTITIONER

## 2025-02-22 PROCEDURE — 84132 ASSAY OF SERUM POTASSIUM: CPT | Performed by: NURSE PRACTITIONER

## 2025-02-22 PROCEDURE — 2500000002 HC RX 250 W HCPCS SELF ADMINISTERED DRUGS (ALT 637 FOR MEDICARE OP, ALT 636 FOR OP/ED): Performed by: NURSE PRACTITIONER

## 2025-02-22 PROCEDURE — 2500000001 HC RX 250 WO HCPCS SELF ADMINISTERED DRUGS (ALT 637 FOR MEDICARE OP)

## 2025-02-22 RX ORDER — POTASSIUM CHLORIDE 20 MEQ/1
10 TABLET, EXTENDED RELEASE ORAL ONCE
Status: COMPLETED | OUTPATIENT
Start: 2025-02-22 | End: 2025-02-22

## 2025-02-22 RX ORDER — LANOLIN ALCOHOL/MO/W.PET/CERES
400 CREAM (GRAM) TOPICAL ONCE
Status: COMPLETED | OUTPATIENT
Start: 2025-02-22 | End: 2025-02-22

## 2025-02-22 RX ADMIN — APIXABAN 5 MG: 5 TABLET, FILM COATED ORAL at 08:38

## 2025-02-22 RX ADMIN — FLUTICASONE PROPIONATE 2 SPRAY: 50 SPRAY, METERED NASAL at 08:40

## 2025-02-22 RX ADMIN — POTASSIUM CHLORIDE 10 MEQ: 1500 TABLET, EXTENDED RELEASE ORAL at 08:37

## 2025-02-22 RX ADMIN — ACYCLOVIR 800 MG: 800 TABLET ORAL at 08:37

## 2025-02-22 RX ADMIN — DILTIAZEM HYDROCHLORIDE 120 MG: 120 CAPSULE, COATED, EXTENDED RELEASE ORAL at 08:37

## 2025-02-22 RX ADMIN — ASPIRIN 81 MG CHEWABLE TABLET 81 MG: 81 TABLET CHEWABLE at 08:37

## 2025-02-22 RX ADMIN — MAGNESIUM OXIDE TAB 400 MG (241.3 MG ELEMENTAL MG) 400 MG: 400 (241.3 MG) TAB at 08:37

## 2025-02-22 RX ADMIN — FUROSEMIDE 20 MG: 20 TABLET ORAL at 08:38

## 2025-02-22 RX ADMIN — BISOPROLOL FUMARATE 20 MG: 10 TABLET, FILM COATED ORAL at 08:38

## 2025-02-22 RX ADMIN — AMIODARONE HYDROCHLORIDE 400 MG: 200 TABLET ORAL at 08:38

## 2025-02-22 RX ADMIN — ACETAMINOPHEN 650 MG: 325 TABLET ORAL at 08:37

## 2025-02-22 RX ADMIN — Medication 1 TABLET: at 08:37

## 2025-02-22 ASSESSMENT — COGNITIVE AND FUNCTIONAL STATUS - GENERAL
CLIMB 3 TO 5 STEPS WITH RAILING: A LITTLE
MOVING TO AND FROM BED TO CHAIR: A LITTLE
TOILETING: A LITTLE
WALKING IN HOSPITAL ROOM: A LITTLE
DRESSING REGULAR LOWER BODY CLOTHING: A LITTLE
TURNING FROM BACK TO SIDE WHILE IN FLAT BAD: A LITTLE
MOBILITY SCORE: 19
DRESSING REGULAR UPPER BODY CLOTHING: A LITTLE
STANDING UP FROM CHAIR USING ARMS: A LITTLE
DAILY ACTIVITIY SCORE: 20
HELP NEEDED FOR BATHING: A LITTLE

## 2025-02-22 ASSESSMENT — PAIN - FUNCTIONAL ASSESSMENT: PAIN_FUNCTIONAL_ASSESSMENT: 0-10

## 2025-02-22 ASSESSMENT — PAIN SCALES - GENERAL: PAINLEVEL_OUTOF10: 2

## 2025-02-22 NOTE — DISCHARGE SUMMARY
Discharge Diagnosis  Coronary artery disease of native artery of native heart with stable angina pectoris  Afib  S/p CABGx2, MAZE, AtriClip    Issues Requiring Follow-Up  Pt to f/up with PCP, cardiology, cardiac surgery    Test Results Pending At Discharge  Pending Labs       No current pending labs.            Hospital Course  Radha Montalvo is a 74 y.o. female, with a PMH of breast cancer, atrial fibrillation, ascending aorta dilation, stable angina, hypertension, hyperlipidemia presenting to the CTICU for elective CABG . Patient previously stated dyspnea and exertional chest pressure/discomfort, she presented to [facility] on 2/14/2025 for CABG x2 (LIMA to LAD, SVG to OM), MAZE, PATSY clip.     2/14/2025 OPERATION: by Dwayne Jay  1. OPERATION/PROCEDURE: CABG x 2 Skeletonized Chambers to Lad , SVG to OM  2. Endoscopic vein harvest  3. GP MAZE with encompass clamp and cryoablation  4. 40 mm Atriaclip with Dwayne Jay MD    Echo Pre/Post:   -Pre: Afib, LV normal, trachAI, mild MR, Asc Aor 3.9, Rvnormal   - Post: Lv noraml, trace AI, mild MR, RV normal  Chest Tubes/Drains: left pleural, mediastinal    Temporary wires location/setting: AAI @ 80      CTICU Course: Fluid volume overload and post-op respiratory insufficiency.     Transferred to T3 on 2/18    Floor Course:  - Patient was diuresed for fluid volume overload post cardiac surgery; Preop weight: 72.5kg, discharge wt: 70.4kg  - will continue lasix at discharge for pleural effusions  - On ASA, statin, BB, diltiazem by discharge  - resume home apixaban for afib  - Epicardial wires CUT on 2/22  - Telemetry at discharge: controlled afib  - 2v CXR done 2/20  - Cardiac rehab referral was placed  - PT recs home and low intensity therapy; walker ordered and delivered 2/21  - Anticipate discharge to home with homecare    On day of discharge, vital signs were stable and no acute distress was noted. All questions were answered. After VS and labs  were reviewed it was determined the patient was stable for discharge.     Discharged with home care RN and PT on Saturday, February 22, 2025; POD#8        ==============================================  Hospital day of discharge management- spent >30 minutes coordinating the discharge and counseling/educating patient and family regarding discharge instructions.   ===============================================  HISTORY:  Medical History  · Abnormal findings on diagnostic imaging of heart and coronary circulation 10/22/2023  · Aneurysm (CMS-HCC) 10/23/2024  · Arthritis of left acromioclavicular joint 02/15/2024  · Ascending aorta dilatation (CMS-HCC) 11/22/2023  · Atrial fibrillation (Multi) 10/23/2023  · Biceps tendinitis of left shoulder 02/15/2024  · Breast cancer (Multi) 2013  · Cancer (Multi) 2013  · Cataract      s/p extraction  · Congenital dilatation of aorta ([facility]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  · Edema of both lower extremities 01/13/2024  · Essential hypertension 10/06/2023  · Hx antineoplastic chemo    · Hyperlipidemia 10/06/2023  · Hypertension 10/06/2023  · Labral tear of long head of biceps tendon, right, initial encounter 02/15/2024  · Parathyroid disease (Multi)      s/p paratcyoiectomy)  · Peripheral neuropathy      feet, bilatera  · Personal history of irradiation    · Personal history of other diseases of the musculoskeletal system and connective tissue 10/06/2023    History of arthritis  · Primary osteoarthritis of both shoulders 02/15/2024  · Shortness of breath 10/23/2023  · Sleep apnea      CPAP       Surgical History  · BI MAMMO GUIDED BREAST LEFT LOCALIZATION Left 02/25/2013    BI MAMMO GUIDED LOCALIZATION BREAST LEFT [facility]  · BREAST BIOPSY      · BREAST LUMPECTOMY Left 2013  · CARDIAC CATHETERIZATION N/A 01/27/2025    Procedure: Left Heart Cath with Coronary Angiography and LV;  Surgeon: Paulo Dougherty MD;  Location: [facility] Cardiac Cath Lab;  Service: Cardiovascular;  Laterality: N/A;  no prior  auth needed  · CATARACT EXTRACTION Bilateral    · HYSTERECTOMY     · MAMMOGRAM DIAGNOSTIC BI Bilateral 2024  · OTHER SURGICAL HISTORY   2022    Lumpectomy  · US COMPLETE BREAST Left 2024     Prescriptions Prior to Admission  · acyclovir (Zovirax) 800 mg tablet TAKE 1 TABLET BY MOUTH EVERY DAY 90 tablet 1 2025  · atorvastatin (Lipitor) 10 mg tablet TAKE 1 TABLET BY MOUTH EVERY DAY 90 tablet 3 2025 at  4:00 AM  · bisoprolol (Zebeta) 10 mg tablet Take 1 tablet (10 mg) by mouth once daily. 90 tablet 3 2025 at  4:00 AM  · cholecalciferol (VITAMIN D-3) 10 mcg (400 unit) tablet Take 1 capsule by mouth once daily.     Past Week  · dilTIAZem ER (Tiazac) 240 mg 24 hr capsule Take 1 capsule (240 mg) by mouth once daily. 90 capsule 3 2025 at  4:00 AM  · ferrous sulfate/ascorbate sod (FERROUS SULFATE-VITAMIN C ORAL) Take 1 tablet by mouth once daily.     Past Week  · omega 3-dha-epa-fish oil (Fish OiL) 1,200 (144-216) mg capsule 1.5 capsules Orally     Past Week  · vit B complex no.12/niacin,B3, (VITAMIN B COMPLEX NO.12-NIACIN ORAL) Take by mouth.       Vitamin B 12 TABS     Past Week  · [] amoxicillin-pot clavulanate (Augmentin) 875-125 mg tablet Take 1 tablet (875 mg) by mouth 2 times a day for 7 days. (Patient not taking: Reported on 2025) 14 tablet 0 Not Taking  · apixaban (Eliquis) 5 mg tablet Take 1 tablet (5 mg) by mouth 2 times a day. 60 tablet 11 2/10/2025 Morning  · [] chlorhexidine (Peridex) 0.12 % solution Use 15 mL in the mouth or throat if needed for wound care for up to 2 days. Use as mouth wash for night before and morning of surgery 120 mL 0       Patient has no known allergies.    Social History  Tobacco Use  · Smoking status: Never      Passive exposure: Never  · Smokeless tobacco: Never  Vaping Use  · Vaping status: Never Used  Substance Use Topics  · Alcohol use: Yes      Alcohol/week: 7.0 standard drinks of alcohol      Types: 7 Glasses of wine  per week      Comment: once a day  · Drug use: Never  ==============================================    Pertinent Physical Exam At Time of Discharge  Physical Exam  Please see progress note of 2/22/2025 for physical exam      Home Medications     Medication List      START taking these medications     acetaminophen 325 mg tablet; Commonly known as: Tylenol; Take 2 tablets   (650 mg) by mouth every 6 hours if needed for mild pain (1 - 3).   amiodarone 200 mg tablet; Commonly known as: Pacerone; Take 2 tablets   (400 mg) by mouth 2 times a day for 7 days, THEN 1 tablet (200 mg) once   daily.; Start taking on: February 20, 2025   aspirin 81 mg chewable tablet; Chew 1 tablet (81 mg) once daily.   fluticasone 50 mcg/actuation nasal spray; Commonly known as: Flonase;   Administer 2 sprays into each nostril once daily. Shake gently. Before   first use, prime pump. After use, clean tip and replace cap.   furosemide 20 mg tablet; Commonly known as: Lasix; Take 1 tablet (20 mg)   by mouth once daily for 14 days. Do not fill before February 22, 2025.   guaiFENesin 600 mg 12 hr tablet; Commonly known as: Mucinex; Take 1   tablet (600 mg) by mouth 2 times a day for 3 doses. Do not crush, chew, or   split.   oxyCODONE 5 mg immediate release tablet; Commonly known as: Roxicodone;   Take 1 tablet (5 mg) by mouth every 4 hours if needed for moderate pain (4   - 6) or severe pain (7 - 10) for up to 5 days.   polyethylene glycol 17 gram packet; Commonly known as: Glycolax,   Miralax; Take 17 g by mouth once daily.   potassium chloride CR 10 mEq ER tablet; Commonly known as: Klor-Con;   Take 1 tablet (10 mEq) by mouth once daily for 14 days. Do not crush,   chew, or split.     CHANGE how you take these medications     atorvastatin 80 mg tablet; Commonly known as: Lipitor; Take 1 tablet (80   mg) by mouth once daily at bedtime.; What changed: medication strength,   how much to take, when to take this   bisoprolol 10 mg tablet; Commonly  known as: Zebeta; Take 2 tablets (20   mg) by mouth once daily.; What changed: how much to take   dilTIAZem  mg 24 hr capsule; Commonly known as: Tiazac; Take 1   capsule (120 mg) by mouth once daily.; What changed: medication strength,   how much to take     CONTINUE taking these medications     acyclovir 800 mg tablet; Commonly known as: Zovirax; TAKE 1 TABLET BY   MOUTH EVERY DAY   apixaban 5 mg tablet; Commonly known as: Eliquis; Take 1 tablet (5 mg)   by mouth 2 times a day.   cholecalciferol 10 mcg (400 unit) tablet; Commonly known as: VITAMIN D-3   FERROUS SULFATE-VITAMIN C ORAL   Fish OiL 1,200 (144-216) mg capsule; Generic drug: omega 3-dha-epa-fish   oil   VITAMIN B COMPLEX NO.12-NIACIN ORAL     STOP taking these medications     amoxicillin-pot clavulanate 875-125 mg tablet; Commonly known as:   Augmentin   chlorhexidine 0.12 % solution; Commonly known as: Peridex   chlorhexidine 4 % external liquid; Commonly known as: Hibiclens       Outpatient Follow-Up  Future Appointments   Date Time Provider Department Center   2/27/2025 10:00 AM [facility] [id number] NURSE [id number] Academic   4/7/2025  1:00 PM Dwayne Jay MD [facility]   5/7/2025  9:45 AM TRI MRI CHARLY Prado, ODALYS-CNP  [facility] Cardiac surgery  Team phone [phone]

## 2025-02-22 NOTE — CARE PLAN
Problem: Discharge Planning  Goal: Discharge to home or other facility with appropriate resources  2/22/2025 0924 by Lashonda Garcia RN  Outcome: Adequate for Discharge  2/22/2025 0924 by Lashonda Garcia RN  Outcome: Progressing     Problem: Chronic Conditions and Co-morbidities  Goal: Patient's chronic conditions and co-morbidity symptoms are monitored and maintained or improved  2/22/2025 0924 by Lashonda Garcia RN  Outcome: Adequate for Discharge  2/22/2025 0924 by Lashonda Garcia RN  Outcome: Progressing     Problem: Nutrition  Goal: Nutrient intake appropriate for maintaining nutritional needs  2/22/2025 0924 by Lashonda Garcia RN  Outcome: Adequate for Discharge  2/22/2025 0924 by Lashonda Garcia RN  Outcome: Progressing     Problem: Skin  Goal: Decreased wound size/increased tissue granulation at next dressing change  2/22/2025 0924 by Lashonda Garcia RN  Outcome: Adequate for Discharge  2/22/2025 0924 by Lashonda Garcia RN  Outcome: Progressing  Goal: Participates in plan/prevention/treatment measures  2/22/2025 0924 by Lashonda Garcia RN  Outcome: Adequate for Discharge  2/22/2025 0924 by Lashonda Garcia RN  Outcome: Progressing  Goal: Prevent/manage excess moisture  2/22/2025 0924 by Lashonda Garcia RN  Outcome: Adequate for Discharge  2/22/2025 0924 by Lashonda Garcia RN  Outcome: Progressing  Goal: Prevent/minimize sheer/friction injuries  2/22/2025 0924 by Lashonda Garcia RN  Outcome: Adequate for Discharge  2/22/2025 0924 by Lashonda Garcia RN  Outcome: Progressing  Goal: Promote/optimize nutrition  2/22/2025 0924 by Lashonda Garcia RN  Outcome: Adequate for Discharge  2/22/2025 0924 by Lashonda Garcia RN  Outcome: Progressing  Goal: Promote skin healing  2/22/2025 0924 by Lashonda Garcia RN  Outcome: Adequate for Discharge  2/22/2025 0924 by Lashonda Garcia RN  Outcome: Progressing     Problem: Pain  Goal: Takes deep breaths with improved pain control throughout the shift  2/22/2025 0924 by Lashonda Garcia RN  Outcome: Adequate for Discharge  2/22/2025  0924 by Lashonda Garcia RN  Outcome: Progressing  Goal: Turns in bed with improved pain control throughout the shift  2/22/2025 0924 by Lashonda Garcia RN  Outcome: Adequate for Discharge  2/22/2025 0924 by Lashonda Garcia RN  Outcome: Progressing  Goal: Walks with improved pain control throughout the shift  2/22/2025 0924 by Lashonda Garcia RN  Outcome: Adequate for Discharge  2/22/2025 0924 by Lashonda Garcia RN  Outcome: Progressing  Goal: Performs ADL's with improved pain control throughout shift  2/22/2025 0924 by Lashonda Garcia RN  Outcome: Adequate for Discharge  2/22/2025 0924 by Lashonda Garcia RN  Outcome: Progressing  Goal: Participates in PT with improved pain control throughout the shift  2/22/2025 0924 by Lashonda Garcia RN  Outcome: Adequate for Discharge  2/22/2025 0924 by Lashonda Garcia RN  Outcome: Progressing  Goal: Free from opioid side effects throughout the shift  2/22/2025 0924 by Lashonda Garcia RN  Outcome: Adequate for Discharge  2/22/2025 0924 by Lashonda Garcia RN  Outcome: Progressing  Goal: Free from acute confusion related to pain meds throughout the shift  2/22/2025 0924 by Lashonda Garcia RN  Outcome: Adequate for Discharge  2/22/2025 0924 by Lashonda Garcia RN  Outcome: Progressing     Problem: Fall/Injury  Goal: Not fall by end of shift  2/22/2025 0924 by Lashonda Garcia RN  Outcome: Adequate for Discharge  2/22/2025 0924 by Lashonda Garcia RN  Outcome: Progressing  Goal: Be free from injury by end of the shift  2/22/2025 0924 by Lashonda Garcia RN  Outcome: Adequate for Discharge  2/22/2025 0924 by Lashonda Garcia RN  Outcome: Progressing  Goal: Verbalize understanding of personal risk factors for fall in the hospital  2/22/2025 0924 by Lashonda Garcia RN  Outcome: Adequate for Discharge  2/22/2025 0924 by Lashonda Garcia RN  Outcome: Progressing  Goal: Verbalize understanding of risk factor reduction measures to prevent injury from fall in the home  2/22/2025 0924 by Lashonda Garcia RN  Outcome: Adequate for Discharge  2/22/2025 0924 by  Lashonda Garcia RN  Outcome: Progressing  Goal: Use assistive devices by end of the shift  2/22/2025 0924 by Lashonda Garcia RN  Outcome: Adequate for Discharge  2/22/2025 0924 by Lashonda Garcia RN  Outcome: Progressing  Goal: Pace activities to prevent fatigue by end of the shift  2/22/2025 0924 by Lashonda Garcia RN  Outcome: Adequate for Discharge  2/22/2025 0924 by Lashonda Garcia RN  Outcome: Progressing

## 2025-02-22 NOTE — SIGNIFICANT EVENT
Atrial and ventricular wires cut at skin level at 0850 due to surgeon preference.  Patient instructed to notify radiology of retained epicardial wires prior to any MRI procedure, and to notify Dr Mc Jay of any visible wires or s/s infection.     ODALYS Morocho-CNP   [facility] Cardiac surgery  Team phone [phone]

## 2025-02-22 NOTE — CARE PLAN
Met with pt and introduced myself as care coordinator with Care Transitions Team for discharge planning. Pt is agreeable to discharge to home with HC. MD is aware of Pt discharge along with  the Nurse. Pt is leaving [facility] with Family. Pt reported no safety concerns at home.  Pt's address, phone number, and contact information was verified.  Discharge Planning: Pt is discharging to home with [facility], SOC 24-48 Hours. Patient has WW at bedside.

## 2025-02-22 NOTE — PROGRESS NOTES
"CARDIAC SURGERY DAILY PROGRESS NOTE    Radha Montalvo is a 74 y.o. female, with a PMH of breast cancer, atrial fibrillation, ascending aorta dilation, stable angina, hypertension, hyperlipidemia presenting to the CTICU for elective CABG . Patient previously stated dyspnea and exertional chest pressure/discomfort, she presented to [facility] on 2/14/2025 for CABG x2 (LIMA to LAD, SVG to OM), MAZE, PATSY clip.     OPERATION/PROCEDURE: CABG x 2 Skeletonized Chambers to Lad , SVG to OM  Endoscopic vein harvest  GP MAZE with encompass clamp and cryoablation  40 mm Atriaclip with Dwayne Jay MD    CTICU Course: Fluid volume overload and post-op respiratory insufficiency.   Transferred to T3 on 2/18    Interval History:   No acute overnight events.   Weaned off O2 yesterday    SUBJECTIVE:  Continues to have poor sleep related to interruptions. Pain well controlled.   Eager for discharge    Objective   /77 (BP Location: Right arm, Patient Position: Lying)   Pulse 99   Temp 37.1 °C (98.8 °F) (Temporal)   Resp 16   Ht 1.702 m (5' 7\")   Wt 70.4 kg (155 lb 1.6 oz)   LMP  (LMP Unknown)   SpO2 95%   BMI 24.29 kg/m²   0-10 (Numeric) Pain Score: 2   3 Day Weight Change: -0.635 kg (-1 lb 6.4 oz) per day    Intake and Output    Intake/Output Summary (Last 24 hours) at 2/22/2025 1003  Last data filed at 2/22/2025 0923  Gross per 24 hour   Intake 920 ml   Output 1050 ml   Net -130 ml       Physical Exam  Physical Exam  Vitals and nursing note reviewed.   Constitutional:       General: She is not in acute distress.     Appearance: Normal appearance.      Comments: Sitting in chair   HENT:      Head: Normocephalic and atraumatic.      Mouth/Throat:      Mouth: Mucous membranes are moist.   Eyes:      Conjunctiva/sclera: Conjunctivae normal.   Cardiovascular:      Rate and Rhythm: Normal rate. Rhythm irregular.      Pulses: Normal pulses.      Heart sounds: Normal heart sounds.      Comments: A and V " wires, capped -> cut during exam  Afib on monitor, 80s-90s  Pulmonary:      Effort: Pulmonary effort is normal.      Breath sounds: Normal breath sounds.      Comments: RA  CPAP with sleep (home machine)  Clear, thin expectorant  Sternum stable  Abdominal:      General: Bowel sounds are normal.      Palpations: Abdomen is soft.      Comments: BM 2/21   Genitourinary:     Comments: Voiding independently  Musculoskeletal:      Cervical back: Neck supple.      Comments: WHELAN  Trace BLE edema   Skin:     General: Skin is warm and dry.      Capillary Refill: Capillary refill takes less than 2 seconds.      Comments: midsternal chest incision C/D/I, well approximated, no s/s infection, R SVG incision C/D/I, well approximated, no s/s infection    Neurological:      General: No focal deficit present.      Mental Status: She is alert and oriented to person, place, and time.   Psychiatric:         Mood and Affect: Mood normal.         Behavior: Behavior normal.       Medications  Scheduled medications  acyclovir, 800 mg, oral, Daily  amiodarone, 400 mg, oral, BID   Followed by  [START ON 2/27/2025] amiodarone, 200 mg, oral, Daily  apixaban, 5 mg, oral, BID  aspirin, 81 mg, oral, Daily  atorvastatin, 80 mg, oral, Nightly  bisoprolol, 20 mg, oral, Daily  dilTIAZem CD, 120 mg, oral, Daily  fluticasone, 2 spray, Each Nostril, Daily  furosemide, 20 mg, oral, Daily  melatonin, 6 mg, oral, Nightly  multivitamin with minerals, 1 tablet, oral, Daily  oxygen, , inhalation, Continuous - 02/gases  polyethylene glycol, 17 g, oral, Daily    Continuous medications   PRN medications  PRN medications: acetaminophen, calcium carbonate, hydrALAZINE, ipratropium-albuteroL, naloxone, [DISCONTINUED] ondansetron **OR** ondansetron, oxyCODONE    Labs  Results for orders placed or performed during the hospital encounter of 02/14/25 (from the past 24 hours)   Magnesium   Result Value Ref Range    Magnesium 2.15 1.60 - 2.40 mg/dL   CBC   Result Value  Ref Range    WBC 10.8 4.4 - 11.3 x10*3/uL    nRBC 0.0 0.0 - 0.0 /100 WBCs    RBC 3.56 (L) 4.00 - 5.20 x10*6/uL    Hemoglobin 12.1 12.0 - 16.0 g/dL    Hematocrit 37.2 36.0 - 46.0 %     (H) 80 - 100 fL    MCH 34.0 26.0 - 34.0 pg    MCHC 32.5 32.0 - 36.0 g/dL    RDW 13.5 11.5 - 14.5 %    Platelets 385 150 - 450 x10*3/uL   Renal Function Panel   Result Value Ref Range    Glucose 106 (H) 74 - 99 mg/dL    Sodium 140 136 - 145 mmol/L    Potassium 4.1 3.5 - 5.3 mmol/L    Chloride 104 98 - 107 mmol/L    Bicarbonate 30 21 - 32 mmol/L    Anion Gap 10 10 - 20 mmol/L    Urea Nitrogen 30 (H) 6 - 23 mg/dL    Creatinine 0.88 0.50 - 1.05 mg/dL    eGFR 69 >60 mL/min/1.73m*2    Calcium 9.1 8.6 - 10.6 mg/dL    Phosphorus 3.8 2.5 - 4.9 mg/dL    Albumin 3.5 3.4 - 5.0 g/dL     *Note: Due to a large number of results and/or encounters for the requested time period, some results have not been displayed. A complete set of results can be found in Results Review.           IMAGING/ DIAGNOSTIC TESTING:  I have personally reviewed the following test result(s):      XR chest 2 views 02/20/2025  Impression  1.  Interval improvement in findings of pulmonary edema with otherwise stable small to moderate left and small right pleural effusions.  2. Hazy right mid lung airspace opacity may represent alveolar edema versus infection.        =================  IMPRESSION & PLAN:  =================  POD # 8 s/p CABG x2, MAZE, LAAC on 2/14  - Increase activity/ ambulation; PT/OT  - Encourage IS, C/DB; respiratory therapy; wean O2 as kalpesh -> on RA  - Cardiac rehab referral   - Continue cardiac meds: ASA, statin, BB, diltiazem  - Pain and anticonstipation meds  - 2v CXR completed 2/20  - Postop echo not indicated  - Cut epicardial wires prior to discharge -> cut 2/22  - Tele until discharge  - Optimize nutrition and electrolytes    Pulm - DARIO on CPAP  - continue home CPAP    Chronic Atrial Fibrillation  - Tele: afib 80-90  - Continue BB  - Continue  diltiazem 90 mg daily  - Amiodarone 400 mg BID, will transition to 200 mg daily after 10g load  - Adjust medications as tolerated  - Resumed home eliquis     Hypertension  - Continue diltiazem  - Continue home bisoprolol    Hyperlipidemia  - Continue high intensity statin  - Lipid monitoring per PCP/cardiology    Acute Blood Loss Anemia   Recent Labs     25  0551 25  0638 25  0716 25  0628 25  0157 25  0024 25  0024   HGB 12.1 11.9* 11.7* 12.5 12.7 12.6 13.6   HCT 37.2 37.8 36.7 37.7 36.5 37.6 40.7   - MV, iron not indicated  - Daily labs, transfuse as indicated    Thrombocytopenia - resolved  Recent Labs     25  0551 25  0638 25  0716 25  0628 25  0157 25  0024 25  0024    345 287 256 210 150 156   - Etiology likely postop/CPB related  - Continue to trend with daily CBCs    Hypokalemia, repleted  Hypervolemia, improving  Contraction Alkalosis, resolved  Volume/Electrolyte Status: Preop wt Weight: 72.5 kg (159 lb 13.3 oz)   Vitals:    25 0300   Weight: 70.4 kg (155 lb 1.6 oz)     - : 72.3 kg --> diuril 500 mg BID, 40 mEQ K x2  - : 71.4 kg --> lasix 60 mg IV x1, 40 mEq K  -  71.1 kg --> lasix 60 mg IV x1, 10 meq K  -  70.4 kg --> lasix 20 mg oral daily, 10 meq K and 400 mg Mg  - Adjust diuresis as needed for postop cardiac surgery hypervolemia  - Replete electrolytes for hypokalemia/hypomagnesemia/hypophosphatemia as needed   - Daily weights and strict I&Os  - Daily RFP while admitted    Leukocytosis, resolved  Recent Labs     25  0551 25  0638 25  0716 25  0628 25  0157 25  0024 25  0024   WBC 10.8 10.7 9.0 9.2 11.3 11.5* 13.0*     Temp (36hrs), Av.4 °C (97.5 °F), Min:35.8 °C (96.4 °F), Max:37.4 °C (99.3 °F)  - continue pulmonary hygiene  - monitor for s/s infection  - likely atelectasis/ postoperative in etiology  - daily CBC to follow    VTE Prophylaxis:  SCDs/TEDs, ambulation, on Eliquis  Code Status: Full Code    Dispo  - PT/OT recs low intensity  - Would benefit from homecare RN for cardiac surgery carepath and PT  - 2/21 ordered walker for dc -> delivered 2/21  - home today; did Meds to Beds  - Will finalize discharge    ODALYS Morocho-CNP  Cardiac Surgery [facility]  [facility]  Team Phone [phone]

## 2025-02-22 NOTE — HH CARE COORDINATION
Home Care received a Referral for Nursing and Physical Therapy. We have processed the referral for a Start of Care on 02-23-25, 24-48 HOURS.     If you have any questions or concerns, please feel free to contact us at [phone]. Follow the prompts, enter your five digit zip code, and you will be directed to your care team on EAST 2.

## 2025-02-22 NOTE — HOSPITAL COURSE
Radha Montalvo is a 74 y.o. female, with a PMH of breast cancer, atrial fibrillation, ascending aorta dilation, stable angina, hypertension, hyperlipidemia presenting to the CTICU for elective CABG . Patient previously stated dyspnea and exertional chest pressure/discomfort, she presented to [facility] on 2/14/2025 for CABG x2 (LIMA to LAD, SVG to OM), MAZE, PATSY clip.     2/14/2025 OPERATION: by Dwayne Jay  1. OPERATION/PROCEDURE: CABG x 2 Skeletonized Chambers to Lad , SVG to OM  2. Endoscopic vein harvest  3. GP MAZE with encompass clamp and cryoablation  4. 40 mm Atriaclip with Dwayne Jay MD    Echo Pre/Post:   -Pre: Afib, LV normal, trachAI, mild MR, Asc Aor 3.9, Rvnormal   - Post: Lv noraml, trace AI, mild MR, RV normal  Chest Tubes/Drains: left pleural, mediastinal    Temporary wires location/setting: AAI @ 80      CTICU Course: Fluid volume overload and post-op respiratory insufficiency.     Transferred to T3 on 2/18    Floor Course:  - Patient was diuresed for fluid volume overload post cardiac surgery; Preop weight: 72.5kg, discharge wt: 70.4kg  - will continue lasix at discharge for pleural effusions  - On ASA, statin, BB, diltiazem by discharge  - resume home apixaban for afib  - Epicardial wires CUT on 2/22  - Telemetry at discharge: controlled afib  - 2v CXR done 2/20  - Cardiac rehab referral was placed  - PT recs home and low intensity therapy; walker ordered and delivered 2/21  - Anticipate discharge to home with homecare    On day of discharge, vital signs were stable and no acute distress was noted. All questions were answered. After VS and labs were reviewed it was determined the patient was stable for discharge.     Discharged with home care RN and PT on Saturday, February 22, 2025; POD#8        ==============================================  Hospital day of discharge management- spent >30 minutes coordinating the discharge and counseling/educating patient and family  regarding discharge instructions.   ===============================================  HISTORY:  Medical History  · Abnormal findings on diagnostic imaging of heart and coronary circulation 10/22/2023  · Aneurysm (CMS-HCC) 10/23/2024  · Arthritis of left acromioclavicular joint 02/15/2024  · Ascending aorta dilatation (CMS-HCC) 11/22/2023  · Atrial fibrillation (Multi) 10/23/2023  · Biceps tendinitis of left shoulder 02/15/2024  · Breast cancer (Multi) 2013  · Cancer (Multi) 2013  · Cataract      s/p extraction  · Congenital dilatation of aorta ([facility]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  · Edema of both lower extremities 01/13/2024  · Essential hypertension 10/06/2023  · Hx antineoplastic chemo    · Hyperlipidemia 10/06/2023  · Hypertension 10/06/2023  · Labral tear of long head of biceps tendon, right, initial encounter 02/15/2024  · Parathyroid disease (Multi)      s/p paratcyoiectomy)  · Peripheral neuropathy      feet, bilatera  · Personal history of irradiation    · Personal history of other diseases of the musculoskeletal system and connective tissue 10/06/2023    History of arthritis  · Primary osteoarthritis of both shoulders 02/15/2024  · Shortness of breath 10/23/2023  · Sleep apnea      CPAP       Surgical History  · BI MAMMO GUIDED BREAST LEFT LOCALIZATION Left 02/25/2013    BI MAMMO GUIDED LOCALIZATION BREAST LEFT [facility] CLINICAL LEGACY  · BREAST BIOPSY      · BREAST LUMPECTOMY Left 2013  · CARDIAC CATHETERIZATION N/A 01/27/2025    Procedure: Left Heart Cath with Coronary Angiography and LV;  Surgeon: Paulo Dougherty MD;  Location: [facility] Cardiac Cath Lab;  Service: Cardiovascular;  Laterality: N/A;  no prior auth needed  · CATARACT EXTRACTION Bilateral    · HYSTERECTOMY   2000  · MAMMOGRAM DIAGNOSTIC BI Bilateral 09/16/2024  · OTHER SURGICAL HISTORY   04/27/2022    Lumpectomy  · US COMPLETE BREAST Left 09/16/2024     Prescriptions Prior to Admission  · acyclovir (Zovirax) 800 mg tablet TAKE 1 TABLET BY MOUTH EVERY DAY 90  tablet 1 2025  · atorvastatin (Lipitor) 10 mg tablet TAKE 1 TABLET BY MOUTH EVERY DAY 90 tablet 3 2025 at  4:00 AM  · bisoprolol (Zebeta) 10 mg tablet Take 1 tablet (10 mg) by mouth once daily. 90 tablet 3 2025 at  4:00 AM  · cholecalciferol (VITAMIN D-3) 10 mcg (400 unit) tablet Take 1 capsule by mouth once daily.     Past Week  · dilTIAZem ER (Tiazac) 240 mg 24 hr capsule Take 1 capsule (240 mg) by mouth once daily. 90 capsule 3 2025 at  4:00 AM  · ferrous sulfate/ascorbate sod (FERROUS SULFATE-VITAMIN C ORAL) Take 1 tablet by mouth once daily.     Past Week  · omega 3-dha-epa-fish oil (Fish OiL) 1,200 (144-216) mg capsule 1.5 capsules Orally     Past Week  · vit B complex no.12/niacin,B3, (VITAMIN B COMPLEX NO.12-NIACIN ORAL) Take by mouth.       Vitamin B 12 TABS     Past Week  · [] amoxicillin-pot clavulanate (Augmentin) 875-125 mg tablet Take 1 tablet (875 mg) by mouth 2 times a day for 7 days. (Patient not taking: Reported on 2025) 14 tablet 0 Not Taking  · apixaban (Eliquis) 5 mg tablet Take 1 tablet (5 mg) by mouth 2 times a day. 60 tablet 11 2/10/2025 Morning  · [] chlorhexidine (Peridex) 0.12 % solution Use 15 mL in the mouth or throat if needed for wound care for up to 2 days. Use as mouth wash for night before and morning of surgery 120 mL 0       Patient has no known allergies.    Social History  Tobacco Use  · Smoking status: Never      Passive exposure: Never  · Smokeless tobacco: Never  Vaping Use  · Vaping status: Never Used  Substance Use Topics  · Alcohol use: Yes      Alcohol/week: 7.0 standard drinks of alcohol      Types: 7 Glasses of wine per week      Comment: once a day  · Drug use: Never  ==============================================

## 2025-02-23 ENCOUNTER — HOME CARE VISIT (OUTPATIENT)
Dept: HOME HEALTH SERVICES | Facility: HOME HEALTH | Age: 75
End: 2025-02-23
Payer: MEDICARE

## 2025-02-23 VITALS
TEMPERATURE: 97.5 F | RESPIRATION RATE: 16 BRPM | HEART RATE: 96 BPM | OXYGEN SATURATION: 95 % | DIASTOLIC BLOOD PRESSURE: 68 MMHG | SYSTOLIC BLOOD PRESSURE: 108 MMHG

## 2025-02-23 PROCEDURE — G0299 HHS/HOSPICE OF RN EA 15 MIN: HCPCS | Mod: HHH

## 2025-02-23 PROCEDURE — 169592 NO-PAY CLAIM PROCEDURE

## 2025-02-23 ASSESSMENT — ENCOUNTER SYMPTOMS
MUSCLE WEAKNESS: 1
PERSON REPORTING PAIN: PATIENT
LAST BOWEL MOVEMENT: 67259
SHORTNESS OF BREATH: 1
APPETITE LEVEL: FAIR
CHANGE IN APPETITE: DECREASED
DYSPNEA ACTIVITY LEVEL: AFTER AMBULATING 10 - 20 FT
COUGH CHARACTERISTICS: PRODUCTIVE
SPUTUM CONSISTENCY: THICK
SPUTUM PRODUCTION: 1
SPUTUM AMOUNT: MODERATE
DENIES PAIN: 1
COUGH: 1
SPUTUM COLOR: CLEAR

## 2025-02-23 ASSESSMENT — ACTIVITIES OF DAILY LIVING (ADL)
AMBULATION ASSISTANCE: STAND BY ASSIST
CURRENT_FUNCTION: STAND BY ASSIST
OASIS_M1830: 03
ENTERING_EXITING_HOME: MINIMUM ASSIST

## 2025-02-24 ENCOUNTER — PATIENT OUTREACH (OUTPATIENT)
Dept: PRIMARY CARE | Facility: CLINIC | Age: 75
End: 2025-02-24
Payer: MEDICARE

## 2025-02-24 NOTE — PROGRESS NOTES
Discharge Facility:  [facility]     Discharge Diagnosis:    Coronary artery disease of native artery of native heart with stable angina pectoris  Afib  S/p CABGx2, MAZE, AtriClip     Issues Requiring Follow-Up  Pt to f/up with PCP, cardiology, cardiac surgery     Admission Date 2/14/2025   Discharge Date:  2/22/2025     PCP Appointment Date:  2/27/2025     Specialist Appointment Date:      HHC in process     2/27/2025 CBC Magnesium Renal Function Panel    Follow up with Paulo Dougherty MD (Cardiology) / [facility]     [facility] Encounter and Summary Linked: Yes    Admission (Discharged) with Dwayne Jay MD (02/14/2025)     See discharge assessment below for further details     Wrap Up  Wrap Up Additional Comments: Patient aware of DC instructions, to obtain weight daily after B.R use before eating ot drinking if a 2+lb weight gain in one day or a 5+lb weight gain in a week to notify provider. Patient aware  of med chnages, to call and mehul with cardiology . Patient aware of F/U i mehul 2/27. Patient encouraged to call providers for any questions  concerns or change in condition. (2/24/2025 12:45 PM)    Engagement  Call Start Time: 1245 (2/24/2025 12:45 PM)    Medications  Medications reviewed with patient/caregiver?: Yes (2/24/2025 12:45 PM)  Is the patient having any side effects they believe may be caused by any medication additions or changes?: No (2/24/2025 12:45 PM)  Does the patient have all medications ordered at discharge?: Yes (2/24/2025 12:45 PM)  Care Management Interventions: No intervention needed (2/24/2025 12:45 PM)  Prescription Comments: New reviewed,START taking these medications     acetaminophen 325 mg tablet; Commonly known as: Tylenol; Take 2 tablets   (650 mg) by mouth every 6 hours if needed for mild pain (1 - 3).   amiodarone 200 mg tablet; Commonly known as: Pacerone; Take 2 tablets   (400 mg) by mouth 2 times a day for 7 days, THEN 1 tablet (200 mg) once   daily.; Start taking on: February  20, 2025   aspirin 81 mg chewable tablet; Chew 1 tablet (81 mg) once daily.   fluticasone 50 mcg/actuation nasal spray; Commonly known as: Flonase;   Administer 2 sprays into each nostril once daily. Shake gently. Before   first use, prime pump. After use, clean tip and replace cap.   furosemide 20 mg tablet; Commonly known as: Lasix; Take 1 tablet (20 mg)   by mouth once daily for 14 days. Do not fill before February 22, 2025.   guaiFENesin 600 mg 12 hr tablet; Commonly known as: Mucinex; Take 1   tablet (600 mg) by mouth 2 times a day for 3 doses. Do not crush, chew, or   split.   oxyCODONE 5 mg immediate release tablet; Commonly known as: Roxicodone;   Take 1 tablet (5 mg) by mouth every 4 hours if needed for moderate pain (4   - 6) or severe pain (7 - 10) for up to 5 days.   polyethylene glycol 17 gram packet; Commonly known as: Glycolax,   Miralax; Take 17 g by mouth once daily.   potassium chloride CR 10 mEq ER tablet; Commonly known as: Klor-Con;   Take 1 tablet (10 mEq) by mouth once daily for 14 days. Do not crush,   chew, or split.     CHANGE how you take these medications     atorvastatin 80 mg tablet; Commonly known as: Lipitor; Take 1 tablet (80   mg) by mouth once daily at bedtime.; What changed: medication strength,   how much to take, when to take this   bisoprolol 10 mg tablet; Commonly known as: Zebeta; Take 2 tablets (20   mg) by mouth once daily.; What changed: how much to take   dilTIAZem  mg 24 hr capsule; Commonly known as: Tiazac; Take 1   capsule (120 mg) by mouth once daily.; What changed: medication strength,   how much to take (2/24/2025 12:45 PM)  Is the patient taking all medications as directed (includes completed medication regime)?: -- (Pt states has all meds) (2/24/2025 12:45 PM)  Medication Comments: Patient aware,STOP taking these medications     amoxicillin-pot clavulanate 875-125 mg tablet; Commonly known as:   Augmentin   chlorhexidine 0.12 % solution; Commonly known as:  Peridex   chlorhexidine 4 % external liquid; Commonly known as: Hibiclens (2/24/2025 12:45 PM)    Appointments  Does the patient have a primary care provider?: Yes (2/24/2025 12:45 PM)  Care Management Interventions: Verified appointment date/time/provider (2/24/2025 12:45 PM)  Care Management Interventions: Advised to schedule with specialist (2/24/2025 12:45 PM)    Self Management  What is the home health agency?: [facility] in process (2/24/2025 12:45 PM)  What Durable Medical Equipment (DME) was ordered?: NA (2/24/2025 12:45 PM)    Patient Teaching  Does the patient have access to their discharge instructions?: Yes (2/24/2025 12:45 PM)  Care Management Interventions: Reviewed instructions with patient (2/24/2025 12:45 PM)  What is the patient's perception of their health status since discharge?: Improving (2/24/2025 12:45 PM)  Is the patient/caregiver able to teach back the hierarchy of who to call/visit for symptoms/problems? PCP, Specialist, Home Health nurse, Urgent Care, ED, 911: Yes (2/24/2025 12:45 PM)

## 2025-02-25 ENCOUNTER — HOME CARE VISIT (OUTPATIENT)
Dept: HOME HEALTH SERVICES | Facility: HOME HEALTH | Age: 75
End: 2025-02-25
Payer: MEDICARE

## 2025-02-25 VITALS
OXYGEN SATURATION: 97 % | TEMPERATURE: 98 F | HEART RATE: 81 BPM | SYSTOLIC BLOOD PRESSURE: 118 MMHG | DIASTOLIC BLOOD PRESSURE: 60 MMHG

## 2025-02-25 PROCEDURE — G0151 HHCP-SERV OF PT,EA 15 MIN: HCPCS | Mod: HHH

## 2025-02-25 ASSESSMENT — ENCOUNTER SYMPTOMS
PAIN LOCATION - PAIN SEVERITY: 3/10
PAIN: 1
SUBJECTIVE PAIN PROGRESSION: UNCHANGED
PAIN LOCATION - PAIN QUALITY: ACHE
PAIN LOCATION - EXACERBATING FACTORS: UNKOWN
PAIN LOCATION - PAIN FREQUENCY: CONSTANT
OCCASIONAL FEELINGS OF UNSTEADINESS: 1
PAIN LOCATION: HEAD
PERSON REPORTING PAIN: PATIENT
HIGHEST PAIN SEVERITY IN PAST 24 HOURS: 3/10
LOWEST PAIN SEVERITY IN PAST 24 HOURS: 0/10

## 2025-02-25 ASSESSMENT — ACTIVITIES OF DAILY LIVING (ADL)
TOILETING: CONTACT GUARD ASSIST
DRESSING_LB_CURRENT_FUNCTION: CONTACT GUARD ASSIST
TOILETING: 1
AMBULATION ASSISTANCE ON FLAT SURFACES: 1

## 2025-02-26 ENCOUNTER — HOME CARE VISIT (OUTPATIENT)
Dept: HOME HEALTH SERVICES | Facility: HOME HEALTH | Age: 75
End: 2025-02-26
Payer: MEDICARE

## 2025-02-26 VITALS
WEIGHT: 153.05 LBS | DIASTOLIC BLOOD PRESSURE: 62 MMHG | HEART RATE: 108 BPM | OXYGEN SATURATION: 97 % | RESPIRATION RATE: 18 BRPM | SYSTOLIC BLOOD PRESSURE: 118 MMHG | BODY MASS INDEX: 23.97 KG/M2 | TEMPERATURE: 97 F

## 2025-02-26 VITALS — OXYGEN SATURATION: 98 % | HEART RATE: 99 BPM | DIASTOLIC BLOOD PRESSURE: 70 MMHG | SYSTOLIC BLOOD PRESSURE: 110 MMHG

## 2025-02-26 PROCEDURE — G0151 HHCP-SERV OF PT,EA 15 MIN: HCPCS | Mod: HHH

## 2025-02-26 PROCEDURE — G0299 HHS/HOSPICE OF RN EA 15 MIN: HCPCS | Mod: HHH

## 2025-02-26 ASSESSMENT — ENCOUNTER SYMPTOMS
FATIGUES EASILY: 1
LIMITED RANGE OF MOTION: 1
LOWEST PAIN SEVERITY IN PAST 24 HOURS: 0/10
PAIN: 1
APPETITE LEVEL: GOOD
CHANGE IN APPETITE: UNCHANGED
PERSON REPORTING PAIN: PATIENT
DYSPNEA ON EXERTION: 1
MUSCLE WEAKNESS: 1
SHORTNESS OF BREATH: 1
PERSON REPORTING PAIN: PATIENT
PAIN SEVERITY GOAL: 0/10
PAIN LOCATION: GENERALIZED
DYSPNEA ACTIVITY LEVEL: AFTER AMBULATING MORE THAN 20 FT
SUBJECTIVE PAIN PROGRESSION: GRADUALLY IMPROVING
HIGHEST PAIN SEVERITY IN PAST 24 HOURS: 5/10
DENIES PAIN: 1

## 2025-02-26 ASSESSMENT — ACTIVITIES OF DAILY LIVING (ADL)
AMBULATION ASSISTANCE: ONE PERSON
AMBULATION ASSISTANCE: 1

## 2025-02-27 ENCOUNTER — TELEMEDICINE CLINICAL SUPPORT (OUTPATIENT)
Dept: CARDIAC SURGERY | Facility: HOSPITAL | Age: 75
End: 2025-02-27
Payer: MEDICARE

## 2025-02-27 DIAGNOSIS — Z95.1 S/P CABG (CORONARY ARTERY BYPASS GRAFT): ICD-10-CM

## 2025-02-27 DIAGNOSIS — D62 ABLA (ACUTE BLOOD LOSS ANEMIA): ICD-10-CM

## 2025-02-27 LAB
ALBUMIN SERPL-MCNC: 3.6 G/DL (ref 3.6–5.1)
BUN SERPL-MCNC: 21 MG/DL (ref 7–25)
BUN/CREAT SERPL: ABNORMAL (CALC) (ref 6–22)
CALCIUM SERPL-MCNC: 8.2 MG/DL (ref 8.6–10.4)
CHLORIDE SERPL-SCNC: 102 MMOL/L (ref 98–110)
CO2 SERPL-SCNC: 18 MMOL/L (ref 20–32)
CREAT SERPL-MCNC: 0.7 MG/DL (ref 0.6–1)
EGFRCR SERPLBLD CKD-EPI 2021: 91 ML/MIN/1.73M2
ERYTHROCYTE [DISTWIDTH] IN BLOOD BY AUTOMATED COUNT: 12.8 % (ref 11–15)
GLUCOSE SERPL-MCNC: 84 MG/DL (ref 65–99)
HCT VFR BLD AUTO: 37.8 % (ref 35–45)
HGB BLD-MCNC: 12.8 G/DL (ref 11.7–15.5)
MAGNESIUM SERPL-MCNC: 2 MG/DL (ref 1.5–2.5)
MCH RBC QN AUTO: 34.9 PG (ref 27–33)
MCHC RBC AUTO-ENTMCNC: 33.9 G/DL (ref 32–36)
MCV RBC AUTO: 103 FL (ref 80–100)
PHOSPHATE SERPL-MCNC: 2.9 MG/DL (ref 2.1–4.3)
PLATELET # BLD AUTO: 520 THOUSAND/UL (ref 140–400)
PMV BLD REES-ECKER: 9.2 FL (ref 7.5–12.5)
POTASSIUM SERPL-SCNC: 4.6 MMOL/L (ref 3.5–5.3)
RBC # BLD AUTO: 3.67 MILLION/UL (ref 3.8–5.1)
SODIUM SERPL-SCNC: 133 MMOL/L (ref 135–146)
WBC # BLD AUTO: 14.6 THOUSAND/UL (ref 3.8–10.8)

## 2025-02-27 NOTE — PROGRESS NOTES
A telephone visit (audio only) between Radha Montalvo (at the originating site) and the provider (at the distant site) was utilized to provide this telehealth service.    Patient is having a telehealth nurse visit today following hospital discharge on February 22, 2025.    Patient is 13 days s/p CABG x 2 / MAZE / PATSY clip with Dr. Mc Jay.  She feels well and has minimal incision discomfort.  She is taking Tylenol as needed for pain.  Sternal incision, graft sites, and chest tube sites are closed without redness or drainage.  She denies shortness of breath and is using the incentive spirometer as instructed.  Patient is ambulating with a walker often with periods of rest as needed.  She has no edema of the lower extremities.  Her appetite is improving and no issues reported with constipation.   Patient is taking all medications as prescribed.    Patient has a cardiology follow-up visit with Dr. Paulo Dougherty on March 26th.   She has a post-op visit with Dr. Mc Jay scheduled for April 7th with a chest x-ray prior to the visit.  She is receiving home care PT / nursing services.  Her  and son are assisting with her care at home.  Patient will call our office with any questions or concerns.  It was a pleasure speaking to Radha Montalvo today.

## 2025-02-28 ENCOUNTER — HOME CARE VISIT (OUTPATIENT)
Dept: HOME HEALTH SERVICES | Facility: HOME HEALTH | Age: 75
End: 2025-02-28
Payer: MEDICARE

## 2025-02-28 DIAGNOSIS — Z95.1 S/P CABG (CORONARY ARTERY BYPASS GRAFT): ICD-10-CM

## 2025-03-03 ENCOUNTER — OFFICE VISIT (OUTPATIENT)
Dept: PRIMARY CARE | Facility: CLINIC | Age: 75
End: 2025-03-03
Payer: MEDICARE

## 2025-03-03 VITALS
RESPIRATION RATE: 16 BRPM | BODY MASS INDEX: 24.17 KG/M2 | DIASTOLIC BLOOD PRESSURE: 68 MMHG | SYSTOLIC BLOOD PRESSURE: 124 MMHG | OXYGEN SATURATION: 95 % | TEMPERATURE: 97.3 F | HEART RATE: 108 BPM | HEIGHT: 67 IN | WEIGHT: 154 LBS

## 2025-03-03 DIAGNOSIS — A60.1 HERPESVIRAL INFECTION OF PERIANAL SKIN AND RECTUM: ICD-10-CM

## 2025-03-03 DIAGNOSIS — Z95.1 S/P CABG X 2: ICD-10-CM

## 2025-03-03 DIAGNOSIS — Z95.1 S/P CABG (CORONARY ARTERY BYPASS GRAFT): ICD-10-CM

## 2025-03-03 PROCEDURE — 3008F BODY MASS INDEX DOCD: CPT | Performed by: FAMILY MEDICINE

## 2025-03-03 PROCEDURE — 1159F MED LIST DOCD IN RCRD: CPT | Performed by: FAMILY MEDICINE

## 2025-03-03 PROCEDURE — 1123F ACP DISCUSS/DSCN MKR DOCD: CPT | Performed by: FAMILY MEDICINE

## 2025-03-03 PROCEDURE — 1111F DSCHRG MED/CURRENT MED MERGE: CPT | Performed by: FAMILY MEDICINE

## 2025-03-03 PROCEDURE — 1036F TOBACCO NON-USER: CPT | Performed by: FAMILY MEDICINE

## 2025-03-03 PROCEDURE — 3078F DIAST BP <80 MM HG: CPT | Performed by: FAMILY MEDICINE

## 2025-03-03 PROCEDURE — 3074F SYST BP LT 130 MM HG: CPT | Performed by: FAMILY MEDICINE

## 2025-03-03 PROCEDURE — 1126F AMNT PAIN NOTED NONE PRSNT: CPT | Performed by: FAMILY MEDICINE

## 2025-03-03 PROCEDURE — 99495 TRANSJ CARE MGMT MOD F2F 14D: CPT | Performed by: FAMILY MEDICINE

## 2025-03-03 RX ORDER — BISOPROLOL FUMARATE 10 MG/1
20 TABLET, FILM COATED ORAL DAILY
Qty: 180 TABLET | Refills: 1 | Status: SHIPPED | OUTPATIENT
Start: 2025-03-03 | End: 2025-08-30

## 2025-03-03 RX ORDER — ATORVASTATIN CALCIUM 80 MG/1
80 TABLET, FILM COATED ORAL NIGHTLY
Qty: 90 TABLET | Refills: 3 | Status: SHIPPED | OUTPATIENT
Start: 2025-03-03 | End: 2026-02-26

## 2025-03-03 RX ORDER — FUROSEMIDE 20 MG/1
20 TABLET ORAL DAILY
Qty: 90 TABLET | Refills: 1 | Status: SHIPPED | OUTPATIENT
Start: 2025-03-03 | End: 2025-08-30

## 2025-03-03 RX ORDER — DILTIAZEM HYDROCHLORIDE 120 MG/1
120 CAPSULE, EXTENDED RELEASE ORAL DAILY
Qty: 90 CAPSULE | Refills: 3 | Status: SHIPPED | OUTPATIENT
Start: 2025-03-03 | End: 2026-02-26

## 2025-03-03 RX ORDER — ACYCLOVIR 800 MG/1
800 TABLET ORAL DAILY
Qty: 90 TABLET | Refills: 1 | Status: SHIPPED | OUTPATIENT
Start: 2025-03-03

## 2025-03-03 RX ORDER — POTASSIUM CHLORIDE 750 MG/1
10 TABLET, FILM COATED, EXTENDED RELEASE ORAL DAILY
Qty: 90 TABLET | Refills: 3 | Status: SHIPPED | OUTPATIENT
Start: 2025-03-03 | End: 2026-02-26

## 2025-03-03 RX ORDER — AMIODARONE HYDROCHLORIDE 200 MG/1
TABLET ORAL
Qty: 30 TABLET | Refills: 1 | Status: SHIPPED | OUTPATIENT
Start: 2025-03-03

## 2025-03-03 ASSESSMENT — PAIN SCALES - GENERAL: PAINLEVEL_OUTOF10: 0-NO PAIN

## 2025-03-03 ASSESSMENT — ENCOUNTER SYMPTOMS
DEPRESSION: 0
LOSS OF SENSATION IN FEET: 0
OCCASIONAL FEELINGS OF UNSTEADINESS: 1

## 2025-03-03 NOTE — PROGRESS NOTES
"Subjective   Patient ID: Radha oMntalvo is a 74 y.o. female who presents for Hospital Follow-up (Pt is here for a [facility] hospital follow up for by pass surgery./Pt needs rx refills of everything except eliquis.).    HPI hospitalized from 2/14-2/22 for CABG x 2 (lima to LAD, SVG to OM) she sees surgeon in April and has home PT now.  Cardiology follow up on 1/26  Feeling slowly better with some fatigue and is increasing activity.   Review of Systems    Objective   /68 (BP Location: Right arm, Patient Position: Sitting, BP Cuff Size: Adult)   Pulse 108   Temp 36.3 °C (97.3 °F) (Temporal)   Resp 16   Ht 1.702 m (5' 7\")   Wt 69.9 kg (154 lb)   LMP  (LMP Unknown)   SpO2 95%   BMI 24.12 kg/m²     Physical Exam  Constitutional:       General: She is not in acute distress.     Appearance: Normal appearance.   Cardiovascular:      Rate and Rhythm: Normal rate and regular rhythm.      Heart sounds: No murmur heard.  Pulmonary:      Breath sounds: Normal breath sounds. No wheezing.   Musculoskeletal:      Comments: Incision site from CABG healing well with no signs of infection.     Neurological:      Mental Status: She is alert.         Assessment/Plan   Problem List Items Addressed This Visit             ICD-10-CM    Herpesviral infection of perianal skin and rectum A60.1    Relevant Medications    acyclovir (Zovirax) 800 mg tablet    S/P CABG x 2 Z95.1    Relevant Medications    furosemide (Lasix) 20 mg tablet    potassium chloride CR (Klor-Con) 10 mEq ER tablet     Other Visit Diagnoses         Codes    S/P CABG (coronary artery bypass graft)     Z95.1    Relevant Medications    amiodarone (Pacerone) 200 mg tablet    atorvastatin (Lipitor) 80 mg tablet    bisoprolol (Zebeta) 10 mg tablet    dilTIAZem ER (Tiazac) 120 mg 24 hr capsule        Follow up with cardiology near future as scheduled.  Will give rx's until seen for amiodarone.         "
Render Risk Assessment In Note?: yes
Detail Level: Zone
Additional Notes: Recommend pt to use warm compresses and see Dr. Graves.

## 2025-03-04 ENCOUNTER — HOME CARE VISIT (OUTPATIENT)
Dept: HOME HEALTH SERVICES | Facility: HOME HEALTH | Age: 75
End: 2025-03-04
Payer: MEDICARE

## 2025-03-04 ENCOUNTER — PATIENT OUTREACH (OUTPATIENT)
Dept: PRIMARY CARE | Facility: CLINIC | Age: 75
End: 2025-03-04
Payer: MEDICARE

## 2025-03-04 VITALS
DIASTOLIC BLOOD PRESSURE: 60 MMHG | OXYGEN SATURATION: 99 % | TEMPERATURE: 98.1 F | HEART RATE: 82 BPM | SYSTOLIC BLOOD PRESSURE: 118 MMHG

## 2025-03-04 PROCEDURE — G0151 HHCP-SERV OF PT,EA 15 MIN: HCPCS | Mod: HHH

## 2025-03-04 ASSESSMENT — ENCOUNTER SYMPTOMS
PAIN LOCATION - PAIN SEVERITY: 5/10
LOWEST PAIN SEVERITY IN PAST 24 HOURS: 0/10
SUBJECTIVE PAIN PROGRESSION: GRADUALLY IMPROVING
PAIN LOCATION - RELIEVING FACTORS: REST
PAIN LOCATION - EXACERBATING FACTORS: SNEEZING, COUGHING
PAIN: 1
PAIN LOCATION - PAIN QUALITY: SHARP
PAIN LOCATION - PAIN FREQUENCY: WITH ACTIVITY
HIGHEST PAIN SEVERITY IN PAST 24 HOURS: 5/10
PAIN LOCATION: CHEST
PERSON REPORTING PAIN: PATIENT

## 2025-03-04 NOTE — PROGRESS NOTES
Call regarding appt. with PCP on 3/3/2025 after Hospital DC.     At time of outreach call the patient feels as if their condition has improved since last visit.    Encouraged to call providers for any questions concerns or change in condition

## 2025-03-06 ENCOUNTER — HOME CARE VISIT (OUTPATIENT)
Dept: HOME HEALTH SERVICES | Facility: HOME HEALTH | Age: 75
End: 2025-03-06
Payer: MEDICARE

## 2025-03-06 VITALS
SYSTOLIC BLOOD PRESSURE: 102 MMHG | TEMPERATURE: 97.4 F | HEART RATE: 108 BPM | OXYGEN SATURATION: 97 % | RESPIRATION RATE: 18 BRPM | DIASTOLIC BLOOD PRESSURE: 70 MMHG

## 2025-03-06 VITALS
HEART RATE: 88 BPM | TEMPERATURE: 98 F | RESPIRATION RATE: 18 BRPM | WEIGHT: 150 LBS | OXYGEN SATURATION: 98 % | SYSTOLIC BLOOD PRESSURE: 126 MMHG | BODY MASS INDEX: 23.49 KG/M2 | DIASTOLIC BLOOD PRESSURE: 64 MMHG

## 2025-03-06 PROCEDURE — G0299 HHS/HOSPICE OF RN EA 15 MIN: HCPCS | Mod: HHH

## 2025-03-06 PROCEDURE — G0151 HHCP-SERV OF PT,EA 15 MIN: HCPCS | Mod: HHH

## 2025-03-06 ASSESSMENT — ENCOUNTER SYMPTOMS
MUSCLE WEAKNESS: 1
DYSPNEA ACTIVITY LEVEL: AFTER AMBULATING MORE THAN 20 FT
PERSON REPORTING PAIN: PATIENT
SHORTNESS OF BREATH: 1
HIGHEST PAIN SEVERITY IN PAST 24 HOURS: 1/10
SUBJECTIVE PAIN PROGRESSION: RAPIDLY IMPROVING
FATIGUES EASILY: 1
PAIN LOCATION: CHEST
PAIN: 1
DYSPNEA ON EXERTION: 1
PAIN SEVERITY GOAL: 0/10
APPETITE LEVEL: GOOD
CHANGE IN APPETITE: UNCHANGED
DENIES PAIN: 1
LOWEST PAIN SEVERITY IN PAST 24 HOURS: 0/10

## 2025-03-06 ASSESSMENT — ACTIVITIES OF DAILY LIVING (ADL)
AMBULATION ASSISTANCE: STAND BY ASSIST
AMBULATION ASSISTANCE: 1

## 2025-03-10 ENCOUNTER — HOME CARE VISIT (OUTPATIENT)
Dept: HOME HEALTH SERVICES | Facility: HOME HEALTH | Age: 75
End: 2025-03-10
Payer: MEDICARE

## 2025-03-10 VITALS
DIASTOLIC BLOOD PRESSURE: 64 MMHG | HEART RATE: 96 BPM | SYSTOLIC BLOOD PRESSURE: 98 MMHG | OXYGEN SATURATION: 98 % | RESPIRATION RATE: 18 BRPM | TEMPERATURE: 97.4 F

## 2025-03-10 PROCEDURE — G0151 HHCP-SERV OF PT,EA 15 MIN: HCPCS | Mod: HHH

## 2025-03-10 ASSESSMENT — ENCOUNTER SYMPTOMS
PERSON REPORTING PAIN: PATIENT
DENIES PAIN: 1

## 2025-03-13 ENCOUNTER — HOME CARE VISIT (OUTPATIENT)
Dept: HOME HEALTH SERVICES | Facility: HOME HEALTH | Age: 75
End: 2025-03-13
Payer: MEDICARE

## 2025-03-13 VITALS
WEIGHT: 149 LBS | TEMPERATURE: 97.5 F | SYSTOLIC BLOOD PRESSURE: 126 MMHG | OXYGEN SATURATION: 98 % | RESPIRATION RATE: 16 BRPM | DIASTOLIC BLOOD PRESSURE: 72 MMHG | BODY MASS INDEX: 23.34 KG/M2 | HEART RATE: 100 BPM

## 2025-03-13 VITALS
DIASTOLIC BLOOD PRESSURE: 60 MMHG | TEMPERATURE: 97.1 F | SYSTOLIC BLOOD PRESSURE: 120 MMHG | OXYGEN SATURATION: 98 % | HEART RATE: 84 BPM

## 2025-03-13 PROCEDURE — G0299 HHS/HOSPICE OF RN EA 15 MIN: HCPCS | Mod: HHH

## 2025-03-13 PROCEDURE — G0151 HHCP-SERV OF PT,EA 15 MIN: HCPCS | Mod: HHH

## 2025-03-13 ASSESSMENT — ENCOUNTER SYMPTOMS
PAIN LOCATION - PAIN FREQUENCY: INTERMITTENT
CHANGE IN APPETITE: UNCHANGED
APPETITE LEVEL: GOOD
PAIN LOCATION - EXACERBATING FACTORS: TOO MUCH MOVEMENT
LOWEST PAIN SEVERITY IN PAST 24 HOURS: 0/10
PAIN LOCATION - PAIN QUALITY: TIGHT
PAIN LOCATION: CHEST
SUBJECTIVE PAIN PROGRESSION: GRADUALLY IMPROVING
FATIGUES EASILY: 1
HIGHEST PAIN SEVERITY IN PAST 24 HOURS: 2/10
PAIN LOCATION - PAIN SEVERITY: 2/10
DENIES PAIN: 1
PERSON REPORTING PAIN: PATIENT
PAIN: 1

## 2025-03-13 ASSESSMENT — ACTIVITIES OF DAILY LIVING (ADL): AMBULATION ASSISTANCE: 1

## 2025-03-18 ENCOUNTER — PATIENT OUTREACH (OUTPATIENT)
Dept: PRIMARY CARE | Facility: CLINIC | Age: 75
End: 2025-03-18
Payer: MEDICARE

## 2025-03-18 ENCOUNTER — HOME CARE VISIT (OUTPATIENT)
Dept: HOME HEALTH SERVICES | Facility: HOME HEALTH | Age: 75
End: 2025-03-18
Payer: MEDICARE

## 2025-03-18 VITALS — SYSTOLIC BLOOD PRESSURE: 115 MMHG | DIASTOLIC BLOOD PRESSURE: 68 MMHG | OXYGEN SATURATION: 97 % | HEART RATE: 104 BPM

## 2025-03-18 PROCEDURE — G0157 HHC PT ASSISTANT EA 15: HCPCS | Mod: CQ,HHH

## 2025-03-18 SDOH — HEALTH STABILITY: PHYSICAL HEALTH: EXERCISE TYPE: BLE IN STANDING

## 2025-03-18 ASSESSMENT — ENCOUNTER SYMPTOMS
PERSON REPORTING PAIN: PATIENT
DENIES PAIN: 1

## 2025-03-18 ASSESSMENT — ACTIVITIES OF DAILY LIVING (ADL)
AMBULATION_DISTANCE/DURATION_TOLERATED: 50 FEET
AMBULATION ASSISTANCE ON FLAT SURFACES: 1

## 2025-03-18 NOTE — PROGRESS NOTES
Call regarding  F/U continues with P.T in home    At time of outreach call the patient feels as if their condition has improved since last visit.    Encouraged to call providers for any questions concerns or change in condition

## 2025-03-20 ENCOUNTER — TELEPHONE (OUTPATIENT)
Dept: CARDIAC SURGERY | Facility: CLINIC | Age: 75
End: 2025-03-20
Payer: MEDICARE

## 2025-03-20 ENCOUNTER — HOME CARE VISIT (OUTPATIENT)
Dept: HOME HEALTH SERVICES | Facility: HOME HEALTH | Age: 75
End: 2025-03-20
Payer: MEDICARE

## 2025-03-20 NOTE — TELEPHONE ENCOUNTER
Top of right foot, ankle, toes tingling with some numbness. Said this morning had pain just below incision site of harvest that resolved without medication. Is walking slower - not losing balance but not comfortable exercising or doing stairs at this time. Is using cane. The incision is healing without redness or swelling or drainage she states. She wonders is this normal or if she should do something else. Notified felipe DOWELL

## 2025-03-21 ENCOUNTER — HOME CARE VISIT (OUTPATIENT)
Dept: HOME HEALTH SERVICES | Facility: HOME HEALTH | Age: 75
End: 2025-03-21
Payer: MEDICARE

## 2025-03-21 VITALS
TEMPERATURE: 97.8 F | DIASTOLIC BLOOD PRESSURE: 68 MMHG | HEART RATE: 100 BPM | SYSTOLIC BLOOD PRESSURE: 118 MMHG | BODY MASS INDEX: 23.49 KG/M2 | WEIGHT: 150 LBS | OXYGEN SATURATION: 100 % | RESPIRATION RATE: 20 BRPM

## 2025-03-21 PROCEDURE — G0299 HHS/HOSPICE OF RN EA 15 MIN: HCPCS | Mod: HHH

## 2025-03-21 ASSESSMENT — ENCOUNTER SYMPTOMS: DENIES PAIN: 1

## 2025-03-22 ASSESSMENT — ACTIVITIES OF DAILY LIVING (ADL)
HOME_HEALTH_OASIS: 00
AMBULATION ASSISTANCE: INDEPENDENT
AMBULATION ASSISTANCE: 1
OASIS_M1830: 00

## 2025-03-26 ENCOUNTER — APPOINTMENT (OUTPATIENT)
Dept: CARDIOLOGY | Facility: CLINIC | Age: 75
End: 2025-03-26
Payer: MEDICARE

## 2025-03-26 VITALS
TEMPERATURE: 98.8 F | OXYGEN SATURATION: 97 % | HEART RATE: 75 BPM | SYSTOLIC BLOOD PRESSURE: 104 MMHG | WEIGHT: 148 LBS | RESPIRATION RATE: 16 BRPM | BODY MASS INDEX: 23.23 KG/M2 | HEIGHT: 67 IN | DIASTOLIC BLOOD PRESSURE: 56 MMHG

## 2025-03-26 DIAGNOSIS — Z95.1 S/P CABG (CORONARY ARTERY BYPASS GRAFT): ICD-10-CM

## 2025-03-26 DIAGNOSIS — I20.89 CHRONIC STABLE ANGINA: ICD-10-CM

## 2025-03-26 DIAGNOSIS — Z95.1 S/P CABG X 2: ICD-10-CM

## 2025-03-26 DIAGNOSIS — I10 PRIMARY HYPERTENSION: ICD-10-CM

## 2025-03-26 DIAGNOSIS — E78.2 MIXED HYPERLIPIDEMIA: Primary | ICD-10-CM

## 2025-03-26 DIAGNOSIS — I25.118 CORONARY ARTERY DISEASE OF NATIVE ARTERY OF NATIVE HEART WITH STABLE ANGINA PECTORIS: ICD-10-CM

## 2025-03-26 DIAGNOSIS — I48.0 PAROXYSMAL ATRIAL FIBRILLATION (MULTI): ICD-10-CM

## 2025-03-26 DIAGNOSIS — I77.810 ASCENDING AORTA DILATATION: ICD-10-CM

## 2025-03-26 PROCEDURE — 1126F AMNT PAIN NOTED NONE PRSNT: CPT | Performed by: INTERNAL MEDICINE

## 2025-03-26 PROCEDURE — 3078F DIAST BP <80 MM HG: CPT | Performed by: INTERNAL MEDICINE

## 2025-03-26 PROCEDURE — G2211 COMPLEX E/M VISIT ADD ON: HCPCS | Performed by: INTERNAL MEDICINE

## 2025-03-26 PROCEDURE — 1160F RVW MEDS BY RX/DR IN RCRD: CPT | Performed by: INTERNAL MEDICINE

## 2025-03-26 PROCEDURE — 99214 OFFICE O/P EST MOD 30 MIN: CPT | Performed by: INTERNAL MEDICINE

## 2025-03-26 PROCEDURE — 1123F ACP DISCUSS/DSCN MKR DOCD: CPT | Performed by: INTERNAL MEDICINE

## 2025-03-26 PROCEDURE — 1036F TOBACCO NON-USER: CPT | Performed by: INTERNAL MEDICINE

## 2025-03-26 PROCEDURE — 3074F SYST BP LT 130 MM HG: CPT | Performed by: INTERNAL MEDICINE

## 2025-03-26 PROCEDURE — 1159F MED LIST DOCD IN RCRD: CPT | Performed by: INTERNAL MEDICINE

## 2025-03-26 ASSESSMENT — LIFESTYLE VARIABLES
SKIP TO QUESTIONS 9-10: 1
TOTAL SCORE: 0
AUDIT-C TOTAL SCORE: 0
HOW OFTEN DO YOU HAVE SIX OR MORE DRINKS ON ONE OCCASION: NEVER
HOW OFTEN DO YOU HAVE A DRINK CONTAINING ALCOHOL: NEVER
HOW MANY STANDARD DRINKS CONTAINING ALCOHOL DO YOU HAVE ON A TYPICAL DAY: PATIENT DOES NOT DRINK

## 2025-03-26 ASSESSMENT — ENCOUNTER SYMPTOMS
LOSS OF SENSATION IN FEET: 0
OCCASIONAL FEELINGS OF UNSTEADINESS: 0
DEPRESSION: 0

## 2025-03-26 ASSESSMENT — PAIN SCALES - GENERAL: PAINLEVEL_OUTOF10: 0-NO PAIN

## 2025-03-26 NOTE — PROGRESS NOTES
Subjective   Chief Complaint   Patient presents with    Hospital Follow-up     Radha Montalvo presents to the office today for a hospital follow up. She was admitted for Coronary Artery Bypass Graft.         75-year-old female patient history of breast cancer, approximator fibrillation with ascending aorta dilatation past.  History of elective CABG.  Patient had two-vessel CABG including LIMA to LAD with the vein graft to obtuse marginal branch with the left atrial appendage clip done.  No active chest pain tightness.  Ascending aortic root diameter was 3.9 the past with normal ejection fraction.  Patient now complaining of tiredness and fatigue.  Surgery was happening in the middle of a February.    Past Medical History:   Diagnosis Date    Abnormal findings on diagnostic imaging of heart and coronary circulation 10/22/2023    Aneurysm (CMS-HCC) 10/23/2024    Arthritis     Arthritis of left acromioclavicular joint 02/15/2024    Ascending aorta dilatation (CMS-HCC) 11/22/2023    Atrial fibrillation (Multi) 10/23/2023    Biceps tendinitis of left shoulder 02/15/2024    Breast cancer (Multi) 2013    Cancer (Multi) 2013    Cataract     s/p extraction    Congenital dilatation of aorta ([facility]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    Easy bruising     Blood thinner    Edema of both lower extremities 01/13/2024    Essential hypertension 10/06/2023    Hx antineoplastic chemo     Hyperlipidemia 10/06/2023    Hypertension 10/06/2023    Irregular heart beat     Afib    Labral tear of long head of biceps tendon, right, initial encounter 02/15/2024    Parathyroid disease (Multi)     s/p paratcyoiectomy)    Peripheral neuropathy     feet, bilatera    Personal history of irradiation     Personal history of other diseases of the musculoskeletal system and connective tissue 10/06/2023    History of arthritis    Primary osteoarthritis of both shoulders 02/15/2024    Scoliosis     Shortness of breath 10/23/2023    Sleep apnea     CPAP     Past Surgical  History:   Procedure Laterality Date    ACHILLES TENDON SURGERY      Only less evasive    BI MAMMO GUIDED BREAST LEFT LOCALIZATION Left 02/25/2013    BI MAMMO GUIDED LOCALIZATION BREAST LEFT [facility]    BREAST BIOPSY      BREAST LUMPECTOMY Left 2013    CARDIAC CATHETERIZATION N/A 01/27/2025    Procedure: Left Heart Cath with Coronary Angiography and LV;  Surgeon: Paulo Dougherty MD;  Location: [facility];  Service: Cardiovascular;  Laterality: N/A;  no prior auth needed    CARDIAC ELECTROPHYSIOLOGY PROCEDURE N/A 02/14/2025    Procedure: Ablation A-Fib;  Surgeon: Dwayne Jay MD;  Location: [facility];  Service: Cardiac Surgery;  Laterality: N/A;    CARPAL TUNNEL RELEASE  ?    CATARACT EXTRACTION Bilateral     COLONOSCOPY  2014    HYSTERECTOMY  2000    HYSTEROSCOPY      LYMPH NODE BIOPSY      MAMMOGRAM DIAGNOSTIC BI Bilateral 09/16/2024    OTHER SURGICAL HISTORY  04/27/2022    Lumpectomy    PARATHYROID GLAND SURGERY  Not sure    TOENAIL EXCISION      It came back    TONSILLECTOMY      TUBAL LIGATION      US COMPLETE BREAST Left 09/16/2024    WISDOM TOOTH EXTRACTION       Family medical history includes stroke in 2 relatives (father, mother).  Current Outpatient Medications   Medication Sig Dispense Refill    acetaminophen (Tylenol) 325 mg tablet Take 2 tablets (650 mg) by mouth every 6 hours if needed for mild pain (1 - 3).      acyclovir (Zovirax) 800 mg tablet Take 1 tablet (800 mg) by mouth once daily. 90 tablet 1    amiodarone (Pacerone) 200 mg tablet One daily (Patient taking differently: Take 1 tablet (200 mg) by mouth once daily. One daily) 30 tablet 1    apixaban (Eliquis) 5 mg tablet Take 1 tablet (5 mg) by mouth 2 times a day. 60 tablet 11    aspirin 81 mg chewable tablet Chew 1 tablet (81 mg) once daily. 30 tablet 0    atorvastatin (Lipitor) 80 mg tablet Take 1 tablet (80 mg) by mouth once daily at bedtime. 90 tablet 3    bisoprolol (Zebeta) 10 mg tablet Take 2 tablets (20 mg)  by mouth once daily. 180 tablet 1    cholecalciferol (Vitamin D-3) 125 mcg (5000 UT) capsule Take 1 capsule (125 mcg) by mouth once daily.      cyanocobalamin, vitamin B-12, 500 mcg tablet,disintegrating Dissolve 500 mcg in the mouth once daily. Indications: prevention of vitamin B12 deficiency      dilTIAZem ER (Tiazac) 120 mg 24 hr capsule Take 1 capsule (120 mg) by mouth once daily. 90 capsule 3    ferrous sulfate/ascorbate sod (FERROUS SULFATE-VITAMIN C ORAL) Take 1 tablet by mouth once daily.      furosemide (Lasix) 20 mg tablet Take 1 tablet (20 mg) by mouth once daily. 90 tablet 1    omega-3 (Fish OiL) 300-1,000 mg capsule Take 1 capsule (1,000 mg) by mouth once daily.      potassium chloride CR (Klor-Con) 10 mEq ER tablet Take 1 tablet (10 mEq) by mouth once daily. Do not crush, chew, or split. 90 tablet 3     No current facility-administered medications for this visit.      reports that she has never smoked. She has never been exposed to tobacco smoke. She has never used smokeless tobacco. She reports that she does not currently use alcohol. She reports that she does not use drugs.  Allergies:  Patient has no known allergies.    ROS: See HPI  CONSTITUTIONAL: Chills- none. Fever- none. Weight change appropriate for age.  HEENT: Headache- Negative.  Change in vision- none.  Ear pain- none. Nasal congestion- none. Post-nasal drip-none.  Sore throat-none.  CARDIOLOGY: Chest pain- none.  Leg edema-trace.  Murmurs-soft systolic.  Palpitation- none.  RESPIRATORY: Denies any shortness of breath.  GI: Abdominal pain- none.  Change in bowel habits- none.  Constipation- none.  Diarrhea- none.  Nausea- none.  Vomiting- none.  MUSCULOSKELETAL: Joint pain- none.  Muscle aches- none.  DERMATOLOGY: Rash- none.  NEUROLOGY: Dizziness- none.   Headache- none.  PSYCHIATRY: Denies any depression or anxiety     Vitals:    03/26/25 1430   BP: 104/56   Pulse: 75   Resp: 16   Temp: 37.1 °C (98.8 °F)   SpO2: 97%   Weight: 67.1 kg  "(148 lb)   Height: 1.702 m (5' 7\")   PainSc: 0-No pain      BMI:Body mass index is 23.18 kg/m².   General Cardiology:  General Appearance: Alert, oriented and in no acute distress.  HEENT: extra ocular movements intact (EOMI), pupils equal,  round, reactive to light and accommodation (PERRLA).  Carotid Upstroke: no bruit, normal.  Jugular Venous Distention (JVD): flat.  Chest: normal.  Lungs: Clear to auscultation,   Heart Sounds: no S3 or S4, normal S1, S2, regular rate.  Murmur, Click, Gallop: no systolic murmur.  Abdomen: no hepatomegaly, no masses felt, soft.  Extremities: no leg edema.  Peripheral pulses: 2 plus bilateral.  NEUROLOGY Cranial nerves II-XII grossly intact.     Patient Active Problem List   Diagnosis    Carpal tunnel syndrome of right wrist    Closed fracture of distal end of fibula    Contact dermatitis    Disorder of intervertebral disc of lumbar spine    Herpes simplex infection of skin    Hyperlipidemia    Hyperparathyroidism (Multi)    Hypertension    Malignant neoplasm of female breast    Neuropathy    Numbness    Hemorrhoids without complication    Residual hemorrhoidal skin tags    Vitamin D deficiency    Abnormal x-ray examination    Herpesviral infection of perianal skin and rectum    Shortness of breath    Chronic stable angina (CMS-HCC)    Cervical radiculitis    Degenerative disc disease, cervical    History of malignant neoplasm of breast    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    Acute frontal sinusitis    Ascending aorta dilatation (CMS-HCC)    Atrial fibrillation (Multi)    Congenital dilatation of aorta (HHS-HCC)    Corneal abrasion    Edema of both lower extremities    Herpes zoster    Injury of eye region    Ankle pain    Pain of left lower extremity    Pain of right lower extremity    History of malignant neoplasm of left breast    Coronary artery disease of native artery of native heart with stable angina pectoris    S/P CABG x 2     Assessment/Plan "             Problem List Items Addressed This Visit       Hyperlipidemia - Primary    Hypertension    Chronic stable angina (CMS-HCC)    Ascending aorta dilatation (CMS-HCC)    Atrial fibrillation (Multi)    Coronary artery disease of native artery of native heart with stable angina pectoris    S/P CABG x 2     Other Visit Diagnoses       S/P CABG (coronary artery bypass graft)               75-year female patient with a status post CABG.  Paroxysmal atrial fibrillation complaint of tiredness fatigue postop 1 month.  Currently on multiple medication  1.  CAD: Continue current aspirin as well as beta-blocker.  Patient currently on bisoprolol 10 mg tablet p.o. daily.  2.  Paroxysmal atrial fibrillation: Continue current Cardizem and Eliquis.  3.  Complaint of fatigue/tiredness: Patient has low blood pressure at the moment.  Advised patient to discontinue Lasix or furosemide as well as a potassium pill.  Take only Lasix and potassium if needed or weight gain more than 3 pound.  Advised patient to CT surgery and they will start the cardiac rehab.    Advised patient to avoid lunch meats, canned soups, pizzas, bread rolls, and sandwiches. Advised patient to limit salt intake 1,500 mg daily. Advised patient to exercise 30 mins/3 times a week including treadmill or aerobic type, Goal to achieve 65% target HR.    Diet and exercise reviewed with patient..advice to walk about 10,000 steps or about 2 hours during day time. Cut back on salt, sugar and flour.    Pt. care time is spent includes review of diagnostic tests, labs, radiographs, EKGs, old echoes, cardiac work-up and coordination of care. Assessment, impression and plans are reflected in the note above as well as the orders.    Paulo Dougherty MD

## 2025-03-28 DIAGNOSIS — I25.10 CAD IN NATIVE ARTERY: ICD-10-CM

## 2025-03-31 ENCOUNTER — TELEPHONE (OUTPATIENT)
Dept: CARDIAC SURGERY | Facility: CLINIC | Age: 75
End: 2025-03-31
Payer: MEDICARE

## 2025-03-31 DIAGNOSIS — Z95.1 S/P CABG (CORONARY ARTERY BYPASS GRAFT): ICD-10-CM

## 2025-03-31 DIAGNOSIS — I48.91 ATRIAL FIBRILLATION, UNSPECIFIED TYPE (MULTI): ICD-10-CM

## 2025-03-31 NOTE — TELEPHONE ENCOUNTER
I called and spoke with Radha Montalvo about the upcoming visit in the AFIB clinic.  I informed the patient of the EKG that Dr. Daly is requesting prior to the visit. I ordered the EKG and gave the patient the information to the closest  facility where they can have the EKG done.  I provided the patient with the walk in hours and times for that location.

## 2025-04-07 ENCOUNTER — OFFICE VISIT (OUTPATIENT)
Dept: CARDIAC SURGERY | Facility: HOSPITAL | Age: 75
End: 2025-04-07
Payer: MEDICARE

## 2025-04-07 ENCOUNTER — HOSPITAL ENCOUNTER (OUTPATIENT)
Dept: RADIOLOGY | Facility: HOSPITAL | Age: 75
Discharge: HOME | End: 2025-04-07
Payer: MEDICARE

## 2025-04-07 VITALS
SYSTOLIC BLOOD PRESSURE: 124 MMHG | OXYGEN SATURATION: 94 % | RESPIRATION RATE: 17 BRPM | WEIGHT: 154.5 LBS | DIASTOLIC BLOOD PRESSURE: 75 MMHG | HEART RATE: 96 BPM | HEIGHT: 67 IN | BODY MASS INDEX: 24.25 KG/M2

## 2025-04-07 DIAGNOSIS — I25.10 CAD IN NATIVE ARTERY: ICD-10-CM

## 2025-04-07 DIAGNOSIS — Z95.1 S/P CABG X 2: ICD-10-CM

## 2025-04-07 DIAGNOSIS — I48.0 PAROXYSMAL ATRIAL FIBRILLATION (MULTI): ICD-10-CM

## 2025-04-07 PROCEDURE — 99212 OFFICE O/P EST SF 10 MIN: CPT | Mod: 25 | Performed by: THORACIC SURGERY (CARDIOTHORACIC VASCULAR SURGERY)

## 2025-04-07 PROCEDURE — 93005 ELECTROCARDIOGRAM TRACING: CPT | Performed by: THORACIC SURGERY (CARDIOTHORACIC VASCULAR SURGERY)

## 2025-04-07 PROCEDURE — 99024 POSTOP FOLLOW-UP VISIT: CPT | Performed by: THORACIC SURGERY (CARDIOTHORACIC VASCULAR SURGERY)

## 2025-04-07 PROCEDURE — 71046 X-RAY EXAM CHEST 2 VIEWS: CPT

## 2025-04-07 PROCEDURE — 71046 X-RAY EXAM CHEST 2 VIEWS: CPT | Performed by: RADIOLOGY

## 2025-04-07 PROCEDURE — 93010 ELECTROCARDIOGRAM REPORT: CPT | Performed by: INTERNAL MEDICINE

## 2025-04-07 RX ORDER — AMIODARONE HYDROCHLORIDE 200 MG/1
TABLET ORAL
Qty: 90 TABLET | Refills: 1 | OUTPATIENT
Start: 2025-04-07

## 2025-04-07 NOTE — PROGRESS NOTES
"CARDIOTHORACIC SURGERY FOLLOW-UP PROGRESS NOTE  Subjective   Radha Montalvo [id number] 1950  4/7/2025    Patient is a 75 y.o. female who presents for routine post-operative follow up. She denies any present complaints. Activity level has been appropriate.  Feels some SOB     Objective   /75 (BP Location: Left arm, Patient Position: Sitting, BP Cuff Size: Adult)   Pulse 96   Resp 17   Ht 1.702 m (5' 7\")   Wt 70.1 kg (154 lb 8 oz)   LMP  (LMP Unknown)   SpO2 94%   BMI 24.20 kg/m²   Heart: Regular rate and rhythm, S1, S2 normal, no murmur, click, rub or gallop.  Lungs: Clear to auscultation bilaterally.  Abdomen: Soft, non-distended, non-tender to palpation.  Extremities: Warm, well perfused, pulses intact, no cyanosis, clubbing, or edema.  Neuro: Alert and oriented X4, moving all extremities with no deficits observed.  Incision(s): Well healing without erythema or drainage.  Imaging: Chest x-ray reviewed. Sternum healing well.    Assessment/Plan   Diagnoses and all orders for this visit:  S/P CABG x 2  -     Referral to Cardiac Rehab; Future    CXR looks OK  Cardiac rehab  Will see Dr Daly for  the A fib clinic in a month or so  Dwayne Jay MD    "

## 2025-04-08 DIAGNOSIS — Z95.1 S/P CABG (CORONARY ARTERY BYPASS GRAFT): ICD-10-CM

## 2025-04-08 LAB
ATRIAL RATE: 93 BPM
P OFFSET: 147 MS
P ONSET: 109 MS
PR INTERVAL: 236 MS
Q ONSET: 227 MS
QRS COUNT: 15 BEATS
QRS DURATION: 86 MS
QT INTERVAL: 430 MS
QTC CALCULATION(BAZETT): 534 MS
QTC FREDERICIA: 498 MS
R AXIS: -52 DEGREES
T AXIS: 52 DEGREES
T OFFSET: 442 MS
VENTRICULAR RATE: 93 BPM

## 2025-04-08 RX ORDER — AMIODARONE HYDROCHLORIDE 200 MG/1
TABLET ORAL
Qty: 90 TABLET | Refills: 3 | Status: SHIPPED | OUTPATIENT
Start: 2025-04-08

## 2025-04-10 ENCOUNTER — OFFICE VISIT (OUTPATIENT)
Dept: PRIMARY CARE | Facility: CLINIC | Age: 75
End: 2025-04-10
Payer: MEDICARE

## 2025-04-10 VITALS
DIASTOLIC BLOOD PRESSURE: 82 MMHG | OXYGEN SATURATION: 97 % | SYSTOLIC BLOOD PRESSURE: 120 MMHG | RESPIRATION RATE: 18 BRPM | HEART RATE: 76 BPM | WEIGHT: 155.6 LBS | BODY MASS INDEX: 24.37 KG/M2 | TEMPERATURE: 97.5 F

## 2025-04-10 DIAGNOSIS — J02.9 ACUTE PHARYNGITIS, UNSPECIFIED ETIOLOGY: Primary | ICD-10-CM

## 2025-04-10 DIAGNOSIS — R09.82 POST-NASAL DRIP: ICD-10-CM

## 2025-04-10 PROCEDURE — 1126F AMNT PAIN NOTED NONE PRSNT: CPT | Performed by: REGISTERED NURSE

## 2025-04-10 PROCEDURE — 99213 OFFICE O/P EST LOW 20 MIN: CPT | Performed by: REGISTERED NURSE

## 2025-04-10 PROCEDURE — 1123F ACP DISCUSS/DSCN MKR DOCD: CPT | Performed by: REGISTERED NURSE

## 2025-04-10 PROCEDURE — 1160F RVW MEDS BY RX/DR IN RCRD: CPT | Performed by: REGISTERED NURSE

## 2025-04-10 PROCEDURE — 3074F SYST BP LT 130 MM HG: CPT | Performed by: REGISTERED NURSE

## 2025-04-10 PROCEDURE — 1159F MED LIST DOCD IN RCRD: CPT | Performed by: REGISTERED NURSE

## 2025-04-10 PROCEDURE — 3079F DIAST BP 80-89 MM HG: CPT | Performed by: REGISTERED NURSE

## 2025-04-10 PROCEDURE — 1036F TOBACCO NON-USER: CPT | Performed by: REGISTERED NURSE

## 2025-04-10 ASSESSMENT — ENCOUNTER SYMPTOMS
LOSS OF SENSATION IN FEET: 0
SORE THROAT: 1
SINUS PRESSURE: 1
DEPRESSION: 0
OCCASIONAL FEELINGS OF UNSTEADINESS: 0

## 2025-04-10 ASSESSMENT — PATIENT HEALTH QUESTIONNAIRE - PHQ9
1. LITTLE INTEREST OR PLEASURE IN DOING THINGS: NOT AT ALL
SUM OF ALL RESPONSES TO PHQ9 QUESTIONS 1 AND 2: 0
2. FEELING DOWN, DEPRESSED OR HOPELESS: NOT AT ALL

## 2025-04-10 ASSESSMENT — PAIN SCALES - GENERAL: PAINLEVEL_OUTOF10: 0-NO PAIN

## 2025-04-10 NOTE — PROGRESS NOTES
Subjective   Patient ID: Radha Montalvo is a 75 y.o. female who presents for Sore Throat (Pt states that she is sore int he gland area on her neck the past few days and no other symptoms. ).    Sore Throat    Pt here for a sore throat for two days.    Review of Systems   HENT:  Positive for sore throat.    All other systems reviewed and are negative.      Objective   /82   Pulse 76   Temp 36.4 °C (97.5 °F)   Resp 18   Wt 70.6 kg (155 lb 9.6 oz)   LMP  (LMP Unknown)   SpO2 97%   BMI 24.37 kg/m²     Physical Exam  Vitals reviewed.   Constitutional:       Appearance: Normal appearance.   HENT:      Right Ear: Tympanic membrane, ear canal and external ear normal.      Left Ear: Tympanic membrane, ear canal and external ear normal.      Mouth/Throat:      Mouth: Mucous membranes are moist.      Pharynx: Posterior oropharyngeal erythema present.   Cardiovascular:      Rate and Rhythm: Normal rate and regular rhythm.      Pulses: Normal pulses.   Pulmonary:      Effort: Pulmonary effort is normal.   Neurological:      General: No focal deficit present.      Mental Status: She is alert and oriented to person, place, and time. Mental status is at baseline.   Psychiatric:         Mood and Affect: Mood normal.         Behavior: Behavior normal.         Thought Content: Thought content normal.         Judgment: Judgment normal.       Assessment/Plan   Problem List Items Addressed This Visit    None  Visit Diagnoses         Codes    Acute pharyngitis, unspecified etiology    -  Primary J02.9    Relevant Orders    POCT rapid strep A manually resulted    Group A Streptococcus, Culture

## 2025-04-10 NOTE — PROGRESS NOTES
Subjective   Patient ID: Radha Montalvo is a 75 y.o. female who presents for Sore Throat (Pt states that she is sore int he gland area on her neck the past few days and no other symptoms. ).    HPI   Pt here for sore throat and gland  Review of Systems   HENT:  Positive for postnasal drip, sinus pressure and sore throat.    All other systems reviewed and are negative.      Objective   /82   Pulse 76   Temp 36.4 °C (97.5 °F)   Resp 18   Wt 70.6 kg (155 lb 9.6 oz)   LMP  (LMP Unknown)   SpO2 97%   BMI 24.37 kg/m²     Physical Exam  Vitals reviewed.   Constitutional:       Appearance: Normal appearance.   HENT:      Right Ear: Tympanic membrane, ear canal and external ear normal.      Left Ear: Tympanic membrane, ear canal and external ear normal.      Nose: Nose normal.      Mouth/Throat:      Mouth: Mucous membranes are moist.      Pharynx: Posterior oropharyngeal erythema present.   Cardiovascular:      Rate and Rhythm: Normal rate.      Pulses: Normal pulses.   Pulmonary:      Effort: Pulmonary effort is normal.   Musculoskeletal:      Cervical back: No rigidity or tenderness.   Neurological:      Mental Status: She is alert.   Psychiatric:         Mood and Affect: Mood normal.         Behavior: Behavior normal.       Assessment/Plan   Problem List Items Addressed This Visit    None  Visit Diagnoses         Codes    Acute pharyngitis, unspecified etiology    -  Primary J02.9

## 2025-04-15 ENCOUNTER — CLINICAL SUPPORT (OUTPATIENT)
Dept: CARDIAC REHAB | Facility: HOSPITAL | Age: 75
End: 2025-04-15
Payer: MEDICARE

## 2025-04-15 ENCOUNTER — DOCUMENTATION (OUTPATIENT)
Dept: CARDIAC REHAB | Facility: HOSPITAL | Age: 75
End: 2025-04-15
Payer: MEDICARE

## 2025-04-15 DIAGNOSIS — Z95.1 S/P CABG (CORONARY ARTERY BYPASS GRAFT): Primary | ICD-10-CM

## 2025-04-15 DIAGNOSIS — Z95.1 S/P CABG X 2: ICD-10-CM

## 2025-04-15 NOTE — PROGRESS NOTES
Name: Radha Montalvo   : 1950   Diagnosis: CABG Z95.1  MRN: [id number]   Onset Date: 2025    Today's Date: 04/15/25      Cardiovascular   HX: CAD, CHF, HTN, and HLD, CABG  Family HX of CAD:  No  Angina: none  Describe: NA  Last Episode:   History of Heart Failure: No  EF: Over 50%  Onset of HF: NA  Last HF hospitalization:  Family HX of HF:  No  HF symptoms: Fatigue with exertion    Devices:  NA  HX of PAD: No    Arrythmias: atrial fibrillation  Apical: regular  Heart Rate: 59  BP: 118/62  Radial pulses:  R Present 2+ L Present 2+    Comments:      Respiratory  HX: NA  Dyspnea:  Yes  Describe: VERY SOB WITH MINIMAL EXERTION   HX DARIO:  Yes  CPAP Use:  Yes  Family History of Lung Disease:  No    Resting O2 sat: 97%  Lung Sounds:  CTA  Locations: all lobes    Comments:    Neurological   Orientation: oriented to person, place, time, and general circumstances  HX: NA  History of stroke/TIA?: NA    Comments:      Skin  Skin Color: normal, no cyanosis, jaundice, pallor or bruising  Edema:  NA      Comments:    Gastrointestinal/Genitourinary  HX: Celiac Disease  Comments:      Psychosocial  Marital status:   Children: 3  Lives alone:  Yes  Lives with:   Drives:  Yes  Occupation: is retired   Caretaker of family member?:  No  Do you feel safe at home?:  No    Caffeinated drinks per day: 1/DAY   Alcoholic drinks per day/week: 1/DAY  HX drug or alcohol abuse?: No  Current use of illicit drugs?: No    Comments:    Musculoskeletal  HX of injury/surgery: HISTORY OF SCOLIOSIS, CRUSHED VERTEBRATES     Comments:    Pain Assessment  Current pain: chronic  Location: LOWER BACK  Description: ACHY    Comments:    Fall Risk Assessment  Assistive device: no device  Needs assistance:  No  Afraid of falling:  No  Fall within the past 6 months?: No  Injured with fall:  Fall risk results: moderate

## 2025-04-16 ENCOUNTER — APPOINTMENT (OUTPATIENT)
Dept: CARDIOLOGY | Facility: HOSPITAL | Age: 75
End: 2025-04-16
Payer: MEDICARE

## 2025-04-17 ENCOUNTER — HOSPITAL ENCOUNTER (OUTPATIENT)
Dept: CARDIOLOGY | Facility: HOSPITAL | Age: 75
Discharge: HOME | End: 2025-04-17
Payer: MEDICARE

## 2025-04-17 DIAGNOSIS — Z95.1 S/P CABG (CORONARY ARTERY BYPASS GRAFT): ICD-10-CM

## 2025-04-17 PROCEDURE — 93018 CV STRESS TEST I&R ONLY: CPT | Performed by: INTERNAL MEDICINE

## 2025-04-17 PROCEDURE — 93016 CV STRESS TEST SUPVJ ONLY: CPT | Performed by: INTERNAL MEDICINE

## 2025-04-17 PROCEDURE — 93017 CV STRESS TEST TRACING ONLY: CPT

## 2025-04-22 ENCOUNTER — DOCUMENTATION (OUTPATIENT)
Dept: CARDIAC REHAB | Facility: HOSPITAL | Age: 75
End: 2025-04-22
Payer: MEDICARE

## 2025-04-22 NOTE — PROGRESS NOTES
Cardiac Rehabilitation Initial Treatment Plan    Name: Radha Montalvo  Medical Record Number: [id number]  YOB: 1950  Age: 75 y.o.    Today’s Date: 4/22/2025  Primary Care Physician: Vishnu Ordoñez DO  Referring Physician: Dr. Mc Jay MD  Program Location: [facility]  Primary Diagnosis: CABG Z95.1  Onset/Date of Diagnosis: 2/14/2025    Initial Assessment, not yet started program.    AACVPR Risk Stratification:  LOW     Falls Risk: Medium  Psychosocial Assessment     PHQ-9= TBD AT FIRST SESSION    Sent PH-Q 9 to MD if score > 20: Survey not yet completed.    Pt reported/currently experiencing stress: No  Patient uses stress management skills: Yes   History of: no history of anxiety or depression  Currently seeing a mental health provider: No  Social Support: Yes, Whom:   Quality of Life Survey: FERRANS AND CRUM  Quality of Life Survey:  PRE POST   GLOBAL SCORE 25.12 NA   HEALTH/FUNCTIONING SCORE 21.20 NA   SOCIAL/ECONOMIC SCORE 28.57 NA   PSYCHOLOGICAL/SPIRITUAL SCORE 26.57 NA   FAMILY SCORE 30.00 NA      Learning Assessment:  Learning assessment/barriers: None  Preferred learning method: Visual and Reading handout  Barriers: None  Comments:    Stages of Change:Maintenance    Psychosocial Plan    Goal Status: Initial Assessment; goals not yet started    Psychosocial Goals: Demonstrating proper techniques for stress management, Maintain or lower PH-Q 9 score by discharge, Identify strategies for managing depression, and Identify social supports    Psychosocial Interventions/Education:   *STRESS MANAGEMENT HANDOUT  *ASK PATIENT AT LEAST ONCE EVERY 30 DAYS ABOUT STRESS     Initial Assessment: PHQ-9    Nutrition Assessment:    Hyperlipidemia: Yes     Lipids:   Lab Results   Component Value Date    CHOL 138 08/15/2024    HDL 61.0 08/15/2024    TRIG 96 08/15/2024       Current Dietary Guidelines: Low fat, Low sodium  Barriers to dietary change: no    Diet Habit Survey: Rate Your  Plate  Pre:  61/69  Post: To be done at discharge.    Diabetes Assessment    Lab Results   Component Value Date    HGBA1C 6.1 (H) 02/07/2025       History of Diabetes: No    Weight Management     CURRENT WEIGHT: 156.0 POUNDS    Nutrition Plan    Goal Status: Initial Assessment; goals not yet started    Nutrition Goals: Lipid Goal: HDL>45, LDL <70, Total <180, Trigs <150, Improve Diet Habit Survey score by 5-10 points by discharge, Adapt a low-sodium, DASH diet prior to discharge, Adapt a Mediterranean focused diet prior to discharge, Learn how to read and interpret nutrition labels prior to discharge, and Lose 1lb/week while enrolled in program    Nutrition Interventions/Education:   *NUTRITION EDUCATION HANDOUTS  *CHOLESTEROL MANAGEMENT HANDOUT     Initial Assessment: RYP    Exercise Assessment    No  Mode: NA  Frequency: NA  Duration: NA    Exercise Prescription     Exercise Prescription based on: Pre-rehab stress test   Frequency:  3 days/week   Mode: Treadmill, NuStep, Recumbent Cycle, and Weights   Duration: 45-60 total aerobic minutes   Intensity: RPE 12-13  Target HR:     MET Level: 1.7  Patient wears supplemental O2: No     Modality Workload METs Duration (minutes)   1 Pre-Exercise   2:00   2 Treadmill 1.0 mph  1.8 5 :00   3 NuStep 14@3  1.6 5 :00   4 Recumbent Bike 10@4  2.4 5 :00   5 Weights 12-15 reps @ 2 pounds   10 :00   6 Post-Exercise   2:00     Resistance Training: No   Home Exercise Prescription given: No    Exercise Plan    Goal Status: Initial Assessment; goals not yet started    Exercise Goals: Increase exercise MET level by 5-10% each week, Increase total exercise duration to 30-45 minutes, Obtain 150 minutes/week of moderate intensity aerobic exercise, Initiate strength training 2-3 days a week, Initiate flexibility training 2-3 days a week, and Establish a home exercise program before discharge    Exercise Interventions/Education:   *EXERCISE FUNDAMENTALS AND MAINTENANCE HANDOUT  *REVIEW  THR AND INSTRUCTIONS FOR RADIAL PULSE CHECK     Initial Assessment: EST    Other Core Components/Risk Factor Assessment:    Medication adherence  Current Medications:   Medication Documentation Review Audit       Reviewed by GISELE Estevez (Nurse Practitioner) on 04/10/25 at 1116      Medication Order Taking? Sig Documenting Provider Last Dose Status   acetaminophen (Tylenol) 325 mg tablet [id number] Yes Take 2 tablets (650 mg) by mouth every 6 hours if needed for mild pain (1 - 3). GISELE Negron  Active   acyclovir (Zovirax) 800 mg tablet [id number] Yes Take 1 tablet (800 mg) by mouth once daily. Vishnu Ordoñez, DO  Active     Discontinued 04/08/25 0920   amiodarone (Pacerone) 200 mg tablet [id number] Yes One daily Paulo Dougherty MD  Active   apixaban (Eliquis) 5 mg tablet [id number] Yes Take 1 tablet (5 mg) by mouth 2 times a day. Paulo Dougherty MD  Active   aspirin 81 mg chewable tablet [id number] Yes Chew 1 tablet (81 mg) once daily. GISELE Negron  Active   atorvastatin (Lipitor) 80 mg tablet [id number] Yes Take 1 tablet (80 mg) by mouth once daily at bedtime. Vishnu Ordoñez DO  Active   bisoprolol (Zebeta) 10 mg tablet [id number] Yes Take 2 tablets (20 mg) by mouth once daily. Vishnu Ordoñez DO  Active   cholecalciferol (Vitamin D-3) 125 mcg (5000 UT) capsule [id number] Yes Take 1 capsule (125 mcg) by mouth once daily. Historical Provider, MD  Active   cyanocobalamin, vitamin B-12, 500 mcg tablet,disintegrating [id number] Yes Dissolve 500 mcg in the mouth once daily. Indications: prevention of vitamin B12 deficiency Historical Provider, MD  Active   dilTIAZem ER (Tiazac) 120 mg 24 hr capsule [id number] Yes Take 1 capsule (120 mg) by mouth once daily. Vishnu Ordoñez DO  Active   ferrous sulfate/ascorbate sod (FERROUS SULFATE-VITAMIN C ORAL) [id number]  Take 1 tablet by mouth once daily.   Patient not taking: Reported on 4/10/2025    Historical Provider, MD  Active      Discontinued 04/07/25 1300   omega-3 (Fish OiL) 300-1,000 mg capsule [id number] Yes Take 1 capsule (1,000 mg) by mouth once daily. Historical Provider, MD  Active     Discontinued 04/07/25 1300                                 Medication compliance: Yes   Uses pill box/organizer: Yes    Carries medication list: Yes     Blood Pressure Management  History of Hypertension: Yes   Medication Changes: No   Resting BP:  118/62    Heart Failure Management  Hx of Heart Failure: No    Smoking/Tobacco Assessment  Tobacco Use History[1]    Other Core Component Plan    Goal Status: Initial Assessment; goals not yet started    Other Core Component Goals: Medication compliance, Verbalize medication usage and drug actions by discharge, Verbalize SL NTG action and proper dosage by discharge, and Achieve resting BP of < 130/80 by discharge    Other Core Component Interventions/Education:   *EDUCATION: CAD, HYPERTENSION AND STROKE, RISK FACTORS FOR HEART DISEASE, UNDERSTANDING HEART VALVE DISEASE, UNDERSTANDING CHF.  *PROVIDE MED EDUCATION TOOL  *PROVIDE MED LIST IF NEEDED     Initial Assessment: KNOWLEDGE QUIZ    Individual Patient Goals:    BE ABLE TO WALK WITH  AND DOG WITHOUT BEING SHORT OF BREATH  LOSE 10 POUNDS AND GET MORE TONED    Goal Status: Initial Assessment; goals not yet started    Staff Comments:  ADVANCED DIRECTIVES REVIEWED WITH PATIENT.    Rehab Staff Signature: Emelia Henley RN             [1]   Social History  Tobacco Use   Smoking Status Never    Passive exposure: Never   Smokeless Tobacco Never

## 2025-04-23 ENCOUNTER — TELEPHONE (OUTPATIENT)
Dept: CARDIOLOGY | Facility: CLINIC | Age: 75
End: 2025-04-23
Payer: MEDICARE

## 2025-04-23 DIAGNOSIS — I25.118 CORONARY ARTERY DISEASE OF NATIVE ARTERY OF NATIVE HEART WITH STABLE ANGINA PECTORIS: ICD-10-CM

## 2025-04-23 DIAGNOSIS — Z95.1 S/P CABG X 2: ICD-10-CM

## 2025-04-23 DIAGNOSIS — R60.0 EDEMA OF BOTH LOWER EXTREMITIES: ICD-10-CM

## 2025-04-23 NOTE — TELEPHONE ENCOUNTER
Patient states she had a double bypass 2 months ago and was taken off lasik. After 1 month she began to notice some swelling in her bilateral lower extremities. The swelling has continued and she would like to know what you want to do for her. Please advise.

## 2025-04-24 RX ORDER — FUROSEMIDE 20 MG/1
20 TABLET ORAL 2 TIMES WEEKLY
Qty: 24 TABLET | Refills: 3 | Status: SHIPPED | OUTPATIENT
Start: 2025-04-24 | End: 2026-04-24

## 2025-04-24 NOTE — TELEPHONE ENCOUNTER
Please send the attached prescription to the patient's pharmacy as soon as possible. Thank you!    Requested Prescriptions     Pending Prescriptions Disp Refills    furosemide (Lasix) 20 mg tablet 24 tablet 3     Sig: Take 1 tablet (20 mg) by mouth 2 times a week.

## 2025-04-28 ENCOUNTER — CLINICAL SUPPORT (OUTPATIENT)
Dept: CARDIAC REHAB | Facility: HOSPITAL | Age: 75
End: 2025-04-28
Payer: MEDICARE

## 2025-04-28 DIAGNOSIS — Z95.1 S/P CABG (CORONARY ARTERY BYPASS GRAFT): ICD-10-CM

## 2025-04-28 PROCEDURE — 93798 PHYS/QHP OP CAR RHAB W/ECG: CPT

## 2025-04-30 ENCOUNTER — CLINICAL SUPPORT (OUTPATIENT)
Dept: CARDIAC REHAB | Facility: HOSPITAL | Age: 75
End: 2025-04-30
Payer: MEDICARE

## 2025-04-30 DIAGNOSIS — Z95.1 S/P CABG (CORONARY ARTERY BYPASS GRAFT): ICD-10-CM

## 2025-04-30 PROCEDURE — 93798 PHYS/QHP OP CAR RHAB W/ECG: CPT | Performed by: INTERNAL MEDICINE

## 2025-05-02 ENCOUNTER — CLINICAL SUPPORT (OUTPATIENT)
Dept: CARDIAC REHAB | Facility: HOSPITAL | Age: 75
End: 2025-05-02
Payer: MEDICARE

## 2025-05-02 DIAGNOSIS — Z95.1 S/P CABG (CORONARY ARTERY BYPASS GRAFT): ICD-10-CM

## 2025-05-02 PROCEDURE — 93798 PHYS/QHP OP CAR RHAB W/ECG: CPT

## 2025-05-05 ENCOUNTER — CLINICAL SUPPORT (OUTPATIENT)
Dept: CARDIAC REHAB | Facility: HOSPITAL | Age: 75
End: 2025-05-05
Payer: MEDICARE

## 2025-05-05 ENCOUNTER — APPOINTMENT (OUTPATIENT)
Dept: CARDIAC SURGERY | Facility: CLINIC | Age: 75
End: 2025-05-05
Payer: MEDICARE

## 2025-05-05 DIAGNOSIS — Z95.1 S/P CABG (CORONARY ARTERY BYPASS GRAFT): ICD-10-CM

## 2025-05-05 PROCEDURE — 93798 PHYS/QHP OP CAR RHAB W/ECG: CPT

## 2025-05-06 ENCOUNTER — TELEMEDICINE (OUTPATIENT)
Dept: CARDIAC SURGERY | Facility: CLINIC | Age: 75
End: 2025-05-06
Payer: MEDICARE

## 2025-05-06 DIAGNOSIS — I48.0 PAROXYSMAL ATRIAL FIBRILLATION (MULTI): Primary | ICD-10-CM

## 2025-05-06 PROCEDURE — 99024 POSTOP FOLLOW-UP VISIT: CPT

## 2025-05-06 ASSESSMENT — ENCOUNTER SYMPTOMS
LOSS OF SENSATION IN FEET: 0
OCCASIONAL FEELINGS OF UNSTEADINESS: 1
DEPRESSION: 0

## 2025-05-06 ASSESSMENT — PAIN SCALES - GENERAL: PAINLEVEL_OUTOF10: 0-NO PAIN

## 2025-05-06 NOTE — PROGRESS NOTES
Chief Complaint  Surveillance visit      A telemedicine visit (audio and video) between the patient (at the originating site) and the provider (at the distant site) was utilized to provide this telehealth service. ~     Verbal consent was requested and obtained from Radha Montalvo  on this date, 05/06/2025 11:00 AM , for a telehealth visit.     HPI:  Ms. Radha Montalvo is a 75 y.o. female, who presents for evaluation after surgical ablation. They are now 3 month out from their operation, and are recovering nicely.  They have resumed normal activities and appetite is returned to normal.  They have no chest pain, no shortness of breath, and denies palpitations, dizziness, or syncope.     Medical History[1]    Surgical History[2]    Family History[3]    Social History     Socioeconomic History    Marital status:      Spouse name: Not on file    Number of children: Not on file    Years of education: Not on file    Highest education level: Not on file   Occupational History    Not on file   Tobacco Use    Smoking status: Never     Passive exposure: Never    Smokeless tobacco: Never   Vaping Use    Vaping status: Never Used   Substance and Sexual Activity    Alcohol use: Yes     Comment: occasional    Drug use: Never    Sexual activity: Yes     Partners: Male     Birth control/protection: Post-menopausal   Other Topics Concern    Not on file   Social History Narrative    Not on file     Social Drivers of Health     Financial Resource Strain: Low Risk  (2/18/2025)    Overall Financial Resource Strain (CARDIA)     Difficulty of Paying Living Expenses: Not hard at all   Food Insecurity: No Food Insecurity (2/18/2025)    Hunger Vital Sign     Worried About Running Out of Food in the Last Year: Never true     Ran Out of Food in the Last Year: Never true   Transportation Needs: No Transportation Needs (3/21/2025)    OASIS : Transportation     Lack of Transportation (Medical): No     Lack of Transportation  (Non-Medical): No     Patient Unable or Declines to Respond: No   Physical Activity: Inactive (2/18/2025)    Exercise Vital Sign     Days of Exercise per Week: 0 days     Minutes of Exercise per Session: 0 min   Stress: No Stress Concern Present (2/18/2025)    [employer] - Occupational Stress Questionnaire     Feeling of Stress : Only a little   Social Connections: Feeling Socially Integrated (3/21/2025)    OASIS : Social Isolation     Frequency of experiencing loneliness or isolation: Never   Recent Concern: Social Connections - Moderately Isolated (2/18/2025)    Social Connection and Isolation Panel [NHANES]     Frequency of Communication with Friends and Family: More than three times a week     Frequency of Social Gatherings with Friends and Family: More than three times a week     Attends Confucianist Services: Never     Active Member of Clubs or Organizations: No     Attends Club or Organization Meetings: Never     Marital Status:    Intimate Partner Violence: Not At Risk (2/18/2025)    Humiliation, Afraid, Rape, and Kick questionnaire     Fear of Current or Ex-Partner: No     Emotionally Abused: No     Physically Abused: No     Sexually Abused: No   Housing Stability: Unknown (2/21/2025)    Housing Stability Vital Sign     Unable to Pay for Housing in the Last Year: Patient declined     Number of Times Moved in the Last Year: 0     Homeless in the Last Year: No       RX Allergies[4]    Encounter Medications[5]    Physical Exam  Constitutional:       Appearance: Normal appearance.   Neurological:      Mental Status: She is alert.   Psychiatric:         Attention and Perception: Attention normal.         Behavior: Behavior is cooperative.         Thought Content: Thought content normal.         Judgment: Judgment normal.       Results/Data: ECG:   Encounter Date: 04/07/25   ECG 12 lead (Ancillary Performed)   Result Value    Ventricular Rate 93    Atrial Rate 93    MD  Interval 236    QRS Duration 86    QT Interval 430    QTC Calculation(Bazett) 534    R Axis -52    T Axis 52    QRS Count 15    Q Onset 227    P Onset 109    P Offset 147    T Offset 442    QTC Fredericia 498    Narrative    Undetermined rhythm  Left axis deviation  Nonspecific ST abnormality  Prolonged QT  Abnormal ECG  When compared with ECG of 16-FEB-2025 11:58,  Sinus rhythm has replaced Atrial fibrillation  ST now depressed in Inferior leads  Nonspecific T wave abnormality no longer evident in Inferior leads  Confirmed by Nakul Soriano ([id number]) on 4/8/2025 9:09:31 AM       Assessment and Plan:   Paroxsymal atrial fibrillation(3 month visit)    S/P CABG x2 LIMA-LAD, SVG-OM, GP MAZE, and PATSY with 40 mm AtriClip on 02/14/2025; by Dr. Dwayne Jay.    Surgical ablation lines created:  Right antral pulmonary vein isolation, Left antral pulmonary vein isolation, Left atrial roof anchoring line, Left atrial floor anchoring line, Mitral valve annular circuit interruption line, Left atrial appendage circuit interruption line, Intercaval circuit interruption ine, Right atrial appendage circuit interruption line, Right atrial free wall anchoring line, Tricuspid valve annular circuit interruption line, and Left atrial appendage ligation    [id number] score: 2  HASBLED score: 2    Antiarrhythmic therapy: Amiodarone 200 mg daily, Bisoprolol 10 mg BID  Antiplatelet therapy: ASA 81 mg daily  Anticoagulation therapy: Apixaban 5 mg BID    Plan:  I am evaluating Mrs. Radha Gonzalez in our Surgical Arrhythmia Clinic.  The purpose of this clinic is to longitudinally follow our surgical patients and promote collaborative multidisciplinary care.  I am happy to report that they remain in sinus rhythm.  I have no current management changes for their medication regimen.  They remain in sinus rhythm with an adequate managed left atrial appendage.  We will plan on a rhythm monitor in July and an office visit in August to go over the  results.  This will be their 6-month visits.      Time: 40 minutes           [1]   Past Medical History:  Diagnosis Date    Abnormal findings on diagnostic imaging of heart and coronary circulation 10/22/2023    Aneurysm 10/23/2024    Arthritis     Arthritis of left acromioclavicular joint 02/15/2024    Ascending aorta dilatation 11/22/2023    Atrial fibrillation (Multi) 10/23/2023    Biceps tendinitis of left shoulder 02/15/2024    Breast cancer 2013    Cancer (Multi) 2013    Cataract     s/p extraction    Congenital dilatation of aorta ([facility]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    Easy bruising     Blood thinner    Edema of both lower extremities 01/13/2024    Essential hypertension 10/06/2023    Hx antineoplastic chemo     Hyperlipidemia 10/06/2023    Hypertension 10/06/2023    Irregular heart beat     Afib    Labral tear of long head of biceps tendon, right, initial encounter 02/15/2024    Parathyroid disease (Multi)     s/p paratcyoiectomy)    Peripheral neuropathy     feet, bilatera    Personal history of irradiation     Personal history of other diseases of the musculoskeletal system and connective tissue 10/06/2023    History of arthritis    Primary osteoarthritis of both shoulders 02/15/2024    Scoliosis     Shortness of breath 10/23/2023    Sleep apnea     CPAP   [2]   Past Surgical History:  Procedure Laterality Date    ACHILLES TENDON SURGERY      Only less evasive    BI MAMMO GUIDED BREAST LEFT LOCALIZATION Left 02/25/2013    BI MAMMO GUIDED LOCALIZATION BREAST LEFT [facility] CLINICAL LEGACY    BREAST BIOPSY      BREAST LUMPECTOMY Left 2013    CARDIAC CATHETERIZATION N/A 01/27/2025    Procedure: Left Heart Cath with Coronary Angiography and LV;  Surgeon: Paulo Dougherty MD;  Location: [facility] Cardiac Cath Lab;  Service: Cardiovascular;  Laterality: N/A;  no prior auth needed    CARDIAC ELECTROPHYSIOLOGY PROCEDURE N/A 02/14/2025    Procedure: Ablation A-Fib;  Surgeon: Dwayne Jay MD;  Location: [facility];  Service: Cardiac  Surgery;  Laterality: N/A;    CARPAL TUNNEL RELEASE  ?    CATARACT EXTRACTION Bilateral     COLONOSCOPY  2014    HYSTERECTOMY  2000    HYSTEROSCOPY      LYMPH NODE BIOPSY      MAMMOGRAM DIAGNOSTIC BI Bilateral 09/16/2024    OTHER SURGICAL HISTORY  04/27/2022    Lumpectomy    PARATHYROID GLAND SURGERY  Not sure    TOENAIL EXCISION      It came back    TONSILLECTOMY      TUBAL LIGATION      US COMPLETE BREAST Left 09/16/2024    WISDOM TOOTH EXTRACTION     [3]   Family History  Problem Relation Name Age of Onset    Stroke Mother Christi Nair     Arthritis Mother Christi Nair     COPD Mother Christi Nair     Stroke Father Liborio Nair     Aneurysm Father Liborio Nair     Hypertension Father Liborio Nair    [4] No Known Allergies  [5]   Outpatient Encounter Medications as of 5/6/2025   Medication Sig Dispense Refill    acyclovir (Zovirax) 800 mg tablet Take 1 tablet (800 mg) by mouth once daily. 90 tablet 1    amiodarone (Pacerone) 200 mg tablet One daily 90 tablet 3    apixaban (Eliquis) 5 mg tablet Take 1 tablet (5 mg) by mouth 2 times a day. 60 tablet 11    aspirin 81 mg chewable tablet Chew 1 tablet (81 mg) once daily. 30 tablet 0    atorvastatin (Lipitor) 80 mg tablet Take 1 tablet (80 mg) by mouth once daily at bedtime. 90 tablet 3    bisoprolol (Zebeta) 10 mg tablet Take 2 tablets (20 mg) by mouth once daily. 180 tablet 1    cholecalciferol (Vitamin D-3) 125 mcg (5000 UT) capsule Take 1 capsule (125 mcg) by mouth once daily.      cyanocobalamin, vitamin B-12, 500 mcg tablet,disintegrating Dissolve 500 mcg in the mouth once daily. Indications: prevention of vitamin B12 deficiency      dilTIAZem ER (Tiazac) 120 mg 24 hr capsule Take 1 capsule (120 mg) by mouth once daily. 90 capsule 3    furosemide (Lasix) 20 mg tablet Take 1 tablet (20 mg) by mouth 2 times a week. 24 tablet 3    omega-3 (Fish OiL) 300-1,000 mg capsule Take 1 capsule (1,000 mg) by mouth once daily.      acetaminophen (Tylenol) 325 mg tablet Take 2 tablets (650  mg) by mouth every 6 hours if needed for mild pain (1 - 3).       No facility-administered encounter medications on file as of 5/6/2025.

## 2025-05-07 ENCOUNTER — CLINICAL SUPPORT (OUTPATIENT)
Dept: CARDIAC REHAB | Facility: HOSPITAL | Age: 75
End: 2025-05-07
Payer: MEDICARE

## 2025-05-07 ENCOUNTER — HOSPITAL ENCOUNTER (OUTPATIENT)
Dept: RADIOLOGY | Facility: HOSPITAL | Age: 75
Discharge: HOME | End: 2025-05-07
Payer: MEDICARE

## 2025-05-07 DIAGNOSIS — Z95.1 S/P CABG (CORONARY ARTERY BYPASS GRAFT): ICD-10-CM

## 2025-05-07 DIAGNOSIS — Z85.3 HISTORY OF MALIGNANT NEOPLASM OF LEFT BREAST: ICD-10-CM

## 2025-05-07 PROCEDURE — 77049 MRI BREAST C-+ W/CAD BI: CPT

## 2025-05-07 PROCEDURE — 2550000001 HC RX 255 CONTRASTS: Performed by: SURGERY

## 2025-05-07 PROCEDURE — A9575 INJ GADOTERATE MEGLUMI 0.1ML: HCPCS | Performed by: SURGERY

## 2025-05-07 PROCEDURE — 93798 PHYS/QHP OP CAR RHAB W/ECG: CPT | Performed by: INTERNAL MEDICINE

## 2025-05-07 RX ORDER — GADOTERATE MEGLUMINE 376.9 MG/ML
15 INJECTION INTRAVENOUS
Status: COMPLETED | OUTPATIENT
Start: 2025-05-07 | End: 2025-05-07

## 2025-05-07 RX ADMIN — GADOTERATE MEGLUMINE 14 ML: 376.9 INJECTION INTRAVENOUS at 10:54

## 2025-05-08 ENCOUNTER — PATIENT OUTREACH (OUTPATIENT)
Dept: PRIMARY CARE | Facility: CLINIC | Age: 75
End: 2025-05-08
Payer: MEDICARE

## 2025-05-08 NOTE — PROGRESS NOTES
Patient has met target of no readmission for (90) days post hospital  discharge and is graduated from Transitional Care Management program at this time.     Encouraged patient to call providers for any questions concerns or change in condition

## 2025-05-09 ENCOUNTER — APPOINTMENT (OUTPATIENT)
Dept: CARDIAC REHAB | Facility: HOSPITAL | Age: 75
End: 2025-05-09
Payer: MEDICARE

## 2025-05-09 ENCOUNTER — ANCILLARY PROCEDURE (OUTPATIENT)
Dept: URGENT CARE | Age: 75
End: 2025-05-09
Payer: MEDICARE

## 2025-05-09 ENCOUNTER — OFFICE VISIT (OUTPATIENT)
Dept: URGENT CARE | Age: 75
End: 2025-05-09
Payer: MEDICARE

## 2025-05-09 VITALS
SYSTOLIC BLOOD PRESSURE: 149 MMHG | TEMPERATURE: 97.9 F | DIASTOLIC BLOOD PRESSURE: 86 MMHG | HEIGHT: 67 IN | WEIGHT: 155 LBS | BODY MASS INDEX: 24.33 KG/M2 | OXYGEN SATURATION: 98 % | RESPIRATION RATE: 14 BRPM | HEART RATE: 76 BPM

## 2025-05-09 DIAGNOSIS — M79.672 LEFT FOOT PAIN: ICD-10-CM

## 2025-05-09 DIAGNOSIS — S92.512A CLOSED DISPLACED FRACTURE OF PROXIMAL PHALANX OF LESSER TOE OF LEFT FOOT, INITIAL ENCOUNTER: Primary | ICD-10-CM

## 2025-05-09 PROCEDURE — 73630 X-RAY EXAM OF FOOT: CPT | Mod: LEFT SIDE

## 2025-05-09 RX ORDER — DICLOFENAC SODIUM 10 MG/G
GEL TOPICAL
Qty: 50 G | Refills: 0 | Status: SHIPPED | OUTPATIENT
Start: 2025-05-09

## 2025-05-09 NOTE — PROGRESS NOTES
"Subjective   Patient ID: Radha Montalvo is a 75 y.o. female. They present today with a chief complaint of Foot Pain (Left foot and toe pain after falling out of bed last night. Did not hit head.).    History of Present Illness  See mdm      History provided by:  Patient   used: No        Past Medical History  Allergies as of 05/09/2025    (No Known Allergies)       Prescriptions Prior to Admission[1]     Medical History[2]    Surgical History[3]     reports that she has never smoked. She has never been exposed to tobacco smoke. She has never used smokeless tobacco. She reports current alcohol use. She reports that she does not use drugs.    Review of Systems  Review of Systems   All other systems reviewed and are negative.                                 Objective    Vitals:    05/09/25 1001   BP: 149/86   Pulse: 76   Resp: 14   Temp: 36.6 °C (97.9 °F)   SpO2: 98%   Weight: 70.3 kg (155 lb)   Height: 1.702 m (5' 7\")     No LMP recorded (lmp unknown). Patient has had a hysterectomy.    Physical Exam  Vitals and nursing note reviewed.   Constitutional:       General: She is not in acute distress.     Appearance: Normal appearance. She is normal weight. She is not ill-appearing, toxic-appearing or diaphoretic.   HENT:      Head: Normocephalic and atraumatic.      Comments: No Ralph sign, raccoon eyes, evidence of epistaxis.  No scalp tenderness or hematoma     Mouth/Throat:      Mouth: Mucous membranes are moist.   Eyes:      General: No scleral icterus.        Right eye: No discharge.         Left eye: No discharge.      Extraocular Movements: Extraocular movements intact.      Conjunctiva/sclera: Conjunctivae normal.      Pupils: Pupils are equal, round, and reactive to light.   Neck:      Comments: No midline cervical tenderness, step-off or deformity.  Complete range of motion is intact without pain.  Cardiovascular:      Rate and Rhythm: Normal rate and regular rhythm.      Pulses: Normal " pulses.      Heart sounds: Normal heart sounds.   Pulmonary:      Effort: Pulmonary effort is normal. No respiratory distress.   Abdominal:      General: Abdomen is flat.   Musculoskeletal:         General: Tenderness and signs of injury present. Normal range of motion.      Cervical back: Normal range of motion and neck supple. No rigidity or tenderness.      Comments: Left LE: Tenderness of the 2nd, 3rd, 4th, 5th toes with very mild ecchymosis of the base of the second and third.  ROM limited of toes due to pain.  No other tenderness of the foot.  Midfoot and 5th metatarsal non tender.  No tenderness of the ankle, tib/fib, knee or femur.  NVI foot    No midline C, T, LS spinal tenderness step off or deformity.    Lymphadenopathy:      Cervical: No cervical adenopathy.   Skin:     General: Skin is warm and dry.      Capillary Refill: Capillary refill takes less than 2 seconds.      Coloration: Skin is not jaundiced or pale.      Findings: No rash.   Neurological:      General: No focal deficit present.      Mental Status: She is alert.      Gait: Gait normal.   Psychiatric:         Mood and Affect: Mood normal.         Behavior: Behavior normal.         Procedures    Point of Care Test & Imaging Results from this visit  No results found for this visit on 05/09/25.   I  Cardiology, Vascular, and Other Imaging  No other imaging results found for the past 2 days      Diagnostic study results (if any) were reviewed by Leanne Davila PA-C.    Assessment/Plan   Allergies, medications, history, and pertinent labs/EKGs/Imaging reviewed by Leanne Davila PA-C.     Medical Decision Making  75 year old F presents with complaint of left foot pain after fall from bed last night.  States she was reaching for CPAP to turn off to go to bathroom and rolled over onto the floor.  She is certain she did not hit her head or LOC.  Has isolated pain and injury to the left foot.  No headache, dizziness, lightheadedness, visual  changes, nausea, vomiting, focal weakness or numbness, neck or back pain.  Able to walk, but not fully weight bearing on the foot due to pain in the toes.  Has walking boot with her.  X-ray with fractures of proximal 4th and 5th phalanx.  Encouraged walking boot.  She takes ASA 81 daily, discussed avoidance of PO NSAIDs due to this and age.  Acetaminophen for pain and can trial voltaren as well.  Follow up with ortho as needed.  Use walking boot.  If pain not improving in 7 days, follow up with orthopedic referral.  No features open fracture, NV compromise, ICH, cervical spine fracture or other emergency.  Encouraged follow-up with primary care provider, recheck BP.  Discussed expected course, indications for return or for presentation to emergency department.  Discharged good condition agreeable to plan as discussed.      Orders and Diagnoses  Diagnoses and all orders for this visit:  Closed displaced fracture of proximal phalanx of lesser toe of left foot, initial encounter  -     diclofenac sodium (Voltaren Arthritis Pain) 1 % gel; Apply 2in of ointment to left toes at area of pain up to three times daily for pain control.  -     Adult Referral to Orthopedics and Sports Medicine; Future  Left foot pain  -     XR foot left 3+ views; Future      Medical Admin Record      Patient disposition: Home    Electronically signed by Leanne Davila PA-C  9:00 PM           [1] (Not in a hospital admission)   [2]   Past Medical History:  Diagnosis Date    Abnormal findings on diagnostic imaging of heart and coronary circulation 10/22/2023    Aneurysm 10/23/2024    Arthritis     Arthritis of left acromioclavicular joint 02/15/2024    Ascending aorta dilatation 11/22/2023    Atrial fibrillation (Multi) 10/23/2023    Biceps tendinitis of left shoulder 02/15/2024    Breast cancer 2013    Cancer (Multi) 2013    Cataract     s/p extraction    Congenital dilatation of aorta [facility]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    Easy bruising     Blood thinner     Edema of both lower extremities 01/13/2024    Essential hypertension 10/06/2023    Hx antineoplastic chemo     Hyperlipidemia 10/06/2023    Hypertension 10/06/2023    Irregular heart beat     Afib    Labral tear of long head of biceps tendon, right, initial encounter 02/15/2024    Parathyroid disease (Multi)     s/p paratcyoiectomy)    Peripheral neuropathy     feet, bilatera    Personal history of irradiation     Personal history of other diseases of the musculoskeletal system and connective tissue 10/06/2023    History of arthritis    Primary osteoarthritis of both shoulders 02/15/2024    Scoliosis     Shortness of breath 10/23/2023    Sleep apnea     CPAP   [3]   Past Surgical History:  Procedure Laterality Date    ACHILLES TENDON SURGERY      Only less evasive    BI MAMMO GUIDED BREAST LEFT LOCALIZATION Left 02/25/2013    BI MAMMO GUIDED LOCALIZATION BREAST LEFT [facility] CLINICAL LEGAC[facility]    BREAST BIOPSY      BREAST LUMPECTOMY Left 2013    CARDIAC CATHETERIZATION N/A 01/27/2025    Procedure: Left Heart Cath with Coronary Angiography and LV;  Surgeon: Paulo Dougherty MD;  Location: [facility] Cardiac Cath Lab;  Service: Cardiovascular;  Laterality: N/A;  no prior auth needed    CARDIAC ELECTROPHYSIOLOGY PROCEDURE N/A 02/14/2025    Procedure: Ablation A-Fib;  Surgeon: Dwayne Jay MD;  Location: [facility];  Service: Cardiac Surgery;  Laterality: N/A;    CARPAL TUNNEL RELEASE  ?    CATARACT EXTRACTION Bilateral     COLONOSCOPY  2014    HYSTERECTOMY  2000    HYSTEROSCOPY      LYMPH NODE BIOPSY      MAMMOGRAM DIAGNOSTIC BI Bilateral 09/16/2024    OTHER SURGICAL HISTORY  04/27/2022    Lumpectomy    PARATHYROID GLAND SURGERY  Not sure    TOENAIL EXCISION      It came back    TONSILLECTOMY      TUBAL LIGATION      US COMPLETE BREAST Left 09/16/2024    WISDOM TOOTH EXTRACTION

## 2025-05-12 ENCOUNTER — APPOINTMENT (OUTPATIENT)
Dept: CARDIAC REHAB | Facility: HOSPITAL | Age: 75
End: 2025-05-12
Payer: MEDICARE

## 2025-05-14 ENCOUNTER — APPOINTMENT (OUTPATIENT)
Dept: CARDIAC REHAB | Facility: HOSPITAL | Age: 75
End: 2025-05-14
Payer: MEDICARE

## 2025-05-15 ENCOUNTER — OFFICE VISIT (OUTPATIENT)
Dept: PRIMARY CARE | Facility: CLINIC | Age: 75
End: 2025-05-15
Payer: MEDICARE

## 2025-05-15 VITALS
HEART RATE: 91 BPM | TEMPERATURE: 97.3 F | WEIGHT: 156 LBS | OXYGEN SATURATION: 99 % | RESPIRATION RATE: 16 BRPM | DIASTOLIC BLOOD PRESSURE: 76 MMHG | SYSTOLIC BLOOD PRESSURE: 108 MMHG | BODY MASS INDEX: 24.48 KG/M2 | HEIGHT: 67 IN

## 2025-05-15 DIAGNOSIS — S92.902D CLOSED FRACTURE OF LEFT FOOT WITH ROUTINE HEALING, SUBSEQUENT ENCOUNTER: ICD-10-CM

## 2025-05-15 DIAGNOSIS — B37.2 CANDIDAL DERMATITIS: Primary | ICD-10-CM

## 2025-05-15 PROCEDURE — G2211 COMPLEX E/M VISIT ADD ON: HCPCS | Performed by: FAMILY MEDICINE

## 2025-05-15 PROCEDURE — 3078F DIAST BP <80 MM HG: CPT | Performed by: FAMILY MEDICINE

## 2025-05-15 PROCEDURE — 99214 OFFICE O/P EST MOD 30 MIN: CPT | Performed by: FAMILY MEDICINE

## 2025-05-15 PROCEDURE — 1159F MED LIST DOCD IN RCRD: CPT | Performed by: FAMILY MEDICINE

## 2025-05-15 PROCEDURE — 1125F AMNT PAIN NOTED PAIN PRSNT: CPT | Performed by: FAMILY MEDICINE

## 2025-05-15 PROCEDURE — 3074F SYST BP LT 130 MM HG: CPT | Performed by: FAMILY MEDICINE

## 2025-05-15 RX ORDER — NYSTATIN 100000 U/G
CREAM TOPICAL 2 TIMES DAILY
Qty: 30 G | Refills: 0 | Status: SHIPPED | OUTPATIENT
Start: 2025-05-15 | End: 2025-05-22

## 2025-05-15 ASSESSMENT — ENCOUNTER SYMPTOMS
OCCASIONAL FEELINGS OF UNSTEADINESS: 0
DEPRESSION: 0
INABILITY TO BEAR WEIGHT: 0
MUSCLE WEAKNESS: 0
LOSS OF SENSATION IN FEET: 0
LOSS OF SENSATION: 0
LOSS OF MOTION: 0
TINGLING: 0

## 2025-05-15 ASSESSMENT — PAIN SCALES - GENERAL: PAINLEVEL_OUTOF10: 3

## 2025-05-15 NOTE — PROGRESS NOTES
"Subjective   Patient ID: Radha Montalvo is a 75 y.o. female who presents for Toe Injury and Follow-up (Pt is here for a urgent care follow up of 2 fractured toes on her left foot. Pt would also like a rx for yeast infection.).    Lower Extremity Issue  Nothing aggravates the symptoms.    she fell out of bed a week ago and went to     Review of Systems    Objective   /76   Pulse 91   Temp 36.3 °C (97.3 °F) (Temporal)   Resp 16   Ht 1.702 m (5' 7\")   Wt 70.8 kg (156 lb)   LMP  (LMP Unknown)   SpO2 99%   BMI 24.43 kg/m²     Physical Exam  Constitutional:       General: She is not in acute distress.     Appearance: Normal appearance.   Cardiovascular:      Rate and Rhythm: Normal rate and regular rhythm.      Heart sounds: No murmur heard.  Pulmonary:      Breath sounds: Normal breath sounds. No wheezing.   Musculoskeletal:      Comments: Left foot with some tenderness and ecchymosis 4th and 5th toes.    Neurological:      Mental Status: She is alert.         Assessment/Plan   Problem List Items Addressed This Visit    None  Visit Diagnoses         Codes      Candidal dermatitis    -  Primary B37.2    Relevant Medications    nystatin (Mycostatin) cream      Closed fracture of left foot with routine healing, subsequent encounter     S92.902D        Elevate/follow up with ortho as referred for recheck.         Answers submitted by the patient for this visit:  Lower Extremity Injury Questionnaire (Submitted on 5/15/2025)  Chief Complaint: Lower extremity pain  Incident occurred: 5 to 7 days ago  Incident location: at home  Injury mechanism: a fall  Pain location: left foot, left toes  Pain quality: aching  Pain - numeric: 2/10  Pain course: improving  tingling: No  inability to bear weight: No  loss of motion: No  loss of sensation: No  muscle weakness: No  Foreign body present: no foreign bodies    "

## 2025-05-15 NOTE — LETTER
May 15, 2025     Patient: Radha Montalvo   YOB: 1950   Date of Visit: 5/15/2025       To Whom It May Concern:    Radha Montalvo was seen in my clinic on 5/15/2025 at 12:45 pm. She has acute fractures of her left 4th and 5th toes.  She can return to cardiac rehab using hand bike etc but should avoid treadmill etc for another 4 weeks.     If you have any questions or concerns, please don't hesitate to call.         Sincerely,         Vishnu Ordoñez, DO        CC: No Recipients

## 2025-05-16 ENCOUNTER — APPOINTMENT (OUTPATIENT)
Dept: CARDIAC REHAB | Facility: HOSPITAL | Age: 75
End: 2025-05-16
Payer: MEDICARE

## 2025-05-19 ENCOUNTER — CLINICAL SUPPORT (OUTPATIENT)
Dept: CARDIAC REHAB | Facility: HOSPITAL | Age: 75
End: 2025-05-19
Payer: MEDICARE

## 2025-05-19 DIAGNOSIS — Z95.1 S/P CABG (CORONARY ARTERY BYPASS GRAFT): ICD-10-CM

## 2025-05-19 PROCEDURE — 93798 PHYS/QHP OP CAR RHAB W/ECG: CPT | Performed by: INTERNAL MEDICINE

## 2025-05-20 ENCOUNTER — DOCUMENTATION (OUTPATIENT)
Dept: CARDIAC REHAB | Facility: HOSPITAL | Age: 75
End: 2025-05-20
Payer: MEDICARE
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
## 2025-05-21 ENCOUNTER — CLINICAL SUPPORT (OUTPATIENT)
Dept: CARDIAC REHAB | Facility: HOSPITAL | Age: 75
End: 2025-05-21
Payer: MEDICARE

## 2025-05-21 DIAGNOSIS — Z95.1 S/P CABG (CORONARY ARTERY BYPASS GRAFT): ICD-10-CM

## 2025-05-21 PROCEDURE — 93798 PHYS/QHP OP CAR RHAB W/ECG: CPT

## 2025-05-23 ENCOUNTER — CLINICAL SUPPORT (OUTPATIENT)
Dept: CARDIAC REHAB | Facility: HOSPITAL | Age: 75
End: 2025-05-23
Payer: MEDICARE

## 2025-05-23 DIAGNOSIS — Z95.1 S/P CABG (CORONARY ARTERY BYPASS GRAFT): ICD-10-CM

## 2025-05-23 PROCEDURE — 93798 PHYS/QHP OP CAR RHAB W/ECG: CPT

## 2025-05-28 ENCOUNTER — CLINICAL SUPPORT (OUTPATIENT)
Dept: CARDIAC REHAB | Facility: HOSPITAL | Age: 75
End: 2025-05-28
Payer: MEDICARE

## 2025-05-28 DIAGNOSIS — Z95.1 S/P CABG (CORONARY ARTERY BYPASS GRAFT): ICD-10-CM

## 2025-05-28 PROCEDURE — 93798 PHYS/QHP OP CAR RHAB W/ECG: CPT

## 2025-05-30 ENCOUNTER — CLINICAL SUPPORT (OUTPATIENT)
Dept: CARDIAC REHAB | Facility: HOSPITAL | Age: 75
End: 2025-05-30
Payer: MEDICARE

## 2025-05-30 DIAGNOSIS — Z95.1 S/P CABG (CORONARY ARTERY BYPASS GRAFT): ICD-10-CM

## 2025-05-30 PROCEDURE — 93798 PHYS/QHP OP CAR RHAB W/ECG: CPT

## 2025-06-02 ENCOUNTER — CLINICAL SUPPORT (OUTPATIENT)
Dept: CARDIAC REHAB | Facility: HOSPITAL | Age: 75
End: 2025-06-02
Payer: MEDICARE

## 2025-06-02 ENCOUNTER — TELEPHONE (OUTPATIENT)
Dept: PRIMARY CARE | Facility: CLINIC | Age: 75
End: 2025-06-02

## 2025-06-02 DIAGNOSIS — Z95.1 S/P CABG (CORONARY ARTERY BYPASS GRAFT): ICD-10-CM

## 2025-06-02 PROCEDURE — 93798 PHYS/QHP OP CAR RHAB W/ECG: CPT | Performed by: INTERNAL MEDICINE

## 2025-06-04 ENCOUNTER — APPOINTMENT (OUTPATIENT)
Dept: CARDIAC REHAB | Facility: HOSPITAL | Age: 75
End: 2025-06-04
Payer: MEDICARE

## 2025-06-05 ENCOUNTER — TELEPHONE (OUTPATIENT)
Dept: PRIMARY CARE | Facility: CLINIC | Age: 75
End: 2025-06-05
Payer: MEDICARE

## 2025-06-05 DIAGNOSIS — S82.832A CLOSED FRACTURE OF DISTAL END OF LEFT FIBULA, UNSPECIFIED FRACTURE MORPHOLOGY, INITIAL ENCOUNTER: ICD-10-CM

## 2025-06-06 ENCOUNTER — HOSPITAL ENCOUNTER (OUTPATIENT)
Dept: RADIOLOGY | Facility: HOSPITAL | Age: 75
Discharge: HOME | End: 2025-06-06
Payer: MEDICARE

## 2025-06-06 ENCOUNTER — CLINICAL SUPPORT (OUTPATIENT)
Dept: CARDIAC REHAB | Facility: HOSPITAL | Age: 75
End: 2025-06-06
Payer: MEDICARE

## 2025-06-06 ENCOUNTER — OFFICE VISIT (OUTPATIENT)
Dept: ORTHOPEDIC SURGERY | Facility: CLINIC | Age: 75
End: 2025-06-06
Payer: MEDICARE

## 2025-06-06 DIAGNOSIS — S82.832A CLOSED FRACTURE OF DISTAL END OF LEFT FIBULA, UNSPECIFIED FRACTURE MORPHOLOGY, INITIAL ENCOUNTER: ICD-10-CM

## 2025-06-06 DIAGNOSIS — Z95.1 S/P CABG (CORONARY ARTERY BYPASS GRAFT): ICD-10-CM

## 2025-06-06 DIAGNOSIS — S82.832A CLOSED FRACTURE OF DISTAL END OF LEFT FIBULA, UNSPECIFIED FRACTURE MORPHOLOGY, INITIAL ENCOUNTER: Primary | ICD-10-CM

## 2025-06-06 DIAGNOSIS — S92.355G CLOSED NONDISPLACED FRACTURE OF FIFTH METATARSAL BONE OF LEFT FOOT WITH DELAYED HEALING: ICD-10-CM

## 2025-06-06 PROCEDURE — 1125F AMNT PAIN NOTED PAIN PRSNT: CPT

## 2025-06-06 PROCEDURE — 73630 X-RAY EXAM OF FOOT: CPT | Mod: LT

## 2025-06-06 PROCEDURE — 1159F MED LIST DOCD IN RCRD: CPT

## 2025-06-06 PROCEDURE — 73630 X-RAY EXAM OF FOOT: CPT | Mod: LEFT SIDE | Performed by: RADIOLOGY

## 2025-06-06 PROCEDURE — 99214 OFFICE O/P EST MOD 30 MIN: CPT

## 2025-06-06 PROCEDURE — 93798 PHYS/QHP OP CAR RHAB W/ECG: CPT

## 2025-06-06 ASSESSMENT — PAIN - FUNCTIONAL ASSESSMENT: PAIN_FUNCTIONAL_ASSESSMENT: 0-10

## 2025-06-06 ASSESSMENT — PAIN SCALES - GENERAL: PAINLEVEL_OUTOF10: 3

## 2025-06-06 NOTE — PROGRESS NOTES
Left foot injurt where she fractures 4th and 5th met, new x rays today look good with not miuch change she is still wearing the boot but experiencing swelling and pain still, can't take iburpofen due to blood thinner, told her to not use treadmill for cardiology recovery, put in referral to risa if she needs to seem him in future, toldher to keep boot on for 3-4 more weeks    that her left foot and ankle was swollen but not too extreme.  All range of motion intact but with some pain with eversion and inversion.  Neurovascular intact distally    Imaging:  Xrays were ordered by me, they were reviewed and independently interpreted by me today, they show a fracture through the base of the 4th and 5th metatarsal.  I am not able to see too much healing from the previous x-ray.    Procedures      Impression/Plan: This is a 75 y.o. female still recovering from the fall about a month ago that fractured her 4th and 5th metatarsal.  I told her to continue with the boot for about 3-4 more weeks and told her to try not to use the treadmill for her cardiac rehab.  Told her to try and use some recumbent exercises like bike to continue with her recovery but not too much stress in the foot.  I set the patient up with a referral to Dr. Moran to see if she would want to follow-up with him if her foot is still taking some time to heal in the next few weeks.    BMI Readings from Last 1 Encounters:   05/15/25 24.43 kg/m²      Lab Results   Component Value Date    CREATININE 0.70 02/26/2025     Tobacco Use: Low Risk  (5/15/2025)    Patient History     Smoking Tobacco Use: Never     Smokeless Tobacco Use: Never     Passive Exposure: Never      MELD 3.0: 8 at 2/7/2025 11:14 AM  MELD-Na: 7 at 2/7/2025 11:14 AM  Calculated from:  Serum Creatinine: 0.69 mg/dL (Using min of 1 mg/dL) at 2/7/2025 11:14 AM  Serum Sodium: 137 mmol/L at 2/7/2025 11:14 AM  Total Bilirubin: 0.8 mg/dL (Using min of 1 mg/dL) at 2/7/2025 11:14 AM  Serum Albumin: 4.3 g/dL (Using max of 3.5 g/dL) at 2/7/2025 11:14 AM  INR(ratio): 1.1 at 2/7/2025 11:14 AM  Age at listing (hypothetical): 74 years  Sex: Female at 2/7/2025 11:14 AM       Lab Results   Component Value Date    HGBA1C 6.1 (H) 02/07/2025     Lab Results   Component Value Date    STAPHMRSASCR No Staphylococcus aureus isolated 02/07/2025

## 2025-06-09 ENCOUNTER — CLINICAL SUPPORT (OUTPATIENT)
Dept: CARDIAC REHAB | Facility: HOSPITAL | Age: 75
End: 2025-06-09
Payer: MEDICARE

## 2025-06-09 DIAGNOSIS — Z95.1 S/P CABG (CORONARY ARTERY BYPASS GRAFT): ICD-10-CM

## 2025-06-09 PROCEDURE — 93798 PHYS/QHP OP CAR RHAB W/ECG: CPT | Performed by: INTERNAL MEDICINE

## 2025-06-11 ENCOUNTER — CLINICAL SUPPORT (OUTPATIENT)
Dept: CARDIAC REHAB | Facility: HOSPITAL | Age: 75
End: 2025-06-11
Payer: MEDICARE

## 2025-06-11 DIAGNOSIS — Z95.1 S/P CABG (CORONARY ARTERY BYPASS GRAFT): ICD-10-CM

## 2025-06-11 PROCEDURE — 93798 PHYS/QHP OP CAR RHAB W/ECG: CPT

## 2025-06-12 ENCOUNTER — OFFICE VISIT (OUTPATIENT)
Dept: URGENT CARE | Age: 75
End: 2025-06-12
Payer: MEDICARE

## 2025-06-12 VITALS
TEMPERATURE: 98.2 F | RESPIRATION RATE: 16 BRPM | DIASTOLIC BLOOD PRESSURE: 67 MMHG | WEIGHT: 155 LBS | HEART RATE: 70 BPM | OXYGEN SATURATION: 95 % | SYSTOLIC BLOOD PRESSURE: 143 MMHG | BODY MASS INDEX: 24.28 KG/M2
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
## 2025-06-12 RX ORDER — PREDNISONE 20 MG/1
20 TABLET ORAL DAILY
Qty: 4 TABLET | Refills: 0 | Status: SHIPPED | OUTPATIENT
Start: 2025-06-12 | End: 2025-06-16

## 2025-06-12 RX ORDER — CETIRIZINE HYDROCHLORIDE 10 MG/1
10 TABLET ORAL DAILY
Qty: 7 TABLET | Refills: 0 | Status: SHIPPED | OUTPATIENT
Start: 2025-06-12 | End: 2025-06-19

## 2025-06-12 ASSESSMENT — ENCOUNTER SYMPTOMS
DEPRESSION: 0
LOSS OF SENSATION IN FEET: 0
OCCASIONAL FEELINGS OF UNSTEADINESS: 0

## 2025-06-12 NOTE — PROGRESS NOTES
Subjective   Patient ID: Radha Montalvo is a 75 y.o. female. They present today with a chief complaint of Other (Bug bite on right lower eye ).    History of Present Illness  See mdm       History provided by:  Patient   used: No        Past Medical History  Allergies as of 06/12/2025    (No Known Allergies)       Prescriptions Prior to Admission[1]     Medical History[2]    Surgical History[3]     reports that she has never smoked. She has never been exposed to tobacco smoke. She has never used smokeless tobacco. She reports current alcohol use. She reports that she does not use drugs.    Review of Systems  Review of Systems   All other systems reviewed and are negative.                                 Objective    Vitals:    06/12/25 1641   BP: 143/67   Pulse: 70   Resp: 16   Temp: 36.8 °C (98.2 °F)   TempSrc: Oral   SpO2: 95%   Weight: 70.3 kg (155 lb)     No LMP recorded (lmp unknown). Patient has had a hysterectomy.    Physical Exam  Vitals and nursing note reviewed.   Constitutional:       General: She is not in acute distress.     Appearance: Normal appearance. She is normal weight. She is not ill-appearing, toxic-appearing or diaphoretic.   HENT:      Head: Normocephalic and atraumatic.      Mouth/Throat:      Mouth: Mucous membranes are moist.   Eyes:      General: No scleral icterus.        Right eye: No discharge.         Left eye: No discharge.      Extraocular Movements: Extraocular movements intact.      Conjunctiva/sclera: Conjunctivae normal.      Pupils: Pupils are equal, round, and reactive to light.      Comments: Right lower eyelid soft tissue swelling without wound, redness or tenderness.  PERRLA.  EOMI.     Cardiovascular:      Rate and Rhythm: Normal rate and regular rhythm.      Pulses: Normal pulses.      Heart sounds: Normal heart sounds. No murmur heard.     No friction rub. No gallop.   Pulmonary:      Effort: Pulmonary effort is normal. No respiratory distress.       Breath sounds: No wheezing, rhonchi or rales.   Abdominal:      General: Abdomen is flat.   Musculoskeletal:         General: Normal range of motion.      Cervical back: Normal range of motion and neck supple. No rigidity or tenderness.   Lymphadenopathy:      Cervical: No cervical adenopathy.   Skin:     General: Skin is warm and dry.      Capillary Refill: Capillary refill takes less than 2 seconds.      Coloration: Skin is not jaundiced or pale.      Findings: No rash.   Neurological:      General: No focal deficit present.      Mental Status: She is alert.      Gait: Gait normal.   Psychiatric:         Mood and Affect: Mood normal.         Behavior: Behavior normal.         Procedures    Point of Care Test & Imaging Results from this visit  No results found for this visit on 06/12/25.   Imaging  No results found.    Cardiology, Vascular, and Other Imaging  No other imaging results found for the past 2 days      Diagnostic study results (if any) were reviewed by Leanne Davila PA-C.    Assessment/Plan   Allergies, medications, history, and pertinent labs/EKGs/Imaging reviewed by Leanne Davila PA-C.     Medical Decision Making  75 year old F presents with complaint of right eyelid swelling since bug bite yesterday.  Patient was sitting outside yesterday with her dog when bug bit right lower eyelid.  Today she is having more swelling and it is very pruritic.  She has used cool compress, no medications.  Exam as above.  Swelling noted with no erythema or indication of secondary infection, anaphylaxis or other emergency.  Advised cetirizine and cool compresses expect this to resolve with conservative management.  If worsening/no improvement in 24-48 hours can begin low dose prednisone as it does involve her face and she is concerned this is very pruritic and bothersome.  Discussed risks of steroids use and avoidance when possible, with preference for non sedating antihistamine first.  She agrees to plan as  discussed.  Encouraged follow-up with primary care provider.  Discussed expected course, indications for return or for presentation to emergency department.  Discharged good condition agreeable to plan as discussed.      Orders and Diagnoses  Diagnoses and all orders for this visit:  Bug bite, initial encounter  -     cetirizine (ZyrTEC) 10 mg tablet; Take 1 tablet (10 mg) by mouth once daily for 7 days.  -     predniSONE (Deltasone) 20 mg tablet; Take 1 tablet (20 mg) by mouth once daily for 4 days.      Medical Admin Record      Patient disposition: Home    Electronically signed by Leanne Davila PA-C  7:47 PM           [1] (Not in a hospital admission)   [2]   Past Medical History:  Diagnosis Date    Abnormal findings on diagnostic imaging of heart and coronary circulation 10/22/2023    Aneurysm 10/23/2024    Arthritis     Arthritis of left acromioclavicular joint 02/15/2024    Ascending aorta dilatation 11/22/2023    Atrial fibrillation (Multi) 10/23/2023    Biceps tendinitis of left shoulder 02/15/2024    Breast cancer 2013    Cancer (Multi) 2013    Cataract     s/p extraction    Congenital dilatation of aorta ([facility]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    Easy bruising     Blood thinner    Edema of both lower extremities 01/13/2024    Essential hypertension 10/06/2023    Hx antineoplastic chemo     Hyperlipidemia 10/06/2023    Hypertension 10/06/2023    Irregular heart beat     Afib    Labral tear of long head of biceps tendon, right, initial encounter 02/15/2024    Parathyroid disease (Multi)     s/p paratcyoiectomy)    Peripheral neuropathy     feet, bilatera    Personal history of irradiation     Personal history of other diseases of the musculoskeletal system and connective tissue 10/06/2023    History of arthritis    Primary osteoarthritis of both shoulders 02/15/2024    Scoliosis     Shortness of breath 10/23/2023    Sleep apnea     CPAP   [3]   Past Surgical History:  Procedure Laterality Date    ACHILLES TENDON SURGERY       Only less evasive    BI MAMMO GUIDED BREAST LEFT LOCALIZATION Left 02/25/2013    BI MAMMO GUIDED LOCALIZATION BREAST LEFT [facility]    BREAST BIOPSY      BREAST LUMPECTOMY Left 2013    CARDIAC CATHETERIZATION N/A 01/27/2025    Procedure: Left Heart Cath with Coronary Angiography and LV;  Surgeon: Paulo Dougherty MD;  Location: [facility] Cardiac Cath Lab;  Service: Cardiovascular;  Laterality: N/A;  no prior auth needed    CARDIAC ELECTROPHYSIOLOGY PROCEDURE N/A 02/14/2025    Procedure: Ablation A-Fib;  Surgeon: Dwayne Jay MD;  Location: [facility];  Service: Cardiac Surgery;  Laterality: N/A;    CARPAL TUNNEL RELEASE  ?    CATARACT EXTRACTION Bilateral     COLONOSCOPY  2014    HYSTERECTOMY  2000    HYSTEROSCOPY      LYMPH NODE BIOPSY      MAMMOGRAM DIAGNOSTIC BI Bilateral 09/16/2024    OTHER SURGICAL HISTORY  04/27/2022    Lumpectomy    PARATHYROID GLAND SURGERY  Not sure    TOENAIL EXCISION      It came back    TONSILLECTOMY      TUBAL LIGATION      US COMPLETE BREAST Left 09/16/2024    WISDOM TOOTH EXTRACTION

## 2025-06-12 NOTE — PATIENT INSTRUCTIONS
Take cetirizine for the next week, use cool compresses.  I expect symptoms to resolve with cetirizine.  If symptoms are worsening or not improving in 24-48 hours begin prednisone, take in the morning to reduce side effects.  Return or go to emergency department for persistent symptoms.

## 2025-06-13 ENCOUNTER — TELEPHONE (OUTPATIENT)
Dept: URGENT CARE | Age: 75
End: 2025-06-13

## 2025-06-13 ENCOUNTER — CLINICAL SUPPORT (OUTPATIENT)
Dept: CARDIAC REHAB | Facility: HOSPITAL | Age: 75
End: 2025-06-13
Payer: MEDICARE

## 2025-06-13 DIAGNOSIS — Z95.1 S/P CABG (CORONARY ARTERY BYPASS GRAFT): ICD-10-CM

## 2025-06-13 PROCEDURE — 93798 PHYS/QHP OP CAR RHAB W/ECG: CPT | Performed by: INTERNAL MEDICINE

## 2025-06-16 ENCOUNTER — CLINICAL SUPPORT (OUTPATIENT)
Dept: CARDIAC REHAB | Facility: HOSPITAL | Age: 75
End: 2025-06-16
Payer: MEDICARE

## 2025-06-16 DIAGNOSIS — Z95.1 S/P CABG (CORONARY ARTERY BYPASS GRAFT): ICD-10-CM

## 2025-06-16 PROCEDURE — 93798 PHYS/QHP OP CAR RHAB W/ECG: CPT | Performed by: INTERNAL MEDICINE

## 2025-06-18 ENCOUNTER — APPOINTMENT (OUTPATIENT)
Dept: ORTHOPEDIC SURGERY | Facility: CLINIC | Age: 75
End: 2025-06-18
Payer: MEDICARE

## 2025-06-18 ENCOUNTER — CLINICAL SUPPORT (OUTPATIENT)
Dept: CARDIAC REHAB | Facility: HOSPITAL | Age: 75
End: 2025-06-18
Payer: MEDICARE

## 2025-06-18 ENCOUNTER — HOSPITAL ENCOUNTER (OUTPATIENT)
Dept: RADIOLOGY | Facility: HOSPITAL | Age: 75
Discharge: HOME | End: 2025-06-18
Payer: MEDICARE

## 2025-06-18 ENCOUNTER — DOCUMENTATION (OUTPATIENT)
Dept: CARDIAC REHAB | Facility: HOSPITAL | Age: 75
End: 2025-06-18

## 2025-06-18 DIAGNOSIS — S82.832A CLOSED FRACTURE OF DISTAL END OF LEFT FIBULA, UNSPECIFIED FRACTURE MORPHOLOGY, INITIAL ENCOUNTER: ICD-10-CM

## 2025-06-18 DIAGNOSIS — S92.355G CLOSED NONDISPLACED FRACTURE OF FIFTH METATARSAL BONE OF LEFT FOOT WITH DELAYED HEALING: ICD-10-CM

## 2025-06-18 DIAGNOSIS — Z95.1 S/P CABG (CORONARY ARTERY BYPASS GRAFT): ICD-10-CM

## 2025-06-18 DIAGNOSIS — S92.912A: ICD-10-CM

## 2025-06-18 PROCEDURE — 73610 X-RAY EXAM OF ANKLE: CPT | Mod: LEFT SIDE | Performed by: STUDENT IN AN ORGANIZED HEALTH CARE EDUCATION/TRAINING PROGRAM

## 2025-06-18 PROCEDURE — 73630 X-RAY EXAM OF FOOT: CPT | Mod: LT

## 2025-06-18 PROCEDURE — 1159F MED LIST DOCD IN RCRD: CPT | Performed by: STUDENT IN AN ORGANIZED HEALTH CARE EDUCATION/TRAINING PROGRAM

## 2025-06-18 PROCEDURE — 73610 X-RAY EXAM OF ANKLE: CPT | Mod: LT

## 2025-06-18 PROCEDURE — 1125F AMNT PAIN NOTED PAIN PRSNT: CPT | Performed by: STUDENT IN AN ORGANIZED HEALTH CARE EDUCATION/TRAINING PROGRAM

## 2025-06-18 PROCEDURE — 99213 OFFICE O/P EST LOW 20 MIN: CPT | Performed by: STUDENT IN AN ORGANIZED HEALTH CARE EDUCATION/TRAINING PROGRAM

## 2025-06-18 PROCEDURE — 93798 PHYS/QHP OP CAR RHAB W/ECG: CPT

## 2025-06-18 PROCEDURE — 73630 X-RAY EXAM OF FOOT: CPT | Mod: LEFT SIDE | Performed by: STUDENT IN AN ORGANIZED HEALTH CARE EDUCATION/TRAINING PROGRAM

## 2025-06-18 ASSESSMENT — PAIN SCALES - GENERAL: PAINLEVEL_OUTOF10: 2

## 2025-06-18 ASSESSMENT — PAIN - FUNCTIONAL ASSESSMENT: PAIN_FUNCTIONAL_ASSESSMENT: 0-10

## 2025-06-18 ASSESSMENT — PAIN DESCRIPTION - DESCRIPTORS: DESCRIPTORS: ACHING;DULL;SORE

## 2025-06-18 NOTE — PROGRESS NOTES
ORTHOPAEDIC SURGERY OUTPATIENT PROGRESS NOTE    Chief Complaint:  Left foot pain    History Of Present Illness  Radha Montalvo is a 75 y.o. female patient presents for evaluation of left foot pain.  She initially sustained an injury while reaching for her CPAP machine in early May, she then fell out of bed.  X-ray imaging was obtained and she was made weightbearing as tolerated in a walking boot.  She continues with 2 out of 10 pain.  This is improving.  She has been in cardiac rehab and has not yet been cleared to begin the treadmill.  She denies similar injury.      She has had surgery on her fifth toe with a local podiatrist.    She denies any new numbness, tingling or weakness.     Past Medical History  Medical History[1]    Surgical History  Recent Surgeries in Orthopaedic Surgery            No cases to display             Social History  Social History[2]    Family History  Family History[3]     Allergies  RX Allergies[4]    Review of Systems  REVIEW OF SYSTEMS  Constitutional: no unplanned weight loss  Psychiatric: no suicidal ideation  ENT: no vision changes, no sinus problems  Pulmonary: no shortness of breath  Lymphatic: no enlarged lymph nodes  Cardiovascular: no chest pain or shortness of breath  Gastrointestinal: no stomach problems  Genitourinary: no dysuria   Skin: no rashes  Endocrine: no thyroid problems  Neurological: no headache, no numbness  Hematological: no easy bruising  Musculoskeletal: Left foot pain     Physical Exam  PHYSICAL EXAMINATION  Constitutional Exam: well developed and well nourished  Psychiatric Exam: alert and oriented, appropriate mood and behavior  Eye Exam: EOMI  Pulmonary Exam: breathing non-labored, no apparent distress  Lymphatic exam: no appreciable lymphadenopathy in the lower extremities  Cardiovascular exam: RRR to peripheral palpation, DP pulses 2+, PT 2+, toes are pink with good capillary refill, no pitting edema  Skin exam: no open lesions, rashes, abrasions or  ulcerations  Neurological exam: sensation to light touch intact in both lower extremities in peripheral and dermatomal distributions (except for any abnormalities noted in musculoskeletal exam)    Musculoskeletal exam: Left lower extremity examination.  Patient pain localized to the distal dorsal forefoot.  She has minimal tenderness to palpation at the 3/4/5 proximal phalanx bases.  Supple metatarsophalangeal joint range of motion with negative Lachman's.  There is swelling evident.  Patient has sensation intact to light touch grossly in a saphenous, sural, superficial peroneal, deep peroneal and tibial nerve distribution.  Patient has 5 out of 5 strength in plantarflexion, dorsiflexion and EHL. Patient has 2+ DP/PT pulse palpated.       Last Recorded Vitals  There were no vitals taken for this visit.    Laboratory Results  No results found. However, due to the size of the patient record, not all encounters were searched. Please check Results Review for a complete set of results.     Radiology Results  X-ray imaging 3 view weightbearing left foot reviewed and independently evaluated by me on 6/18/2025 demonstrates medial aspect proximal phalanx base of 3/4/5 toes with nondisplaced fracture with callus evident and maintained alignment.    Assessment/Plan:  75-year-old female who presents for evaluation of left foot pain with medial aspect proximal phalanx fractures of 3/4/5 with routine healing evident and maintained alignment.  I would recommend the patient begin weightbearing to her tolerance in her left lower extremity.  She may slowly transition out of the walking boot and into regular shoes.  She may begin utilizing the treadmill in cardiac rehab.  I have no formal restrictions for her.  We discussed that these are healing fractures, she may have persistent pain and swelling especially as they continue to heal and she is walking on them.  I would plan to see her back in 6 weeks for repeat clinical and  radiographic evaluation to ensure there is no secondary displacement.  Upon return patient would require three-view weightbearing left foot.    Chaim Powell MD, ROCIO  Department of Orthopaedic Surgery  [facility]    The diagnosis and treatment plan were reviewed with the patient. All questions were answered. The patient verbalized understanding of the treatment plan. There were no barriers to understanding identified.    Note dictated with [employer] software.  Completed without full type editing and sent to avoid delay.       [1]   Past Medical History:  Diagnosis Date    Abnormal findings on diagnostic imaging of heart and coronary circulation 10/22/2023    Aneurysm 10/23/2024    Arthritis     Arthritis of left acromioclavicular joint 02/15/2024    Ascending aorta dilatation 11/22/2023    Atrial fibrillation (Multi) 10/23/2023    Biceps tendinitis of left shoulder 02/15/2024    Breast cancer 2013    Cancer (Multi) 2013    Cataract     s/p extraction    Congenital dilatation of aorta ([facility]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    Easy bruising     Blood thinner    Edema of both lower extremities 01/13/2024    Essential hypertension 10/06/2023    Hx antineoplastic chemo     Hyperlipidemia 10/06/2023    Hypertension 10/06/2023    Irregular heart beat     Afib    Labral tear of long head of biceps tendon, right, initial encounter 02/15/2024    Parathyroid disease (Multi)     s/p paratcyoiectomy)    Peripheral neuropathy     feet, bilatera    Personal history of irradiation     Personal history of other diseases of the musculoskeletal system and connective tissue 10/06/2023    History of arthritis    Primary osteoarthritis of both shoulders 02/15/2024    Scoliosis     Shortness of breath 10/23/2023    Sleep apnea     CPAP   [2]   Social History  Socioeconomic History    Marital status:    Tobacco Use    Smoking status: Never     Passive exposure: Never    Smokeless tobacco:  Never   Vaping Use    Vaping status: Never Used   Substance and Sexual Activity    Alcohol use: Yes     Comment: occasional    Drug use: Never    Sexual activity: Yes     Partners: Male     Birth control/protection: Post-menopausal     Social Drivers of Health     Financial Resource Strain: Low Risk  (2/18/2025)    Overall Financial Resource Strain (CARDIA)     Difficulty of Paying Living Expenses: Not hard at all   Food Insecurity: No Food Insecurity (2/18/2025)    Hunger Vital Sign     Worried About Running Out of Food in the Last Year: Never true     Ran Out of Food in the Last Year: Never true   Transportation Needs: No Transportation Needs (3/21/2025)    OASIS : Transportation     Lack of Transportation (Medical): No     Lack of Transportation (Non-Medical): No     Patient Unable or Declines to Respond: No   Physical Activity: Inactive (2/18/2025)    Exercise Vital Sign     Days of Exercise per Week: 0 days     Minutes of Exercise per Session: 0 min   Stress: No Stress Concern Present (2/18/2025)    [employer] [place] [employer] - Occupational Stress Questionnaire     Feeling of Stress : Only a little   Social Connections: Feeling Socially Integrated (3/21/2025)    OASIS : Social Isolation     Frequency of experiencing loneliness or isolation: Never   Recent Concern: Social Connections - Moderately Isolated (2/18/2025)    Social Connection and Isolation Panel [NHANES]     Frequency of Communication with Friends and Family: More than three times a week     Frequency of Social Gatherings with Friends and Family: More than three times a week     Attends [employer] Services: Never     Active Member of Clubs or Organizations: No     Attends Club or Organization Meetings: Never     Marital Status:    Intimate Partner Violence: Not At Risk (2/18/2025)    Humiliation, Afraid, Rape, and Kick questionnaire     Fear of Current or Ex-Partner: No     Emotionally Abused: No     Physically Abused:  No     Sexually Abused: No   Housing Stability: Unknown (2/21/2025)    Housing Stability Vital Sign     Unable to Pay for Housing in the Last Year: Patient declined     Number of Times Moved in the Last Year: 0     Homeless in the Last Year: No   [3]   Family History  Problem Relation Name Age of Onset    Stroke Mother Christi Nair     Arthritis Mother Christi Nair     COPD Mother Christi Nair     Stroke Father Liborio Suttony     Aneurysm Father Liborio Suttony     Hypertension Father Libroio Nair    [4] No Known Allergies

## 2025-06-18 NOTE — PROGRESS NOTES
Cardiac Rehabilitation 60 Day Reassessment    Name: Radha Montalvo  Medical Record Number: [id number]  YOB: 1950  Age: 75 y.o.    Today’s Date: 6/18/2025  Primary Care Physician: Vishnu Ordoñez DO  Referring Physician: Dr. 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  Onset/Date of Diagnosis: 2/14/2025    SESSION #16    AACVPR Risk Stratification:  LOW     Falls Risk: Medium  Psychosocial Assessment     INTAKE PHQ-9= 2 MINIMAL    Sent PH-Q 9 to MD if score > 20: No; score < 20    Pt reported/currently experiencing stress: No  Patient uses stress management skills: Yes   History of: no history of anxiety or depression  Currently seeing a mental health provider: No  Social Support: Yes, Whom:   Quality of Life Survey: FERRANS AND CRUM  Quality of Life Survey:  PRE POST   GLOBAL SCORE 25.12 NA   HEALTH/FUNCTIONING SCORE 21.20 NA   SOCIAL/ECONOMIC SCORE 28.57 NA   PSYCHOLOGICAL/SPIRITUAL SCORE 26.57 NA   FAMILY SCORE 30.00 NA      Learning Assessment:  Learning assessment/barriers: None  Preferred learning method: Visual and Reading handout  Barriers: None  Comments:    Stages of Change:Maintenance    Psychosocial Plan    Goal Status: In progress    Psychosocial Goals: Demonstrating proper techniques for stress management, Maintain or lower PH-Q 9 score by discharge, Identify strategies for managing depression, and Identify social supports    Psychosocial Interventions/Education:   *STRESS MANAGEMENT HANDOUT  *ASK PATIENT AT LEAST ONCE EVERY 30 DAYS ABOUT STRESS     Initial Assessment: PHQ-9 AND QOL SURVEYS COMPLETED   REASSESSMENT:  5/20/2025 PATIENT INJURED FOOT AND MISSED SOME CR SESSIONS. BUT STATES SHE WON'T LET THAT GET HER DOWN. REVIEWED INITIAL SURVEY RESULTS.  6/18/2025 PATIENT STATES THAT SHE IS FRUSTRATED WITH THE ORTHOPEDIC CARE FOR HER FOOT BUT WILL CONTINUE TO REMAIN POSITIVE. CONTINUES TO ATTEND CARDIAC REHAB- STATES THAT SHE ENJOYS  "COMING AND FEELS BETTER WHEN SHE DOES. EDUCATION ON STRESS MANAGEMENT PROVIDED AND REVIEWED.        Nutrition Assessment:    Hyperlipidemia: Yes     Lipids:   Lab Results   Component Value Date    CHOL 138 08/15/2024    HDL 61.0 08/15/2024    TRIG 96 08/15/2024       Current Dietary Guidelines: Low fat, Low sodium  Barriers to dietary change: no    Diet Habit Survey: Rate Your Plate  Pre: 61/69  Post: To be done at discharge.    Diabetes Assessment    Lab Results   Component Value Date    HGBA1C 6.1 (H) 02/07/2025       History of Diabetes: No    Weight Management  INTAKE WEIGHT 156.0   CURRENT WEIGHT: 158    Nutrition Plan    Goal Status: In progress    Nutrition Goals: Adapt a low-sodium, DASH diet prior to discharge, Adapt a Mediterranean focused diet prior to discharge, Learn how to read and interpret nutrition labels prior to discharge, Lose 1lb/week while enrolled in program, and IMPROVE OR MAINTAIN RYP SURVEY RESULT    Nutrition Interventions/Education:   *NUTRITION EDUCATION HANDOUTS  *CHOLESTEROL MANAGEMENT HANDOUT     Initial Assessment: RYP  REASSESSMENT:  5/20/2025 REVIEWED INITIAL RYP SURVEY RESULTS. 61/69 \"YOU ARE MAKING MANY HEALTHY CHOICES\"  6/18/2025 PATIENT WEIGHT UP SINCE INTAKE- REVIEWED DIET AND GOALS WITH PT. NUTRITION EDUCATION PROVIDED AND REVIEWED WITH PATIENT.           Exercise Assessment    No  Mode: NA  Frequency: NA  Duration: NA    Exercise Prescription     Exercise Prescription based on: Pre-rehab stress test   Frequency:  3 days/week   Mode: Treadmill, NuStep, Recumbent Cycle, and Weights   Duration: 45-60 total aerobic minutes   Intensity: RPE 12-13  Target HR:    MET Level: 2-2.8  Patient wears supplemental O2: No     Modality Workload METs Duration (minutes)   1 Pre-Exercise   2:00   2 Treadmill ON HOLD     3 NuStep X2 22@4 2 09:00   4 Recumbent Bike 18@5  2.8 09 :00   5 Weights 2 pounds 12-15 reps  10 :00   6 Post-Exercise   2:00     Resistance Training: No   Home Exercise " Prescription given: No    Exercise Plan    Goal Status: In progress    Exercise Goals: Increase exercise MET level by 5-10% each week, Increase total exercise duration to 30-45 minutes, Obtain 150 minutes/week of moderate intensity aerobic exercise, Initiate strength training 2-3 days a week, Initiate flexibility training 2-3 days a week, and Establish a home exercise program before discharge    Exercise Interventions/Education:   *EXERCISE FUNDAMENTALS AND MAINTENANCE HANDOUT  *REVIEW THR AND INSTRUCTIONS FOR RADIAL PULSE CHECK     Initial Assessment: EST  REASSESSMENT:  5/20/2025 PT MISSED SEVERAL SESSIONS D/T FOOT INJURY. SOME RESTRICTIONS UPON RETURN TO REHAB. WILL CONTINUE TO WORK TOWARDS INCREASED DURATION/ METS. REVIEWED TARGET HEART RATE WITH PT. MAX METS ACHIEVED 2.6 ON RECUMBENT BIKE.  6/18/2025 CONTINUES TO INCREASE DURATION/ METS ON RECUMBENT BIKE AND NUSTEP. TREADMILL IS ON HOLD WHILE RECOVERING FROM FOOT INJURY. MAX METS OF 2.8 ACHIEVED ON THE RECUMBENT BIKE AT MOST RECENT SESSION.        Other Core Components/Risk Factor Assessment:    Medication adherence  Current Medications:   Medication Documentation Review Audit       Reviewed by yAla Adrian CMA (Medical Assistant) on 06/18/25 at 1108      Medication Order Taking? Sig Documenting Provider Last Dose Status   acetaminophen (Tylenol) 325 mg tablet [id number]  Take 2 tablets (650 mg) by mouth every 6 hours if needed for mild pain (1 - 3). ODALYS Negron-CNP  Active   acyclovir (Zovirax) 800 mg tablet [id number] Yes Take 1 tablet (800 mg) by mouth once daily. Vishnu Ordoeñz,   Active   amiodarone (Pacerone) 200 mg tablet [id number] Yes One daily Paulo Dougherty MD  Active   apixaban (Eliquis) 5 mg tablet [id number] Yes Take 1 tablet (5 mg) by mouth 2 times a day. Paulo Dougherty MD  Active   aspirin 81 mg chewable tablet [id number] Yes Chew 1 tablet (81 mg) once daily. ODALYS Negron-CNP  Active   atorvastatin (Lipitor) 80 mg tablet  [id number] Yes Take 1 tablet (80 mg) by mouth once daily at bedtime. Vishnu Ordoñez, DO  Active   bisoprolol (Zebeta) 10 mg tablet [id number] Yes Take 2 tablets (20 mg) by mouth once daily. Vishnu Ordoñez, DO  Active   cetirizine (ZyrTEC) 10 mg tablet [id number] Yes Take 1 tablet (10 mg) by mouth once daily for 7 days. Leanne Davila PA-C  Active   cholecalciferol (Vitamin D-3) 125 mcg (5000 UT) capsule [id number] Yes Take 1 capsule (125 mcg) by mouth once daily. Historical Provider, MD  Active   cyanocobalamin, vitamin B-12, 500 mcg tablet,disintegrating [id number] Yes Dissolve 500 mcg in the mouth once daily. Indications: prevention of vitamin B12 deficiency Historical Provider, MD  Active   diclofenac sodium (Voltaren Arthritis Pain) 1 % gel [id number]  Apply 2in of ointment to left toes at area of pain up to three times daily for pain control. Leanne Davila PA-C  Active   dilTIAZem ER (Tiazac) 120 mg 24 hr capsule [id number] Yes Take 1 capsule (120 mg) by mouth once daily. Vishnu Ordoñez DO  Active   omega-3 (Fish OiL) 300-1,000 mg capsule [id number] Yes Take 1 capsule (1,000 mg) by mouth once daily. Historical Provider, MD  Active   predniSONE (Deltasone) 20 mg tablet [id number]  Take 1 tablet (20 mg) by mouth once daily for 4 days. Leanne Davila PA-C   [id number]                                 Medication compliance: Yes   Uses pill box/organizer: Yes    Carries medication list: Yes     Blood Pressure Management  History of Hypertension: Yes   Medication Changes: No   Resting BP:  102/70    Heart Failure Management  Hx of Heart Failure: No    Smoking/Tobacco Assessment  Tobacco Use History[1]    Other Core Component Plan    Goal Status: In progress    Other Core Component Goals: Medication compliance, Verbalize medication usage and drug actions by discharge, Verbalize SL NTG action and proper dosage by discharge, and Achieve resting BP of < 130/80 by discharge    Other Core Component  Interventions/Education:   *EDUCATION: CAD, HYPERTENSION AND STROKE, RISK FACTORS FOR HEART DISEASE, UNDERSTANDING HEART VALVE DISEASE, UNDERSTANDING CHF.  *PROVIDE MED EDUCATION TOOL  *PROVIDE MED LIST IF NEEDED     Initial Assessment: KNOWLEDGE QUIZ  REASSESSMENT:  5/20/2025 REVIEWED INTAKE KNOWLEDGE QUIZ RESULTS WITH PT.  6/18/2025 EDUCATIONAL HANDOUTS ON RISK FACTORS FOR HEART DISEASE, HEART FAILURE AND HEART VALVE DISEASE PROVIDED AND REVIEWED WITH PATIENT. PT ABLE TO IDENTIFY HER SPECIFIC RISK FACTORS AND WAYS TO MANAGE THEM.        Individual Patient Goals:    BE ABLE TO WALK WITH  AND DOG WITHOUT BEING SHORT OF BREATH  LOSE 10 POUNDS AND GET MORE TONED    Goal Status: In progress    Staff Comments:  ADVANCED DIRECTIVES REVIEWED WITH PATIENT.   MAINTAINS SAFETY DURING EACH SESSION         Rehab Staff Signature: Valeri Hines RN             [1]   Social History  Tobacco Use   Smoking Status Never    Passive exposure: Never   Smokeless Tobacco Never

## 2025-06-20 ENCOUNTER — CLINICAL SUPPORT (OUTPATIENT)
Dept: CARDIAC REHAB | Facility: HOSPITAL | Age: 75
End: 2025-06-20
Payer: MEDICARE

## 2025-06-20 DIAGNOSIS — Z95.1 S/P CABG (CORONARY ARTERY BYPASS GRAFT): ICD-10-CM

## 2025-06-20 PROCEDURE — 93798 PHYS/QHP OP CAR RHAB W/ECG: CPT

## 2025-06-23 ENCOUNTER — CLINICAL SUPPORT (OUTPATIENT)
Dept: CARDIAC REHAB | Facility: HOSPITAL | Age: 75
End: 2025-06-23
Payer: MEDICARE

## 2025-06-23 DIAGNOSIS — Z95.1 S/P CABG (CORONARY ARTERY BYPASS GRAFT): ICD-10-CM

## 2025-06-23 PROCEDURE — 93798 PHYS/QHP OP CAR RHAB W/ECG: CPT

## 2025-06-23 RX ORDER — ATORVASTATIN CALCIUM 80 MG/1
80 TABLET, FILM COATED ORAL NIGHTLY
Qty: 90 TABLET | Refills: 3 | Status: SHIPPED | OUTPATIENT
Start: 2025-06-23 | End: 2026-06-18

## 2025-06-25 ENCOUNTER — CLINICAL SUPPORT (OUTPATIENT)
Dept: CARDIAC REHAB | Facility: HOSPITAL | Age: 75
End: 2025-06-25
Payer: MEDICARE

## 2025-06-25 DIAGNOSIS — Z95.1 S/P CABG (CORONARY ARTERY BYPASS GRAFT): ICD-10-CM

## 2025-06-25 PROCEDURE — 93798 PHYS/QHP OP CAR RHAB W/ECG: CPT

## 2025-06-26 ENCOUNTER — TELEPHONE (OUTPATIENT)
Dept: CARDIAC SURGERY | Facility: CLINIC | Age: 75
End: 2025-06-26
Payer: MEDICARE

## 2025-06-26 ENCOUNTER — APPOINTMENT (OUTPATIENT)
Dept: PRIMARY CARE | Facility: CLINIC | Age: 75
End: 2025-06-26
Payer: MEDICARE

## 2025-06-26 NOTE — TELEPHONE ENCOUNTER
I left a voicemail regarding the Holter monitor to be worn for 7 days this month that Dr. Daly discussed with her.  I need to verify her address and insurance so the monitor can be ordered and mailed to her home. I left the office number and asked for a return call.

## 2025-06-26 NOTE — TELEPHONE ENCOUNTER
I reminded patient about the Holter monitor to be worn for 7 days this month per their last visit with Dr. Daly in the a-fib clinic.  I explained everything about the Holter monitor and that it will be mailed to their home.  I verified the patients insurance and address to be correct in the system. I ordered the Holter monitor with the patients permission to do so.  I informed patient to ignore any message they might receive about a visit for the monitor to be placed because, that is just me ordering their monitor to be mailed to their home.

## 2025-06-27 ENCOUNTER — CLINICAL SUPPORT (OUTPATIENT)
Dept: CARDIAC REHAB | Facility: HOSPITAL | Age: 75
End: 2025-06-27
Payer: MEDICARE

## 2025-06-27 DIAGNOSIS — Z95.1 S/P CABG (CORONARY ARTERY BYPASS GRAFT): ICD-10-CM

## 2025-06-27 PROCEDURE — 93798 PHYS/QHP OP CAR RHAB W/ECG: CPT

## 2025-06-30 ENCOUNTER — CLINICAL SUPPORT (OUTPATIENT)
Dept: CARDIAC REHAB | Facility: HOSPITAL | Age: 75
End: 2025-06-30
Payer: MEDICARE

## 2025-06-30 DIAGNOSIS — Z95.1 S/P CABG (CORONARY ARTERY BYPASS GRAFT): ICD-10-CM

## 2025-06-30 PROCEDURE — 93798 PHYS/QHP OP CAR RHAB W/ECG: CPT

## 2025-07-01 ENCOUNTER — ANCILLARY PROCEDURE (OUTPATIENT)
Dept: CARDIAC SURGERY | Facility: CLINIC | Age: 75
End: 2025-07-01
Payer: MEDICARE

## 2025-07-01 DIAGNOSIS — I48.0 PAROXYSMAL A-FIB (MULTI): ICD-10-CM

## 2025-07-02 ENCOUNTER — CLINICAL SUPPORT (OUTPATIENT)
Dept: CARDIAC REHAB | Facility: HOSPITAL | Age: 75
End: 2025-07-02
Payer: MEDICARE

## 2025-07-02 DIAGNOSIS — Z95.1 S/P CABG (CORONARY ARTERY BYPASS GRAFT): ICD-10-CM

## 2025-07-02 PROCEDURE — 93798 PHYS/QHP OP CAR RHAB W/ECG: CPT

## 2025-07-07 ENCOUNTER — CLINICAL SUPPORT (OUTPATIENT)
Dept: CARDIAC REHAB | Facility: HOSPITAL | Age: 75
End: 2025-07-07
Payer: MEDICARE

## 2025-07-07 DIAGNOSIS — Z95.1 S/P CABG (CORONARY ARTERY BYPASS GRAFT): ICD-10-CM

## 2025-07-07 PROCEDURE — 93798 PHYS/QHP OP CAR RHAB W/ECG: CPT | Performed by: INTERNAL MEDICINE

## 2025-07-09 ENCOUNTER — CLINICAL SUPPORT (OUTPATIENT)
Dept: CARDIAC REHAB | Facility: HOSPITAL | Age: 75
End: 2025-07-09
Payer: MEDICARE

## 2025-07-09 DIAGNOSIS — Z95.1 S/P CABG (CORONARY ARTERY BYPASS GRAFT): ICD-10-CM

## 2025-07-09 PROCEDURE — 93798 PHYS/QHP OP CAR RHAB W/ECG: CPT | Performed by: INTERNAL MEDICINE

## 2025-07-11 ENCOUNTER — CLINICAL SUPPORT (OUTPATIENT)
Dept: CARDIAC REHAB | Facility: HOSPITAL | Age: 75
End: 2025-07-11
Payer: MEDICARE

## 2025-07-11 ENCOUNTER — TELEPHONE (OUTPATIENT)
Dept: CARDIOLOGY | Facility: CLINIC | Age: 75
End: 2025-07-11

## 2025-07-11 DIAGNOSIS — Z95.1 S/P CABG (CORONARY ARTERY BYPASS GRAFT): ICD-10-CM

## 2025-07-11 PROCEDURE — 93798 PHYS/QHP OP CAR RHAB W/ECG: CPT | Performed by: INTERNAL MEDICINE

## 2025-07-11 NOTE — TELEPHONE ENCOUNTER
Patient called in and stated she is still having swelling in her legs despite being on the Lasix, please advise.

## 2025-07-11 NOTE — TELEPHONE ENCOUNTER
Called and spoke with Radha. Right leg is swelling-goes down over night then increases with daily activities. Has some numbness in the leg at times. Leg is not painful/discolored. Still taking Lasix twice a week. Would like your advice.

## 2025-07-14 ENCOUNTER — CLINICAL SUPPORT (OUTPATIENT)
Dept: CARDIAC REHAB | Facility: HOSPITAL | Age: 75
End: 2025-07-14
Payer: MEDICARE

## 2025-07-14 DIAGNOSIS — Z95.1 S/P CABG (CORONARY ARTERY BYPASS GRAFT): ICD-10-CM

## 2025-07-14 PROCEDURE — 93798 PHYS/QHP OP CAR RHAB W/ECG: CPT | Performed by: INTERNAL MEDICINE

## 2025-07-16 ENCOUNTER — CLINICAL SUPPORT (OUTPATIENT)
Dept: CARDIAC REHAB | Facility: HOSPITAL | Age: 75
End: 2025-07-16
Payer: MEDICARE

## 2025-07-16 ENCOUNTER — DOCUMENTATION (OUTPATIENT)
Dept: CARDIAC REHAB | Facility: HOSPITAL | Age: 75
End: 2025-07-16

## 2025-07-16 DIAGNOSIS — Z95.1 S/P CABG (CORONARY ARTERY BYPASS GRAFT): ICD-10-CM

## 2025-07-16 PROCEDURE — 93798 PHYS/QHP OP CAR RHAB W/ECG: CPT | Performed by: INTERNAL MEDICINE

## 2025-07-16 NOTE — PROGRESS NOTES
Cardiac Rehabilitation 90 Day Reassessment    Name: Radha Montalvo  Medical Record Number: [id number]  YOB: 1950  Age: 75 y.o.    Today’s Date: 7/16/2025  Primary Care Physician: Vishnu Ordoñez DO  Referring Physician: Dr. 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  Onset/Date of Diagnosis: 2/14/2025    SESSION #28    AACVPR Risk Stratification:  LOW     Falls Risk: Medium  Psychosocial Assessment     INTAKE PHQ-9= 2 MINIMAL    Sent PH-Q 9 to MD if score > 20: No; score < 20    Pt reported/currently experiencing stress: No  Patient uses stress management skills: Yes   History of: no history of anxiety or depression  Currently seeing a mental health provider: No  Social Support: Yes, Whom:   Quality of Life Survey: FERRANS AND CRUM  Quality of Life Survey:  PRE POST   GLOBAL SCORE 25.12 NA   HEALTH/FUNCTIONING SCORE 21.20 NA   SOCIAL/ECONOMIC SCORE 28.57 NA   PSYCHOLOGICAL/SPIRITUAL SCORE 26.57 NA   FAMILY SCORE 30.00 NA      Learning Assessment:  Learning assessment/barriers: None  Preferred learning method: Visual and Reading handout  Barriers: None  Comments:    Stages of Change:Maintenance    Psychosocial Plan    Goal Status: In progress    Psychosocial Goals: Demonstrating proper techniques for stress management, Maintain or lower PH-Q 9 score by discharge, Identify strategies for managing depression, and Identify social supports    Psychosocial Interventions/Education:   *STRESS MANAGEMENT HANDOUT  *ASK PATIENT AT LEAST ONCE EVERY 30 DAYS ABOUT STRESS     Initial Assessment: PHQ-9 AND QOL SURVEYS COMPLETED   REASSESSMENT:  5/20/2025 PATIENT INJURED FOOT AND MISSED SOME CR SESSIONS. BUT STATES SHE WON'T LET THAT GET HER DOWN. REVIEWED INITIAL SURVEY RESULTS.  6/18/2025 PATIENT STATES THAT SHE IS FRUSTRATED WITH THE ORTHOPEDIC CARE FOR HER FOOT BUT WILL CONTINUE TO REMAIN POSITIVE. CONTINUES TO ATTEND CARDIAC REHAB- STATES THAT SHE  "ENJOYS COMING AND FEELS BETTER WHEN SHE DOES. EDUCATION ON STRESS MANAGEMENT PROVIDED AND REVIEWED.  7/16/2025- PATIENT DENIES ANY NEW OR WORSENING STRESS. SHE IS ENJOYING THE SUMMER AND GOING TO WATCH HER GRANDDAUGHTERS PLAY SOFTBALL WEEKLY.         Nutrition Assessment:    Hyperlipidemia: Yes     Lipids:   Lab Results   Component Value Date    CHOL 138 08/15/2024    HDL 61.0 08/15/2024    TRIG 96 08/15/2024       Current Dietary Guidelines: Low fat, Low sodium  Barriers to dietary change: no    Diet Habit Survey: Rate Your Plate  Pre: 61/69  Post: To be done at discharge.    Diabetes Assessment    Lab Results   Component Value Date    HGBA1C 6.1 (H) 02/07/2025       History of Diabetes: No    Weight Management  INTAKE WEIGHT 156.0 POUNDS   CURRENT WEIGHT: 157.5 POUNDS    Nutrition Plan    Goal Status: In progress    Nutrition Goals: Adapt a low-sodium, DASH diet prior to discharge, Adapt a Mediterranean focused diet prior to discharge, Learn how to read and interpret nutrition labels prior to discharge, Lose 1lb/week while enrolled in program, and IMPROVE OR MAINTAIN RYP SURVEY RESULT    Nutrition Interventions/Education:   *NUTRITION EDUCATION HANDOUTS  *CHOLESTEROL MANAGEMENT HANDOUT     Initial Assessment: RYP  REASSESSMENT:  5/20/2025 REVIEWED INITIAL RYP SURVEY RESULTS. 61/69 \"YOU ARE MAKING MANY HEALTHY CHOICES\"  6/18/2025 PATIENT WEIGHT UP SINCE INTAKE- REVIEWED DIET AND GOALS WITH PT. NUTRITION EDUCATION PROVIDED AND REVIEWED WITH PATIENT.   7/16/2025- CHOLESTEROL MANAGEMENT EDUCATION HANDOUTS AND LAB WORK REVIEWED WITH PATIENT.         Exercise Assessment    No  Mode: NA  Frequency: NA  Duration: NA    Exercise Prescription     Exercise Prescription based on: Pre-rehab stress test   Frequency:  3 days/week   Mode: Treadmill, NuStep, Recumbent Cycle, and Weights   Duration: 45-60 total aerobic minutes   Intensity: RPE 12-13  Target HR:    MET Level: 2-2.8  Patient wears supplemental O2: No     " Modality Workload METs Duration (minutes)   1 Pre-Exercise   2:00   2 Treadmill 1.3@1% 2.2 10:00   3 NuStep X2 24@4 2.1 13:00   4 Recumbent Bike 26@5  3.1 13 :00   5 Weights 2 pounds 12-15 reps  10 :00   6 Post-Exercise   2:00     Resistance Training: No   Home Exercise Prescription given: No    Exercise Plan    Goal Status: In progress    Exercise Goals: Increase exercise MET level by 5-10% each week, Increase total exercise duration to 30-45 minutes, Obtain 150 minutes/week of moderate intensity aerobic exercise, Initiate strength training 2-3 days a week, Initiate flexibility training 2-3 days a week, and Establish a home exercise program before discharge    Exercise Interventions/Education:   *EXERCISE FUNDAMENTALS AND MAINTENANCE HANDOUT  *REVIEW THR AND INSTRUCTIONS FOR RADIAL PULSE CHECK     Initial Assessment: EST  REASSESSMENT:  5/20/2025 PT MISSED SEVERAL SESSIONS D/T FOOT INJURY. SOME RESTRICTIONS UPON RETURN TO REHAB. WILL CONTINUE TO WORK TOWARDS INCREASED DURATION/ METS. REVIEWED TARGET HEART RATE WITH PT. MAX METS ACHIEVED 2.6 ON RECUMBENT BIKE.  6/18/2025 CONTINUES TO INCREASE DURATION/ METS ON RECUMBENT BIKE AND NUSTEP. TREADMILL IS ON HOLD WHILE RECOVERING FROM FOOT INJURY. MAX METS OF 2.8 ACHIEVED ON THE RECUMBENT BIKE AT MOST RECENT SESSION.  7/16/2025- PATIENT ADVANCING ON TREADMILL SINCE BOOT REMOVAL ON FOOT. ACHIEVED 2.2 METS ON TREADMILL.      Other Core Components/Risk Factor Assessment:    Medication adherence  Current Medications:   Medication Documentation Review Audit       Reviewed by Ayla Adrian CMA (Medical Assistant) on 06/18/25 at 1108      Medication Order Taking? Sig Documenting Provider Last Dose Status   acetaminophen (Tylenol) 325 mg tablet [id number]  Take 2 tablets (650 mg) by mouth every 6 hours if needed for mild pain (1 - 3). Carolin Rios, APRN-CNP  Active   acyclovir (Zovirax) 800 mg tablet [id number] Yes Take 1 tablet (800 mg) by mouth once daily. Vishnu Ordoñez, DO   Active   amiodarone (Pacerone) 200 mg tablet [id number] Yes One daily Paulo Dougherty MD  Active   apixaban (Eliquis) 5 mg tablet [id number] Yes Take 1 tablet (5 mg) by mouth 2 times a day. Paulo Dougherty MD  Active   aspirin 81 mg chewable tablet [id number] Yes Chew 1 tablet (81 mg) once daily. Carolin Rios, APRN-CNP  Active   atorvastatin (Lipitor) 80 mg tablet [id number] Yes Take 1 tablet (80 mg) by mouth once daily at bedtime. Vishnu Ordoñez DO  Active   bisoprolol (Zebeta) 10 mg tablet [id number] Yes Take 2 tablets (20 mg) by mouth once daily. Vishnu Ordoñez DO  Active   cetirizine (ZyrTEC) 10 mg tablet [id number] Yes Take 1 tablet (10 mg) by mouth once daily for 7 days. Leanne Davila PA-C  Active   cholecalciferol (Vitamin D-3) 125 mcg (5000 UT) capsule [id number] Yes Take 1 capsule (125 mcg) by mouth once daily. Historical Provider, MD  Active   cyanocobalamin, vitamin B-12, 500 mcg tablet,disintegrating [id number] Yes Dissolve 500 mcg in the mouth once daily. Indications: prevention of vitamin B12 deficiency Historical Provider, MD  Active   diclofenac sodium (Voltaren Arthritis Pain) 1 % gel [id number]  Apply 2in of ointment to left toes at area of pain up to three times daily for pain control. Leanne Davila PA-C  Active   dilTIAZem ER (Tiazac) 120 mg 24 hr capsule [id number] Yes Take 1 capsule (120 mg) by mouth once daily. Vishnu Ordoñez DO  Active   omega-3 (Fish OiL) 300-1,000 mg capsule [id number] Yes Take 1 capsule (1,000 mg) by mouth once daily. Historical Provider, MD  Active   predniSONE (Deltasone) 20 mg tablet [id number]  Take 1 tablet (20 mg) by mouth once daily for 4 days. Leanne Davila PA-C   [phone]                                 Medication compliance: Yes   Uses pill box/organizer: Yes    Carries medication list: Yes     Blood Pressure Management  History of Hypertension: Yes   Medication Changes: No   Resting BP:  102/70  CURRENT BP: 108/62    Heart Failure  Management  Hx of Heart Failure: No    Smoking/Tobacco Assessment  Tobacco Use History[1]    Other Core Component Plan    Goal Status: In progress    Other Core Component Goals: Medication compliance, Verbalize medication usage and drug actions by discharge, Verbalize SL NTG action and proper dosage by discharge, and Achieve resting BP of < 130/80 by discharge    Other Core Component Interventions/Education:   *EDUCATION: CAD, HYPERTENSION AND STROKE, RISK FACTORS FOR HEART DISEASE, UNDERSTANDING HEART VALVE DISEASE, UNDERSTANDING CHF.  *PROVIDE MED EDUCATION TOOL  *PROVIDE MED LIST IF NEEDED     Initial Assessment: KNOWLEDGE QUIZ  REASSESSMENT:  5/20/2025 REVIEWED INTAKE KNOWLEDGE QUIZ RESULTS WITH PT.  6/18/2025 EDUCATIONAL HANDOUTS ON RISK FACTORS FOR HEART DISEASE, HEART FAILURE AND HEART VALVE DISEASE PROVIDED AND REVIEWED WITH PATIENT. PT ABLE TO IDENTIFY HER SPECIFIC RISK FACTORS AND WAYS TO MANAGE THEM.  7/16/2025- CORONARY ARTERY DISEASE HANDOUTS AND EDUCATION PROVIDED AND REVIEWED WITH PATIENT.       Individual Patient Goals:    BE ABLE TO WALK WITH  AND DOG WITHOUT BEING SHORT OF BREATH  LOSE 10 POUNDS AND GET MORE TONED    Goal Status: In progress    Staff Comments:  ADVANCED DIRECTIVES REVIEWED WITH PATIENT.  MAINTAINS SAFETY DURING EACH SESSION         Rehab Staff Signature: Emelia Henley RN                 [1]   Social History  Tobacco Use   Smoking Status Never    Passive exposure: Never   Smokeless Tobacco Never

## 2025-07-18 ENCOUNTER — CLINICAL SUPPORT (OUTPATIENT)
Dept: CARDIAC REHAB | Facility: HOSPITAL | Age: 75
End: 2025-07-18
Payer: MEDICARE

## 2025-07-18 DIAGNOSIS — Z95.1 S/P CABG (CORONARY ARTERY BYPASS GRAFT): ICD-10-CM

## 2025-07-18 PROCEDURE — 93798 PHYS/QHP OP CAR RHAB W/ECG: CPT

## 2025-07-21 ENCOUNTER — CLINICAL SUPPORT (OUTPATIENT)
Dept: CARDIAC REHAB | Facility: HOSPITAL | Age: 75
End: 2025-07-21
Payer: MEDICARE

## 2025-07-21 DIAGNOSIS — Z95.1 S/P CABG (CORONARY ARTERY BYPASS GRAFT): ICD-10-CM

## 2025-07-21 PROCEDURE — 93798 PHYS/QHP OP CAR RHAB W/ECG: CPT | Performed by: INTERNAL MEDICINE

## 2025-07-21 PROCEDURE — 93244 EXT ECG>48HR<7D REV&INTERPJ: CPT | Performed by: INTERNAL MEDICINE

## 2025-07-23 ENCOUNTER — CLINICAL SUPPORT (OUTPATIENT)
Dept: CARDIAC REHAB | Facility: HOSPITAL | Age: 75
End: 2025-07-23
Payer: MEDICARE

## 2025-07-23 DIAGNOSIS — Z95.1 S/P CABG (CORONARY ARTERY BYPASS GRAFT): ICD-10-CM

## 2025-07-23 PROCEDURE — 93798 PHYS/QHP OP CAR RHAB W/ECG: CPT | Performed by: INTERNAL MEDICINE

## 2025-07-25 ENCOUNTER — APPOINTMENT (OUTPATIENT)
Dept: CARDIAC REHAB | Facility: HOSPITAL | Age: 75
End: 2025-07-25
Payer: MEDICARE

## 2025-07-28 ENCOUNTER — CLINICAL SUPPORT (OUTPATIENT)
Dept: CARDIAC REHAB | Facility: HOSPITAL | Age: 75
End: 2025-07-28
Payer: MEDICARE

## 2025-07-28 DIAGNOSIS — Z95.1 S/P CABG (CORONARY ARTERY BYPASS GRAFT): ICD-10-CM

## 2025-07-28 PROCEDURE — 93798 PHYS/QHP OP CAR RHAB W/ECG: CPT

## 2025-07-30 ENCOUNTER — CLINICAL SUPPORT (OUTPATIENT)
Dept: CARDIAC REHAB | Facility: HOSPITAL | Age: 75
End: 2025-07-30
Payer: MEDICARE

## 2025-07-30 ENCOUNTER — APPOINTMENT (OUTPATIENT)
Dept: ORTHOPEDIC SURGERY | Facility: CLINIC | Age: 75
End: 2025-07-30
Payer: COMMERCIAL

## 2025-07-30 DIAGNOSIS — Z95.1 S/P CABG (CORONARY ARTERY BYPASS GRAFT): ICD-10-CM

## 2025-07-30 PROCEDURE — 93798 PHYS/QHP OP CAR RHAB W/ECG: CPT

## 2025-08-01 ENCOUNTER — CLINICAL SUPPORT (OUTPATIENT)
Dept: CARDIAC REHAB | Facility: HOSPITAL | Age: 75
End: 2025-08-01
Payer: MEDICARE

## 2025-08-01 DIAGNOSIS — Z95.1 S/P CABG (CORONARY ARTERY BYPASS GRAFT): ICD-10-CM

## 2025-08-01 PROCEDURE — 93798 PHYS/QHP OP CAR RHAB W/ECG: CPT

## 2025-08-01 NOTE — PROGRESS NOTES
Assessment/Plan:  1  Status post right rotator cuff repair         Scribe Attestation    I,:   Molly Hu Call am acting as a scribe while in the presence of the attending physician :        I,:   Jesus Rashid MD personally performed the services described in this documentation    as scribed in my presence :              Alexa Darden is doing very well and as expected  He will continue with physical therapy  I did review his physical therapy notes  I explained to him that this can be a 6-12 month recovery  I encouraged him to continue with his home exercise program when not at therapy  A new theraband and was provided to him today  I will see him in 6 weeks for follow-up evaluation  Subjective:   Katerin Jackson is a 64 y o  male who presents today for postop evaluation of the right rotator cuff repair and subacromial decompression performed a little over 13 weeks ago  He states he is doing well  He continues with physical therapy 2 times per week  He admits to not necessarily being very compliant with his home exercise program   Today has no complaints of excessive pain or distal paresthesias  He does remain somewhat stiff in his opinion  Review of Systems   Constitutional: Negative for chills, fever and unexpected weight change  HENT: Negative for hearing loss, nosebleeds and sore throat  Eyes: Negative for pain, redness and visual disturbance  Respiratory: Negative for cough, shortness of breath and wheezing  Cardiovascular: Negative for chest pain, palpitations and leg swelling  Gastrointestinal: Negative for abdominal pain, nausea and vomiting  Endocrine: Negative for polyphagia and polyuria  Genitourinary: Negative for dysuria and hematuria  Musculoskeletal:        See HPI   Skin: Negative for rash and wound  Neurological: Negative for dizziness, numbness and headaches  Psychiatric/Behavioral: Negative for decreased concentration and suicidal ideas   The patient is not Daily Cardiopulmonary Rehab Visit      nervous/anxious            Past Medical History:   Diagnosis Date    Asthma     Hyperlipidemia     Nasal polyps     Near syncope     Sleep apnea, obstructive        Past Surgical History:   Procedure Laterality Date    ARTHROSCOPY KNEE      COLONOSCOPY      [facility] SHLDR ARTHROSCOP,SURG,W/REMOVAL,LOOSE/FB Right 2018    Procedure: ARTHROSCOPIC SHOULDER;  Surgeon: Mayco Tovar MD;  Location: [facility];  Service: Orthopedics    [facility] SHLDR ARTHROSCOP,SURG,W/ROTAT CUFF REPR Right 2018    Procedure: REPAIR ROTATOR CUFF  ARTHROSCOPIC WITH POSSIBLE REMOVAL LOOSE BODIES AND POSSIBLE DECOMPRESSION;  Surgeon: Mayco Tovar MD;  Location: [facility];  Service: Orthopedics    SINUS SURGERY         Family History   Problem Relation Age of Onset    Dementia Mother     Kidney disease Mother     Dementia Father     Rheumatologic disease Father     Other Father         rheumatism    No Known Problems Sister     No Known Problems Brother     No Known Problems Maternal Aunt     No Known Problems Maternal Uncle     No Known Problems Paternal Aunt     No Known Problems Paternal Uncle     No Known Problems Maternal Grandmother     No Known Problems Maternal Grandfather     No Known Problems Paternal Grandmother     No Known Problems Paternal Grandfather     ADD / ADHD Neg Hx     Anesthesia problems Neg Hx     Cancer Neg Hx     Clotting disorder Neg Hx     Collagen disease Neg Hx     Diabetes Neg Hx     Dislocations Neg Hx     Learning disabilities Neg Hx     Neurological problems Neg Hx     Osteoporosis Neg Hx     Scoliosis Neg Hx     Vascular Disease Neg Hx        Social History     Occupational History    Not on file   Tobacco Use    Smoking status: Former Smoker     Packs/day: 0 50     Years: 15 00     Pack years: 7 50     Last attempt to quit:      Years since quittin 2    Smokeless tobacco: Never Used    Tobacco comment: smoked 3-4 days    Substance and Sexual Activity  Alcohol use: No    Drug use: No    Sexual activity: Not on file         Current Outpatient Medications:     amoxicillin-clavulanate (AUGMENTIN) 875-125 mg per tablet, take 1 tablet by mouth twice a day for 14 days, Disp: , Rfl: 0    esomeprazole (NexIUM) 20 mg capsule, 40 mg daily in the early morning  , Disp: , Rfl: 0    fluticasone-vilanterol (BREO ELLIPTA) 200-25 MCG/INH inhaler, Inhale 1 puff daily Rinse mouth after use , Disp: 3 Inhaler, Rfl: 3    montelukast (SINGULAIR) 10 mg tablet, Take 1 tablet (10 mg total) by mouth daily at bedtime (Patient taking differently: Take 10 mg by mouth every morning  ), Disp: 90 tablet, Rfl: 3    Multiple Vitamin (MULTIVITAMIN) tablet, Take 1 tablet by mouth daily, Disp: , Rfl:     naproxen (EC NAPROSYN) 500 MG EC tablet, Take 1 tablet (500 mg total) by mouth 2 (two) times a day with meals, Disp: 60 tablet, Rfl: 0    rOPINIRole (REQUIP) 2 mg tablet, Take 1 tablet (2 mg total) by mouth daily at bedtime, Disp: 90 tablet, Rfl: 3    rosuvastatin (CRESTOR) 10 MG tablet, Take 1 tablet (10 mg total) by mouth daily (Patient taking differently: Take 10 mg by mouth daily at bedtime  ), Disp: 30 tablet, Rfl: 3    valACYclovir (VALTREX) 500 mg tablet, Take 500 mg by mouth 2 (two) times a day, Disp: , Rfl: 0    VENTOLIN  (90 Base) MCG/ACT inhaler, Inhale 2 puffs every 4 (four) hours as needed for wheezing or shortness of breath, Disp: 3 Inhaler, Rfl: 3    No Known Allergies    Objective:  Vitals:    03/11/19 1037   BP: 132/82   Pulse: 61       Right Shoulder Exam     Range of Motion   Active abduction: 120   External rotation: 70   Forward flexion: 120   Internal rotation 0 degrees: Sacrum     Muscle Strength   Abduction: 4/5   Internal rotation: 5/5   External rotation: 4/5   Supraspinatus: 4/5   Subscapularis: 5/5     Other   Sensation: normal  Pulse: present (2+ radial pulse)            Physical Exam   Constitutional: He is oriented to person, place, and time   He appears well-developed and well-nourished  HENT:   Head: Normocephalic and atraumatic  Eyes: Conjunctivae are normal  Right eye exhibits no discharge  Left eye exhibits no discharge  Neck: Normal range of motion  Neck supple  Cardiovascular: Regular rhythm and intact distal pulses  Pulmonary/Chest: Effort normal  No respiratory distress  Neurological: He is alert and oriented to person, place, and time  Skin: Skin is warm and dry  Psychiatric: He has a normal mood and affect  His behavior is normal    Vitals reviewed  I have personally reviewed pertinent films in PACS and my interpretation is as follows: No images reviewed today

## 2025-08-04 ENCOUNTER — CLINICAL SUPPORT (OUTPATIENT)
Dept: CARDIAC REHAB | Facility: HOSPITAL | Age: 75
End: 2025-08-04
Payer: MEDICARE

## 2025-08-04 DIAGNOSIS — Z95.1 S/P CABG (CORONARY ARTERY BYPASS GRAFT): ICD-10-CM

## 2025-08-04 PROCEDURE — 93798 PHYS/QHP OP CAR RHAB W/ECG: CPT | Performed by: INTERNAL MEDICINE

## 2025-08-05 ENCOUNTER — TELEMEDICINE (OUTPATIENT)
Dept: CARDIAC SURGERY | Facility: CLINIC | Age: 75
End: 2025-08-05
Payer: MEDICARE

## 2025-08-05 DIAGNOSIS — I48.0 PAROXYSMAL ATRIAL FIBRILLATION (MULTI): Primary | ICD-10-CM

## 2025-08-05 PROCEDURE — 1159F MED LIST DOCD IN RCRD: CPT

## 2025-08-05 PROCEDURE — 99215 OFFICE O/P EST HI 40 MIN: CPT

## 2025-08-05 PROCEDURE — G2211 COMPLEX E/M VISIT ADD ON: HCPCS

## 2025-08-05 ASSESSMENT — ENCOUNTER SYMPTOMS
LOSS OF SENSATION IN FEET: 0
DEPRESSION: 0
OCCASIONAL FEELINGS OF UNSTEADINESS: 1

## 2025-08-05 NOTE — PROGRESS NOTES
Chief Complaint  Surveillance visit    A telemedicine visit (audio and video between the patient (at the originating site) and the provider (at the distant site) was utilized to provide this telehealth service. ~     Verbal consent was requested and obtained from Radha Montalvo            on this date, 08/05/2025 2:15 PM , for a telehealth visit.     HPI:  Ms. Radha Montalvo is a 75 y.o. female, who presents for evaluation after surgical ablation. They are now 6 month out from their operation, and are recovering nicely.  They have resumed normal activities and appetite is returned to normal.  She is currently participating in cardiac rehab, which ends tomorrow, but has signed up to do pulmonary rehab to help with her continued shortness of breath.  They have no chest pain, denies palpitations, dizziness, or syncope.    Medical History[1]    Surgical History[2]    Family History[3]    Social History     Socioeconomic History    Marital status:      Spouse name: Not on file    Number of children: Not on file    Years of education: Not on file    Highest education level: Not on file   Occupational History    Not on file   Tobacco Use    Smoking status: Never     Passive exposure: Never    Smokeless tobacco: Never   Vaping Use    Vaping status: Never Used   Substance and Sexual Activity    Alcohol use: Yes     Alcohol/week: 7.0 standard drinks of alcohol     Types: 7 Glasses of wine per week     Comment: occasional    Drug use: Never    Sexual activity: Yes     Partners: Male     Birth control/protection: Post-menopausal   Other Topics Concern    Not on file   Social History Narrative    Not on file     Social Drivers of Health     Financial Resource Strain: Low Risk  (2/18/2025)    Overall Financial Resource Strain (CARDIA)     Difficulty of Paying Living Expenses: Not hard at all   Food Insecurity: No Food Insecurity (2/18/2025)    Hunger Vital Sign     Worried About Running Out of Food in the Last Year:  Never true     Ran Out of Food in the Last Year: Never true   Transportation Needs: No Transportation Needs (3/21/2025)    OASIS : Transportation     Lack of Transportation (Medical): No     Lack of Transportation (Non-Medical): No     Patient Unable or Declines to Respond: No   Physical Activity: Inactive (2/18/2025)    Exercise Vital Sign     Days of Exercise per Week: 0 days     Minutes of Exercise per Session: 0 min   Stress: No Stress Concern Present (2/18/2025)    [employer] - Occupational Stress Questionnaire     Feeling of Stress : Only a little   Social Connections: Feeling Socially Integrated (3/21/2025)    OASIS : Social Isolation     Frequency of experiencing loneliness or isolation: Never   Recent Concern: Social Connections - Moderately Isolated (2/18/2025)    Social Connection and Isolation Panel     Frequency of Communication with Friends and Family: More than three times a week     Frequency of Social Gatherings with Friends and Family: More than three times a week     Attends Hindu Services: Never     Active Member of Clubs or Organizations: No     Attends Club or Organization Meetings: Never     Marital Status:    Intimate Partner Violence: Not At Risk (2/18/2025)    Humiliation, Afraid, Rape, and Kick questionnaire     Fear of Current or Ex-Partner: No     Emotionally Abused: No     Physically Abused: No     Sexually Abused: No   Housing Stability: Unknown (2/21/2025)    Housing Stability Vital Sign     Unable to Pay for Housing in the Last Year: Patient declined     Number of Times Moved in the Last Year: 0     Homeless in the Last Year: No       RX Allergies[4]    Encounter Medications[5]    Physical Exam    Encounter Date: 04/07/25   ECG 12 lead (Ancillary Performed)   Result Value    Ventricular Rate 93    Atrial Rate 93    IA Interval 236    QRS Duration 86    QT Interval 430    QTC Calculation(Bazett) 534    R Axis -52    T Axis 52    QRS  Count 15    Q Onset 227    P Onset 109    P Offset 147    T Offset 442    QTC Fredericia 498    Narrative    Undetermined rhythm  Left axis deviation  Nonspecific ST abnormality  Prolonged QT  Abnormal ECG  When compared with ECG of 16-FEB-2025 11:58,  Sinus rhythm has replaced Atrial fibrillation  ST now depressed in Inferior leads  Nonspecific T wave abnormality no longer evident in Inferior leads  Confirmed by Nakul Soriano ([id number]) on 4/8/2025 9:09:31 AM     Results/Data: 7-day rhythm monitor: July 1-8, 2025  0% burden of atrial fibrillation    Assessment and Plan:   Paroxsymal atrial fibrillation(6 month visit)    S/P CABG x2 LIMA-LAD, SVG-OM, GP MAZE, and PATSY with 40 mm AtriClip on 02/14/2025; by Dr. Dwayne Jay.       Surgical ablation lines created:  Right antral pulmonary vein isolation, Left antral pulmonary vein isolation, Left atrial roof anchoring line, Left atrial floor anchoring line, Mitral valve annular circuit interruption line, Left atrial appendage circuit interruption line, Intercaval circuit interruption ine, Right atrial appendage circuit interruption line, Right atrial free wall anchoring line, Tricuspid valve annular circuit interruption line, and Left atrial appendage ligation    [id number] score: 2  HASBLED score: 2    Antiarrhythmic therapy: Diltiazem 120 mg daily  Antiplatelet therapy: ASA 81 mg daily  Anticoagulation therapy: Apixaban 5 mg BID    Plan:  I am evaluating Ms. Radha Montalvo in our Surgical Arrhythmia Clinic.  The purpose of this clinic is to longitudinally follow our surgical patients and promote collaborative multidisciplinary care.  I am happy to report that they remain in sinus rhythm.  We have advised that she can discontinue her amiodarone at this point, but we will continue apixaban until her 1 year visit and re-evaluate at that time.  They remain in sinus rhythm, with an adequate managed left atrial appendage.  We will plan on a rhythm monitor in January and  a follow-up visit in February to go over the results.  This will be their 1 year visit.              [1]   Past Medical History:  Diagnosis Date    Abnormal findings on diagnostic imaging of heart and coronary circulation 10/22/2023    Aneurysm 10/23/2024    Arthritis     Arthritis of left acromioclavicular joint 02/15/2024    Ascending aorta dilatation 11/22/2023    Atrial fibrillation (Multi) 10/23/2023    Biceps tendinitis of left shoulder 02/15/2024    Breast cancer 2013    Cancer (Multi) 2013    Cataract     s/p extraction    Cervical disc disorder 1965    Congenital dilatation of aorta ([facility]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    Coronary artery disease 2026    CTS (carpal tunnel syndrome) 2017    Dislocated elbow     Easy bruising     Blood thinner    Edema of both lower extremities 01/13/2024    Essential hypertension 10/06/2023    Fracture of ankle not sure    Fracture of wrist not sure    Fracture, foot 2025 toes    Hx antineoplastic chemo     Hyperlipidemia 10/06/2023    Hypertension 10/06/2023    Irregular heart beat     Afib    Labral tear of long head of biceps tendon, right, initial encounter 02/15/2024    Lumbosacral disc disease 2000    Parathyroid disease (Multi)     s/p paratcyoiectomy)    Peripheral neuropathy     feet, bilatera    Personal history of irradiation     Personal history of other diseases of the musculoskeletal system and connective tissue 10/06/2023    History of arthritis    Primary osteoarthritis of both shoulders 02/15/2024    Scoliosis 1965    Shortness of breath 10/23/2023    Sleep apnea 2025    CPAP   [2]   Past Surgical History:  Procedure Laterality Date    ACHILLES TENDON SURGERY  2017 partcial    Only less evasive    BI MAMMO GUIDED BREAST LEFT LOCALIZATION Left 02/25/2013    BI MAMMO GUIDED LOCALIZATION BREAST LEFT [facility]    BREAST BIOPSY      BREAST LUMPECTOMY Left 2013    CARDIAC CATHETERIZATION N/A 01/27/2025    Procedure: Left Heart Cath with Coronary Angiography and LV;   Surgeon: Paulo Dougherty MD;  Location: [facility] Cardiac Cath Lab;  Service: Cardiovascular;  Laterality: N/A;  no prior auth needed    CARDIAC ELECTROPHYSIOLOGY PROCEDURE N/A 02/14/2025    Procedure: Ablation A-Fib;  Surgeon: Dwayne Jay MD;  Location: [facility];  Service: Cardiac Surgery;  Laterality: N/A;    CARPAL TUNNEL RELEASE  ?    CATARACT EXTRACTION Bilateral     COLONOSCOPY  2014    HYSTERECTOMY  2000    HYSTEROSCOPY      LYMPH NODE BIOPSY      MAMMOGRAM DIAGNOSTIC BI Bilateral 09/16/2024    OTHER SURGICAL HISTORY  04/27/2022    Lumpectomy    PARATHYROID GLAND SURGERY  Not sure    TOENAIL EXCISION      It came back    TONSILLECTOMY      TUBAL LIGATION      US COMPLETE BREAST Left 09/16/2024    WISDOM TOOTH EXTRACTION     [3]   Family History  Problem Relation Name Age of Onset    Stroke Mother Christi Nair     Arthritis Mother Christi Nair     COPD Mother Christi Nair     Stroke Father Liborio Nair     Aneurysm Father Liborio Nair     Hypertension Father Liborio Nair     Broken bones Father Liborio Nair     Osteoporosis Mother Christi Nair    [4] No Known Allergies  [5]   Outpatient Encounter Medications as of 8/5/2025   Medication Sig Dispense Refill    acyclovir (Zovirax) 800 mg tablet Take 1 tablet (800 mg) by mouth once daily. 90 tablet 1    amiodarone (Pacerone) 200 mg tablet One daily 90 tablet 3    apixaban (Eliquis) 5 mg tablet Take 1 tablet (5 mg) by mouth 2 times a day. 60 tablet 11    aspirin 81 mg chewable tablet Chew 1 tablet (81 mg) once daily. 30 tablet 0    atorvastatin (Lipitor) 80 mg tablet Take 1 tablet (80 mg) by mouth once daily at bedtime. 90 tablet 3    bisoprolol (Zebeta) 10 mg tablet Take 2 tablets (20 mg) by mouth once daily. 180 tablet 1    cholecalciferol (Vitamin D-3) 125 mcg (5000 UT) capsule Take 1 capsule (125 mcg) by mouth once daily.      cyanocobalamin, vitamin B-12, 500 mcg tablet,disintegrating Dissolve 500 mcg in the mouth once daily. Indications: prevention of vitamin B12  deficiency      dilTIAZem ER (Tiazac) 120 mg 24 hr capsule Take 1 capsule (120 mg) by mouth once daily. 90 capsule 3    omega-3 (Fish OiL) 300-1,000 mg capsule Take 1 capsule (1,000 mg) by mouth once daily.      acetaminophen (Tylenol) 325 mg tablet Take 2 tablets (650 mg) by mouth every 6 hours if needed for mild pain (1 - 3).      [DISCONTINUED] cetirizine (ZyrTEC) 10 mg tablet Take 1 tablet (10 mg) by mouth once daily for 7 days. 7 tablet 0    [DISCONTINUED] diclofenac sodium (Voltaren Arthritis Pain) 1 % gel Apply 2in of ointment to left toes at area of pain up to three times daily for pain control. 50 g 0     No facility-administered encounter medications on file as of 8/5/2025.

## 2025-08-06 ENCOUNTER — CLINICAL SUPPORT (OUTPATIENT)
Dept: CARDIAC REHAB | Facility: HOSPITAL | Age: 75
End: 2025-08-06
Payer: MEDICARE

## 2025-08-06 DIAGNOSIS — Z95.1 S/P CABG (CORONARY ARTERY BYPASS GRAFT): ICD-10-CM

## 2025-08-06 PROCEDURE — 93798 PHYS/QHP OP CAR RHAB W/ECG: CPT | Performed by: INTERNAL MEDICINE

## 2025-08-08 ENCOUNTER — DOCUMENTATION (OUTPATIENT)
Dept: CARDIAC REHAB | Facility: HOSPITAL | Age: 75
End: 2025-08-08
Payer: MEDICARE

## 2025-08-08 NOTE — PROGRESS NOTES
Cardiac Rehabilitation Discharge Summary    Name: Radha Montalvo  Medical Record Number: [id number]  YOB: 1950  Age: 75 y.o.    Today’s Date: 8/8/2025  Primary Care Physician: Vishnu Ordoñez DO  Referring Physician: Dr. Mc Jay MD  Program Location: [facility]  Primary Diagnosis: CABG Z95.1  Onset/Date of Diagnosis: 2/14/2025    SESSION #36  FIRST EXERCISE SESSION: 4/28/2025    AACVPR Risk Stratification:  LOW     Falls Risk: Medium  Psychosocial Assessment   INTAKE PHQ-9=9 MILD  MID PHQ-9= 2 MINIMAL  FINAL PHQ-9=5 MILD      Sent PH-Q 9 to MD if score > 20: No; score < 20    Pt reported/currently experiencing stress: No  Patient uses stress management skills: Yes   History of: no history of anxiety or depression  Currently seeing a mental health provider: No  Social Support: Yes, Whom:   Quality of Life Survey: FERRANS AND CRUM  Quality of Life Survey:  PRE POST   GLOBAL SCORE 25.12 25.69   HEALTH/FUNCTIONING SCORE 21.20 22.30   SOCIAL/ECONOMIC SCORE 28.57 30   PSYCHOLOGICAL/SPIRITUAL SCORE 26.57 27   FAMILY SCORE 30.00 30      Learning Assessment:  Learning assessment/barriers: None  Preferred learning method: Visual and Reading handout  Barriers: None  Comments:    Stages of Change:Maintenance    Psychosocial Plan    Goal Status: In progress    Psychosocial Goals: Demonstrating proper techniques for stress management, Maintain or lower PH-Q 9 score by discharge, Identify strategies for managing depression, and Identify social supports    Psychosocial Interventions/Education:   *STRESS MANAGEMENT HANDOUT  *ASK PATIENT AT LEAST ONCE EVERY 30 DAYS ABOUT STRESS     Initial Assessment: PHQ-9 AND QOL SURVEYS COMPLETED   REASSESSMENT:  5/20/2025 PATIENT INJURED FOOT AND MISSED SOME CR SESSIONS. BUT STATES SHE WON'T LET THAT GET HER DOWN. REVIEWED INITIAL SURVEY RESULTS.  6/18/2025 PATIENT STATES THAT SHE IS FRUSTRATED WITH THE ORTHOPEDIC CARE FOR HER FOOT BUT WILL CONTINUE  "TO REMAIN POSITIVE. CONTINUES TO ATTEND CARDIAC REHAB- STATES THAT SHE ENJOYS COMING AND FEELS BETTER WHEN SHE DOES. EDUCATION ON STRESS MANAGEMENT PROVIDED AND REVIEWED.  7/16/2025- PATIENT DENIES ANY NEW OR WORSENING STRESS. SHE IS ENJOYING THE SUMMER AND GOING TO WATCH HER GRANDDAUGHTERS PLAY SOFTBALL WEEKLY.   DISCHARGE ASSESSMENT: 8/08/2025 PATIENT COMPLETED 36/36 SESSIONS. ALL EDUCATION PROVIDED AND REVIEWED. REVIEWED FINAL QUALITY OF LIFE AND PHQ-9 SURVEYS. PATIENT HAS HAD SEVERAL SET BACKS WITH A FOOT INJURY AND NOW SOME KNEE PROBLEMS. PATIENT STATES THAT SHE FEELS THAT SHE HAS LEARNED HOW TO BETTER MANAGE HER STRESS. PATIENT ALSO MADE FRIENDS WITH A FELLOW CLASSMATE AND HAS PLANS TO MEET FOR LUNCH AND CONTINUE TO BE SUPPORTIVE TO EACH OTHER.       Nutrition Assessment:    Hyperlipidemia: Yes     Lipids:   Lab Results   Component Value Date    CHOL 138 08/15/2024    HDL 61.0 08/15/2024    TRIG 96 08/15/2024       Current Dietary Guidelines: Low fat, Low sodium  Barriers to dietary change: no    Diet Habit Survey: Rate Your Plate  Pre: 61/69  Post:  60/69    Diabetes Assessment    Lab Results   Component Value Date    HGBA1C 6.1 (H) 02/07/2025       History of Diabetes: No    Weight Management  INTAKE WEIGHT 156.0 POUNDS   CURRENT WEIGHT: 157.5 POUNDS  FINAL WEIGHT: 161  Nutrition Plan    Goal Status: In progress    Nutrition Goals: Adapt a low-sodium, DASH diet prior to discharge, Adapt a Mediterranean focused diet prior to discharge, Learn how to read and interpret nutrition labels prior to discharge, Lose 1lb/week while enrolled in program, and IMPROVE OR MAINTAIN RYP SURVEY RESULT    Nutrition Interventions/Education:   *NUTRITION EDUCATION HANDOUTS  *CHOLESTEROL MANAGEMENT HANDOUT     Initial Assessment: RYP  REASSESSMENT:  5/20/2025 REVIEWED INITIAL RYP SURVEY RESULTS. 61/69 \"YOU ARE MAKING MANY HEALTHY CHOICES\"  6/18/2025 PATIENT WEIGHT UP SINCE INTAKE- REVIEWED DIET AND GOALS WITH PT. NUTRITION " EDUCATION PROVIDED AND REVIEWED WITH PATIENT.   7/16/2025- CHOLESTEROL MANAGEMENT EDUCATION HANDOUTS AND LAB WORK REVIEWED WITH PATIENT.   DISCHARGE ASSESSMENT: 8/08/2025 PATIENT COMPLETED 36/36 SESSIONS. ALL EDUCATION PROVIDED AND REVIEWED. PATIENT DID NOT LOSE THE 10LBS DUE TO INCREASED INACTIVITY FROM HER FOOT AND KNEE INJURY. PT STATES THAT SHE WILL CONTINUE TO WORK ON MAKING HEALTHY DIETARY CHOICES SUCH DECREASING THE AMOUNT OF RED MEAT THEY EAT. REVIEWED FINAL RATE YOUR PLATE 60/69 DECREASED FROM 61/69 BUT STILL SHOWS THAT SHE IS MAKING MANY HEALTHY DIETARY CHOICES.      Exercise Assessment    YES  Mode: VARIETY- JOINING THE [employer] Vacc[employer]   Frequency: PLAN 3 DAYS PER WEEK  Duration: 30-45    Exercise Prescription     Exercise Prescription based on: Pre-rehab stress test   Frequency:  3 days/week   Mode: Treadmill, NuStep, Recumbent Cycle, and Weights   Duration: 45-60 total aerobic minutes   Intensity: RPE 12-13  Target HR:    MET Level:   Patient wears supplemental O2: No     Modality Workload METs Duration (minutes)   1 Pre-Exercise   2:00   2 Treadmill 1.3@1.5% 2.3 12:00   3 NuStep X2 24@5 2.1 15:00   4 Recumbent Bike 26@6 3.1 15 :00   5 Weights 3 pounds 12-15 reps  10 :00   6 Post-Exercise   2:00     Resistance Training: No   Home Exercise Prescription given: No    Exercise Plan    Goal Status: In progress    Exercise Goals: Increase exercise MET level by 5-10% each week, Increase total exercise duration to 30-45 minutes, Obtain 150 minutes/week of moderate intensity aerobic exercise, Initiate strength training 2-3 days a week, Initiate flexibility training 2-3 days a week, and Establish a home exercise program before discharge    Exercise Interventions/Education:   *EXERCISE FUNDAMENTALS AND MAINTENANCE HANDOUT  *REVIEW THR AND INSTRUCTIONS FOR RADIAL PULSE CHECK     Initial Assessment: EST  REASSESSMENT:  5/20/2025 PT MISSED SEVERAL SESSIONS D/T FOOT INJURY. SOME RESTRICTIONS UPON RETURN TO REHAB. WILL  CONTINUE TO WORK TOWARDS INCREASED DURATION/ METS. REVIEWED TARGET HEART RATE WITH PT. MAX METS ACHIEVED 2.6 ON RECUMBENT BIKE.  6/18/2025 CONTINUES TO INCREASE DURATION/ METS ON RECUMBENT BIKE AND NUSTEP. TREADMILL IS ON HOLD WHILE RECOVERING FROM FOOT INJURY. MAX METS OF 2.8 ACHIEVED ON THE RECUMBENT BIKE AT MOST RECENT SESSION.  7/16/2025- PATIENT ADVANCING ON TREADMILL SINCE BOOT REMOVAL ON FOOT. ACHIEVED 2.2 METS ON TREADMILL.  DISCHARGE ASSESSMENT: 8/08/2025 PATIENT COMPLETED 36/36 SESSIONS. ALL EDUCATION PROVIDED AND REVIEWED. PATIENT IMPROVED ON INTENSITY AND DURATION WEEKLY THROUGHOUT PROGRAM METS ON TREADMILL AT SESSION #3=1.8 AND AT #36=2.3.     Other Core Components/Risk Factor Assessment:    Medication adherence  Current Medications:   Medication Documentation Review Audit       Reviewed by Leo Mann RN (Registered Nurse) on 08/05/25 at 1358      Medication Order Taking? Sig Documenting Provider Last Dose Status   acetaminophen (Tylenol) 325 mg tablet [id number]  Take 2 tablets (650 mg) by mouth every 6 hours if needed for mild pain (1 - 3). Carolin Rios APRN-CNP  Active   acyclovir (Zovirax) 800 mg tablet [id number] Yes Take 1 tablet (800 mg) by mouth once daily. Vishnu Ordoñez, DO  Active   amiodarone (Pacerone) 200 mg tablet [id number] Yes One daily Paulo Dougherty MD  Active   apixaban (Eliquis) 5 mg tablet [id number] Yes Take 1 tablet (5 mg) by mouth 2 times a day. Paulo Dougherty MD  Active   aspirin 81 mg chewable tablet [id number] Yes Chew 1 tablet (81 mg) once daily. ODALYS Negron-CNP  Active   atorvastatin (Lipitor) 80 mg tablet [id number] Yes Take 1 tablet (80 mg) by mouth once daily at bedtime. Vishnu Ordoñez, DO  Active   bisoprolol (Zebeta) 10 mg tablet [id number] Yes Take 2 tablets (20 mg) by mouth once daily. Vishnu Ordoñez, DO  Active   cetirizine (ZyrTEC) 10 mg tablet [id number]  Take 1 tablet (10 mg) by mouth once daily for 7 days. Leanne Davila PA-C  Active    cholecalciferol (Vitamin D-3) 125 mcg (5000 UT) capsule [id number] Yes Take 1 capsule (125 mcg) by mouth once daily. Historical Provider, MD  Active   cyanocobalamin, vitamin B-12, 500 mcg tablet,disintegrating [id number] Yes Dissolve 500 mcg in the mouth once daily. Indications: prevention of vitamin B12 deficiency Historical Provider, MD  Active   diclofenac sodium (Voltaren Arthritis Pain) 1 % gel [id number]  Apply 2in of ointment to left toes at area of pain up to three times daily for pain control. Leanne Davila PA-C  Active   dilTIAZem ER (Tiazac) 120 mg 24 hr capsule [id number] Yes Take 1 capsule (120 mg) by mouth once daily. Vishnu Ordoñez DO  Active   omega-3 (Fish OiL) 300-1,000 mg capsule [id number] Yes Take 1 capsule (1,000 mg) by mouth once daily. Historical Provider, MD  Active                                 Medication compliance: Yes   Uses pill box/organizer: Yes    Carries medication list: Yes     Blood Pressure Management  History of Hypertension: Yes   Medication Changes: No   Resting BP:  102/70  CURRENT BP: 110/60 AT FINAL SESSION    Heart Failure Management  Hx of Heart Failure: No    Smoking/Tobacco Assessment  Tobacco Use History[1]    Other Core Component Plan    Goal Status: Met    Other Core Component Goals: Medication compliance, Verbalize medication usage and drug actions by discharge, Verbalize SL NTG action and proper dosage by discharge, and Achieve resting BP of < 130/80 by discharge    Other Core Component Interventions/Education:   *EDUCATION: CAD, HYPERTENSION AND STROKE, RISK FACTORS FOR HEART DISEASE, UNDERSTANDING HEART VALVE DISEASE, UNDERSTANDING CHF.  *PROVIDE MED EDUCATION TOOL  *PROVIDE MED LIST IF NEEDED     Initial Assessment: KNOWLEDGE QUIZ  REASSESSMENT:  5/20/2025 REVIEWED INTAKE KNOWLEDGE QUIZ RESULTS WITH PT.  6/18/2025 EDUCATIONAL HANDOUTS ON RISK FACTORS FOR HEART DISEASE, HEART FAILURE AND HEART VALVE DISEASE PROVIDED AND REVIEWED WITH PATIENT. PT ABLE TO  IDENTIFY HER SPECIFIC RISK FACTORS AND WAYS TO MANAGE THEM.  7/16/2025- CORONARY ARTERY DISEASE HANDOUTS AND EDUCATION PROVIDED AND REVIEWED WITH PATIENT.   DISCHARGE ASSESSMENT: 8/08/2025 PATIENT COMPLETED 36/36 SESSIONS. ALL EDUCATION PROVIDED AND REVIEWED. PATIENT KNOWLEDGEABLE OF HER RISK FACTORS FOR HEART DISEASE AND HOW TO BETTER MANAGE THEM. COMPLAINT WITH TAKING MEDICATIONS AND CARRYING LIST AT ALL TIMES.    Individual Patient Goals:    BE ABLE TO WALK WITH  AND DOG WITHOUT BEING SHORT OF BREATH  LOSE 10 POUNDS AND GET MORE TONED    Goal Status: PARTIALLY MET DUE TO OTHER HEALTH PROBLEMS    Staff Comments:  ADVANCED DIRECTIVES REVIEWED WITH PATIENT.  MAINTAINS SAFETY DURING EACH SESSION         Rehab Staff Signature: Valeri Hines RN                 [1]   Social History  Tobacco Use   Smoking Status Never    Passive exposure: Never   Smokeless Tobacco Never

## 2025-08-13 ENCOUNTER — HOSPITAL ENCOUNTER (OUTPATIENT)
Dept: RADIOLOGY | Facility: HOSPITAL | Age: 75
Discharge: HOME | End: 2025-08-13
Payer: MEDICARE

## 2025-08-13 ENCOUNTER — APPOINTMENT (OUTPATIENT)
Dept: ORTHOPEDIC SURGERY | Facility: CLINIC | Age: 75
End: 2025-08-13
Payer: MEDICARE

## 2025-08-13 DIAGNOSIS — S92.912A: ICD-10-CM

## 2025-08-13 PROCEDURE — 99213 OFFICE O/P EST LOW 20 MIN: CPT | Performed by: STUDENT IN AN ORGANIZED HEALTH CARE EDUCATION/TRAINING PROGRAM

## 2025-08-13 PROCEDURE — 1125F AMNT PAIN NOTED PAIN PRSNT: CPT | Performed by: STUDENT IN AN ORGANIZED HEALTH CARE EDUCATION/TRAINING PROGRAM

## 2025-08-13 PROCEDURE — 73630 X-RAY EXAM OF FOOT: CPT | Mod: LT

## 2025-08-13 PROCEDURE — 1159F MED LIST DOCD IN RCRD: CPT | Performed by: STUDENT IN AN ORGANIZED HEALTH CARE EDUCATION/TRAINING PROGRAM

## 2025-08-13 PROCEDURE — 73630 X-RAY EXAM OF FOOT: CPT | Mod: LEFT SIDE | Performed by: RADIOLOGY

## 2025-08-13 ASSESSMENT — PAIN DESCRIPTION - DESCRIPTORS: DESCRIPTORS: ACHING;DULL;DISCOMFORT

## 2025-08-13 ASSESSMENT — PAIN SCALES - GENERAL: PAINLEVEL_OUTOF10: 2

## 2025-08-13 ASSESSMENT — PAIN - FUNCTIONAL ASSESSMENT: PAIN_FUNCTIONAL_ASSESSMENT: 0-10

## 2025-08-15 DIAGNOSIS — J44.9 CHRONIC OBSTRUCTIVE PULMONARY DISEASE, UNSPECIFIED COPD TYPE (MULTI): Primary | ICD-10-CM

## 2025-08-18 ENCOUNTER — APPOINTMENT (OUTPATIENT)
Dept: CARDIOLOGY | Facility: CLINIC | Age: 75
End: 2025-08-18
Payer: MEDICARE

## 2025-08-18 VITALS
OXYGEN SATURATION: 98 % | WEIGHT: 159 LBS | RESPIRATION RATE: 16 BRPM | SYSTOLIC BLOOD PRESSURE: 130 MMHG | BODY MASS INDEX: 24.9 KG/M2 | DIASTOLIC BLOOD PRESSURE: 68 MMHG | HEART RATE: 60 BPM

## 2025-08-18 DIAGNOSIS — I10 PRIMARY HYPERTENSION: ICD-10-CM

## 2025-08-18 DIAGNOSIS — E78.2 MIXED HYPERLIPIDEMIA: ICD-10-CM

## 2025-08-18 DIAGNOSIS — Z95.1 S/P CABG X 2: ICD-10-CM

## 2025-08-18 DIAGNOSIS — I25.118 CORONARY ARTERY DISEASE OF NATIVE ARTERY OF NATIVE HEART WITH STABLE ANGINA PECTORIS: ICD-10-CM

## 2025-08-18 DIAGNOSIS — I20.89 CHRONIC STABLE ANGINA: Primary | ICD-10-CM

## 2025-08-18 PROBLEM — C50.919 MALIGNANT NEOPLASM OF FEMALE BREAST: Status: RESOLVED | Noted: 2023-10-06 | Resolved: 2025-08-18

## 2025-08-18 PROCEDURE — 3075F SYST BP GE 130 - 139MM HG: CPT | Performed by: INTERNAL MEDICINE

## 2025-08-18 PROCEDURE — 99214 OFFICE O/P EST MOD 30 MIN: CPT | Performed by: INTERNAL MEDICINE

## 2025-08-18 PROCEDURE — G2211 COMPLEX E/M VISIT ADD ON: HCPCS | Performed by: INTERNAL MEDICINE

## 2025-08-18 PROCEDURE — 1159F MED LIST DOCD IN RCRD: CPT | Performed by: INTERNAL MEDICINE

## 2025-08-18 PROCEDURE — 1160F RVW MEDS BY RX/DR IN RCRD: CPT | Performed by: INTERNAL MEDICINE

## 2025-08-18 PROCEDURE — 1126F AMNT PAIN NOTED NONE PRSNT: CPT | Performed by: INTERNAL MEDICINE

## 2025-08-18 PROCEDURE — 1036F TOBACCO NON-USER: CPT | Performed by: INTERNAL MEDICINE

## 2025-08-18 PROCEDURE — 3078F DIAST BP <80 MM HG: CPT | Performed by: INTERNAL MEDICINE

## 2025-08-18 ASSESSMENT — LIFESTYLE VARIABLES
HOW MANY STANDARD DRINKS CONTAINING ALCOHOL DO YOU HAVE ON A TYPICAL DAY: 1 OR 2
HAVE YOU OR SOMEONE ELSE BEEN INJURED AS A RESULT OF YOUR DRINKING: NO
AUDIT-C TOTAL SCORE: 4
AUDIT TOTAL SCORE: 4
HOW OFTEN DO YOU HAVE SIX OR MORE DRINKS ON ONE OCCASION: NEVER
SKIP TO QUESTIONS 9-10: 1
HOW OFTEN DO YOU HAVE A DRINK CONTAINING ALCOHOL: 4 OR MORE TIMES A WEEK
HAS A RELATIVE, FRIEND, DOCTOR, OR ANOTHER HEALTH PROFESSIONAL EXPRESSED CONCERN ABOUT YOUR DRINKING OR SUGGESTED YOU CUT DOWN: NO

## 2025-08-18 ASSESSMENT — PAIN SCALES - GENERAL: PAINLEVEL_OUTOF10: 0-NO PAIN

## 2025-08-18 ASSESSMENT — ENCOUNTER SYMPTOMS
OCCASIONAL FEELINGS OF UNSTEADINESS: 0
DEPRESSION: 0
LOSS OF SENSATION IN FEET: 0

## 2025-08-19 ENCOUNTER — DOCUMENTATION (OUTPATIENT)
Dept: CARDIAC REHAB | Facility: HOSPITAL | Age: 75
End: 2025-08-19
Payer: MEDICARE

## 2025-08-19 ENCOUNTER — CLINICAL SUPPORT (OUTPATIENT)
Dept: CARDIAC REHAB | Facility: HOSPITAL | Age: 75
End: 2025-08-19
Payer: MEDICARE

## 2025-08-19 DIAGNOSIS — J44.9 CHRONIC OBSTRUCTIVE PULMONARY DISEASE, UNSPECIFIED COPD TYPE (MULTI): Primary | ICD-10-CM

## 2025-08-19 PROCEDURE — 94625 PHY/QHP OP PULM RHB W/O MNTR: CPT | Performed by: INTERNAL MEDICINE

## 2025-08-20 ENCOUNTER — APPOINTMENT (OUTPATIENT)
Dept: ORTHOPEDIC SURGERY | Facility: CLINIC | Age: 75
End: 2025-08-20
Payer: COMMERCIAL

## 2025-08-21 ENCOUNTER — OFFICE VISIT (OUTPATIENT)
Dept: PRIMARY CARE | Facility: CLINIC | Age: 75
End: 2025-08-21
Payer: MEDICARE

## 2025-08-21 VITALS
TEMPERATURE: 98.5 F | HEIGHT: 67 IN | DIASTOLIC BLOOD PRESSURE: 78 MMHG | RESPIRATION RATE: 20 BRPM | WEIGHT: 158 LBS | BODY MASS INDEX: 24.8 KG/M2 | OXYGEN SATURATION: 99 % | HEART RATE: 68 BPM | SYSTOLIC BLOOD PRESSURE: 138 MMHG

## 2025-08-21 DIAGNOSIS — E78.2 MIXED HYPERLIPIDEMIA: ICD-10-CM

## 2025-08-21 DIAGNOSIS — I10 PRIMARY HYPERTENSION: ICD-10-CM

## 2025-08-21 DIAGNOSIS — I80.9 PHLEBITIS: ICD-10-CM

## 2025-08-21 DIAGNOSIS — Z13.820 SCREENING FOR OSTEOPOROSIS: ICD-10-CM

## 2025-08-21 DIAGNOSIS — Z95.1 S/P CABG X 2: ICD-10-CM

## 2025-08-21 DIAGNOSIS — Z00.00 ROUTINE GENERAL MEDICAL EXAMINATION AT A HEALTH CARE FACILITY: Primary | ICD-10-CM

## 2025-08-21 DIAGNOSIS — M81.0 OSTEOPOROSIS, UNSPECIFIED OSTEOPOROSIS TYPE, UNSPECIFIED PATHOLOGICAL FRACTURE PRESENCE: ICD-10-CM

## 2025-08-21 DIAGNOSIS — Z23 ENCOUNTER FOR IMMUNIZATION: ICD-10-CM

## 2025-08-21 PROCEDURE — 90715 TDAP VACCINE 7 YRS/> IM: CPT | Performed by: FAMILY MEDICINE

## 2025-08-21 PROCEDURE — 3075F SYST BP GE 130 - 139MM HG: CPT | Performed by: FAMILY MEDICINE

## 2025-08-21 PROCEDURE — G0439 PPPS, SUBSEQ VISIT: HCPCS | Performed by: FAMILY MEDICINE

## 2025-08-21 PROCEDURE — 99212 OFFICE O/P EST SF 10 MIN: CPT | Mod: 25 | Performed by: FAMILY MEDICINE

## 2025-08-21 PROCEDURE — 1159F MED LIST DOCD IN RCRD: CPT | Performed by: FAMILY MEDICINE

## 2025-08-21 PROCEDURE — 3078F DIAST BP <80 MM HG: CPT | Performed by: FAMILY MEDICINE

## 2025-08-21 PROCEDURE — 1126F AMNT PAIN NOTED NONE PRSNT: CPT | Performed by: FAMILY MEDICINE

## 2025-08-21 PROCEDURE — 1170F FXNL STATUS ASSESSED: CPT | Performed by: FAMILY MEDICINE

## 2025-08-21 PROCEDURE — 99212 OFFICE O/P EST SF 10 MIN: CPT | Performed by: FAMILY MEDICINE

## 2025-08-21 PROCEDURE — 99215 OFFICE O/P EST HI 40 MIN: CPT | Performed by: FAMILY MEDICINE

## 2025-08-21 ASSESSMENT — ACTIVITIES OF DAILY LIVING (ADL)
DRESSING: INDEPENDENT
BATHING: INDEPENDENT
GROCERY_SHOPPING: INDEPENDENT
MANAGING_FINANCES: INDEPENDENT
TAKING_MEDICATION: INDEPENDENT
DOING_HOUSEWORK: INDEPENDENT

## 2025-08-21 ASSESSMENT — ENCOUNTER SYMPTOMS
OCCASIONAL FEELINGS OF UNSTEADINESS: 0
LOSS OF SENSATION IN FEET: 0
DEPRESSION: 0

## 2025-08-21 ASSESSMENT — PAIN SCALES - GENERAL: PAINLEVEL_OUTOF10: 0-NO PAIN

## 2025-08-22 LAB
ALBUMIN SERPL-MCNC: 4.5 G/DL (ref 3.6–5.1)
ALP SERPL-CCNC: 62 U/L (ref 37–153)
ALT SERPL-CCNC: 19 U/L (ref 6–29)
ANION GAP SERPL CALCULATED.4IONS-SCNC: 10 MMOL/L (CALC) (ref 7–17)
AST SERPL-CCNC: 19 U/L (ref 10–35)
BASOPHILS # BLD AUTO: 29 CELLS/UL (ref 0–200)
BASOPHILS NFR BLD AUTO: 0.5 %
BILIRUB SERPL-MCNC: 0.7 MG/DL (ref 0.2–1.2)
BUN SERPL-MCNC: 23 MG/DL (ref 7–25)
CALCIUM SERPL-MCNC: 9.4 MG/DL (ref 8.6–10.4)
CHLORIDE SERPL-SCNC: 106 MMOL/L (ref 98–110)
CHOLEST SERPL-MCNC: 120 MG/DL
CHOLEST/HDLC SERPL: 1.7 (CALC)
CO2 SERPL-SCNC: 25 MMOL/L (ref 20–32)
CREAT SERPL-MCNC: 1 MG/DL (ref 0.6–1)
EGFRCR SERPLBLD CKD-EPI 2021: 59 ML/MIN/1.73M2
EOSINOPHIL # BLD AUTO: 319 CELLS/UL (ref 15–500)
EOSINOPHIL NFR BLD AUTO: 5.6 %
ERYTHROCYTE [DISTWIDTH] IN BLOOD BY AUTOMATED COUNT: 13 % (ref 11–15)
GLUCOSE SERPL-MCNC: 109 MG/DL (ref 65–99)
HCT VFR BLD AUTO: 40.6 % (ref 35–45)
HDLC SERPL-MCNC: 69 MG/DL
HGB BLD-MCNC: 13.5 G/DL (ref 11.7–15.5)
LDLC SERPL CALC-MCNC: 37 MG/DL (CALC)
LYMPHOCYTES # BLD AUTO: 1254 CELLS/UL (ref 850–3900)
LYMPHOCYTES NFR BLD AUTO: 22 %
MCH RBC QN AUTO: 34.5 PG (ref 27–33)
MCHC RBC AUTO-ENTMCNC: 33.3 G/DL (ref 32–36)
MCV RBC AUTO: 103.8 FL (ref 80–100)
MONOCYTES # BLD AUTO: 570 CELLS/UL (ref 200–950)
MONOCYTES NFR BLD AUTO: 10 %
NEUTROPHILS # BLD AUTO: 3528 CELLS/UL (ref 1500–7800)
NEUTROPHILS NFR BLD AUTO: 61.9 %
NONHDLC SERPL-MCNC: 51 MG/DL (CALC)
PLATELET # BLD AUTO: 270 THOUSAND/UL (ref 140–400)
PMV BLD REES-ECKER: 10 FL (ref 7.5–12.5)
POTASSIUM SERPL-SCNC: 4.5 MMOL/L (ref 3.5–5.3)
PROT SERPL-MCNC: 6.6 G/DL (ref 6.1–8.1)
RBC # BLD AUTO: 3.91 MILLION/UL (ref 3.8–5.1)
SODIUM SERPL-SCNC: 141 MMOL/L (ref 135–146)
TRIGL SERPL-MCNC: 59 MG/DL
TSH SERPL-ACNC: 3.51 MIU/L (ref 0.4–4.5)
WBC # BLD AUTO: 5.7 THOUSAND/UL (ref 3.8–10.8)

## 2025-08-25 DIAGNOSIS — N28.9 FUNCTION KIDNEY DECREASED: ICD-10-CM

## 2025-08-26 ENCOUNTER — CLINICAL SUPPORT (OUTPATIENT)
Dept: CARDIAC REHAB | Facility: HOSPITAL | Age: 75
End: 2025-08-26
Payer: MEDICARE

## 2025-08-26 DIAGNOSIS — J44.9 CHRONIC OBSTRUCTIVE PULMONARY DISEASE, UNSPECIFIED COPD TYPE (MULTI): ICD-10-CM

## 2025-08-26 PROCEDURE — 94625 PHY/QHP OP PULM RHB W/O MNTR: CPT | Performed by: INTERNAL MEDICINE

## 2025-08-27 ENCOUNTER — TELEPHONE (OUTPATIENT)
Dept: PRIMARY CARE | Facility: CLINIC | Age: 75
End: 2025-08-27
Payer: MEDICARE

## 2025-08-28 ENCOUNTER — CLINICAL SUPPORT (OUTPATIENT)
Dept: CARDIAC REHAB | Facility: HOSPITAL | Age: 75
End: 2025-08-28
Payer: MEDICARE

## 2025-08-28 DIAGNOSIS — J44.9 CHRONIC OBSTRUCTIVE PULMONARY DISEASE, UNSPECIFIED COPD TYPE (MULTI): ICD-10-CM

## 2025-08-28 PROCEDURE — 94625 PHY/QHP OP PULM RHB W/O MNTR: CPT | Performed by: INTERNAL MEDICINE

## 2025-09-02 ENCOUNTER — CLINICAL SUPPORT (OUTPATIENT)
Dept: CARDIAC REHAB | Facility: HOSPITAL | Age: 75
End: 2025-09-02
Payer: MEDICARE

## 2025-09-02 DIAGNOSIS — J44.9 CHRONIC OBSTRUCTIVE PULMONARY DISEASE, UNSPECIFIED COPD TYPE (MULTI): ICD-10-CM

## 2025-09-02 PROCEDURE — 94625 PHY/QHP OP PULM RHB W/O MNTR: CPT | Performed by: INTERNAL MEDICINE

## 2025-09-04 ENCOUNTER — CLINICAL SUPPORT (OUTPATIENT)
Dept: CARDIAC REHAB | Facility: HOSPITAL | Age: 75
End: 2025-09-04
Payer: MEDICARE

## 2025-09-04 DIAGNOSIS — J44.9 CHRONIC OBSTRUCTIVE PULMONARY DISEASE, UNSPECIFIED COPD TYPE (MULTI): ICD-10-CM

## 2025-09-04 PROCEDURE — 94625 PHY/QHP OP PULM RHB W/O MNTR: CPT | Performed by: INTERNAL MEDICINE

## 2025-09-17 ENCOUNTER — APPOINTMENT (OUTPATIENT)
Dept: CARDIOLOGY | Facility: CLINIC | Age: 75
End: 2025-09-17
Payer: MEDICARE

## 2026-01-06 ENCOUNTER — APPOINTMENT (OUTPATIENT)
Dept: CARDIOLOGY | Facility: CLINIC | Age: 76
End: 2026-01-06
Payer: MEDICARE

## (undated) DEVICE — BAG, DECANTER

## (undated) DEVICE — CANNULA, VENOUS 2 STAGE 32/40

## (undated) DEVICE — SUTURE, SURGICAL STEEL, STERNUM 7, 18 IN, KV40, SINGL-WIRE

## (undated) DEVICE — DEVICE, ISO SYNERGY ENCOMPASS, LONG

## (undated) DEVICE — ELECTRODE, QUICK-COMBO, EDGE SYSTEM, REDI PACK

## (undated) DEVICE — CONNECTOR, STRAIGHT, 0.375 X 0.375 IN

## (undated) DEVICE — TUBING, SMOKE EVAC, 3/8 X 10 FT

## (undated) DEVICE — TUBE SET, PNEUMOCLEAR, SMOKE EVACU, HIGH-FLOW

## (undated) DEVICE — MICROCOAGULATION TEST, ACT+ TEST CUVETTE

## (undated) DEVICE — MARKER, SKIN, RULER AND LABEL PACK, CUSTOM

## (undated) DEVICE — INSERT, CLAMP, SURGICAL, SOFT/TRACTION, STEALTH, 5 MM

## (undated) DEVICE — COVER, CART, 45 X 27 X 48 IN, CLEAR

## (undated) DEVICE — SUTURE, PROLENE, 4-0, 36 IN, RB1, DA, BLUE

## (undated) DEVICE — SUTURE, PROLENE, 5-0, 30 IN, RB2, DA, BLUE

## (undated) DEVICE — CATHETER, ANGIO, IMPULSE, FL4, 6 FR X 100 CM

## (undated) DEVICE — SUTURE, MONOCRYL, 3-0, 18 IN, PS2, UNDYED

## (undated) DEVICE — KIT, COLLECTION, CARDIO

## (undated) DEVICE — DRAPE, SHEET, CARDIOVASCULAR, ANTIMICROBIAL, W/ANESTHESIA SCREEN, IOBAN 2, STERI DRAPE, 107 X 133 IN, DISPOSABLE, FABRIC, BLUE, STERILE

## (undated) DEVICE — SUTURE, PROLENE 4-0, TAPER POINT, SH-1 BLUE 30 INCH

## (undated) DEVICE — SHUNT, SENSOR

## (undated) DEVICE — DRAPE, INSTRUMENT, W/POUCH, STERI DRAPE, 7 X 11 IN, DISPOSABLE, STERILE

## (undated) DEVICE — PROBE, CRYO-ABLATION MALLEABLE CRYOICE

## (undated) DEVICE — KNIFE, OPHTHALMIC, SLIT, 22.5 DEG

## (undated) DEVICE — COUNTER, NEEDLE, FOAM BLOCK, POP-N-COUNT, W/BLADEGUARD, W/ADHESIVE 40 COUNT, RED

## (undated) DEVICE — CANNULA, EOPA 20F W/O GUIDEWIRE

## (undated) DEVICE — WASH SET, XTRA, 225ML

## (undated) DEVICE — CANNULA, RCSP, SELF-INFLATE, SHAPEABLE STYLET, 18 MM BALLOON, 14 FR X 27 CM

## (undated) DEVICE — DRESSING, ADHESIVE, ISLAND, TELFA, 2 X 3.75 IN, LF

## (undated) DEVICE — PACING WIRE, 1/2 CIRCLE, 26MM NEEDLE, WHITE

## (undated) DEVICE — TRAY, SURESTEP, URINE METER, 14FR, SILICONE

## (undated) DEVICE — SUTURE, PROLENE, 7-0, 30 IN, BV1, DA, BLUE

## (undated) DEVICE — TUBING PACK, OXYGENATOR, ADULT

## (undated) DEVICE — KIT, NAMIC STANDARD LEFT HEART, CUSTOM, LWMC

## (undated) DEVICE — SUTURE, PROLENE, 6-0, 30 IN, C-1, CV-11, BLUE

## (undated) DEVICE — DRESSING, ADHESIVE, ISLAND, TELFA, 4 X 14 IN

## (undated) DEVICE — SUTURE, PROLENE, 5-0, 36 IN, RB1, DA, BLUE

## (undated) DEVICE — PACING CABLE, EXTENSION, 12 FT BEIGE, DISPOSABLE

## (undated) DEVICE — OXYGENATOR FX 25, W/HR, ARTERIAL FILTER

## (undated) DEVICE — MANIFOLD, 4 PORT NEPTUNE STANDARD

## (undated) DEVICE — TUBING, 0.375 X 3/32 IN X 6 FT

## (undated) DEVICE — TAPE, POLYESTER, BRAIDED, PRE-CUT 2 X 30, WHITE"

## (undated) DEVICE — BLADE, SAW STERNUM, STERILE

## (undated) DEVICE — SYRINGE, 20 CC, LUER LOCK, MONOJECT, W/O CAP, LF

## (undated) DEVICE — LEAD WIRE, PACING, BIPOLAR, LOW PROFILE

## (undated) DEVICE — CLEANER, ELECTROSURGICAL, TIP, 5 X 5 CM, LF

## (undated) DEVICE — Device

## (undated) DEVICE — DEVICE, ENDOSCOPIC VESSEL HARVESTING, SAPHENA VENAPAX

## (undated) DEVICE — LOOP, VESSEL, MAXI, BLUE

## (undated) DEVICE — PACK, ANGIO P2, CUSTOM, LAKE

## (undated) DEVICE — DRAPE, FLUID WARMER

## (undated) DEVICE — INSERT, CLAMP EVERGRIP 33

## (undated) DEVICE — DRESSING, MEPILEX, BORDER, SACRUM, 8.7 X 9.8 IN

## (undated) DEVICE — LOOP, VESSEL, MAXI, RED

## (undated) DEVICE — KIT, CELL SAVER, W/COLLECTION SET, 225ML WASH SET

## (undated) DEVICE — KIT, TOURNIQUET, 7"

## (undated) DEVICE — CATHETER, THORACIC, STRAIGHT, ADULT, 28 FR, PVC

## (undated) DEVICE — GUIDEWIRE, J TIP, 3 MM, 0.035 IN X 150 CM, PTFE

## (undated) DEVICE — SUTURE, PROLENE, 3-0, 36 IN, SH, DA, BLUE

## (undated) DEVICE — CATHETER, DRAINAGE, NASOGASTRIC, DOUBLE LUMEN, FUNNEL END, SUMP, SALEM, 18 FR, 48 IN, PVC, STERILE

## (undated) DEVICE — CANNULA, RETROGRADE, 14FR X 32CM

## (undated) DEVICE — DRESSING, MEPILEX, BORDER, HEEL, 8.7 X 9.1 IN

## (undated) DEVICE — KIT, BLOWER / MISTER II

## (undated) DEVICE — DRAPE, SHEET, FAN FOLDED, HALF, 44 X 58 IN, DISPOSABLE, LF, STERILE

## (undated) DEVICE — DRESSING, ISLAND, TELFA, 4 X 5 IN

## (undated) DEVICE — CATHETER, ANGIO, IMPULSE, AR2, 6 FR X 100 CM

## (undated) DEVICE — INTRODUCER, SHEATH, AVANTI PLUS, W/MINI WIRE, STANDARD, 6MS FR, 11 CM

## (undated) DEVICE — CATHETER, EXPO, MODEL-D, 6FR PIG 110CM

## (undated) DEVICE — SENSOR, OXYGEN, CEREBRAL, SOMASENSOR, ADULT

## (undated) DEVICE — APPLICATOR, CHLORAPREP, W/ORANGE TINT, 26ML

## (undated) DEVICE — CASSETTE, BLOOD, PLEGIC SET

## (undated) DEVICE — COVER, XRAY, CASSETTE, UNIVERSAL, 20 X 40 IN

## (undated) DEVICE — PLEDGET, PTFE, SOFT, LARGE, 3/8 X 3/16 X 1/16 IN

## (undated) DEVICE — FILTER, IV, BLOOD, MICROAGGREGATE, 40 MIC, RBC TRANSFUSION

## (undated) DEVICE — COLLECTION UNIT, DRAINAGE, THORACIC, SINGLE TUBE, DRY SUCTION, ATS COMPATIBLE, OASIS 3600, LF

## (undated) DEVICE — DRAPE, SHEET, UTILITY, NON ABSORBENT, 18 X 26 IN, LF

## (undated) DEVICE — GEL, ULTRASOUND, AQUASONIC 100, 20 GM, STERILE

## (undated) DEVICE — RETRACTOR, SUTURE, HOLDING, INSERT, OCTOBASE, DISPOSABLE

## (undated) DEVICE — SUTURE, NUROLON, 0, 18 IN, CT1, DETACHABLE, MULTIPACK, BLACK

## (undated) DEVICE — PUNCH, AORTIC 4MM

## (undated) DEVICE — SUTURE, ETHIBOND, XTRA, 30 IN, 0, CT-1, GREEN

## (undated) DEVICE — CANNULA, CARDIAC SUMP

## (undated) DEVICE — WASH SET, XTRA, 125ML

## (undated) DEVICE — PAD, ELECTRODE DEFIB PADPRO ADULT STRL W/ADAPTER

## (undated) DEVICE — CONNECTOR, Y, 0.375 X 0.375 X 0.5 IN

## (undated) DEVICE — INSERT, EVERGRIP 61

## (undated) DEVICE — SET, SIZER GRAFT

## (undated) DEVICE — GOWN, SURGICAL, SMARTGOWN, XLARGE, STERILE

## (undated) DEVICE — MONITORING KIT, TRANSDUCER, RETROGRADE, MPS, W/EXTENSION, LF

## (undated) DEVICE — CLIPPER, SURGICAL BLADE ASSEMBLY, GENERAL PURPOSE, SINGLE USE

## (undated) DEVICE — PACING CABLE, EXTENSION, 12 FT BLUE, DISPOSABLE

## (undated) DEVICE — INSERT, CLAMP, SURGICAL, SOFT/TRACTION, STEALTH, 1 MM

## (undated) DEVICE — CONNECTOR, STRAIGHT, 0.5 X 0.375 IN

## (undated) DEVICE — BANDAGE, ELASTIC, ACE, ACE, DOUBLE LENGTH, 6 X 550 IN, LF

## (undated) DEVICE — CLOSURE DEVICE, VASCULAR, ANGIO-SEAL, VIP, 6FR, LF